# Patient Record
Sex: FEMALE | Race: ASIAN | NOT HISPANIC OR LATINO | Employment: UNEMPLOYED | URBAN - NONMETROPOLITAN AREA
[De-identification: names, ages, dates, MRNs, and addresses within clinical notes are randomized per-mention and may not be internally consistent; named-entity substitution may affect disease eponyms.]

---

## 2020-01-17 ENCOUNTER — HOSPITAL ENCOUNTER (EMERGENCY)
Facility: HOSPITAL | Age: 45
Discharge: HOME | End: 2020-01-17
Attending: EMERGENCY MEDICINE
Payer: COMMERCIAL

## 2020-01-17 VITALS
SYSTOLIC BLOOD PRESSURE: 204 MMHG | TEMPERATURE: 97.7 F | OXYGEN SATURATION: 97 % | RESPIRATION RATE: 22 BRPM | HEART RATE: 107 BPM | DIASTOLIC BLOOD PRESSURE: 93 MMHG

## 2020-01-17 DIAGNOSIS — J06.9 URI (UPPER RESPIRATORY INFECTION): Primary | ICD-10-CM

## 2020-01-17 PROCEDURE — 99283 EMERGENCY DEPT VISIT LOW MDM: CPT

## 2020-01-17 PROCEDURE — 99284 EMERGENCY DEPT VISIT MOD MDM: CPT | Performed by: PHYSICIAN ASSISTANT

## 2020-01-17 RX ORDER — PREDNISONE 20 MG/1
TABLET ORAL
Qty: 18 TABLET | Refills: 0 | Status: SHIPPED | OUTPATIENT
Start: 2020-01-17 | End: 2020-04-08 | Stop reason: HOSPADM

## 2020-01-17 RX ORDER — AZITHROMYCIN 250 MG/1
TABLET, FILM COATED ORAL
Qty: 6 TABLET | Refills: 0 | Status: SHIPPED | OUTPATIENT
Start: 2020-01-17 | End: 2020-01-21

## 2020-03-28 ENCOUNTER — HOSPITAL ENCOUNTER (EMERGENCY)
Facility: HOSPITAL | Age: 45
Discharge: HOME | End: 2020-03-28
Attending: EMERGENCY MEDICINE | Admitting: EMERGENCY MEDICINE
Payer: COMMERCIAL

## 2020-03-28 ENCOUNTER — APPOINTMENT (EMERGENCY)
Dept: CT IMAGING | Facility: HOSPITAL | Age: 45
End: 2020-03-28
Payer: COMMERCIAL

## 2020-03-28 VITALS
TEMPERATURE: 97.3 F | DIASTOLIC BLOOD PRESSURE: 80 MMHG | HEART RATE: 80 BPM | SYSTOLIC BLOOD PRESSURE: 152 MMHG | RESPIRATION RATE: 18 BRPM | WEIGHT: 236.33 LBS | OXYGEN SATURATION: 98 %

## 2020-03-28 DIAGNOSIS — M54.9 BACK PAIN: Primary | ICD-10-CM

## 2020-03-28 LAB
ALBUMIN SERPL BCP-MCNC: 2.5 G/DL (ref 3.5–5)
ALP SERPL-CCNC: 99 U/L (ref 46–116)
ALT SERPL W P-5'-P-CCNC: 14 U/L (ref 12–78)
ANION GAP SERPL CALCULATED.3IONS-SCNC: 7 MMOL/L (ref 4–13)
AST SERPL W P-5'-P-CCNC: 9 U/L (ref 5–45)
BACTERIA UR QL AUTO: NORMAL /HPF
BASOPHILS # BLD AUTO: 0.1 THOUSANDS/ΜL (ref 0–0.1)
BASOPHILS NFR BLD AUTO: 1 % (ref 0–1)
BILIRUB SERPL-MCNC: 0.2 MG/DL (ref 0.2–1)
BILIRUB UR QL STRIP: NEGATIVE
BUN SERPL-MCNC: 17 MG/DL (ref 5–25)
CALCIUM SERPL-MCNC: 9.1 MG/DL (ref 8.3–10.1)
CHLORIDE SERPL-SCNC: 103 MMOL/L (ref 100–108)
CLARITY UR: CLEAR
CO2 SERPL-SCNC: 27 MMOL/L (ref 21–32)
COLOR UR: YELLOW
CREAT SERPL-MCNC: 1.11 MG/DL (ref 0.6–1.3)
EOSINOPHIL # BLD AUTO: 0.53 THOUSAND/ΜL (ref 0–0.61)
EOSINOPHIL NFR BLD AUTO: 5 % (ref 0–6)
ERYTHROCYTE [DISTWIDTH] IN BLOOD BY AUTOMATED COUNT: 21.7 % (ref 11.6–15.1)
GFR SERPL CREATININE-BSD FRML MDRD: 60 ML/MIN/1.73SQ M
GLUCOSE SERPL-MCNC: 105 MG/DL (ref 65–140)
GLUCOSE UR STRIP-MCNC: NEGATIVE MG/DL
HCT VFR BLD AUTO: 32.6 % (ref 34.8–46.1)
HGB BLD-MCNC: 9.7 G/DL (ref 11.5–15.4)
HGB UR QL STRIP.AUTO: NEGATIVE
IMM GRANULOCYTES # BLD AUTO: 0.05 THOUSAND/UL (ref 0–0.2)
IMM GRANULOCYTES NFR BLD AUTO: 0 % (ref 0–2)
KETONES UR STRIP-MCNC: NEGATIVE MG/DL
LACTATE SERPL-SCNC: 1.2 MMOL/L (ref 0.5–2)
LEUKOCYTE ESTERASE UR QL STRIP: NEGATIVE
LIPASE SERPL-CCNC: 112 U/L (ref 73–393)
LYMPHOCYTES # BLD AUTO: 2.65 THOUSANDS/ΜL (ref 0.6–4.47)
LYMPHOCYTES NFR BLD AUTO: 23 % (ref 14–44)
MCH RBC QN AUTO: 23.4 PG (ref 26.8–34.3)
MCHC RBC AUTO-ENTMCNC: 29.8 G/DL (ref 31.4–37.4)
MCV RBC AUTO: 79 FL (ref 82–98)
MONOCYTES # BLD AUTO: 1.04 THOUSAND/ΜL (ref 0.17–1.22)
MONOCYTES NFR BLD AUTO: 9 % (ref 4–12)
NEUTROPHILS # BLD AUTO: 7.31 THOUSANDS/ΜL (ref 1.85–7.62)
NEUTS SEG NFR BLD AUTO: 62 % (ref 43–75)
NITRITE UR QL STRIP: NEGATIVE
NON-SQ EPI CELLS URNS QL MICRO: NORMAL /HPF
NRBC BLD AUTO-RTO: 0 /100 WBCS
PH UR STRIP.AUTO: 7 [PH]
PLATELET # BLD AUTO: 411 THOUSANDS/UL (ref 149–390)
PMV BLD AUTO: 9.7 FL (ref 8.9–12.7)
POTASSIUM SERPL-SCNC: 4.6 MMOL/L (ref 3.5–5.3)
PROT SERPL-MCNC: 7.6 G/DL (ref 6.4–8.2)
PROT UR STRIP-MCNC: ABNORMAL MG/DL
RBC # BLD AUTO: 4.15 MILLION/UL (ref 3.81–5.12)
RBC #/AREA URNS AUTO: NORMAL /HPF
SODIUM SERPL-SCNC: 137 MMOL/L (ref 136–145)
SP GR UR STRIP.AUTO: 1.02 (ref 1–1.03)
UROBILINOGEN UR QL STRIP.AUTO: 0.2 E.U./DL
WBC # BLD AUTO: 11.68 THOUSAND/UL (ref 4.31–10.16)
WBC #/AREA URNS AUTO: NORMAL /HPF

## 2020-03-28 PROCEDURE — 96374 THER/PROPH/DIAG INJ IV PUSH: CPT

## 2020-03-28 PROCEDURE — 81001 URINALYSIS AUTO W/SCOPE: CPT | Performed by: PHYSICIAN ASSISTANT

## 2020-03-28 PROCEDURE — 96361 HYDRATE IV INFUSION ADD-ON: CPT

## 2020-03-28 PROCEDURE — 87040 BLOOD CULTURE FOR BACTERIA: CPT | Performed by: PHYSICIAN ASSISTANT

## 2020-03-28 PROCEDURE — 80053 COMPREHEN METABOLIC PANEL: CPT | Performed by: PHYSICIAN ASSISTANT

## 2020-03-28 PROCEDURE — 36415 COLL VENOUS BLD VENIPUNCTURE: CPT | Performed by: PHYSICIAN ASSISTANT

## 2020-03-28 PROCEDURE — 83690 ASSAY OF LIPASE: CPT | Performed by: PHYSICIAN ASSISTANT

## 2020-03-28 PROCEDURE — 83605 ASSAY OF LACTIC ACID: CPT | Performed by: PHYSICIAN ASSISTANT

## 2020-03-28 PROCEDURE — 74177 CT ABD & PELVIS W/CONTRAST: CPT

## 2020-03-28 PROCEDURE — 85025 COMPLETE CBC W/AUTO DIFF WBC: CPT | Performed by: PHYSICIAN ASSISTANT

## 2020-03-28 PROCEDURE — 99284 EMERGENCY DEPT VISIT MOD MDM: CPT

## 2020-03-28 PROCEDURE — 99284 EMERGENCY DEPT VISIT MOD MDM: CPT | Performed by: PHYSICIAN ASSISTANT

## 2020-03-28 RX ORDER — KETOROLAC TROMETHAMINE 30 MG/ML
15 INJECTION, SOLUTION INTRAMUSCULAR; INTRAVENOUS ONCE
Status: COMPLETED | OUTPATIENT
Start: 2020-03-28 | End: 2020-03-28

## 2020-03-28 RX ORDER — NAPROXEN 250 MG/1
250 TABLET ORAL 2 TIMES DAILY WITH MEALS
Qty: 15 TABLET | Refills: 0 | Status: SHIPPED | OUTPATIENT
Start: 2020-03-28 | End: 2020-04-08 | Stop reason: HOSPADM

## 2020-03-28 RX ADMIN — KETOROLAC TROMETHAMINE 15 MG: 30 INJECTION, SOLUTION INTRAMUSCULAR at 19:38

## 2020-03-28 RX ADMIN — IOHEXOL 100 ML: 350 INJECTION, SOLUTION INTRAVENOUS at 19:02

## 2020-03-28 RX ADMIN — SODIUM CHLORIDE 1000 ML: 0.9 INJECTION, SOLUTION INTRAVENOUS at 18:11

## 2020-03-28 NOTE — ED PROVIDER NOTES
History  Chief Complaint   Patient presents with    Flank Pain     pt c/o left flank pain and left groin pain with increasing pain on urination  patient also states she has sweats and chills     Patient presents to the emergency department today for evaluation left low back pain  She states this commenced about 5 days ago  She states it radiates around the left side of her abdomen low  She also states that she has some burning with urination, urinary frequency, voiding small amounts of urine as well  No vaginal bleeding discharge  Denies cough or fever  No trauma  Prior to Admission Medications   Prescriptions Last Dose Informant Patient Reported? Taking?   predniSONE 20 mg tablet   No No   Sig: Take 3 tablets daily for 3 days followed by 2 tablets daily for 3 days followed by 1 tablet daily for 3 days      Facility-Administered Medications: None       Past Medical History:   Diagnosis Date    Asthma     Diabetes mellitus ([address] )     Hypertension        Past Surgical History:   Procedure Laterality Date     SECTION         History reviewed  No pertinent family history  I have reviewed and agree with the history as documented  E-Cigarette/Vaping    E-Cigarette Use Never User      E-Cigarette/Vaping Substances     Social History     Tobacco Use    Smoking status: Never Smoker    Smokeless tobacco: Never Used   Substance Use Topics    Alcohol use: Not Currently    Drug use: Never       Review of Systems   Constitutional: Negative  Negative for chills and fever  HENT: Negative  Negative for sore throat and trouble swallowing  Eyes: Negative  Respiratory: Negative  Negative for cough, shortness of breath and wheezing  Cardiovascular: Negative  Negative for chest pain and leg swelling  Gastrointestinal: Positive for abdominal pain  Negative for blood in stool and vomiting  Endocrine: Negative      Genitourinary: Positive for difficulty urinating, dysuria, flank pain and frequency  Musculoskeletal: Positive for back pain  Negative for neck stiffness  Skin: Negative  Allergic/Immunologic: Negative  Neurological: Negative  Negative for dizziness, seizures, speech difficulty, weakness, light-headedness, numbness and headaches  Hematological: Negative  Psychiatric/Behavioral: Negative  All other systems reviewed and are negative  Physical Exam  Physical Exam   Constitutional: She is oriented to person, place, and time  Vital signs are normal  She appears well-developed and well-nourished  She does not have a sickly appearance  She does not appear ill  No distress  HENT:   Right Ear: External ear normal  No swelling  Tympanic membrane is not bulging  Left Ear: External ear normal  No swelling  Tympanic membrane is not bulging  Nose: Nose normal    Mouth/Throat: Oropharynx is clear and moist  No oropharyngeal exudate  Eyes: Pupils are equal, round, and reactive to light  Conjunctivae, EOM and lids are normal    Neck: Normal range of motion  Neck supple  No JVD present  No tracheal deviation, no edema and normal range of motion present  No thyromegaly present  Cardiovascular: Normal rate, regular rhythm, normal heart sounds, intact distal pulses and normal pulses  Exam reveals no gallop and no friction rub  No murmur heard  Pulmonary/Chest: Effort normal and breath sounds normal  No stridor  No respiratory distress  She has no wheezes  She has no rales  She exhibits no tenderness  Abdominal: Soft  Bowel sounds are normal  She exhibits no distension and no mass  There is tenderness  There is no rebound, no guarding and negative Campos's sign  No hernia  Lower quadrant abdominal 10   Musculoskeletal: Normal range of motion  She exhibits tenderness  She exhibits no edema  Left low back tenderness   Lymphadenopathy:     She has no cervical adenopathy  Neurological: She is alert and oriented to person, place, and time   She has normal strength and normal reflexes  No cranial nerve deficit or sensory deficit  GCS eye subscore is 4  GCS verbal subscore is 5  GCS motor subscore is 6  Skin: Skin is warm and dry  Capillary refill takes less than 2 seconds  No rash noted  She is not diaphoretic  No erythema  No pallor  Psychiatric: She has a normal mood and affect  Her speech is normal and behavior is normal    Vitals reviewed  Vital Signs  ED Triage Vitals [03/28/20 1757]   Temperature Pulse Respirations Blood Pressure SpO2   (!) 97 3 °F (36 3 °C) 88 20 (!) 189/82 99 %      Temp Source Heart Rate Source Patient Position - Orthostatic VS BP Location FiO2 (%)   Temporal Monitor Lying Right arm --      Pain Score       Worst Possible Pain           Vitals:    03/28/20 1845 03/28/20 1915 03/28/20 1930 03/28/20 1938   BP: 168/77   152/80   Pulse: 90 89 87 80   Patient Position - Orthostatic VS: Lying   Lying         Visual Acuity      ED Medications  Medications   sodium chloride 0 9 % bolus 1,000 mL (0 mL Intravenous Stopped 3/28/20 1935)   iohexol (OMNIPAQUE) 350 MG/ML injection (SINGLE-DOSE) 100 mL (100 mL Intravenous Given 3/28/20 1902)   ketorolac (TORADOL) injection 15 mg (15 mg Intravenous Given 3/28/20 1938)       Diagnostic Studies  Results Reviewed     Procedure Component Value Units Date/Time    Lactic acid, plasma x2 [[id number]]  (Normal) Collected:  03/28/20 1808    Lab Status:  Final result Specimen:  Blood from Arm, Left Updated:  03/28/20 1844     LACTIC ACID 1 2 mmol/L     Narrative:       Result may be elevated if tourniquet was used during collection      Comprehensive metabolic panel [[id number]]  (Abnormal) Collected:  03/28/20 1808    Lab Status:  Final result Specimen:  Blood from Arm, Left Updated:  03/28/20 1840     Sodium 137 mmol/L      Potassium 4 6 mmol/L      Chloride 103 mmol/L      CO2 27 mmol/L      ANION GAP 7 mmol/L      BUN 17 mg/dL      Creatinine 1 11 mg/dL      Glucose 105 mg/dL      Calcium 9 1 mg/dL      AST 9 U/L ALT 14 U/L      Alkaline Phosphatase 99 U/L      Total Protein 7 6 g/dL      Albumin 2 5 g/dL      Total Bilirubin 0 20 mg/dL      eGFR 60 ml/min/1 73sq m     Narrative:       [place] guidelines for Chronic Kidney Disease (CKD):     Stage 1 with normal or high GFR (GFR > 90 mL/min/1 73 square meters)    Stage 2 Mild CKD (GFR = 60-89 mL/min/1 73 square meters)    Stage 3A Moderate CKD (GFR = 45-59 mL/min/1 73 square meters)    Stage 3B Moderate CKD (GFR = 30-44 mL/min/1 73 square meters)    Stage 4 Severe CKD (GFR = 15-29 mL/min/1 73 square meters)    Stage 5 End Stage CKD (GFR <15 mL/min/1 73 square meters)  Note: GFR calculation is accurate only with a steady state creatinine    Urine Microscopic [[id number]]  (Normal) Collected:  03/28/20 1802    Lab Status:  Final result Specimen:  Urine, Clean Catch Updated:  03/28/20 1840     RBC, UA None Seen /hpf      WBC, UA None Seen /hpf      Epithelial Cells Occasional /hpf      Bacteria, UA Occasional /hpf     Lipase [[id number]]  (Normal) Collected:  03/28/20 1808    Lab Status:  Final result Specimen:  Blood from Arm, Left Updated:  03/28/20 1836     Lipase 112 u/L     UA w Reflex to Microscopic w Reflex to Culture [[id number]]  (Abnormal) Collected:  03/28/20 1802    Lab Status:  Final result Specimen:  Urine, Clean Catch Updated:  03/28/20 1825     Color, UA Yellow     Clarity, UA Clear     Specific [place], UA 1 020     pH, UA 7 0     Leukocytes, UA Negative     Nitrite, UA Negative     Protein,  (2+) mg/dl      Glucose, UA Negative mg/dl      Ketones, UA Negative mg/dl      Urobilinogen, UA 0 2 E U /dl      Bilirubin, UA Negative     Blood, UA Negative    CBC and differential [[id number]]  (Abnormal) Collected:  03/28/20 1809    Lab Status:  Final result Specimen:  Blood from Arm, Left Updated:  03/28/20 1824     WBC 11 68 Thousand/uL      RBC 4 15 Million/uL      Hemoglobin 9 7 g/dL      Hematocrit 32 6 %      MCV 79 fL      MCH 23 4 pg      MCHC 29 8 g/dL      RDW 21 7 %      MPV 9 7 fL      Platelets 101 Thousands/uL      nRBC 0 /100 WBCs      Neutrophils Relative 62 %      Immat GRANS % 0 %      Lymphocytes Relative 23 %      Monocytes Relative 9 %      Eosinophils Relative 5 %      Basophils Relative 1 %      Neutrophils Absolute 7 31 Thousands/µL      Immature Grans Absolute 0 05 Thousand/uL      Lymphocytes Absolute 2 65 Thousands/µL      Monocytes Absolute 1 04 Thousand/µL      Eosinophils Absolute 0 53 Thousand/µL      Basophils Absolute 0 10 Thousands/µL     Blood culture #2 [[id number]] Collected:  03/28/20 1808    Lab Status: In process Specimen:  Blood from Arm, Left Updated:  03/28/20 1821    Blood culture #1 [[id number]] Collected:  03/28/20 1808    Lab Status: In process Specimen:  Blood from Arm, Right Updated:  03/28/20 1821                 CT abdomen pelvis with contrast   Final Result by Adelaida Palomo MD (03/28 1917)      No acute pathology              Workstation performed: [id number]                    Procedures  Procedures         ED Course  ED Course as of Mar 28 1940   Sat Mar 28, 2020   1807 Blood Pressure(!): 189/82   1807 Temperature(!): 97 3 °F (36 3 °C)   1807 Pulse: 88   1807 Respirations: 20   1807 SpO2: 99 %   1827 Patient does receive IV infusion of iron      1828 Blood, UA: Negative   1828 Ketones, UA: Negative   1828 POCT URINE PROTEIN(!): 100 (2+)   1828 Nitrite, UA: Negative   1828 Leukocytes, UA: Negative   1828 Color, UA: Yellow   1836 Lipase: 112   1858 LACTIC ACID: 1 2   1858 Sodium: 137   1858 Potassium: 4 6   1858 Chloride: 103   1858 BUN: 17   1858 Calcium: 9 1   1858 Lipase: 112   1859 Pt at [address]:     ABDOMEN     LOWER CHEST:  No clinically significant abnormality identified in the visualized lower chest      LIVER/BILIARY TREE:  One or more subcentimeter sharply circumscribed low-density hepatic lesion(s) are noted, too small to accurately characterize, but statistically most likely to represent subcentimeter hepatic cysts  No suspicious solid hepatic   lesion is identified  Hepatic contours are normal   No biliary dilatation      GALLBLADDER:  No calcified gallstones  No pericholecystic inflammatory change      SPLEEN:  Unremarkable      PANCREAS:  Unremarkable      ADRENAL GLANDS:  Unremarkable      KIDNEYS/URETERS:  Unremarkable  No hydronephrosis      STOMACH AND BOWEL:  Unremarkable      APPENDIX:  No findings to suggest appendicitis      ABDOMINOPELVIC CAVITY:  No ascites or free intraperitoneal air  No lymphadenopathy      VESSELS:  Unremarkable for patient's age      PELVIS     REPRODUCTIVE ORGANS:  Unremarkable for patient's age      URINARY BLADDER:  Unremarkable      ABDOMINAL WALL/INGUINAL REGIONS:  There is a small fat-containing umbilical hernia      OSSEOUS STRUCTURES:  No acute fracture or destructive osseous lesion      IMPRESSION:     No acute pathology                                        MDM      Disposition  Final diagnoses:   Back pain     Time reflects when diagnosis was documented in both MDM as applicable and the Disposition within this note     Time User Action Codes Description Comment    3/28/2020  7:35 PM Cuca Marie [M54 9] Back pain       ED Disposition     ED Disposition Condition Date/Time Comment    Discharge Stable Sat Mar 28, 2020  7:35  [facility] Sree discharge to home/self care  Follow-up Information     Follow up With Specialties Details Why Marco Faulkner 8, DO Family Medicine Schedule an appointment as soon as possible for a visit  As needed [address]  [phone]            Patient's Medications   Discharge Prescriptions    NAPROXEN (NAPROSYN) 250 MG TABLET    Take 1 tablet (250 mg total) by mouth 2 (two) times a day with meals       Start Date: 3/28/2020 End Date: --       Order Dose: 250 mg       Quantity: 15 tablet    Refills: 0     No discharge procedures on file      PDMP Review None          ED Provider  Electronically Signed by           Rodrick Boast, PA-C  03/28/20 1940

## 2020-04-03 LAB
BACTERIA BLD CULT: NORMAL
BACTERIA BLD CULT: NORMAL

## 2020-04-06 ENCOUNTER — APPOINTMENT (EMERGENCY)
Dept: CT IMAGING | Facility: HOSPITAL | Age: 45
End: 2020-04-06
Payer: COMMERCIAL

## 2020-04-06 ENCOUNTER — HOSPITAL ENCOUNTER (OUTPATIENT)
Facility: HOSPITAL | Age: 45
Setting detail: OBSERVATION
Discharge: HOME | End: 2020-04-08
Attending: EMERGENCY MEDICINE | Admitting: INTERNAL MEDICINE
Payer: COMMERCIAL

## 2020-04-06 ENCOUNTER — APPOINTMENT (EMERGENCY)
Dept: RADIOLOGY | Facility: HOSPITAL | Age: 45
End: 2020-04-06
Payer: COMMERCIAL

## 2020-04-06 DIAGNOSIS — I77.9 AORTA DISORDER (HCC): ICD-10-CM

## 2020-04-06 DIAGNOSIS — D50.9 IRON DEFICIENCY ANEMIA, UNSPECIFIED IRON DEFICIENCY ANEMIA TYPE: ICD-10-CM

## 2020-04-06 DIAGNOSIS — R31.9 HEMATURIA: ICD-10-CM

## 2020-04-06 DIAGNOSIS — R07.2 RETROSTERNAL CHEST PAIN: Primary | ICD-10-CM

## 2020-04-06 DIAGNOSIS — I27.20 PULMONARY HYPERTENSION (HCC): ICD-10-CM

## 2020-04-06 DIAGNOSIS — R10.9 LEFT FLANK PAIN: ICD-10-CM

## 2020-04-06 PROBLEM — E11.69 DIABETES MELLITUS TYPE 2 IN OBESE (HCC): Status: ACTIVE | Noted: 2020-04-06

## 2020-04-06 PROBLEM — E04.2 MULTIPLE THYROID NODULES: Status: ACTIVE | Noted: 2020-04-06

## 2020-04-06 PROBLEM — D75.839 THROMBOCYTOSIS: Status: ACTIVE | Noted: 2020-04-06

## 2020-04-06 PROBLEM — E66.9 DIABETES MELLITUS TYPE 2 IN OBESE (HCC): Status: ACTIVE | Noted: 2020-04-06

## 2020-04-06 LAB
ALBUMIN SERPL BCP-MCNC: 2.3 G/DL (ref 3.5–5)
ALP SERPL-CCNC: 279 U/L (ref 46–116)
ALT SERPL W P-5'-P-CCNC: 60 U/L (ref 12–78)
ANION GAP SERPL CALCULATED.3IONS-SCNC: 11 MMOL/L (ref 4–13)
AST SERPL W P-5'-P-CCNC: 39 U/L (ref 5–45)
BACTERIA UR QL AUTO: ABNORMAL /HPF
BASOPHILS # BLD AUTO: 0.09 THOUSANDS/ΜL (ref 0–0.1)
BASOPHILS NFR BLD AUTO: 1 % (ref 0–1)
BILIRUB SERPL-MCNC: 0.2 MG/DL (ref 0.2–1)
BILIRUB UR QL STRIP: NEGATIVE
BUN SERPL-MCNC: 14 MG/DL (ref 5–25)
CALCIUM SERPL-MCNC: 9.8 MG/DL (ref 8.3–10.1)
CHLORIDE SERPL-SCNC: 101 MMOL/L (ref 100–108)
CLARITY UR: ABNORMAL
CO2 SERPL-SCNC: 24 MMOL/L (ref 21–32)
COLOR UR: ABNORMAL
CREAT SERPL-MCNC: 0.87 MG/DL (ref 0.6–1.3)
EOSINOPHIL # BLD AUTO: 0.34 THOUSAND/ΜL (ref 0–0.61)
EOSINOPHIL NFR BLD AUTO: 2 % (ref 0–6)
ERYTHROCYTE [DISTWIDTH] IN BLOOD BY AUTOMATED COUNT: 20.9 % (ref 11.6–15.1)
EXT PREG TEST URINE: NEGATIVE
EXT. CONTROL ED NAV: NORMAL
GFR SERPL CREATININE-BSD FRML MDRD: 80 ML/MIN/1.73SQ M
GLUCOSE SERPL-MCNC: 118 MG/DL (ref 65–140)
GLUCOSE SERPL-MCNC: 132 MG/DL (ref 65–140)
GLUCOSE UR STRIP-MCNC: NEGATIVE MG/DL
HCT VFR BLD AUTO: 31.8 % (ref 34.8–46.1)
HGB BLD-MCNC: 9.6 G/DL (ref 11.5–15.4)
HGB UR QL STRIP.AUTO: ABNORMAL
HOLD SPECIMEN: NORMAL
IMM GRANULOCYTES # BLD AUTO: 0.11 THOUSAND/UL (ref 0–0.2)
IMM GRANULOCYTES NFR BLD AUTO: 1 % (ref 0–2)
INR PPP: 1.11 (ref 0.84–1.19)
KETONES UR STRIP-MCNC: NEGATIVE MG/DL
LEUKOCYTE ESTERASE UR QL STRIP: ABNORMAL
LIPASE SERPL-CCNC: 99 U/L (ref 73–393)
LYMPHOCYTES # BLD AUTO: 2.4 THOUSANDS/ΜL (ref 0.6–4.47)
LYMPHOCYTES NFR BLD AUTO: 16 % (ref 14–44)
MAGNESIUM SERPL-MCNC: 2 MG/DL (ref 1.6–2.6)
MCH RBC QN AUTO: 23.1 PG (ref 26.8–34.3)
MCHC RBC AUTO-ENTMCNC: 30.2 G/DL (ref 31.4–37.4)
MCV RBC AUTO: 76 FL (ref 82–98)
MONOCYTES # BLD AUTO: 1.24 THOUSAND/ΜL (ref 0.17–1.22)
MONOCYTES NFR BLD AUTO: 8 % (ref 4–12)
NEUTROPHILS # BLD AUTO: 10.76 THOUSANDS/ΜL (ref 1.85–7.62)
NEUTS SEG NFR BLD AUTO: 72 % (ref 43–75)
NITRITE UR QL STRIP: NEGATIVE
NON-SQ EPI CELLS URNS QL MICRO: ABNORMAL /HPF
NRBC BLD AUTO-RTO: 0 /100 WBCS
NT-PROBNP SERPL-MCNC: 353 PG/ML
PH UR STRIP.AUTO: 5.5 [PH]
PLATELET # BLD AUTO: 612 THOUSANDS/UL (ref 149–390)
PLATELET # BLD AUTO: 644 THOUSANDS/UL (ref 149–390)
PMV BLD AUTO: 9.2 FL (ref 8.9–12.7)
PMV BLD AUTO: 9.7 FL (ref 8.9–12.7)
POTASSIUM SERPL-SCNC: 4.2 MMOL/L (ref 3.5–5.3)
PROT SERPL-MCNC: 8.8 G/DL (ref 6.4–8.2)
PROT UR STRIP-MCNC: >=300 MG/DL
PROTHROMBIN TIME: 14.3 SECONDS (ref 11.6–14.5)
RBC # BLD AUTO: 4.16 MILLION/UL (ref 3.81–5.12)
RBC #/AREA URNS AUTO: ABNORMAL /HPF
SODIUM SERPL-SCNC: 136 MMOL/L (ref 136–145)
SP GR UR STRIP.AUTO: 1.02 (ref 1–1.03)
TROPONIN I SERPL-MCNC: <0.02 NG/ML
URINE COMMENT: ABNORMAL
UROBILINOGEN UR QL STRIP.AUTO: 0.2 E.U./DL
WBC # BLD AUTO: 14.94 THOUSAND/UL (ref 4.31–10.16)
WBC #/AREA URNS AUTO: ABNORMAL /HPF

## 2020-04-06 PROCEDURE — 99285 EMERGENCY DEPT VISIT HI MDM: CPT | Performed by: EMERGENCY MEDICINE

## 2020-04-06 PROCEDURE — 83735 ASSAY OF MAGNESIUM: CPT | Performed by: EMERGENCY MEDICINE

## 2020-04-06 PROCEDURE — 84484 ASSAY OF TROPONIN QUANT: CPT

## 2020-04-06 PROCEDURE — 71045 X-RAY EXAM CHEST 1 VIEW: CPT

## 2020-04-06 PROCEDURE — 84484 ASSAY OF TROPONIN QUANT: CPT | Performed by: EMERGENCY MEDICINE

## 2020-04-06 PROCEDURE — 85610 PROTHROMBIN TIME: CPT | Performed by: EMERGENCY MEDICINE

## 2020-04-06 PROCEDURE — 81025 URINE PREGNANCY TEST: CPT | Performed by: EMERGENCY MEDICINE

## 2020-04-06 PROCEDURE — 84484 ASSAY OF TROPONIN QUANT: CPT | Performed by: NURSE PRACTITIONER

## 2020-04-06 PROCEDURE — 81001 URINALYSIS AUTO W/SCOPE: CPT | Performed by: EMERGENCY MEDICINE

## 2020-04-06 PROCEDURE — 93005 ELECTROCARDIOGRAM TRACING: CPT

## 2020-04-06 PROCEDURE — 96375 TX/PRO/DX INJ NEW DRUG ADDON: CPT

## 2020-04-06 PROCEDURE — 85025 COMPLETE CBC W/AUTO DIFF WBC: CPT

## 2020-04-06 PROCEDURE — 99220 PR INITIAL OBSERVATION CARE/DAY 70 MINUTES: CPT | Performed by: NURSE PRACTITIONER

## 2020-04-06 PROCEDURE — 71275 CT ANGIOGRAPHY CHEST: CPT

## 2020-04-06 PROCEDURE — 74175 CTA ABDOMEN W/CONTRAST: CPT

## 2020-04-06 PROCEDURE — 51798 US URINE CAPACITY MEASURE: CPT

## 2020-04-06 PROCEDURE — 80053 COMPREHEN METABOLIC PANEL: CPT

## 2020-04-06 PROCEDURE — 36415 COLL VENOUS BLD VENIPUNCTURE: CPT

## 2020-04-06 PROCEDURE — 83880 ASSAY OF NATRIURETIC PEPTIDE: CPT | Performed by: EMERGENCY MEDICINE

## 2020-04-06 PROCEDURE — 99285 EMERGENCY DEPT VISIT HI MDM: CPT

## 2020-04-06 PROCEDURE — 82948 REAGENT STRIP/BLOOD GLUCOSE: CPT

## 2020-04-06 PROCEDURE — 96374 THER/PROPH/DIAG INJ IV PUSH: CPT

## 2020-04-06 PROCEDURE — 83690 ASSAY OF LIPASE: CPT | Performed by: EMERGENCY MEDICINE

## 2020-04-06 PROCEDURE — 85049 AUTOMATED PLATELET COUNT: CPT | Performed by: NURSE PRACTITIONER

## 2020-04-06 PROCEDURE — 83036 HEMOGLOBIN GLYCOSYLATED A1C: CPT | Performed by: NURSE PRACTITIONER

## 2020-04-06 RX ORDER — ESCITALOPRAM OXALATE 10 MG/1
10 TABLET ORAL DAILY
Status: DISCONTINUED | OUTPATIENT
Start: 2020-04-07 | End: 2020-04-08 | Stop reason: HOSPADM

## 2020-04-06 RX ORDER — MELATONIN
5000 WEEKLY
COMMUNITY
End: 2020-05-28 | Stop reason: ALTCHOICE

## 2020-04-06 RX ORDER — IRON POLYSACCHARIDE COMPLEX 150 MG
150 CAPSULE ORAL 2 TIMES DAILY
COMMUNITY
End: 2020-04-08 | Stop reason: HOSPADM

## 2020-04-06 RX ORDER — FOLIC ACID 1 MG/1
1 TABLET ORAL DAILY
Status: DISCONTINUED | OUTPATIENT
Start: 2020-04-07 | End: 2020-04-08 | Stop reason: HOSPADM

## 2020-04-06 RX ORDER — MONTELUKAST SODIUM 10 MG/1
10 TABLET ORAL
COMMUNITY

## 2020-04-06 RX ORDER — ONDANSETRON 2 MG/ML
4 INJECTION INTRAMUSCULAR; INTRAVENOUS ONCE
Status: COMPLETED | OUTPATIENT
Start: 2020-04-06 | End: 2020-04-06

## 2020-04-06 RX ORDER — METOPROLOL TARTRATE 5 MG/5ML
2.5 INJECTION INTRAVENOUS EVERY 6 HOURS PRN
Status: DISCONTINUED | OUTPATIENT
Start: 2020-04-06 | End: 2020-04-08 | Stop reason: HOSPADM

## 2020-04-06 RX ORDER — LOSARTAN POTASSIUM 100 MG/1
100 TABLET ORAL DAILY
COMMUNITY

## 2020-04-06 RX ORDER — ALBUTEROL SULFATE 90 UG/1
2 AEROSOL, METERED RESPIRATORY (INHALATION) 2 TIMES DAILY
COMMUNITY

## 2020-04-06 RX ORDER — SIMVASTATIN 10 MG
10 TABLET ORAL
COMMUNITY
End: 2020-05-19 | Stop reason: HOSPADM

## 2020-04-06 RX ORDER — IRON POLYSACCHARIDE COMPLEX 150 MG
150 CAPSULE ORAL 2 TIMES DAILY
Status: DISCONTINUED | OUTPATIENT
Start: 2020-04-07 | End: 2020-04-08 | Stop reason: HOSPADM

## 2020-04-06 RX ORDER — FOLIC ACID 1 MG/1
TABLET ORAL DAILY
COMMUNITY
End: 2020-04-08 | Stop reason: HOSPADM

## 2020-04-06 RX ORDER — LOSARTAN POTASSIUM 25 MG/1
25 TABLET ORAL DAILY
Status: DISCONTINUED | OUTPATIENT
Start: 2020-04-07 | End: 2020-04-08

## 2020-04-06 RX ORDER — HYDROMORPHONE HCL/PF 1 MG/ML
0.5 SYRINGE (ML) INJECTION ONCE
Status: COMPLETED | OUTPATIENT
Start: 2020-04-07 | End: 2020-04-07

## 2020-04-06 RX ORDER — PRAVASTATIN SODIUM 20 MG
20 TABLET ORAL
Status: DISCONTINUED | OUTPATIENT
Start: 2020-04-07 | End: 2020-04-08 | Stop reason: HOSPADM

## 2020-04-06 RX ORDER — ESCITALOPRAM OXALATE 10 MG/1
10 TABLET ORAL DAILY
COMMUNITY

## 2020-04-06 RX ORDER — MORPHINE SULFATE 10 MG/ML
5 INJECTION, SOLUTION INTRAMUSCULAR; INTRAVENOUS ONCE
Status: COMPLETED | OUTPATIENT
Start: 2020-04-06 | End: 2020-04-06

## 2020-04-06 RX ORDER — BACLOFEN 10 MG/1
10 TABLET ORAL ONCE
Status: COMPLETED | OUTPATIENT
Start: 2020-04-06 | End: 2020-04-06

## 2020-04-06 RX ORDER — LABETALOL 20 MG/4 ML (5 MG/ML) INTRAVENOUS SYRINGE
10 ONCE
Status: COMPLETED | OUTPATIENT
Start: 2020-04-06 | End: 2020-04-06

## 2020-04-06 RX ADMIN — MORPHINE SULFATE 5 MG: 10 INJECTION INTRAVENOUS at 16:14

## 2020-04-06 RX ADMIN — BACLOFEN 10 MG: 10 TABLET ORAL at 23:00

## 2020-04-06 RX ADMIN — LABETALOL 20 MG/4 ML (5 MG/ML) INTRAVENOUS SYRINGE 10 MG: at 18:48

## 2020-04-06 RX ADMIN — ONDANSETRON 4 MG: 2 INJECTION INTRAMUSCULAR; INTRAVENOUS at 16:13

## 2020-04-06 RX ADMIN — IOHEXOL 100 ML: 350 INJECTION, SOLUTION INTRAVENOUS at 16:32

## 2020-04-07 ENCOUNTER — APPOINTMENT (OUTPATIENT)
Dept: NON INVASIVE DIAGNOSTICS | Facility: HOSPITAL | Age: 45
End: 2020-04-07
Payer: COMMERCIAL

## 2020-04-07 PROBLEM — I71.00 INTRAMURAL AORTIC HEMATOMA (HCC): Status: ACTIVE | Noted: 2020-04-06

## 2020-04-07 PROBLEM — I27.20 PULMONARY HYPERTENSION (HCC): Status: ACTIVE | Noted: 2020-04-07

## 2020-04-07 LAB
ALBUMIN SERPL BCP-MCNC: 2 G/DL (ref 3.5–5)
ALP SERPL-CCNC: 254 U/L (ref 46–116)
ALT SERPL W P-5'-P-CCNC: 45 U/L (ref 12–78)
ANION GAP SERPL CALCULATED.3IONS-SCNC: 11 MMOL/L (ref 4–13)
AST SERPL W P-5'-P-CCNC: 24 U/L (ref 5–45)
ATRIAL RATE: 76 BPM
ATRIAL RATE: 83 BPM
ATRIAL RATE: 85 BPM
ATRIAL RATE: 85 BPM
ATRIAL RATE: 90 BPM
BILIRUB SERPL-MCNC: 0.2 MG/DL (ref 0.2–1)
BUN SERPL-MCNC: 12 MG/DL (ref 5–25)
CALCIUM SERPL-MCNC: 9.4 MG/DL (ref 8.3–10.1)
CHLORIDE SERPL-SCNC: 100 MMOL/L (ref 100–108)
CHOLEST SERPL-MCNC: 135 MG/DL (ref 50–200)
CO2 SERPL-SCNC: 23 MMOL/L (ref 21–32)
CREAT SERPL-MCNC: 1.01 MG/DL (ref 0.6–1.3)
ERYTHROCYTE [DISTWIDTH] IN BLOOD BY AUTOMATED COUNT: 21.1 % (ref 11.6–15.1)
EST. AVERAGE GLUCOSE BLD GHB EST-MCNC: 134 MG/DL
GFR SERPL CREATININE-BSD FRML MDRD: 67 ML/MIN/1.73SQ M
GLUCOSE SERPL-MCNC: 123 MG/DL (ref 65–140)
GLUCOSE SERPL-MCNC: 151 MG/DL (ref 65–140)
GLUCOSE SERPL-MCNC: 155 MG/DL (ref 65–140)
GLUCOSE SERPL-MCNC: 165 MG/DL (ref 65–140)
GLUCOSE SERPL-MCNC: 166 MG/DL (ref 65–140)
HBA1C MFR BLD: 6.3 %
HCT VFR BLD AUTO: 30 % (ref 34.8–46.1)
HDLC SERPL-MCNC: 34 MG/DL
HGB BLD-MCNC: 9.1 G/DL (ref 11.5–15.4)
LDLC SERPL CALC-MCNC: 78 MG/DL (ref 0–100)
MAGNESIUM SERPL-MCNC: 2 MG/DL (ref 1.6–2.6)
MCH RBC QN AUTO: 23.4 PG (ref 26.8–34.3)
MCHC RBC AUTO-ENTMCNC: 30.3 G/DL (ref 31.4–37.4)
MCV RBC AUTO: 77 FL (ref 82–98)
P AXIS: 51 DEGREES
P AXIS: 56 DEGREES
P AXIS: 60 DEGREES
P AXIS: 63 DEGREES
P AXIS: 64 DEGREES
PHOSPHATE SERPL-MCNC: 5 MG/DL (ref 2.7–4.5)
PLATELET # BLD AUTO: 632 THOUSANDS/UL (ref 149–390)
PMV BLD AUTO: 9.4 FL (ref 8.9–12.7)
POTASSIUM SERPL-SCNC: 4.1 MMOL/L (ref 3.5–5.3)
PR INTERVAL: 164 MS
PR INTERVAL: 170 MS
PR INTERVAL: 172 MS
PR INTERVAL: 172 MS
PR INTERVAL: 176 MS
PROT SERPL-MCNC: 8.2 G/DL (ref 6.4–8.2)
QRS AXIS: 40 DEGREES
QRS AXIS: 44 DEGREES
QRS AXIS: 48 DEGREES
QRS AXIS: 50 DEGREES
QRS AXIS: 50 DEGREES
QRSD INTERVAL: 78 MS
QRSD INTERVAL: 82 MS
QRSD INTERVAL: 82 MS
QRSD INTERVAL: 86 MS
QRSD INTERVAL: 88 MS
QT INTERVAL: 360 MS
QT INTERVAL: 378 MS
QT INTERVAL: 382 MS
QT INTERVAL: 394 MS
QT INTERVAL: 406 MS
QTC INTERVAL: 440 MS
QTC INTERVAL: 449 MS
QTC INTERVAL: 454 MS
QTC INTERVAL: 456 MS
QTC INTERVAL: 462 MS
RBC # BLD AUTO: 3.89 MILLION/UL (ref 3.81–5.12)
SODIUM SERPL-SCNC: 134 MMOL/L (ref 136–145)
T WAVE AXIS: 67 DEGREES
T WAVE AXIS: 70 DEGREES
T WAVE AXIS: 71 DEGREES
T WAVE AXIS: 75 DEGREES
T WAVE AXIS: 76 DEGREES
TRIGL SERPL-MCNC: 114 MG/DL
TROPONIN I SERPL-MCNC: <0.02 NG/ML
VENTRICULAR RATE: 76 BPM
VENTRICULAR RATE: 83 BPM
VENTRICULAR RATE: 85 BPM
VENTRICULAR RATE: 85 BPM
VENTRICULAR RATE: 90 BPM
WBC # BLD AUTO: 12.67 THOUSAND/UL (ref 4.31–10.16)

## 2020-04-07 PROCEDURE — 93306 TTE W/DOPPLER COMPLETE: CPT | Performed by: INTERNAL MEDICINE

## 2020-04-07 PROCEDURE — 82948 REAGENT STRIP/BLOOD GLUCOSE: CPT

## 2020-04-07 PROCEDURE — 99244 OFF/OP CNSLTJ NEW/EST MOD 40: CPT | Performed by: PHYSICIAN ASSISTANT

## 2020-04-07 PROCEDURE — 85027 COMPLETE CBC AUTOMATED: CPT | Performed by: NURSE PRACTITIONER

## 2020-04-07 PROCEDURE — 93010 ELECTROCARDIOGRAM REPORT: CPT | Performed by: INTERNAL MEDICINE

## 2020-04-07 PROCEDURE — 80061 LIPID PANEL: CPT | Performed by: NURSE PRACTITIONER

## 2020-04-07 PROCEDURE — 93306 TTE W/DOPPLER COMPLETE: CPT

## 2020-04-07 PROCEDURE — 83735 ASSAY OF MAGNESIUM: CPT | Performed by: NURSE PRACTITIONER

## 2020-04-07 PROCEDURE — 84100 ASSAY OF PHOSPHORUS: CPT | Performed by: NURSE PRACTITIONER

## 2020-04-07 PROCEDURE — 99225 PR SBSQ OBSERVATION CARE/DAY 25 MINUTES: CPT | Performed by: PHYSICIAN ASSISTANT

## 2020-04-07 PROCEDURE — 90686 IIV4 VACC NO PRSV 0.5 ML IM: CPT | Performed by: INTERNAL MEDICINE

## 2020-04-07 PROCEDURE — 90471 IMMUNIZATION ADMIN: CPT | Performed by: INTERNAL MEDICINE

## 2020-04-07 PROCEDURE — 80053 COMPREHEN METABOLIC PANEL: CPT | Performed by: NURSE PRACTITIONER

## 2020-04-07 RX ORDER — FUROSEMIDE 10 MG/ML
40 INJECTION INTRAMUSCULAR; INTRAVENOUS ONCE
Status: COMPLETED | OUTPATIENT
Start: 2020-04-07 | End: 2020-04-07

## 2020-04-07 RX ORDER — SODIUM CHLORIDE 9 MG/ML
75 INJECTION, SOLUTION INTRAVENOUS CONTINUOUS
Status: DISCONTINUED | OUTPATIENT
Start: 2020-04-07 | End: 2020-04-07

## 2020-04-07 RX ORDER — ACETAMINOPHEN 325 MG/1
650 TABLET ORAL EVERY 6 HOURS PRN
Status: DISCONTINUED | OUTPATIENT
Start: 2020-04-07 | End: 2020-04-08 | Stop reason: HOSPADM

## 2020-04-07 RX ORDER — OXYCODONE HYDROCHLORIDE 10 MG/1
10 TABLET ORAL EVERY 6 HOURS PRN
Status: DISCONTINUED | OUTPATIENT
Start: 2020-04-07 | End: 2020-04-08 | Stop reason: HOSPADM

## 2020-04-07 RX ORDER — OXYCODONE HYDROCHLORIDE 5 MG/1
5 TABLET ORAL EVERY 6 HOURS PRN
Status: DISCONTINUED | OUTPATIENT
Start: 2020-04-07 | End: 2020-04-08 | Stop reason: HOSPADM

## 2020-04-07 RX ORDER — IBUPROFEN 400 MG/1
400 TABLET ORAL 2 TIMES DAILY
Status: COMPLETED | OUTPATIENT
Start: 2020-04-07 | End: 2020-04-07

## 2020-04-07 RX ADMIN — HYDROMORPHONE HYDROCHLORIDE 0.5 MG: 1 INJECTION, SOLUTION INTRAMUSCULAR; INTRAVENOUS; SUBCUTANEOUS at 00:07

## 2020-04-07 RX ADMIN — OXYCODONE HYDROCHLORIDE 5 MG: 5 TABLET ORAL at 04:45

## 2020-04-07 RX ADMIN — FOLIC ACID 1 MG: 1 TABLET ORAL at 09:18

## 2020-04-07 RX ADMIN — IBUPROFEN 400 MG: 400 TABLET ORAL at 17:21

## 2020-04-07 RX ADMIN — ESCITALOPRAM OXALATE 10 MG: 10 TABLET ORAL at 09:18

## 2020-04-07 RX ADMIN — IBUPROFEN 400 MG: 400 TABLET ORAL at 10:24

## 2020-04-07 RX ADMIN — OXYCODONE HYDROCHLORIDE 5 MG: 5 TABLET ORAL at 21:24

## 2020-04-07 RX ADMIN — INFLUENZA VIRUS VACCINE 0.5 ML: 15; 15; 15; 15 SUSPENSION INTRAMUSCULAR at 09:34

## 2020-04-07 RX ADMIN — Medication 150 MG: at 17:21

## 2020-04-07 RX ADMIN — INSULIN LISPRO 1 UNITS: 100 INJECTION, SOLUTION INTRAVENOUS; SUBCUTANEOUS at 11:56

## 2020-04-07 RX ADMIN — Medication 150 MG: at 09:18

## 2020-04-07 RX ADMIN — LOSARTAN POTASSIUM 25 MG: 25 TABLET, FILM COATED ORAL at 09:18

## 2020-04-07 RX ADMIN — INSULIN LISPRO 1 UNITS: 100 INJECTION, SOLUTION INTRAVENOUS; SUBCUTANEOUS at 07:41

## 2020-04-07 RX ADMIN — PRAVASTATIN SODIUM 20 MG: 20 TABLET ORAL at 17:21

## 2020-04-07 RX ADMIN — FUROSEMIDE 40 MG: 10 INJECTION, SOLUTION INTRAMUSCULAR; INTRAVENOUS at 09:19

## 2020-04-07 RX ADMIN — ENOXAPARIN SODIUM 40 MG: 40 INJECTION SUBCUTANEOUS at 09:38

## 2020-04-07 RX ADMIN — OXYCODONE HYDROCHLORIDE 10 MG: 10 TABLET ORAL at 09:29

## 2020-04-08 VITALS
WEIGHT: 224.65 LBS | HEART RATE: 104 BPM | HEIGHT: 63 IN | DIASTOLIC BLOOD PRESSURE: 83 MMHG | OXYGEN SATURATION: 96 % | TEMPERATURE: 99.8 F | SYSTOLIC BLOOD PRESSURE: 134 MMHG | BODY MASS INDEX: 39.8 KG/M2 | RESPIRATION RATE: 19 BRPM

## 2020-04-08 LAB
ALBUMIN SERPL BCP-MCNC: 2 G/DL (ref 3.5–5)
ALP SERPL-CCNC: 285 U/L (ref 46–116)
ALT SERPL W P-5'-P-CCNC: 46 U/L (ref 12–78)
ANION GAP SERPL CALCULATED.3IONS-SCNC: 7 MMOL/L (ref 4–13)
AST SERPL W P-5'-P-CCNC: 21 U/L (ref 5–45)
BASOPHILS # BLD AUTO: 0.09 THOUSANDS/ΜL (ref 0–0.1)
BASOPHILS NFR BLD AUTO: 1 % (ref 0–1)
BILIRUB SERPL-MCNC: 0.3 MG/DL (ref 0.2–1)
BUN SERPL-MCNC: 17 MG/DL (ref 5–25)
CALCIUM SERPL-MCNC: 9.4 MG/DL (ref 8.3–10.1)
CHLORIDE SERPL-SCNC: 97 MMOL/L (ref 100–108)
CO2 SERPL-SCNC: 27 MMOL/L (ref 21–32)
CREAT SERPL-MCNC: 1.02 MG/DL (ref 0.6–1.3)
EOSINOPHIL # BLD AUTO: 0.26 THOUSAND/ΜL (ref 0–0.61)
EOSINOPHIL NFR BLD AUTO: 2 % (ref 0–6)
ERYTHROCYTE [DISTWIDTH] IN BLOOD BY AUTOMATED COUNT: 20.4 % (ref 11.6–15.1)
GFR SERPL CREATININE-BSD FRML MDRD: 66 ML/MIN/1.73SQ M
GLUCOSE SERPL-MCNC: 138 MG/DL (ref 65–140)
GLUCOSE SERPL-MCNC: 141 MG/DL (ref 65–140)
GLUCOSE SERPL-MCNC: 143 MG/DL (ref 65–140)
HCT VFR BLD AUTO: 31.2 % (ref 34.8–46.1)
HGB BLD-MCNC: 9.4 G/DL (ref 11.5–15.4)
IMM GRANULOCYTES # BLD AUTO: 0.16 THOUSAND/UL (ref 0–0.2)
IMM GRANULOCYTES NFR BLD AUTO: 1 % (ref 0–2)
LYMPHOCYTES # BLD AUTO: 2.31 THOUSANDS/ΜL (ref 0.6–4.47)
LYMPHOCYTES NFR BLD AUTO: 16 % (ref 14–44)
MCH RBC QN AUTO: 23 PG (ref 26.8–34.3)
MCHC RBC AUTO-ENTMCNC: 30.1 G/DL (ref 31.4–37.4)
MCV RBC AUTO: 76 FL (ref 82–98)
MONOCYTES # BLD AUTO: 1.2 THOUSAND/ΜL (ref 0.17–1.22)
MONOCYTES NFR BLD AUTO: 8 % (ref 4–12)
NEUTROPHILS # BLD AUTO: 10.58 THOUSANDS/ΜL (ref 1.85–7.62)
NEUTS SEG NFR BLD AUTO: 72 % (ref 43–75)
NRBC BLD AUTO-RTO: 0 /100 WBCS
PLATELET # BLD AUTO: 646 THOUSANDS/UL (ref 149–390)
PMV BLD AUTO: 9.3 FL (ref 8.9–12.7)
POTASSIUM SERPL-SCNC: 4.3 MMOL/L (ref 3.5–5.3)
PROT SERPL-MCNC: 8.4 G/DL (ref 6.4–8.2)
RBC # BLD AUTO: 4.09 MILLION/UL (ref 3.81–5.12)
SODIUM SERPL-SCNC: 131 MMOL/L (ref 136–145)
WBC # BLD AUTO: 14.6 THOUSAND/UL (ref 4.31–10.16)

## 2020-04-08 PROCEDURE — 90471 IMMUNIZATION ADMIN: CPT | Performed by: INTERNAL MEDICINE

## 2020-04-08 PROCEDURE — 99213 OFFICE O/P EST LOW 20 MIN: CPT | Performed by: INTERNAL MEDICINE

## 2020-04-08 PROCEDURE — 99217 PR OBSERVATION CARE DISCHARGE MANAGEMENT: CPT | Performed by: FAMILY MEDICINE

## 2020-04-08 PROCEDURE — 85025 COMPLETE CBC W/AUTO DIFF WBC: CPT | Performed by: PHYSICIAN ASSISTANT

## 2020-04-08 PROCEDURE — 90732 PPSV23 VACC 2 YRS+ SUBQ/IM: CPT | Performed by: INTERNAL MEDICINE

## 2020-04-08 PROCEDURE — 80053 COMPREHEN METABOLIC PANEL: CPT | Performed by: PHYSICIAN ASSISTANT

## 2020-04-08 PROCEDURE — 82948 REAGENT STRIP/BLOOD GLUCOSE: CPT

## 2020-04-08 RX ORDER — IBUPROFEN 400 MG/1
400 TABLET ORAL EVERY 12 HOURS
Qty: 20 TABLET | Refills: 0 | Status: SHIPPED | OUTPATIENT
Start: 2020-04-08 | End: 2020-04-11

## 2020-04-08 RX ORDER — FUROSEMIDE 10 MG/ML
40 INJECTION INTRAMUSCULAR; INTRAVENOUS ONCE
Status: COMPLETED | OUTPATIENT
Start: 2020-04-08 | End: 2020-04-08

## 2020-04-08 RX ORDER — IBUPROFEN 400 MG/1
400 TABLET ORAL 3 TIMES DAILY
Status: DISCONTINUED | OUTPATIENT
Start: 2020-04-08 | End: 2020-04-08 | Stop reason: HOSPADM

## 2020-04-08 RX ORDER — ONDANSETRON 2 MG/ML
4 INJECTION INTRAMUSCULAR; INTRAVENOUS EVERY 6 HOURS PRN
Status: DISCONTINUED | OUTPATIENT
Start: 2020-04-08 | End: 2020-04-08 | Stop reason: HOSPADM

## 2020-04-08 RX ADMIN — METOPROLOL TARTRATE 2.5 MG: 5 INJECTION, SOLUTION INTRAVENOUS at 00:11

## 2020-04-08 RX ADMIN — Medication 150 MG: at 09:06

## 2020-04-08 RX ADMIN — ENOXAPARIN SODIUM 40 MG: 40 INJECTION SUBCUTANEOUS at 09:06

## 2020-04-08 RX ADMIN — ONDANSETRON 4 MG: 2 INJECTION INTRAMUSCULAR; INTRAVENOUS at 00:29

## 2020-04-08 RX ADMIN — FOLIC ACID 1 MG: 1 TABLET ORAL at 09:06

## 2020-04-08 RX ADMIN — IBUPROFEN 400 MG: 400 TABLET ORAL at 12:02

## 2020-04-08 RX ADMIN — LOSARTAN POTASSIUM 25 MG: 25 TABLET, FILM COATED ORAL at 09:06

## 2020-04-08 RX ADMIN — PNEUMOCOCCAL VACCINE POLYVALENT 0.5 ML
25; 25; 25; 25; 25; 25; 25; 25; 25; 25; 25; 25; 25; 25; 25; 25; 25; 25; 25; 25; 25; 25; 25 INJECTION, SOLUTION INTRAMUSCULAR; SUBCUTANEOUS at 13:38

## 2020-04-08 RX ADMIN — OXYCODONE HYDROCHLORIDE 10 MG: 10 TABLET ORAL at 09:07

## 2020-04-08 RX ADMIN — FUROSEMIDE 40 MG: 10 INJECTION, SOLUTION INTRAMUSCULAR; INTRAVENOUS at 12:02

## 2020-04-08 RX ADMIN — ESCITALOPRAM OXALATE 10 MG: 10 TABLET ORAL at 09:06

## 2020-04-11 ENCOUNTER — APPOINTMENT (EMERGENCY)
Dept: CT IMAGING | Facility: HOSPITAL | Age: 45
End: 2020-04-11
Payer: COMMERCIAL

## 2020-04-11 ENCOUNTER — HOSPITAL ENCOUNTER (EMERGENCY)
Facility: HOSPITAL | Age: 45
Discharge: HOME | End: 2020-04-11
Attending: EMERGENCY MEDICINE | Admitting: EMERGENCY MEDICINE
Payer: COMMERCIAL

## 2020-04-11 VITALS
HEART RATE: 81 BPM | WEIGHT: 224.87 LBS | DIASTOLIC BLOOD PRESSURE: 80 MMHG | RESPIRATION RATE: 16 BRPM | TEMPERATURE: 97 F | SYSTOLIC BLOOD PRESSURE: 133 MMHG | OXYGEN SATURATION: 97 % | BODY MASS INDEX: 39.84 KG/M2 | HEIGHT: 63 IN

## 2020-04-11 DIAGNOSIS — R79.89 DECREASED THYROID STIMULATING HORMONE (TSH) LEVEL: ICD-10-CM

## 2020-04-11 DIAGNOSIS — R10.9 RIGHT FLANK PAIN: Primary | ICD-10-CM

## 2020-04-11 LAB
ALBUMIN SERPL BCP-MCNC: 2.2 G/DL (ref 3.5–5)
ALP SERPL-CCNC: 292 U/L (ref 46–116)
ALT SERPL W P-5'-P-CCNC: 31 U/L (ref 12–78)
ANION GAP SERPL CALCULATED.3IONS-SCNC: 11 MMOL/L (ref 4–13)
AST SERPL W P-5'-P-CCNC: 24 U/L (ref 5–45)
BACTERIA UR QL AUTO: ABNORMAL /HPF
BASOPHILS # BLD AUTO: 0.1 THOUSANDS/ΜL (ref 0–0.1)
BASOPHILS NFR BLD AUTO: 1 % (ref 0–1)
BILIRUB DIRECT SERPL-MCNC: 0.06 MG/DL (ref 0–0.2)
BILIRUB SERPL-MCNC: 0.3 MG/DL (ref 0.2–1)
BILIRUB UR QL STRIP: NEGATIVE
BUN SERPL-MCNC: 30 MG/DL (ref 5–25)
CALCIUM SERPL-MCNC: 9.8 MG/DL (ref 8.3–10.1)
CHLORIDE SERPL-SCNC: 97 MMOL/L (ref 100–108)
CLARITY UR: CLEAR
CO2 SERPL-SCNC: 24 MMOL/L (ref 21–32)
COLOR UR: YELLOW
CREAT SERPL-MCNC: 1.15 MG/DL (ref 0.6–1.3)
EOSINOPHIL # BLD AUTO: 0.22 THOUSAND/ΜL (ref 0–0.61)
EOSINOPHIL NFR BLD AUTO: 2 % (ref 0–6)
ERYTHROCYTE [DISTWIDTH] IN BLOOD BY AUTOMATED COUNT: 20.8 % (ref 11.6–15.1)
GFR SERPL CREATININE-BSD FRML MDRD: 57 ML/MIN/1.73SQ M
GLUCOSE SERPL-MCNC: 119 MG/DL (ref 65–140)
GLUCOSE UR STRIP-MCNC: NEGATIVE MG/DL
HCT VFR BLD AUTO: 34 % (ref 34.8–46.1)
HGB BLD-MCNC: 10.1 G/DL (ref 11.5–15.4)
HGB UR QL STRIP.AUTO: ABNORMAL
IMM GRANULOCYTES # BLD AUTO: 0.16 THOUSAND/UL (ref 0–0.2)
IMM GRANULOCYTES NFR BLD AUTO: 1 % (ref 0–2)
INR PPP: 1.06 (ref 0.84–1.19)
KETONES UR STRIP-MCNC: NEGATIVE MG/DL
LEUKOCYTE ESTERASE UR QL STRIP: NEGATIVE
LIPASE SERPL-CCNC: 84 U/L (ref 73–393)
LYMPHOCYTES # BLD AUTO: 2.57 THOUSANDS/ΜL (ref 0.6–4.47)
LYMPHOCYTES NFR BLD AUTO: 18 % (ref 14–44)
MAGNESIUM SERPL-MCNC: 2.8 MG/DL (ref 1.6–2.6)
MCH RBC QN AUTO: 23 PG (ref 26.8–34.3)
MCHC RBC AUTO-ENTMCNC: 29.7 G/DL (ref 31.4–37.4)
MCV RBC AUTO: 77 FL (ref 82–98)
MONOCYTES # BLD AUTO: 1.13 THOUSAND/ΜL (ref 0.17–1.22)
MONOCYTES NFR BLD AUTO: 8 % (ref 4–12)
NEUTROPHILS # BLD AUTO: 9.93 THOUSANDS/ΜL (ref 1.85–7.62)
NEUTS SEG NFR BLD AUTO: 70 % (ref 43–75)
NITRITE UR QL STRIP: NEGATIVE
NON-SQ EPI CELLS URNS QL MICRO: ABNORMAL /HPF
NRBC BLD AUTO-RTO: 0 /100 WBCS
PH UR STRIP.AUTO: 5.5 [PH]
PLATELET # BLD AUTO: 644 THOUSANDS/UL (ref 149–390)
PMV BLD AUTO: 10.2 FL (ref 8.9–12.7)
POTASSIUM SERPL-SCNC: 5 MMOL/L (ref 3.5–5.3)
PROT SERPL-MCNC: 9.4 G/DL (ref 6.4–8.2)
PROT UR STRIP-MCNC: ABNORMAL MG/DL
PROTHROMBIN TIME: 13.8 SECONDS (ref 11.6–14.5)
RBC # BLD AUTO: 4.39 MILLION/UL (ref 3.81–5.12)
RBC #/AREA URNS AUTO: ABNORMAL /HPF
SODIUM SERPL-SCNC: 132 MMOL/L (ref 136–145)
SP GR UR STRIP.AUTO: 1.02 (ref 1–1.03)
T4 FREE SERPL-MCNC: 1.76 NG/DL (ref 0.76–1.46)
TROPONIN I SERPL-MCNC: <0.02 NG/ML
TSH SERPL DL<=0.05 MIU/L-ACNC: 0.15 UIU/ML (ref 0.36–3.74)
UROBILINOGEN UR QL STRIP.AUTO: 0.2 E.U./DL
WBC # BLD AUTO: 14.11 THOUSAND/UL (ref 4.31–10.16)
WBC #/AREA URNS AUTO: ABNORMAL /HPF

## 2020-04-11 PROCEDURE — 36415 COLL VENOUS BLD VENIPUNCTURE: CPT | Performed by: EMERGENCY MEDICINE

## 2020-04-11 PROCEDURE — 85610 PROTHROMBIN TIME: CPT | Performed by: EMERGENCY MEDICINE

## 2020-04-11 PROCEDURE — 74177 CT ABD & PELVIS W/CONTRAST: CPT

## 2020-04-11 PROCEDURE — 84484 ASSAY OF TROPONIN QUANT: CPT | Performed by: EMERGENCY MEDICINE

## 2020-04-11 PROCEDURE — 96374 THER/PROPH/DIAG INJ IV PUSH: CPT

## 2020-04-11 PROCEDURE — 99285 EMERGENCY DEPT VISIT HI MDM: CPT | Performed by: EMERGENCY MEDICINE

## 2020-04-11 PROCEDURE — 84439 ASSAY OF FREE THYROXINE: CPT | Performed by: EMERGENCY MEDICINE

## 2020-04-11 PROCEDURE — 80076 HEPATIC FUNCTION PANEL: CPT | Performed by: EMERGENCY MEDICINE

## 2020-04-11 PROCEDURE — 83735 ASSAY OF MAGNESIUM: CPT | Performed by: EMERGENCY MEDICINE

## 2020-04-11 PROCEDURE — 85025 COMPLETE CBC W/AUTO DIFF WBC: CPT | Performed by: EMERGENCY MEDICINE

## 2020-04-11 PROCEDURE — 83690 ASSAY OF LIPASE: CPT | Performed by: EMERGENCY MEDICINE

## 2020-04-11 PROCEDURE — 99284 EMERGENCY DEPT VISIT MOD MDM: CPT

## 2020-04-11 PROCEDURE — 84443 ASSAY THYROID STIM HORMONE: CPT | Performed by: EMERGENCY MEDICINE

## 2020-04-11 PROCEDURE — 96375 TX/PRO/DX INJ NEW DRUG ADDON: CPT

## 2020-04-11 PROCEDURE — 81001 URINALYSIS AUTO W/SCOPE: CPT | Performed by: EMERGENCY MEDICINE

## 2020-04-11 PROCEDURE — 80048 BASIC METABOLIC PNL TOTAL CA: CPT | Performed by: EMERGENCY MEDICINE

## 2020-04-11 PROCEDURE — 93005 ELECTROCARDIOGRAM TRACING: CPT

## 2020-04-11 RX ORDER — LIDOCAINE 50 MG/G
PATCH TOPICAL
Qty: 30 PATCH | Refills: 0 | Status: SHIPPED | OUTPATIENT
Start: 2020-04-11 | End: 2020-05-09 | Stop reason: ALTCHOICE

## 2020-04-11 RX ORDER — LIDOCAINE 50 MG/G
1 PATCH TOPICAL ONCE
Status: DISCONTINUED | OUTPATIENT
Start: 2020-04-11 | End: 2020-04-11 | Stop reason: HOSPADM

## 2020-04-11 RX ORDER — KETOROLAC TROMETHAMINE 30 MG/ML
10 INJECTION, SOLUTION INTRAMUSCULAR; INTRAVENOUS ONCE
Status: COMPLETED | OUTPATIENT
Start: 2020-04-11 | End: 2020-04-11

## 2020-04-11 RX ORDER — MORPHINE SULFATE 15 MG/1
7.5 TABLET ORAL EVERY 8 HOURS PRN
Qty: 7 TABLET | Refills: 0 | Status: SHIPPED | OUTPATIENT
Start: 2020-04-11 | End: 2020-04-14

## 2020-04-11 RX ORDER — MORPHINE SULFATE 10 MG/ML
5 INJECTION, SOLUTION INTRAMUSCULAR; INTRAVENOUS ONCE
Status: COMPLETED | OUTPATIENT
Start: 2020-04-11 | End: 2020-04-11

## 2020-04-11 RX ADMIN — LIDOCAINE 1 PATCH: 50 PATCH TOPICAL at 16:12

## 2020-04-11 RX ADMIN — IOHEXOL 100 ML: 350 INJECTION, SOLUTION INTRAVENOUS at 15:14

## 2020-04-11 RX ADMIN — MORPHINE SULFATE 5 MG: 10 INJECTION INTRAVENOUS at 14:25

## 2020-04-11 RX ADMIN — KETOROLAC TROMETHAMINE 9.9 MG: 30 INJECTION, SOLUTION INTRAMUSCULAR at 14:25

## 2020-04-12 LAB
ATRIAL RATE: 79 BPM
P AXIS: 49 DEGREES
PR INTERVAL: 180 MS
QRS AXIS: 51 DEGREES
QRSD INTERVAL: 82 MS
QT INTERVAL: 390 MS
QTC INTERVAL: 447 MS
T WAVE AXIS: 61 DEGREES
VENTRICULAR RATE: 79 BPM

## 2020-04-12 PROCEDURE — 93010 ELECTROCARDIOGRAM REPORT: CPT | Performed by: INTERNAL MEDICINE

## 2020-04-14 ENCOUNTER — APPOINTMENT (EMERGENCY)
Dept: CT IMAGING | Facility: HOSPITAL | Age: 45
End: 2020-04-14
Payer: COMMERCIAL

## 2020-04-14 ENCOUNTER — HOSPITAL ENCOUNTER (EMERGENCY)
Facility: HOSPITAL | Age: 45
Discharge: HOME | End: 2020-04-14
Attending: EMERGENCY MEDICINE
Payer: COMMERCIAL

## 2020-04-14 VITALS
HEART RATE: 82 BPM | OXYGEN SATURATION: 98 % | DIASTOLIC BLOOD PRESSURE: 63 MMHG | RESPIRATION RATE: 17 BRPM | BODY MASS INDEX: 40.07 KG/M2 | TEMPERATURE: 97.1 F | SYSTOLIC BLOOD PRESSURE: 126 MMHG | WEIGHT: 226.19 LBS

## 2020-04-14 DIAGNOSIS — D72.829 LEUKOCYTOSIS: ICD-10-CM

## 2020-04-14 DIAGNOSIS — R42 DIZZINESS: ICD-10-CM

## 2020-04-14 DIAGNOSIS — R10.9 LEFT FLANK PAIN: ICD-10-CM

## 2020-04-14 DIAGNOSIS — G43.909 MIGRAINE: Primary | ICD-10-CM

## 2020-04-14 DIAGNOSIS — D75.839 THROMBOCYTOSIS: ICD-10-CM

## 2020-04-14 LAB
ALBUMIN SERPL BCP-MCNC: 2 G/DL (ref 3.5–5)
ALP SERPL-CCNC: 304 U/L (ref 46–116)
ALT SERPL W P-5'-P-CCNC: 25 U/L (ref 12–78)
ANION GAP SERPL CALCULATED.3IONS-SCNC: 12 MMOL/L (ref 4–13)
APTT PPP: 27 SECONDS (ref 23–37)
AST SERPL W P-5'-P-CCNC: 16 U/L (ref 5–45)
ATRIAL RATE: 83 BPM
BACTERIA UR QL AUTO: ABNORMAL /HPF
BASOPHILS # BLD AUTO: 0.11 THOUSANDS/ΜL (ref 0–0.1)
BASOPHILS NFR BLD AUTO: 1 % (ref 0–1)
BILIRUB SERPL-MCNC: 0.4 MG/DL (ref 0.2–1)
BILIRUB UR QL STRIP: NEGATIVE
BUN SERPL-MCNC: 29 MG/DL (ref 5–25)
CALCIUM SERPL-MCNC: 9.4 MG/DL (ref 8.3–10.1)
CHLORIDE SERPL-SCNC: 99 MMOL/L (ref 100–108)
CLARITY UR: ABNORMAL
CO2 SERPL-SCNC: 22 MMOL/L (ref 21–32)
COLOR UR: YELLOW
CREAT SERPL-MCNC: 1.31 MG/DL (ref 0.6–1.3)
EOSINOPHIL # BLD AUTO: 0.12 THOUSAND/ΜL (ref 0–0.61)
EOSINOPHIL NFR BLD AUTO: 1 % (ref 0–6)
ERYTHROCYTE [DISTWIDTH] IN BLOOD BY AUTOMATED COUNT: 20.3 % (ref 11.6–15.1)
GFR SERPL CREATININE-BSD FRML MDRD: 49 ML/MIN/1.73SQ M
GLUCOSE SERPL-MCNC: 183 MG/DL (ref 65–140)
GLUCOSE UR STRIP-MCNC: NEGATIVE MG/DL
HCT VFR BLD AUTO: 30.3 % (ref 34.8–46.1)
HGB BLD-MCNC: 9.2 G/DL (ref 11.5–15.4)
HGB UR QL STRIP.AUTO: ABNORMAL
IMM GRANULOCYTES # BLD AUTO: 0.27 THOUSAND/UL (ref 0–0.2)
IMM GRANULOCYTES NFR BLD AUTO: 1 % (ref 0–2)
INR PPP: 1.26 (ref 0.84–1.19)
KETONES UR STRIP-MCNC: NEGATIVE MG/DL
LEUKOCYTE ESTERASE UR QL STRIP: NEGATIVE
LIPASE SERPL-CCNC: 66 U/L (ref 73–393)
LYMPHOCYTES # BLD AUTO: 1.75 THOUSANDS/ΜL (ref 0.6–4.47)
LYMPHOCYTES NFR BLD AUTO: 8 % (ref 14–44)
MAGNESIUM SERPL-MCNC: 2.2 MG/DL (ref 1.6–2.6)
MCH RBC QN AUTO: 23.1 PG (ref 26.8–34.3)
MCHC RBC AUTO-ENTMCNC: 30.4 G/DL (ref 31.4–37.4)
MCV RBC AUTO: 76 FL (ref 82–98)
MONOCYTES # BLD AUTO: 1.44 THOUSAND/ΜL (ref 0.17–1.22)
MONOCYTES NFR BLD AUTO: 6 % (ref 4–12)
NEUTROPHILS # BLD AUTO: 19.59 THOUSANDS/ΜL (ref 1.85–7.62)
NEUTS SEG NFR BLD AUTO: 83 % (ref 43–75)
NITRITE UR QL STRIP: NEGATIVE
NON-SQ EPI CELLS URNS QL MICRO: ABNORMAL /HPF
NRBC BLD AUTO-RTO: 0 /100 WBCS
P AXIS: 48 DEGREES
PH UR STRIP.AUTO: 5.5 [PH]
PHOSPHATE SERPL-MCNC: 4.5 MG/DL (ref 2.7–4.5)
PLATELET # BLD AUTO: 762 THOUSANDS/UL (ref 149–390)
PMV BLD AUTO: 9.5 FL (ref 8.9–12.7)
POTASSIUM SERPL-SCNC: 5 MMOL/L (ref 3.5–5.3)
PR INTERVAL: 188 MS
PROT SERPL-MCNC: 8.6 G/DL (ref 6.4–8.2)
PROT UR STRIP-MCNC: ABNORMAL MG/DL
PROTHROMBIN TIME: 15.9 SECONDS (ref 11.6–14.5)
QRS AXIS: 31 DEGREES
QRSD INTERVAL: 86 MS
QT INTERVAL: 388 MS
QTC INTERVAL: 455 MS
RBC # BLD AUTO: 3.99 MILLION/UL (ref 3.81–5.12)
RBC #/AREA URNS AUTO: ABNORMAL /HPF
SODIUM SERPL-SCNC: 133 MMOL/L (ref 136–145)
SP GR UR STRIP.AUTO: 1.01 (ref 1–1.03)
T WAVE AXIS: 57 DEGREES
TROPONIN I SERPL-MCNC: <0.02 NG/ML
UROBILINOGEN UR QL STRIP.AUTO: 0.2 E.U./DL
VENTRICULAR RATE: 83 BPM
WBC # BLD AUTO: 23.28 THOUSAND/UL (ref 4.31–10.16)
WBC #/AREA URNS AUTO: ABNORMAL /HPF

## 2020-04-14 PROCEDURE — 84484 ASSAY OF TROPONIN QUANT: CPT | Performed by: EMERGENCY MEDICINE

## 2020-04-14 PROCEDURE — 93010 ELECTROCARDIOGRAM REPORT: CPT | Performed by: INTERNAL MEDICINE

## 2020-04-14 PROCEDURE — 85610 PROTHROMBIN TIME: CPT | Performed by: EMERGENCY MEDICINE

## 2020-04-14 PROCEDURE — 70496 CT ANGIOGRAPHY HEAD: CPT

## 2020-04-14 PROCEDURE — 96375 TX/PRO/DX INJ NEW DRUG ADDON: CPT

## 2020-04-14 PROCEDURE — 99284 EMERGENCY DEPT VISIT MOD MDM: CPT

## 2020-04-14 PROCEDURE — 85025 COMPLETE CBC W/AUTO DIFF WBC: CPT | Performed by: EMERGENCY MEDICINE

## 2020-04-14 PROCEDURE — 97163 PT EVAL HIGH COMPLEX 45 MIN: CPT

## 2020-04-14 PROCEDURE — 96361 HYDRATE IV INFUSION ADD-ON: CPT

## 2020-04-14 PROCEDURE — 81001 URINALYSIS AUTO W/SCOPE: CPT | Performed by: EMERGENCY MEDICINE

## 2020-04-14 PROCEDURE — 96365 THER/PROPH/DIAG IV INF INIT: CPT

## 2020-04-14 PROCEDURE — 84100 ASSAY OF PHOSPHORUS: CPT | Performed by: EMERGENCY MEDICINE

## 2020-04-14 PROCEDURE — 93005 ELECTROCARDIOGRAM TRACING: CPT

## 2020-04-14 PROCEDURE — 99285 EMERGENCY DEPT VISIT HI MDM: CPT | Performed by: EMERGENCY MEDICINE

## 2020-04-14 PROCEDURE — 80053 COMPREHEN METABOLIC PANEL: CPT | Performed by: EMERGENCY MEDICINE

## 2020-04-14 PROCEDURE — 70498 CT ANGIOGRAPHY NECK: CPT

## 2020-04-14 PROCEDURE — 85730 THROMBOPLASTIN TIME PARTIAL: CPT | Performed by: EMERGENCY MEDICINE

## 2020-04-14 PROCEDURE — 83735 ASSAY OF MAGNESIUM: CPT | Performed by: EMERGENCY MEDICINE

## 2020-04-14 PROCEDURE — 83690 ASSAY OF LIPASE: CPT | Performed by: EMERGENCY MEDICINE

## 2020-04-14 PROCEDURE — 36415 COLL VENOUS BLD VENIPUNCTURE: CPT | Performed by: EMERGENCY MEDICINE

## 2020-04-14 RX ORDER — METOCLOPRAMIDE HYDROCHLORIDE 5 MG/ML
10 INJECTION INTRAMUSCULAR; INTRAVENOUS ONCE
Status: COMPLETED | OUTPATIENT
Start: 2020-04-14 | End: 2020-04-14

## 2020-04-14 RX ORDER — ACETAMINOPHEN 325 MG/1
650 TABLET ORAL ONCE
Status: COMPLETED | OUTPATIENT
Start: 2020-04-14 | End: 2020-04-14

## 2020-04-14 RX ORDER — DIPHENHYDRAMINE HYDROCHLORIDE 50 MG/ML
25 INJECTION INTRAMUSCULAR; INTRAVENOUS ONCE
Status: COMPLETED | OUTPATIENT
Start: 2020-04-14 | End: 2020-04-14

## 2020-04-14 RX ORDER — MAGNESIUM SULFATE HEPTAHYDRATE 40 MG/ML
2 INJECTION, SOLUTION INTRAVENOUS ONCE
Status: COMPLETED | OUTPATIENT
Start: 2020-04-14 | End: 2020-04-14

## 2020-04-14 RX ORDER — MECLIZINE HCL 12.5 MG/1
12.5 TABLET ORAL 3 TIMES DAILY PRN
Qty: 30 TABLET | Refills: 0 | Status: SHIPPED | OUTPATIENT
Start: 2020-04-14 | End: 2020-05-09 | Stop reason: ALTCHOICE

## 2020-04-14 RX ORDER — HYDROCODONE BITARTRATE AND ACETAMINOPHEN 5; 325 MG/1; MG/1
0.5 TABLET ORAL EVERY 8 HOURS PRN
Qty: 5 TABLET | Refills: 0 | Status: ON HOLD | OUTPATIENT
Start: 2020-04-14 | End: 2020-04-25 | Stop reason: SDUPTHER

## 2020-04-14 RX ORDER — MECLIZINE HCL 12.5 MG/1
12.5 TABLET ORAL ONCE
Status: COMPLETED | OUTPATIENT
Start: 2020-04-14 | End: 2020-04-14

## 2020-04-14 RX ORDER — MECLIZINE HCL 12.5 MG/1
25 TABLET ORAL ONCE
Status: DISCONTINUED | OUTPATIENT
Start: 2020-04-14 | End: 2020-04-14

## 2020-04-14 RX ADMIN — ACETAMINOPHEN 650 MG: 325 TABLET ORAL at 05:36

## 2020-04-14 RX ADMIN — IOHEXOL 100 ML: 350 INJECTION, SOLUTION INTRAVENOUS at 05:50

## 2020-04-14 RX ADMIN — DIPHENHYDRAMINE HYDROCHLORIDE 25 MG: 50 INJECTION INTRAMUSCULAR; INTRAVENOUS at 04:37

## 2020-04-14 RX ADMIN — METOCLOPRAMIDE HYDROCHLORIDE 10 MG: 5 INJECTION INTRAMUSCULAR; INTRAVENOUS at 04:37

## 2020-04-14 RX ADMIN — SODIUM CHLORIDE 1000 ML: 0.9 INJECTION, SOLUTION INTRAVENOUS at 04:42

## 2020-04-14 RX ADMIN — MECLIZINE 12.5 MG: 12.5 TABLET ORAL at 07:29

## 2020-04-14 RX ADMIN — MAGNESIUM SULFATE HEPTAHYDRATE 2 G: 40 INJECTION, SOLUTION INTRAVENOUS at 06:02

## 2020-04-15 ENCOUNTER — HOSPITAL ENCOUNTER (EMERGENCY)
Facility: HOSPITAL | Age: 45
End: 2020-04-15
Attending: EMERGENCY MEDICINE | Admitting: EMERGENCY MEDICINE
Payer: COMMERCIAL

## 2020-04-15 ENCOUNTER — TELEPHONE (OUTPATIENT)
Dept: SURGICAL ONCOLOGY | Facility: CLINIC | Age: 45
End: 2020-04-15

## 2020-04-15 ENCOUNTER — HOSPITAL ENCOUNTER (INPATIENT)
Facility: HOSPITAL | Age: 45
LOS: 10 days | Discharge: HOME | DRG: 546 | End: 2020-04-25
Attending: INTERNAL MEDICINE | Admitting: INTERNAL MEDICINE
Payer: COMMERCIAL

## 2020-04-15 ENCOUNTER — PATIENT OUTREACH (OUTPATIENT)
Dept: CASE MANAGEMENT | Facility: OTHER | Age: 45
End: 2020-04-15

## 2020-04-15 ENCOUNTER — APPOINTMENT (EMERGENCY)
Dept: CT IMAGING | Facility: HOSPITAL | Age: 45
End: 2020-04-15
Payer: COMMERCIAL

## 2020-04-15 VITALS
HEART RATE: 93 BPM | DIASTOLIC BLOOD PRESSURE: 63 MMHG | OXYGEN SATURATION: 95 % | RESPIRATION RATE: 22 BRPM | TEMPERATURE: 99.5 F | WEIGHT: 227.07 LBS | SYSTOLIC BLOOD PRESSURE: 140 MMHG | HEIGHT: 63 IN | BODY MASS INDEX: 40.23 KG/M2

## 2020-04-15 DIAGNOSIS — I77.6 AORTITIS (HCC): ICD-10-CM

## 2020-04-15 DIAGNOSIS — R79.89 LOW TSH LEVEL: ICD-10-CM

## 2020-04-15 DIAGNOSIS — E83.39 HYPERPHOSPHATEMIA: ICD-10-CM

## 2020-04-15 DIAGNOSIS — D75.839 THROMBOCYTOSIS: ICD-10-CM

## 2020-04-15 DIAGNOSIS — D50.9 IRON DEFICIENCY ANEMIA, UNSPECIFIED IRON DEFICIENCY ANEMIA TYPE: ICD-10-CM

## 2020-04-15 DIAGNOSIS — D72.829 LEUKOCYTOSIS: ICD-10-CM

## 2020-04-15 DIAGNOSIS — R65.10 SIRS (SYSTEMIC INFLAMMATORY RESPONSE SYNDROME) (HCC): ICD-10-CM

## 2020-04-15 DIAGNOSIS — I77.6 VASCULITIS (HCC): Primary | ICD-10-CM

## 2020-04-15 DIAGNOSIS — G89.29 CHRONIC LOW BACK PAIN: ICD-10-CM

## 2020-04-15 DIAGNOSIS — M54.50 CHRONIC LOW BACK PAIN: ICD-10-CM

## 2020-04-15 DIAGNOSIS — D64.9 ANEMIA: ICD-10-CM

## 2020-04-15 DIAGNOSIS — R79.9 ABNORMAL BLOOD CHEMISTRY: ICD-10-CM

## 2020-04-15 DIAGNOSIS — I77.6 AORTITIS (HCC): Primary | ICD-10-CM

## 2020-04-15 DIAGNOSIS — R74.8 ELEVATED ALKALINE PHOSPHATASE LEVEL: ICD-10-CM

## 2020-04-15 DIAGNOSIS — R10.9 LEFT FLANK PAIN: ICD-10-CM

## 2020-04-15 DIAGNOSIS — R74.8 ELEVATED SERUM GGT LEVEL: ICD-10-CM

## 2020-04-15 DIAGNOSIS — R94.6 ABNORMAL THYROID FUNCTION TEST: ICD-10-CM

## 2020-04-15 PROBLEM — E04.2 MULTIPLE THYROID NODULES: Status: ACTIVE | Noted: 2019-06-12

## 2020-04-15 PROBLEM — F32.A ANXIETY AND DEPRESSION: Status: ACTIVE | Noted: 2018-03-05

## 2020-04-15 PROBLEM — J45.30 MILD PERSISTENT ASTHMA: Status: ACTIVE | Noted: 2018-03-05

## 2020-04-15 PROBLEM — E78.5 DYSLIPIDEMIA, GOAL LDL BELOW 100: Status: ACTIVE | Noted: 2018-03-05

## 2020-04-15 PROBLEM — F41.9 ANXIETY AND DEPRESSION: Status: ACTIVE | Noted: 2018-03-05

## 2020-04-15 PROBLEM — I10 HTN, GOAL BELOW 140/90: Status: ACTIVE | Noted: 2018-03-05

## 2020-04-15 LAB
ALBUMIN SERPL BCP-MCNC: 2 G/DL (ref 3.5–5)
ALP SERPL-CCNC: 416 U/L (ref 46–116)
ALT SERPL W P-5'-P-CCNC: 44 U/L (ref 12–78)
ANION GAP SERPL CALCULATED.3IONS-SCNC: 11 MMOL/L (ref 4–13)
AST SERPL W P-5'-P-CCNC: 46 U/L (ref 5–45)
BACTERIA UR QL AUTO: ABNORMAL /HPF
BASOPHILS # BLD AUTO: 0.11 THOUSANDS/ΜL (ref 0–0.1)
BASOPHILS NFR BLD AUTO: 1 % (ref 0–1)
BILIRUB SERPL-MCNC: 0.3 MG/DL (ref 0.2–1)
BILIRUB UR QL STRIP: NEGATIVE
BUN SERPL-MCNC: 25 MG/DL (ref 5–25)
CALCIUM SERPL-MCNC: 9.4 MG/DL (ref 8.3–10.1)
CHLORIDE SERPL-SCNC: 99 MMOL/L (ref 100–108)
CLARITY UR: CLEAR
CO2 SERPL-SCNC: 25 MMOL/L (ref 21–32)
COLOR UR: YELLOW
CREAT SERPL-MCNC: 1.26 MG/DL (ref 0.6–1.3)
EOSINOPHIL # BLD AUTO: 0.1 THOUSAND/ΜL (ref 0–0.61)
EOSINOPHIL NFR BLD AUTO: 1 % (ref 0–6)
ERYTHROCYTE [DISTWIDTH] IN BLOOD BY AUTOMATED COUNT: 20.5 % (ref 11.6–15.1)
ERYTHROCYTE [SEDIMENTATION RATE] IN BLOOD: 126 MM/HOUR (ref 0–20)
GFR SERPL CREATININE-BSD FRML MDRD: 51 ML/MIN/1.73SQ M
GLUCOSE SERPL-MCNC: 103 MG/DL (ref 65–140)
GLUCOSE UR STRIP-MCNC: NEGATIVE MG/DL
HCT VFR BLD AUTO: 29.6 % (ref 34.8–46.1)
HGB BLD-MCNC: 9 G/DL (ref 11.5–15.4)
HGB UR QL STRIP.AUTO: NEGATIVE
IMM GRANULOCYTES # BLD AUTO: 0.12 THOUSAND/UL (ref 0–0.2)
IMM GRANULOCYTES NFR BLD AUTO: 1 % (ref 0–2)
KETONES UR STRIP-MCNC: NEGATIVE MG/DL
LACTATE SERPL-SCNC: 0.9 MMOL/L (ref 0.5–2)
LEUKOCYTE ESTERASE UR QL STRIP: NEGATIVE
LYMPHOCYTES # BLD AUTO: 2.43 THOUSANDS/ΜL (ref 0.6–4.47)
LYMPHOCYTES NFR BLD AUTO: 15 % (ref 14–44)
MAGNESIUM SERPL-MCNC: 2.5 MG/DL (ref 1.6–2.6)
MCH RBC QN AUTO: 23.2 PG (ref 26.8–34.3)
MCHC RBC AUTO-ENTMCNC: 30.4 G/DL (ref 31.4–37.4)
MCV RBC AUTO: 76 FL (ref 82–98)
MONOCYTES # BLD AUTO: 1.36 THOUSAND/ΜL (ref 0.17–1.22)
MONOCYTES NFR BLD AUTO: 8 % (ref 4–12)
NEUTROPHILS # BLD AUTO: 12.52 THOUSANDS/ΜL (ref 1.85–7.62)
NEUTS SEG NFR BLD AUTO: 74 % (ref 43–75)
NITRITE UR QL STRIP: NEGATIVE
NON-SQ EPI CELLS URNS QL MICRO: ABNORMAL /HPF
NRBC BLD AUTO-RTO: 0 /100 WBCS
NT-PROBNP SERPL-MCNC: 674 PG/ML
PH UR STRIP.AUTO: 6 [PH]
PLATELET # BLD AUTO: 753 THOUSANDS/UL (ref 149–390)
PMV BLD AUTO: 9.3 FL (ref 8.9–12.7)
POTASSIUM SERPL-SCNC: 5 MMOL/L (ref 3.5–5.3)
PROT SERPL-MCNC: 8.6 G/DL (ref 6.4–8.2)
PROT UR STRIP-MCNC: ABNORMAL MG/DL
RBC # BLD AUTO: 3.88 MILLION/UL (ref 3.81–5.12)
RBC #/AREA URNS AUTO: ABNORMAL /HPF
S PYO DNA THROAT QL NAA+PROBE: NORMAL
SODIUM SERPL-SCNC: 135 MMOL/L (ref 136–145)
SP GR UR STRIP.AUTO: <=1.005 (ref 1–1.03)
TROPONIN I SERPL-MCNC: <0.02 NG/ML
TSH SERPL DL<=0.05 MIU/L-ACNC: 0.05 UIU/ML (ref 0.36–3.74)
UROBILINOGEN UR QL STRIP.AUTO: 0.2 E.U./DL
WBC # BLD AUTO: 16.64 THOUSAND/UL (ref 4.31–10.16)
WBC #/AREA URNS AUTO: ABNORMAL /HPF

## 2020-04-15 PROCEDURE — 87651 STREP A DNA AMP PROBE: CPT | Performed by: PHYSICIAN ASSISTANT

## 2020-04-15 PROCEDURE — 93005 ELECTROCARDIOGRAM TRACING: CPT

## 2020-04-15 PROCEDURE — 84484 ASSAY OF TROPONIN QUANT: CPT | Performed by: PHYSICIAN ASSISTANT

## 2020-04-15 PROCEDURE — 99223 1ST HOSP IP/OBS HIGH 75: CPT | Performed by: INTERNAL MEDICINE

## 2020-04-15 PROCEDURE — 99285 EMERGENCY DEPT VISIT HI MDM: CPT | Performed by: PHYSICIAN ASSISTANT

## 2020-04-15 PROCEDURE — 84443 ASSAY THYROID STIM HORMONE: CPT | Performed by: PHYSICIAN ASSISTANT

## 2020-04-15 PROCEDURE — 85025 COMPLETE CBC W/AUTO DIFF WBC: CPT | Performed by: PHYSICIAN ASSISTANT

## 2020-04-15 PROCEDURE — 87040 BLOOD CULTURE FOR BACTERIA: CPT | Performed by: PHYSICIAN ASSISTANT

## 2020-04-15 PROCEDURE — 74177 CT ABD & PELVIS W/CONTRAST: CPT

## 2020-04-15 PROCEDURE — 83735 ASSAY OF MAGNESIUM: CPT | Performed by: PHYSICIAN ASSISTANT

## 2020-04-15 PROCEDURE — 85652 RBC SED RATE AUTOMATED: CPT | Performed by: PHYSICIAN ASSISTANT

## 2020-04-15 PROCEDURE — 96374 THER/PROPH/DIAG INJ IV PUSH: CPT

## 2020-04-15 PROCEDURE — 71260 CT THORAX DX C+: CPT

## 2020-04-15 PROCEDURE — 83605 ASSAY OF LACTIC ACID: CPT | Performed by: PHYSICIAN ASSISTANT

## 2020-04-15 PROCEDURE — 80053 COMPREHEN METABOLIC PANEL: CPT | Performed by: PHYSICIAN ASSISTANT

## 2020-04-15 PROCEDURE — 36415 COLL VENOUS BLD VENIPUNCTURE: CPT | Performed by: PHYSICIAN ASSISTANT

## 2020-04-15 PROCEDURE — 81001 URINALYSIS AUTO W/SCOPE: CPT | Performed by: PHYSICIAN ASSISTANT

## 2020-04-15 PROCEDURE — 86592 SYPHILIS TEST NON-TREP QUAL: CPT | Performed by: PHYSICIAN ASSISTANT

## 2020-04-15 PROCEDURE — 96361 HYDRATE IV INFUSION ADD-ON: CPT

## 2020-04-15 PROCEDURE — 99285 EMERGENCY DEPT VISIT HI MDM: CPT

## 2020-04-15 PROCEDURE — 96375 TX/PRO/DX INJ NEW DRUG ADDON: CPT

## 2020-04-15 PROCEDURE — 83880 ASSAY OF NATRIURETIC PEPTIDE: CPT | Performed by: PHYSICIAN ASSISTANT

## 2020-04-15 RX ORDER — ASPIRIN 81 MG/1
81 TABLET ORAL DAILY
COMMUNITY

## 2020-04-15 RX ORDER — ONDANSETRON 2 MG/ML
4 INJECTION INTRAMUSCULAR; INTRAVENOUS ONCE
Status: COMPLETED | OUTPATIENT
Start: 2020-04-15 | End: 2020-04-15

## 2020-04-15 RX ORDER — MORPHINE SULFATE 4 MG/ML
4 INJECTION, SOLUTION INTRAMUSCULAR; INTRAVENOUS ONCE
Status: COMPLETED | OUTPATIENT
Start: 2020-04-15 | End: 2020-04-15

## 2020-04-15 RX ADMIN — IOHEXOL 100 ML: 350 INJECTION, SOLUTION INTRAVENOUS at 17:59

## 2020-04-15 RX ADMIN — MORPHINE SULFATE 4 MG: 4 INJECTION INTRAVENOUS at 21:00

## 2020-04-15 RX ADMIN — SODIUM CHLORIDE 1000 ML: 0.9 INJECTION, SOLUTION INTRAVENOUS at 16:30

## 2020-04-15 RX ADMIN — ONDANSETRON 4 MG: 2 INJECTION INTRAMUSCULAR; INTRAVENOUS at 20:59

## 2020-04-16 ENCOUNTER — APPOINTMENT (INPATIENT)
Dept: RADIOLOGY | Facility: HOSPITAL | Age: 45
DRG: 546 | End: 2020-04-16
Payer: COMMERCIAL

## 2020-04-16 PROBLEM — M54.50 RIGHT-SIDED LOW BACK PAIN WITHOUT SCIATICA: Status: ACTIVE | Noted: 2020-04-16

## 2020-04-16 LAB
ANION GAP SERPL CALCULATED.3IONS-SCNC: 8 MMOL/L (ref 4–13)
ATRIAL RATE: 80 BPM
BASOPHILS # BLD AUTO: 0.1 THOUSANDS/ΜL (ref 0–0.1)
BASOPHILS NFR BLD AUTO: 1 % (ref 0–1)
BUN SERPL-MCNC: 18 MG/DL (ref 5–25)
C3 SERPL-MCNC: 166 MG/DL (ref 90–180)
C4 SERPL-MCNC: 23 MG/DL (ref 10–40)
CALCIUM SERPL-MCNC: 9.1 MG/DL (ref 8.3–10.1)
CHLORIDE SERPL-SCNC: 109 MMOL/L (ref 100–108)
CO2 SERPL-SCNC: 22 MMOL/L (ref 21–32)
CREAT SERPL-MCNC: 0.93 MG/DL (ref 0.6–1.3)
CRP SERPL QL: 272 MG/L
EOSINOPHIL # BLD AUTO: 0.07 THOUSAND/ΜL (ref 0–0.61)
EOSINOPHIL NFR BLD AUTO: 1 % (ref 0–6)
ERYTHROCYTE [DISTWIDTH] IN BLOOD BY AUTOMATED COUNT: 20.9 % (ref 11.6–15.1)
GFR SERPL CREATININE-BSD FRML MDRD: 74 ML/MIN/1.73SQ M
GLUCOSE SERPL-MCNC: 113 MG/DL (ref 65–140)
GLUCOSE SERPL-MCNC: 120 MG/DL (ref 65–140)
GLUCOSE SERPL-MCNC: 131 MG/DL (ref 65–140)
GLUCOSE SERPL-MCNC: 93 MG/DL (ref 65–140)
GLUCOSE SERPL-MCNC: 97 MG/DL (ref 65–140)
HBV CORE AB SER QL: NORMAL
HBV CORE IGM SER QL: NORMAL
HBV SURFACE AG SER QL: NORMAL
HCT VFR BLD AUTO: 28.1 % (ref 34.8–46.1)
HCV AB SER QL: NORMAL
HGB BLD-MCNC: 8.3 G/DL (ref 11.5–15.4)
IMM GRANULOCYTES # BLD AUTO: 0.14 THOUSAND/UL (ref 0–0.2)
IMM GRANULOCYTES NFR BLD AUTO: 1 % (ref 0–2)
LYMPHOCYTES # BLD AUTO: 2.74 THOUSANDS/ΜL (ref 0.6–4.47)
LYMPHOCYTES NFR BLD AUTO: 18 % (ref 14–44)
MCH RBC QN AUTO: 22.7 PG (ref 26.8–34.3)
MCHC RBC AUTO-ENTMCNC: 29.5 G/DL (ref 31.4–37.4)
MCV RBC AUTO: 77 FL (ref 82–98)
MONOCYTES # BLD AUTO: 1.25 THOUSAND/ΜL (ref 0.17–1.22)
MONOCYTES NFR BLD AUTO: 8 % (ref 4–12)
NEUTROPHILS # BLD AUTO: 10.56 THOUSANDS/ΜL (ref 1.85–7.62)
NEUTS SEG NFR BLD AUTO: 71 % (ref 43–75)
NRBC BLD AUTO-RTO: 0 /100 WBCS
P AXIS: 44 DEGREES
PLATELET # BLD AUTO: 682 THOUSANDS/UL (ref 149–390)
PMV BLD AUTO: 9.6 FL (ref 8.9–12.7)
POTASSIUM SERPL-SCNC: 4.7 MMOL/L (ref 3.5–5.3)
PR INTERVAL: 190 MS
QRS AXIS: 34 DEGREES
QRSD INTERVAL: 88 MS
QT INTERVAL: 398 MS
QTC INTERVAL: 459 MS
RBC # BLD AUTO: 3.65 MILLION/UL (ref 3.81–5.12)
RPR SER QL: NORMAL
SODIUM SERPL-SCNC: 139 MMOL/L (ref 136–145)
T WAVE AXIS: 60 DEGREES
VENTRICULAR RATE: 80 BPM
WBC # BLD AUTO: 14.86 THOUSAND/UL (ref 4.31–10.16)

## 2020-04-16 PROCEDURE — A9585 GADOBUTROL INJECTION: HCPCS | Performed by: INTERNAL MEDICINE

## 2020-04-16 PROCEDURE — 86255 FLUORESCENT ANTIBODY SCREEN: CPT | Performed by: INTERNAL MEDICINE

## 2020-04-16 PROCEDURE — 93010 ELECTROCARDIOGRAM REPORT: CPT | Performed by: INTERNAL MEDICINE

## 2020-04-16 PROCEDURE — 86140 C-REACTIVE PROTEIN: CPT | Performed by: INTERNAL MEDICINE

## 2020-04-16 PROCEDURE — 82948 REAGENT STRIP/BLOOD GLUCOSE: CPT

## 2020-04-16 PROCEDURE — 99222 1ST HOSP IP/OBS MODERATE 55: CPT | Performed by: INTERNAL MEDICINE

## 2020-04-16 PROCEDURE — 72158 MRI LUMBAR SPINE W/O & W/DYE: CPT

## 2020-04-16 PROCEDURE — 86705 HEP B CORE ANTIBODY IGM: CPT | Performed by: INTERNAL MEDICINE

## 2020-04-16 PROCEDURE — 86803 HEPATITIS C AB TEST: CPT | Performed by: INTERNAL MEDICINE

## 2020-04-16 PROCEDURE — 85025 COMPLETE CBC W/AUTO DIFF WBC: CPT | Performed by: INTERNAL MEDICINE

## 2020-04-16 PROCEDURE — 86704 HEP B CORE ANTIBODY TOTAL: CPT | Performed by: INTERNAL MEDICINE

## 2020-04-16 PROCEDURE — 80048 BASIC METABOLIC PNL TOTAL CA: CPT | Performed by: INTERNAL MEDICINE

## 2020-04-16 PROCEDURE — 87340 HEPATITIS B SURFACE AG IA: CPT | Performed by: INTERNAL MEDICINE

## 2020-04-16 PROCEDURE — 99254 IP/OBS CNSLTJ NEW/EST MOD 60: CPT | Performed by: INTERNAL MEDICINE

## 2020-04-16 PROCEDURE — 86160 COMPLEMENT ANTIGEN: CPT | Performed by: INTERNAL MEDICINE

## 2020-04-16 PROCEDURE — 87103 BLOOD FUNGUS CULTURE: CPT | Performed by: INTERNAL MEDICINE

## 2020-04-16 PROCEDURE — 82595 ASSAY OF CRYOGLOBULIN: CPT | Performed by: INTERNAL MEDICINE

## 2020-04-16 PROCEDURE — 99232 SBSQ HOSP IP/OBS MODERATE 35: CPT | Performed by: INTERNAL MEDICINE

## 2020-04-16 PROCEDURE — 86480 TB TEST CELL IMMUN MEASURE: CPT | Performed by: INTERNAL MEDICINE

## 2020-04-16 PROCEDURE — 87389 HIV-1 AG W/HIV-1&-2 AB AG IA: CPT | Performed by: INTERNAL MEDICINE

## 2020-04-16 PROCEDURE — 87449 NOS EACH ORGANISM AG IA: CPT | Performed by: INTERNAL MEDICINE

## 2020-04-16 RX ORDER — VANCOMYCIN HYDROCHLORIDE 1 G/200ML
12.5 INJECTION, SOLUTION INTRAVENOUS EVERY 12 HOURS
Status: DISCONTINUED | OUTPATIENT
Start: 2020-04-16 | End: 2020-04-17

## 2020-04-16 RX ORDER — MECLIZINE HCL 12.5 MG/1
12.5 TABLET ORAL 3 TIMES DAILY PRN
Status: DISCONTINUED | OUTPATIENT
Start: 2020-04-16 | End: 2020-04-25 | Stop reason: HOSPADM

## 2020-04-16 RX ORDER — ACETAMINOPHEN 325 MG/1
650 TABLET ORAL EVERY 6 HOURS PRN
Status: DISCONTINUED | OUTPATIENT
Start: 2020-04-16 | End: 2020-04-20

## 2020-04-16 RX ORDER — MONTELUKAST SODIUM 10 MG/1
10 TABLET ORAL
Status: DISCONTINUED | OUTPATIENT
Start: 2020-04-16 | End: 2020-04-25 | Stop reason: HOSPADM

## 2020-04-16 RX ORDER — MORPHINE SULFATE 4 MG/ML
4 INJECTION, SOLUTION INTRAMUSCULAR; INTRAVENOUS ONCE
Status: COMPLETED | OUTPATIENT
Start: 2020-04-16 | End: 2020-04-16

## 2020-04-16 RX ORDER — LIDOCAINE 50 MG/G
1 PATCH TOPICAL DAILY
Status: DISCONTINUED | OUTPATIENT
Start: 2020-04-16 | End: 2020-04-25 | Stop reason: HOSPADM

## 2020-04-16 RX ORDER — METHOCARBAMOL 500 MG/1
500 TABLET, FILM COATED ORAL EVERY 6 HOURS SCHEDULED
Status: DISCONTINUED | OUTPATIENT
Start: 2020-04-16 | End: 2020-04-19

## 2020-04-16 RX ORDER — HEPARIN SODIUM 5000 [USP'U]/ML
5000 INJECTION, SOLUTION INTRAVENOUS; SUBCUTANEOUS EVERY 8 HOURS SCHEDULED
Status: DISCONTINUED | OUTPATIENT
Start: 2020-04-16 | End: 2020-04-21

## 2020-04-16 RX ORDER — ASPIRIN 81 MG/1
81 TABLET ORAL DAILY
Status: DISCONTINUED | OUTPATIENT
Start: 2020-04-16 | End: 2020-04-25 | Stop reason: HOSPADM

## 2020-04-16 RX ORDER — ALBUTEROL SULFATE 90 UG/1
2 AEROSOL, METERED RESPIRATORY (INHALATION) EVERY 4 HOURS PRN
Status: DISCONTINUED | OUTPATIENT
Start: 2020-04-16 | End: 2020-04-25 | Stop reason: HOSPADM

## 2020-04-16 RX ORDER — ACETAMINOPHEN 325 MG/1
650 TABLET ORAL EVERY 6 HOURS PRN
Status: DISCONTINUED | OUTPATIENT
Start: 2020-04-16 | End: 2020-04-16

## 2020-04-16 RX ORDER — OXYCODONE HYDROCHLORIDE 5 MG/1
2.5 TABLET ORAL EVERY 4 HOURS PRN
Status: DISCONTINUED | OUTPATIENT
Start: 2020-04-16 | End: 2020-04-25 | Stop reason: HOSPADM

## 2020-04-16 RX ORDER — MELATONIN
1000 DAILY
Status: DISCONTINUED | OUTPATIENT
Start: 2020-04-16 | End: 2020-04-25 | Stop reason: HOSPADM

## 2020-04-16 RX ORDER — ESCITALOPRAM OXALATE 10 MG/1
10 TABLET ORAL DAILY
Status: DISCONTINUED | OUTPATIENT
Start: 2020-04-16 | End: 2020-04-21

## 2020-04-16 RX ORDER — OXYCODONE HYDROCHLORIDE 5 MG/1
5 TABLET ORAL EVERY 6 HOURS PRN
Status: DISCONTINUED | OUTPATIENT
Start: 2020-04-16 | End: 2020-04-25 | Stop reason: HOSPADM

## 2020-04-16 RX ORDER — LOSARTAN POTASSIUM 25 MG/1
25 TABLET ORAL DAILY
Status: DISCONTINUED | OUTPATIENT
Start: 2020-04-16 | End: 2020-04-25 | Stop reason: HOSPADM

## 2020-04-16 RX ORDER — PRAVASTATIN SODIUM 20 MG
20 TABLET ORAL
Status: DISCONTINUED | OUTPATIENT
Start: 2020-04-16 | End: 2020-04-21

## 2020-04-16 RX ADMIN — MONTELUKAST 10 MG: 10 TABLET, FILM COATED ORAL at 21:47

## 2020-04-16 RX ADMIN — HEPARIN SODIUM 5000 UNITS: 5000 INJECTION INTRAVENOUS; SUBCUTANEOUS at 05:41

## 2020-04-16 RX ADMIN — VANCOMYCIN HYDROCHLORIDE 1500 MG: 1 INJECTION, POWDER, LYOPHILIZED, FOR SOLUTION INTRAVENOUS at 03:15

## 2020-04-16 RX ADMIN — FOLIC ACID-PYRIDOXINE-CYANOCOBALAMIN TAB 2.5-25-2 MG 1 TABLET: 2.5-25-2 TAB at 08:28

## 2020-04-16 RX ADMIN — LIDOCAINE 1 PATCH: 50 PATCH CUTANEOUS at 11:48

## 2020-04-16 RX ADMIN — GADOBUTROL 10 ML: 604.72 INJECTION INTRAVENOUS at 12:45

## 2020-04-16 RX ADMIN — ACETAMINOPHEN 650 MG: 325 TABLET ORAL at 02:04

## 2020-04-16 RX ADMIN — METHOCARBAMOL TABLETS 500 MG: 500 TABLET, COATED ORAL at 23:14

## 2020-04-16 RX ADMIN — CEFEPIME HYDROCHLORIDE 1000 MG: 1 INJECTION, POWDER, FOR SOLUTION INTRAMUSCULAR; INTRAVENOUS at 02:07

## 2020-04-16 RX ADMIN — MONTELUKAST 10 MG: 10 TABLET, FILM COATED ORAL at 02:04

## 2020-04-16 RX ADMIN — PRAVASTATIN SODIUM 20 MG: 20 TABLET ORAL at 17:21

## 2020-04-16 RX ADMIN — MELATONIN 1000 UNITS: at 08:29

## 2020-04-16 RX ADMIN — METHOCARBAMOL TABLETS 500 MG: 500 TABLET, COATED ORAL at 17:21

## 2020-04-16 RX ADMIN — HEPARIN SODIUM 5000 UNITS: 5000 INJECTION INTRAVENOUS; SUBCUTANEOUS at 13:37

## 2020-04-16 RX ADMIN — METHOCARBAMOL TABLETS 500 MG: 500 TABLET, COATED ORAL at 11:48

## 2020-04-16 RX ADMIN — VANCOMYCIN HYDROCHLORIDE 1000 MG: 1 INJECTION, SOLUTION INTRAVENOUS at 13:37

## 2020-04-16 RX ADMIN — LOSARTAN POTASSIUM 25 MG: 25 TABLET, FILM COATED ORAL at 08:29

## 2020-04-16 RX ADMIN — ESCITALOPRAM OXALATE 10 MG: 10 TABLET ORAL at 08:29

## 2020-04-16 RX ADMIN — MORPHINE SULFATE 4 MG: 4 INJECTION INTRAVENOUS at 08:29

## 2020-04-16 RX ADMIN — ASPIRIN 81 MG: 81 TABLET ORAL at 08:28

## 2020-04-16 RX ADMIN — HEPARIN SODIUM 5000 UNITS: 5000 INJECTION INTRAVENOUS; SUBCUTANEOUS at 21:47

## 2020-04-16 RX ADMIN — OXYCODONE HYDROCHLORIDE 5 MG: 5 TABLET ORAL at 11:48

## 2020-04-16 RX ADMIN — ACETAMINOPHEN 650 MG: 325 TABLET ORAL at 17:23

## 2020-04-17 ENCOUNTER — APPOINTMENT (INPATIENT)
Dept: RADIOLOGY | Facility: HOSPITAL | Age: 45
DRG: 546 | End: 2020-04-17
Payer: COMMERCIAL

## 2020-04-17 LAB
ALBUMIN SERPL BCP-MCNC: 1.9 G/DL (ref 3.5–5)
ALP SERPL-CCNC: 421 U/L (ref 46–116)
ALT SERPL W P-5'-P-CCNC: 58 U/L (ref 12–78)
ANION GAP SERPL CALCULATED.3IONS-SCNC: 7 MMOL/L (ref 4–13)
AST SERPL W P-5'-P-CCNC: 32 U/L (ref 5–45)
BASOPHILS # BLD AUTO: 0.1 THOUSANDS/ΜL (ref 0–0.1)
BASOPHILS NFR BLD AUTO: 1 % (ref 0–1)
BILIRUB SERPL-MCNC: 0.33 MG/DL (ref 0.2–1)
BUN SERPL-MCNC: 14 MG/DL (ref 5–25)
CALCIUM SERPL-MCNC: 9.4 MG/DL (ref 8.3–10.1)
CHLORIDE SERPL-SCNC: 105 MMOL/L (ref 100–108)
CO2 SERPL-SCNC: 24 MMOL/L (ref 21–32)
CREAT SERPL-MCNC: 0.91 MG/DL (ref 0.6–1.3)
CRYPTOC AG TITR SER LA: NEGATIVE {TITER}
EOSINOPHIL # BLD AUTO: 0.15 THOUSAND/ΜL (ref 0–0.61)
EOSINOPHIL NFR BLD AUTO: 1 % (ref 0–6)
ERYTHROCYTE [DISTWIDTH] IN BLOOD BY AUTOMATED COUNT: 20.9 % (ref 11.6–15.1)
GAMMA INTERFERON BACKGROUND BLD IA-ACNC: 0.04 IU/ML
GFR SERPL CREATININE-BSD FRML MDRD: 76 ML/MIN/1.73SQ M
GLUCOSE SERPL-MCNC: 106 MG/DL (ref 65–140)
GLUCOSE SERPL-MCNC: 129 MG/DL (ref 65–140)
GLUCOSE SERPL-MCNC: 97 MG/DL (ref 65–140)
GLUCOSE SERPL-MCNC: 97 MG/DL (ref 65–140)
GLUCOSE SERPL-MCNC: 98 MG/DL (ref 65–140)
HCT VFR BLD AUTO: 29.1 % (ref 34.8–46.1)
HGB BLD-MCNC: 8.7 G/DL (ref 11.5–15.4)
HIV 1+2 AB+HIV1 P24 AG SERPL QL IA: NORMAL
IMM GRANULOCYTES # BLD AUTO: 0.15 THOUSAND/UL (ref 0–0.2)
IMM GRANULOCYTES NFR BLD AUTO: 1 % (ref 0–2)
LYMPHOCYTES # BLD AUTO: 2.66 THOUSANDS/ΜL (ref 0.6–4.47)
LYMPHOCYTES NFR BLD AUTO: 20 % (ref 14–44)
M TB IFN-G BLD-IMP: POSITIVE
M TB IFN-G CD4+ BCKGRND COR BLD-ACNC: 0.69 IU/ML
M TB IFN-G CD4+ BCKGRND COR BLD-ACNC: 1.33 IU/ML
MCH RBC QN AUTO: 22.8 PG (ref 26.8–34.3)
MCHC RBC AUTO-ENTMCNC: 29.9 G/DL (ref 31.4–37.4)
MCV RBC AUTO: 76 FL (ref 82–98)
MITOGEN IGNF BCKGRD COR BLD-ACNC: 0.77 IU/ML
MONOCYTES # BLD AUTO: 1.3 THOUSAND/ΜL (ref 0.17–1.22)
MONOCYTES NFR BLD AUTO: 10 % (ref 4–12)
NEUTROPHILS # BLD AUTO: 8.89 THOUSANDS/ΜL (ref 1.85–7.62)
NEUTS SEG NFR BLD AUTO: 67 % (ref 43–75)
NRBC BLD AUTO-RTO: 0 /100 WBCS
PLATELET # BLD AUTO: 666 THOUSANDS/UL (ref 149–390)
PMV BLD AUTO: 9.5 FL (ref 8.9–12.7)
POTASSIUM SERPL-SCNC: 4.5 MMOL/L (ref 3.5–5.3)
PROT SERPL-MCNC: 8.5 G/DL (ref 6.4–8.2)
RBC # BLD AUTO: 3.81 MILLION/UL (ref 3.81–5.12)
SODIUM SERPL-SCNC: 136 MMOL/L (ref 136–145)
WBC # BLD AUTO: 13.25 THOUSAND/UL (ref 4.31–10.16)

## 2020-04-17 PROCEDURE — 80053 COMPREHEN METABOLIC PANEL: CPT | Performed by: INTERNAL MEDICINE

## 2020-04-17 PROCEDURE — 84165 PROTEIN E-PHORESIS SERUM: CPT | Performed by: PATHOLOGY

## 2020-04-17 PROCEDURE — 82948 REAGENT STRIP/BLOOD GLUCOSE: CPT

## 2020-04-17 PROCEDURE — 99232 SBSQ HOSP IP/OBS MODERATE 35: CPT | Performed by: HOSPITALIST

## 2020-04-17 PROCEDURE — 86038 ANTINUCLEAR ANTIBODIES: CPT | Performed by: INTERNAL MEDICINE

## 2020-04-17 PROCEDURE — 99232 SBSQ HOSP IP/OBS MODERATE 35: CPT | Performed by: INTERNAL MEDICINE

## 2020-04-17 PROCEDURE — 85025 COMPLETE CBC W/AUTO DIFF WBC: CPT | Performed by: INTERNAL MEDICINE

## 2020-04-17 PROCEDURE — 84166 PROTEIN E-PHORESIS/URINE/CSF: CPT | Performed by: PATHOLOGY

## 2020-04-17 PROCEDURE — 86334 IMMUNOFIX E-PHORESIS SERUM: CPT | Performed by: HOSPITALIST

## 2020-04-17 PROCEDURE — 86334 IMMUNOFIX E-PHORESIS SERUM: CPT | Performed by: PATHOLOGY

## 2020-04-17 PROCEDURE — 84166 PROTEIN E-PHORESIS/URINE/CSF: CPT | Performed by: HOSPITALIST

## 2020-04-17 PROCEDURE — 84165 PROTEIN E-PHORESIS SERUM: CPT | Performed by: HOSPITALIST

## 2020-04-17 PROCEDURE — 82787 IGG 1 2 3 OR 4 EACH: CPT | Performed by: HOSPITALIST

## 2020-04-17 PROCEDURE — 82784 ASSAY IGA/IGD/IGG/IGM EACH: CPT | Performed by: HOSPITALIST

## 2020-04-17 PROCEDURE — A9585 GADOBUTROL INJECTION: HCPCS | Performed by: HOSPITALIST

## 2020-04-17 PROCEDURE — 72157 MRI CHEST SPINE W/O & W/DYE: CPT

## 2020-04-17 RX ORDER — DOCUSATE SODIUM 100 MG/1
100 CAPSULE, LIQUID FILLED ORAL DAILY
Status: DISCONTINUED | OUTPATIENT
Start: 2020-04-18 | End: 2020-04-25

## 2020-04-17 RX ADMIN — MONTELUKAST 10 MG: 10 TABLET, FILM COATED ORAL at 21:53

## 2020-04-17 RX ADMIN — PRAVASTATIN SODIUM 20 MG: 20 TABLET ORAL at 17:57

## 2020-04-17 RX ADMIN — METHOCARBAMOL TABLETS 500 MG: 500 TABLET, COATED ORAL at 17:57

## 2020-04-17 RX ADMIN — ESCITALOPRAM OXALATE 10 MG: 10 TABLET ORAL at 09:49

## 2020-04-17 RX ADMIN — METHOCARBAMOL TABLETS 500 MG: 500 TABLET, COATED ORAL at 23:50

## 2020-04-17 RX ADMIN — ACETAMINOPHEN 650 MG: 325 TABLET ORAL at 03:53

## 2020-04-17 RX ADMIN — ASPIRIN 81 MG: 81 TABLET ORAL at 09:49

## 2020-04-17 RX ADMIN — OXYCODONE HYDROCHLORIDE 5 MG: 5 TABLET ORAL at 15:45

## 2020-04-17 RX ADMIN — MELATONIN 1000 UNITS: at 09:49

## 2020-04-17 RX ADMIN — GADOBUTROL 10 ML: 604.72 INJECTION INTRAVENOUS at 17:33

## 2020-04-17 RX ADMIN — LOSARTAN POTASSIUM 25 MG: 25 TABLET, FILM COATED ORAL at 09:49

## 2020-04-17 RX ADMIN — HEPARIN SODIUM 5000 UNITS: 5000 INJECTION INTRAVENOUS; SUBCUTANEOUS at 21:52

## 2020-04-17 RX ADMIN — METHOCARBAMOL TABLETS 500 MG: 500 TABLET, COATED ORAL at 11:02

## 2020-04-17 RX ADMIN — HEPARIN SODIUM 5000 UNITS: 5000 INJECTION INTRAVENOUS; SUBCUTANEOUS at 05:21

## 2020-04-17 RX ADMIN — ACETAMINOPHEN 650 MG: 325 TABLET ORAL at 09:49

## 2020-04-17 RX ADMIN — METHOCARBAMOL TABLETS 500 MG: 500 TABLET, COATED ORAL at 05:21

## 2020-04-17 RX ADMIN — OXYCODONE HYDROCHLORIDE 5 MG: 5 TABLET ORAL at 01:55

## 2020-04-17 RX ADMIN — HEPARIN SODIUM 5000 UNITS: 5000 INJECTION INTRAVENOUS; SUBCUTANEOUS at 13:54

## 2020-04-17 RX ADMIN — VANCOMYCIN HYDROCHLORIDE 1000 MG: 1 INJECTION, SOLUTION INTRAVENOUS at 01:59

## 2020-04-17 RX ADMIN — LIDOCAINE 1 PATCH: 50 PATCH CUTANEOUS at 09:49

## 2020-04-17 RX ADMIN — FOLIC ACID-PYRIDOXINE-CYANOCOBALAMIN TAB 2.5-25-2 MG 1 TABLET: 2.5-25-2 TAB at 09:50

## 2020-04-18 PROBLEM — R94.6 ABNORMAL THYROID FUNCTION TEST: Status: ACTIVE | Noted: 2020-04-18

## 2020-04-18 PROBLEM — R76.12 POSITIVE QUANTIFERON-TB GOLD TEST: Status: ACTIVE | Noted: 2020-04-18

## 2020-04-18 PROBLEM — R65.10 SIRS (SYSTEMIC INFLAMMATORY RESPONSE SYNDROME) (HCC): Status: ACTIVE | Noted: 2020-04-18

## 2020-04-18 LAB
ALBUMIN SERPL BCP-MCNC: 2 G/DL (ref 3.5–5)
ALP SERPL-CCNC: 397 U/L (ref 46–116)
ALT SERPL W P-5'-P-CCNC: 48 U/L (ref 12–78)
ANION GAP SERPL CALCULATED.3IONS-SCNC: 8 MMOL/L (ref 4–13)
AST SERPL W P-5'-P-CCNC: 15 U/L (ref 5–45)
BASOPHILS # BLD AUTO: 0.1 THOUSANDS/ΜL (ref 0–0.1)
BASOPHILS NFR BLD AUTO: 1 % (ref 0–1)
BILIRUB SERPL-MCNC: 0.25 MG/DL (ref 0.2–1)
BUN SERPL-MCNC: 14 MG/DL (ref 5–25)
CALCIUM SERPL-MCNC: 9.8 MG/DL (ref 8.3–10.1)
CHLORIDE SERPL-SCNC: 104 MMOL/L (ref 100–108)
CO2 SERPL-SCNC: 23 MMOL/L (ref 21–32)
CREAT SERPL-MCNC: 0.81 MG/DL (ref 0.6–1.3)
EOSINOPHIL # BLD AUTO: 0.1 THOUSAND/ΜL (ref 0–0.61)
EOSINOPHIL NFR BLD AUTO: 1 % (ref 0–6)
ERYTHROCYTE [DISTWIDTH] IN BLOOD BY AUTOMATED COUNT: 20.8 % (ref 11.6–15.1)
GFR SERPL CREATININE-BSD FRML MDRD: 87 ML/MIN/1.73SQ M
GLUCOSE SERPL-MCNC: 106 MG/DL (ref 65–140)
GLUCOSE SERPL-MCNC: 115 MG/DL (ref 65–140)
GLUCOSE SERPL-MCNC: 115 MG/DL (ref 65–140)
GLUCOSE SERPL-MCNC: 140 MG/DL (ref 65–140)
GLUCOSE SERPL-MCNC: 91 MG/DL (ref 65–140)
HCT VFR BLD AUTO: 30.2 % (ref 34.8–46.1)
HGB BLD-MCNC: 9 G/DL (ref 11.5–15.4)
HGB RETIC QN AUTO: 25.5 PG (ref 30–38.3)
IMM GRANULOCYTES # BLD AUTO: 0.14 THOUSAND/UL (ref 0–0.2)
IMM GRANULOCYTES NFR BLD AUTO: 1 % (ref 0–2)
IMM RETICS NFR: 16.8 % (ref 0–14)
LDH SERPL-CCNC: 109 U/L (ref 81–234)
LYMPHOCYTES # BLD AUTO: 2.76 THOUSANDS/ΜL (ref 0.6–4.47)
LYMPHOCYTES NFR BLD AUTO: 21 % (ref 14–44)
MCH RBC QN AUTO: 22.5 PG (ref 26.8–34.3)
MCHC RBC AUTO-ENTMCNC: 29.8 G/DL (ref 31.4–37.4)
MCV RBC AUTO: 76 FL (ref 82–98)
MONOCYTES # BLD AUTO: 1.14 THOUSAND/ΜL (ref 0.17–1.22)
MONOCYTES NFR BLD AUTO: 9 % (ref 4–12)
NEUTROPHILS # BLD AUTO: 9.1 THOUSANDS/ΜL (ref 1.85–7.62)
NEUTS SEG NFR BLD AUTO: 67 % (ref 43–75)
NRBC BLD AUTO-RTO: 0 /100 WBCS
PLATELET # BLD AUTO: 741 THOUSANDS/UL (ref 149–390)
PMV BLD AUTO: 9.5 FL (ref 8.9–12.7)
POTASSIUM SERPL-SCNC: 4.6 MMOL/L (ref 3.5–5.3)
PROT SERPL-MCNC: 9 G/DL (ref 6.4–8.2)
RBC # BLD AUTO: 4 MILLION/UL (ref 3.81–5.12)
RETICS # AUTO: ABNORMAL 10*3/UL (ref 14097–95744)
RETICS # CALC: 1.11 % (ref 0.37–1.87)
SODIUM SERPL-SCNC: 135 MMOL/L (ref 136–145)
WBC # BLD AUTO: 13.34 THOUSAND/UL (ref 4.31–10.16)

## 2020-04-18 PROCEDURE — 85046 RETICYTE/HGB CONCENTRATE: CPT | Performed by: HOSPITALIST

## 2020-04-18 PROCEDURE — 82948 REAGENT STRIP/BLOOD GLUCOSE: CPT

## 2020-04-18 PROCEDURE — 83615 LACTATE (LD) (LDH) ENZYME: CPT | Performed by: HOSPITALIST

## 2020-04-18 PROCEDURE — 99232 SBSQ HOSP IP/OBS MODERATE 35: CPT | Performed by: HOSPITALIST

## 2020-04-18 PROCEDURE — 85025 COMPLETE CBC W/AUTO DIFF WBC: CPT | Performed by: HOSPITALIST

## 2020-04-18 PROCEDURE — 93005 ELECTROCARDIOGRAM TRACING: CPT

## 2020-04-18 PROCEDURE — 80053 COMPREHEN METABOLIC PANEL: CPT | Performed by: HOSPITALIST

## 2020-04-18 PROCEDURE — 83010 ASSAY OF HAPTOGLOBIN QUANT: CPT | Performed by: HOSPITALIST

## 2020-04-18 RX ORDER — ONDANSETRON 2 MG/ML
4 INJECTION INTRAMUSCULAR; INTRAVENOUS EVERY 4 HOURS PRN
Status: DISCONTINUED | OUTPATIENT
Start: 2020-04-18 | End: 2020-04-25 | Stop reason: HOSPADM

## 2020-04-18 RX ADMIN — DOCUSATE SODIUM 100 MG: 100 CAPSULE, LIQUID FILLED ORAL at 08:08

## 2020-04-18 RX ADMIN — FOLIC ACID-PYRIDOXINE-CYANOCOBALAMIN TAB 2.5-25-2 MG 1 TABLET: 2.5-25-2 TAB at 08:08

## 2020-04-18 RX ADMIN — METHOCARBAMOL TABLETS 500 MG: 500 TABLET, COATED ORAL at 11:51

## 2020-04-18 RX ADMIN — ESCITALOPRAM OXALATE 10 MG: 10 TABLET ORAL at 08:08

## 2020-04-18 RX ADMIN — METHOCARBAMOL TABLETS 500 MG: 500 TABLET, COATED ORAL at 05:51

## 2020-04-18 RX ADMIN — LIDOCAINE 1 PATCH: 50 PATCH CUTANEOUS at 08:08

## 2020-04-18 RX ADMIN — HEPARIN SODIUM 5000 UNITS: 5000 INJECTION INTRAVENOUS; SUBCUTANEOUS at 05:51

## 2020-04-18 RX ADMIN — PRAVASTATIN SODIUM 20 MG: 20 TABLET ORAL at 16:52

## 2020-04-18 RX ADMIN — METHOCARBAMOL TABLETS 500 MG: 500 TABLET, COATED ORAL at 16:53

## 2020-04-18 RX ADMIN — LOSARTAN POTASSIUM 25 MG: 25 TABLET, FILM COATED ORAL at 08:08

## 2020-04-18 RX ADMIN — METHOCARBAMOL TABLETS 500 MG: 500 TABLET, COATED ORAL at 22:42

## 2020-04-18 RX ADMIN — MONTELUKAST 10 MG: 10 TABLET, FILM COATED ORAL at 22:42

## 2020-04-18 RX ADMIN — HEPARIN SODIUM 5000 UNITS: 5000 INJECTION INTRAVENOUS; SUBCUTANEOUS at 13:08

## 2020-04-18 RX ADMIN — MELATONIN 1000 UNITS: at 08:08

## 2020-04-18 RX ADMIN — ACETAMINOPHEN 650 MG: 325 TABLET ORAL at 16:53

## 2020-04-18 RX ADMIN — HEPARIN SODIUM 5000 UNITS: 5000 INJECTION INTRAVENOUS; SUBCUTANEOUS at 22:42

## 2020-04-18 RX ADMIN — ASPIRIN 81 MG: 81 TABLET ORAL at 08:08

## 2020-04-19 LAB
ATRIAL RATE: 85 BPM
BASOPHILS # BLD AUTO: 0.11 THOUSANDS/ΜL (ref 0–0.1)
BASOPHILS NFR BLD AUTO: 1 % (ref 0–1)
EOSINOPHIL # BLD AUTO: 0.12 THOUSAND/ΜL (ref 0–0.61)
EOSINOPHIL NFR BLD AUTO: 1 % (ref 0–6)
ERYTHROCYTE [DISTWIDTH] IN BLOOD BY AUTOMATED COUNT: 21 % (ref 11.6–15.1)
GLUCOSE SERPL-MCNC: 109 MG/DL (ref 65–140)
GLUCOSE SERPL-MCNC: 115 MG/DL (ref 65–140)
GLUCOSE SERPL-MCNC: 132 MG/DL (ref 65–140)
GLUCOSE SERPL-MCNC: 135 MG/DL (ref 65–140)
GLUCOSE SERPL-MCNC: 91 MG/DL (ref 65–140)
HAPTOGLOB SERPL-MCNC: 797 MG/DL (ref 42–296)
HCT VFR BLD AUTO: 32.9 % (ref 34.8–46.1)
HGB BLD-MCNC: 9.9 G/DL (ref 11.5–15.4)
IMM GRANULOCYTES # BLD AUTO: 0.15 THOUSAND/UL (ref 0–0.2)
IMM GRANULOCYTES NFR BLD AUTO: 1 % (ref 0–2)
LYMPHOCYTES # BLD AUTO: 2.79 THOUSANDS/ΜL (ref 0.6–4.47)
LYMPHOCYTES NFR BLD AUTO: 20 % (ref 14–44)
MCH RBC QN AUTO: 22.8 PG (ref 26.8–34.3)
MCHC RBC AUTO-ENTMCNC: 30.1 G/DL (ref 31.4–37.4)
MCV RBC AUTO: 76 FL (ref 82–98)
MONOCYTES # BLD AUTO: 1.1 THOUSAND/ΜL (ref 0.17–1.22)
MONOCYTES NFR BLD AUTO: 8 % (ref 4–12)
NEUTROPHILS # BLD AUTO: 9.58 THOUSANDS/ΜL (ref 1.85–7.62)
NEUTS SEG NFR BLD AUTO: 69 % (ref 43–75)
NRBC BLD AUTO-RTO: 0 /100 WBCS
P AXIS: 52 DEGREES
PLATELET # BLD AUTO: 788 THOUSANDS/UL (ref 149–390)
PMV BLD AUTO: 9.3 FL (ref 8.9–12.7)
PR INTERVAL: 184 MS
PROCALCITONIN SERPL-MCNC: 0.25 NG/ML
QRS AXIS: 52 DEGREES
QRSD INTERVAL: 84 MS
QT INTERVAL: 366 MS
QTC INTERVAL: 435 MS
RBC # BLD AUTO: 4.35 MILLION/UL (ref 3.81–5.12)
T WAVE AXIS: 53 DEGREES
VENTRICULAR RATE: 85 BPM
WBC # BLD AUTO: 13.85 THOUSAND/UL (ref 4.31–10.16)

## 2020-04-19 PROCEDURE — 85025 COMPLETE CBC W/AUTO DIFF WBC: CPT | Performed by: HOSPITALIST

## 2020-04-19 PROCEDURE — 99232 SBSQ HOSP IP/OBS MODERATE 35: CPT | Performed by: HOSPITALIST

## 2020-04-19 PROCEDURE — 84145 PROCALCITONIN (PCT): CPT | Performed by: HOSPITALIST

## 2020-04-19 PROCEDURE — 82948 REAGENT STRIP/BLOOD GLUCOSE: CPT

## 2020-04-19 PROCEDURE — 93010 ELECTROCARDIOGRAM REPORT: CPT | Performed by: INTERNAL MEDICINE

## 2020-04-19 PROCEDURE — 99254 IP/OBS CNSLTJ NEW/EST MOD 60: CPT | Performed by: INTERNAL MEDICINE

## 2020-04-19 RX ORDER — CYCLOBENZAPRINE HCL 10 MG
10 TABLET ORAL 3 TIMES DAILY
Status: DISCONTINUED | OUTPATIENT
Start: 2020-04-19 | End: 2020-04-25 | Stop reason: HOSPADM

## 2020-04-19 RX ADMIN — FOLIC ACID-PYRIDOXINE-CYANOCOBALAMIN TAB 2.5-25-2 MG 1 TABLET: 2.5-25-2 TAB at 08:43

## 2020-04-19 RX ADMIN — HEPARIN SODIUM 5000 UNITS: 5000 INJECTION INTRAVENOUS; SUBCUTANEOUS at 06:43

## 2020-04-19 RX ADMIN — MELATONIN 1000 UNITS: at 08:43

## 2020-04-19 RX ADMIN — OXYCODONE HYDROCHLORIDE 5 MG: 5 TABLET ORAL at 18:21

## 2020-04-19 RX ADMIN — LOSARTAN POTASSIUM 25 MG: 25 TABLET, FILM COATED ORAL at 08:43

## 2020-04-19 RX ADMIN — HEPARIN SODIUM 5000 UNITS: 5000 INJECTION INTRAVENOUS; SUBCUTANEOUS at 22:23

## 2020-04-19 RX ADMIN — METHOCARBAMOL TABLETS 500 MG: 500 TABLET, COATED ORAL at 06:43

## 2020-04-19 RX ADMIN — DOCUSATE SODIUM 100 MG: 100 CAPSULE, LIQUID FILLED ORAL at 08:43

## 2020-04-19 RX ADMIN — ASPIRIN 81 MG: 81 TABLET ORAL at 08:43

## 2020-04-19 RX ADMIN — HEPARIN SODIUM 5000 UNITS: 5000 INJECTION INTRAVENOUS; SUBCUTANEOUS at 13:54

## 2020-04-19 RX ADMIN — LIDOCAINE 1 PATCH: 50 PATCH CUTANEOUS at 08:43

## 2020-04-19 RX ADMIN — ACETAMINOPHEN 650 MG: 325 TABLET ORAL at 12:03

## 2020-04-19 RX ADMIN — CYCLOBENZAPRINE HYDROCHLORIDE 10 MG: 10 TABLET, FILM COATED ORAL at 12:03

## 2020-04-19 RX ADMIN — MONTELUKAST 10 MG: 10 TABLET, FILM COATED ORAL at 22:23

## 2020-04-19 RX ADMIN — OXYCODONE HYDROCHLORIDE 5 MG: 5 TABLET ORAL at 00:18

## 2020-04-19 RX ADMIN — PRAVASTATIN SODIUM 20 MG: 20 TABLET ORAL at 16:03

## 2020-04-19 RX ADMIN — CYCLOBENZAPRINE HYDROCHLORIDE 10 MG: 10 TABLET, FILM COATED ORAL at 22:23

## 2020-04-19 RX ADMIN — ACETAMINOPHEN 650 MG: 325 TABLET ORAL at 22:35

## 2020-04-19 RX ADMIN — CYCLOBENZAPRINE HYDROCHLORIDE 10 MG: 10 TABLET, FILM COATED ORAL at 16:03

## 2020-04-19 RX ADMIN — ESCITALOPRAM OXALATE 10 MG: 10 TABLET ORAL at 08:43

## 2020-04-20 LAB
25(OH)D3 SERPL-MCNC: 56.9 NG/ML (ref 30–100)
BASOPHILS # BLD AUTO: 0.12 THOUSANDS/ΜL (ref 0–0.1)
BASOPHILS NFR BLD AUTO: 1 % (ref 0–1)
CRYOGLOB SER QL 1D COLD INC: NORMAL
EOSINOPHIL # BLD AUTO: 0.22 THOUSAND/ΜL (ref 0–0.61)
EOSINOPHIL NFR BLD AUTO: 2 % (ref 0–6)
ERYTHROCYTE [DISTWIDTH] IN BLOOD BY AUTOMATED COUNT: 21.1 % (ref 11.6–15.1)
GGT SERPL-CCNC: 293 U/L (ref 5–85)
GLUCOSE SERPL-MCNC: 103 MG/DL (ref 65–140)
GLUCOSE SERPL-MCNC: 117 MG/DL (ref 65–140)
GLUCOSE SERPL-MCNC: 119 MG/DL (ref 65–140)
GLUCOSE SERPL-MCNC: 119 MG/DL (ref 65–140)
HCT VFR BLD AUTO: 31.7 % (ref 34.8–46.1)
HGB BLD-MCNC: 9.4 G/DL (ref 11.5–15.4)
IGG SERPL-MCNC: 1540 MG/DL (ref 586–1602)
IGG1 SER-MCNC: 1115 MG/DL (ref 248–810)
IGG2 SER-MCNC: 179 MG/DL (ref 130–555)
IGG3 SER-MCNC: 53 MG/DL (ref 15–102)
IGG4 SER-MCNC: 43 MG/DL (ref 2–96)
IMM GRANULOCYTES # BLD AUTO: 0.17 THOUSAND/UL (ref 0–0.2)
IMM GRANULOCYTES NFR BLD AUTO: 1 % (ref 0–2)
LYMPHOCYTES # BLD AUTO: 2.86 THOUSANDS/ΜL (ref 0.6–4.47)
LYMPHOCYTES NFR BLD AUTO: 24 % (ref 14–44)
MAGNESIUM SERPL-MCNC: 2.6 MG/DL (ref 1.6–2.6)
MCH RBC QN AUTO: 22.8 PG (ref 26.8–34.3)
MCHC RBC AUTO-ENTMCNC: 29.7 G/DL (ref 31.4–37.4)
MCV RBC AUTO: 77 FL (ref 82–98)
MONOCYTES # BLD AUTO: 1.08 THOUSAND/ΜL (ref 0.17–1.22)
MONOCYTES NFR BLD AUTO: 9 % (ref 4–12)
NEUTROPHILS # BLD AUTO: 7.44 THOUSANDS/ΜL (ref 1.85–7.62)
NEUTS SEG NFR BLD AUTO: 63 % (ref 43–75)
NRBC BLD AUTO-RTO: 0 /100 WBCS
PHOSPHATE SERPL-MCNC: 5.8 MG/DL (ref 2.7–4.5)
PLATELET # BLD AUTO: 766 THOUSANDS/UL (ref 149–390)
PMV BLD AUTO: 9.7 FL (ref 8.9–12.7)
PROCALCITONIN SERPL-MCNC: 0.24 NG/ML
PTH-INTACT SERPL-MCNC: 45.4 PG/ML (ref 18.4–80.1)
RBC # BLD AUTO: 4.13 MILLION/UL (ref 3.81–5.12)
RYE IGE QN: NEGATIVE
WBC # BLD AUTO: 11.89 THOUSAND/UL (ref 4.31–10.16)

## 2020-04-20 PROCEDURE — 83970 ASSAY OF PARATHORMONE: CPT | Performed by: INTERNAL MEDICINE

## 2020-04-20 PROCEDURE — 82306 VITAMIN D 25 HYDROXY: CPT | Performed by: INTERNAL MEDICINE

## 2020-04-20 PROCEDURE — 99232 SBSQ HOSP IP/OBS MODERATE 35: CPT | Performed by: HOSPITALIST

## 2020-04-20 PROCEDURE — 82977 ASSAY OF GGT: CPT | Performed by: INTERNAL MEDICINE

## 2020-04-20 PROCEDURE — 82948 REAGENT STRIP/BLOOD GLUCOSE: CPT

## 2020-04-20 PROCEDURE — 99254 IP/OBS CNSLTJ NEW/EST MOD 60: CPT | Performed by: INTERNAL MEDICINE

## 2020-04-20 PROCEDURE — 83735 ASSAY OF MAGNESIUM: CPT | Performed by: INTERNAL MEDICINE

## 2020-04-20 PROCEDURE — 84100 ASSAY OF PHOSPHORUS: CPT | Performed by: INTERNAL MEDICINE

## 2020-04-20 PROCEDURE — 85025 COMPLETE CBC W/AUTO DIFF WBC: CPT | Performed by: HOSPITALIST

## 2020-04-20 PROCEDURE — 84145 PROCALCITONIN (PCT): CPT | Performed by: HOSPITALIST

## 2020-04-20 PROCEDURE — 84445 ASSAY OF TSI GLOBULIN: CPT | Performed by: INTERNAL MEDICINE

## 2020-04-20 PROCEDURE — ND001 PR NO DOCUMENTATION: Performed by: SURGERY

## 2020-04-20 PROCEDURE — 99233 SBSQ HOSP IP/OBS HIGH 50: CPT | Performed by: INTERNAL MEDICINE

## 2020-04-20 RX ORDER — ACETAMINOPHEN 325 MG/1
650 TABLET ORAL EVERY 8 HOURS SCHEDULED
Status: DISCONTINUED | OUTPATIENT
Start: 2020-04-20 | End: 2020-04-25

## 2020-04-20 RX ORDER — FERROUS SULFATE 325(65) MG
325 TABLET ORAL
Status: DISCONTINUED | OUTPATIENT
Start: 2020-04-20 | End: 2020-04-25 | Stop reason: HOSPADM

## 2020-04-20 RX ADMIN — OXYCODONE HYDROCHLORIDE 2.5 MG: 5 TABLET ORAL at 14:52

## 2020-04-20 RX ADMIN — MONTELUKAST 10 MG: 10 TABLET, FILM COATED ORAL at 21:25

## 2020-04-20 RX ADMIN — HEPARIN SODIUM 5000 UNITS: 5000 INJECTION INTRAVENOUS; SUBCUTANEOUS at 06:16

## 2020-04-20 RX ADMIN — CYCLOBENZAPRINE HYDROCHLORIDE 10 MG: 10 TABLET, FILM COATED ORAL at 21:26

## 2020-04-20 RX ADMIN — LIDOCAINE 1 PATCH: 50 PATCH CUTANEOUS at 09:19

## 2020-04-20 RX ADMIN — FERROUS SULFATE TAB 325 MG (65 MG ELEMENTAL FE) 325 MG: 325 (65 FE) TAB at 09:19

## 2020-04-20 RX ADMIN — ASPIRIN 81 MG: 81 TABLET ORAL at 09:19

## 2020-04-20 RX ADMIN — DOCUSATE SODIUM 100 MG: 100 CAPSULE, LIQUID FILLED ORAL at 09:18

## 2020-04-20 RX ADMIN — HEPARIN SODIUM 5000 UNITS: 5000 INJECTION INTRAVENOUS; SUBCUTANEOUS at 14:41

## 2020-04-20 RX ADMIN — OXYCODONE HYDROCHLORIDE 5 MG: 5 TABLET ORAL at 09:18

## 2020-04-20 RX ADMIN — ESCITALOPRAM OXALATE 10 MG: 10 TABLET ORAL at 09:19

## 2020-04-20 RX ADMIN — CYCLOBENZAPRINE HYDROCHLORIDE 10 MG: 10 TABLET, FILM COATED ORAL at 09:19

## 2020-04-20 RX ADMIN — LOSARTAN POTASSIUM 25 MG: 25 TABLET, FILM COATED ORAL at 09:20

## 2020-04-20 RX ADMIN — ACETAMINOPHEN 650 MG: 325 TABLET ORAL at 14:41

## 2020-04-20 RX ADMIN — ACETAMINOPHEN 650 MG: 325 TABLET ORAL at 21:25

## 2020-04-20 RX ADMIN — MELATONIN 1000 UNITS: at 09:19

## 2020-04-20 RX ADMIN — OXYCODONE HYDROCHLORIDE 5 MG: 5 TABLET ORAL at 03:05

## 2020-04-20 RX ADMIN — FOLIC ACID-PYRIDOXINE-CYANOCOBALAMIN TAB 2.5-25-2 MG 1 TABLET: 2.5-25-2 TAB at 09:23

## 2020-04-20 RX ADMIN — CYCLOBENZAPRINE HYDROCHLORIDE 10 MG: 10 TABLET, FILM COATED ORAL at 16:59

## 2020-04-20 RX ADMIN — HEPARIN SODIUM 5000 UNITS: 5000 INJECTION INTRAVENOUS; SUBCUTANEOUS at 21:24

## 2020-04-20 RX ADMIN — PRAVASTATIN SODIUM 20 MG: 20 TABLET ORAL at 17:00

## 2020-04-21 LAB
ALBUMIN SERPL BCP-MCNC: 2 G/DL (ref 3.5–5)
ALBUMIN SERPL ELPH-MCNC: 2.35 G/DL (ref 3.5–5)
ALBUMIN SERPL ELPH-MCNC: 31.7 % (ref 52–65)
ALBUMIN UR ELPH-MCNC: 46.3 %
ALP SERPL-CCNC: 331 U/L (ref 46–116)
ALPHA1 GLOB MFR UR ELPH: 13.2 %
ALPHA1 GLOB SERPL ELPH-MCNC: 0.74 G/DL (ref 0.1–0.4)
ALPHA1 GLOB SERPL ELPH-MCNC: 10 % (ref 2.5–5)
ALPHA2 GLOB MFR UR ELPH: 10.5 %
ALPHA2 GLOB SERPL ELPH-MCNC: 1.69 G/DL (ref 0.4–1.2)
ALPHA2 GLOB SERPL ELPH-MCNC: 22.8 % (ref 7–13)
ALT SERPL W P-5'-P-CCNC: 44 U/L (ref 12–78)
ANION GAP SERPL CALCULATED.3IONS-SCNC: 6 MMOL/L (ref 4–13)
AST SERPL W P-5'-P-CCNC: 18 U/L (ref 5–45)
B-GLOBULIN MFR UR ELPH: 7.9 %
BASOPHILS # BLD AUTO: 0.13 THOUSANDS/ΜL (ref 0–0.1)
BASOPHILS NFR BLD AUTO: 1 % (ref 0–1)
BETA GLOB ABNORMAL SERPL ELPH-MCNC: 0.52 G/DL (ref 0.4–0.8)
BETA1 GLOB SERPL ELPH-MCNC: 7 % (ref 5–13)
BETA2 GLOB SERPL ELPH-MCNC: 7.9 % (ref 2–8)
BETA2+GAMMA GLOB SERPL ELPH-MCNC: 0.58 G/DL (ref 0.2–0.5)
BILIRUB SERPL-MCNC: 0.27 MG/DL (ref 0.2–1)
BUN SERPL-MCNC: 25 MG/DL (ref 5–25)
C-ANCA TITR SER IF: NORMAL TITER
CALCIUM SERPL-MCNC: 9.7 MG/DL (ref 8.3–10.1)
CHLORIDE SERPL-SCNC: 102 MMOL/L (ref 100–108)
CO2 SERPL-SCNC: 25 MMOL/L (ref 21–32)
CREAT SERPL-MCNC: 0.97 MG/DL (ref 0.6–1.3)
CRP SERPL QL: 187 MG/L
EOSINOPHIL # BLD AUTO: 0.16 THOUSAND/ΜL (ref 0–0.61)
EOSINOPHIL NFR BLD AUTO: 1 % (ref 0–6)
ERYTHROCYTE [DISTWIDTH] IN BLOOD BY AUTOMATED COUNT: 21.3 % (ref 11.6–15.1)
ERYTHROCYTE [SEDIMENTATION RATE] IN BLOOD: >130 MM/HOUR (ref 0–20)
GAMMA GLOB ABNORMAL SERPL ELPH-MCNC: 1.52 G/DL (ref 0.5–1.6)
GAMMA GLOB MFR UR ELPH: 22.1 %
GAMMA GLOB SERPL ELPH-MCNC: 20.6 % (ref 12–22)
GFR SERPL CREATININE-BSD FRML MDRD: 70 ML/MIN/1.73SQ M
GLUCOSE SERPL-MCNC: 102 MG/DL (ref 65–140)
GLUCOSE SERPL-MCNC: 107 MG/DL (ref 65–140)
GLUCOSE SERPL-MCNC: 130 MG/DL (ref 65–140)
GLUCOSE SERPL-MCNC: 135 MG/DL (ref 65–140)
GLUCOSE SERPL-MCNC: 156 MG/DL (ref 65–140)
GLUCOSE SERPL-MCNC: 167 MG/DL (ref 65–140)
HCT VFR BLD AUTO: 31.8 % (ref 34.8–46.1)
HGB BLD-MCNC: 9.1 G/DL (ref 11.5–15.4)
IGG/ALB SER: 0.46 {RATIO} (ref 1.1–1.8)
IMM GRANULOCYTES # BLD AUTO: 0.18 THOUSAND/UL (ref 0–0.2)
IMM GRANULOCYTES NFR BLD AUTO: 2 % (ref 0–2)
LYMPHOCYTES # BLD AUTO: 2.61 THOUSANDS/ΜL (ref 0.6–4.47)
LYMPHOCYTES NFR BLD AUTO: 23 % (ref 14–44)
MCH RBC QN AUTO: 22.1 PG (ref 26.8–34.3)
MCHC RBC AUTO-ENTMCNC: 28.6 G/DL (ref 31.4–37.4)
MCV RBC AUTO: 77 FL (ref 82–98)
MONOCYTES # BLD AUTO: 0.89 THOUSAND/ΜL (ref 0.17–1.22)
MONOCYTES NFR BLD AUTO: 8 % (ref 4–12)
MYELOPEROXIDASE AB SER IA-ACNC: <9 U/ML (ref 0–9)
NEUTROPHILS # BLD AUTO: 7.35 THOUSANDS/ΜL (ref 1.85–7.62)
NEUTS SEG NFR BLD AUTO: 65 % (ref 43–75)
NRBC BLD AUTO-RTO: 0 /100 WBCS
P-ANCA ATYPICAL TITR SER IF: NORMAL TITER
P-ANCA TITR SER IF: NORMAL TITER
PLATELET # BLD AUTO: 688 THOUSANDS/UL (ref 149–390)
PMV BLD AUTO: 9.4 FL (ref 8.9–12.7)
POTASSIUM SERPL-SCNC: 4.9 MMOL/L (ref 3.5–5.3)
PROT PATTERN SERPL ELPH-IMP: ABNORMAL
PROT PATTERN UR ELPH-IMP: ABNORMAL
PROT SERPL-MCNC: 7.4 G/DL (ref 6.4–8.2)
PROT SERPL-MCNC: 8.7 G/DL (ref 6.4–8.2)
PROT UR-MCNC: 169 MG/DL
PROTEINASE3 AB SER IA-ACNC: <3.5 U/ML (ref 0–3.5)
RBC # BLD AUTO: 4.11 MILLION/UL (ref 3.81–5.12)
SODIUM SERPL-SCNC: 133 MMOL/L (ref 136–145)
TSI SER-ACNC: <0.1 IU/L (ref 0–0.55)
WBC # BLD AUTO: 11.32 THOUSAND/UL (ref 4.31–10.16)

## 2020-04-21 PROCEDURE — 99232 SBSQ HOSP IP/OBS MODERATE 35: CPT | Performed by: INTERNAL MEDICINE

## 2020-04-21 PROCEDURE — 99254 IP/OBS CNSLTJ NEW/EST MOD 60: CPT | Performed by: SURGERY

## 2020-04-21 PROCEDURE — 86140 C-REACTIVE PROTEIN: CPT | Performed by: SURGERY

## 2020-04-21 PROCEDURE — 80053 COMPREHEN METABOLIC PANEL: CPT | Performed by: INTERNAL MEDICINE

## 2020-04-21 PROCEDURE — 85025 COMPLETE CBC W/AUTO DIFF WBC: CPT | Performed by: INTERNAL MEDICINE

## 2020-04-21 PROCEDURE — 82948 REAGENT STRIP/BLOOD GLUCOSE: CPT

## 2020-04-21 PROCEDURE — 85652 RBC SED RATE AUTOMATED: CPT | Performed by: SURGERY

## 2020-04-21 RX ORDER — PRAVASTATIN SODIUM 40 MG
40 TABLET ORAL
Status: DISCONTINUED | OUTPATIENT
Start: 2020-04-22 | End: 2020-04-25 | Stop reason: HOSPADM

## 2020-04-21 RX ORDER — POLYETHYLENE GLYCOL 3350 17 G/17G
17 POWDER, FOR SOLUTION ORAL DAILY
Status: DISCONTINUED | OUTPATIENT
Start: 2020-04-21 | End: 2020-04-25

## 2020-04-21 RX ORDER — ESCITALOPRAM OXALATE 20 MG/1
20 TABLET ORAL DAILY
Status: DISCONTINUED | OUTPATIENT
Start: 2020-04-22 | End: 2020-04-25 | Stop reason: HOSPADM

## 2020-04-21 RX ADMIN — LIDOCAINE 1 PATCH: 50 PATCH CUTANEOUS at 08:43

## 2020-04-21 RX ADMIN — PRAVASTATIN SODIUM 20 MG: 20 TABLET ORAL at 16:53

## 2020-04-21 RX ADMIN — CYCLOBENZAPRINE HYDROCHLORIDE 10 MG: 10 TABLET, FILM COATED ORAL at 16:53

## 2020-04-21 RX ADMIN — HEPARIN SODIUM 5000 UNITS: 5000 INJECTION INTRAVENOUS; SUBCUTANEOUS at 13:26

## 2020-04-21 RX ADMIN — CYCLOBENZAPRINE HYDROCHLORIDE 10 MG: 10 TABLET, FILM COATED ORAL at 08:43

## 2020-04-21 RX ADMIN — FOLIC ACID-PYRIDOXINE-CYANOCOBALAMIN TAB 2.5-25-2 MG 1 TABLET: 2.5-25-2 TAB at 08:45

## 2020-04-21 RX ADMIN — ASPIRIN 81 MG: 81 TABLET ORAL at 08:42

## 2020-04-21 RX ADMIN — ESCITALOPRAM OXALATE 10 MG: 10 TABLET ORAL at 08:42

## 2020-04-21 RX ADMIN — MECLIZINE 12.5 MG: 12.5 TABLET ORAL at 22:04

## 2020-04-21 RX ADMIN — ACETAMINOPHEN 650 MG: 325 TABLET ORAL at 21:41

## 2020-04-21 RX ADMIN — CYCLOBENZAPRINE HYDROCHLORIDE 10 MG: 10 TABLET, FILM COATED ORAL at 21:42

## 2020-04-21 RX ADMIN — MELATONIN 1000 UNITS: at 08:43

## 2020-04-21 RX ADMIN — RIFAMPIN 600 MG: 300 CAPSULE ORAL at 08:43

## 2020-04-21 RX ADMIN — MONTELUKAST 10 MG: 10 TABLET, FILM COATED ORAL at 21:42

## 2020-04-21 RX ADMIN — OXYCODONE HYDROCHLORIDE 5 MG: 5 TABLET ORAL at 03:04

## 2020-04-21 RX ADMIN — FERROUS SULFATE TAB 325 MG (65 MG ELEMENTAL FE) 325 MG: 325 (65 FE) TAB at 08:43

## 2020-04-21 RX ADMIN — DOCUSATE SODIUM 100 MG: 100 CAPSULE, LIQUID FILLED ORAL at 08:43

## 2020-04-21 RX ADMIN — POLYETHYLENE GLYCOL 3350 17 G: 17 POWDER, FOR SOLUTION ORAL at 12:23

## 2020-04-21 RX ADMIN — HEPARIN SODIUM 5000 UNITS: 5000 INJECTION INTRAVENOUS; SUBCUTANEOUS at 05:58

## 2020-04-21 RX ADMIN — OXYCODONE HYDROCHLORIDE 5 MG: 5 TABLET ORAL at 12:40

## 2020-04-21 RX ADMIN — ACETAMINOPHEN 650 MG: 325 TABLET ORAL at 05:59

## 2020-04-21 RX ADMIN — ACETAMINOPHEN 650 MG: 325 TABLET ORAL at 13:25

## 2020-04-21 RX ADMIN — LOSARTAN POTASSIUM 25 MG: 25 TABLET, FILM COATED ORAL at 08:42

## 2020-04-22 DIAGNOSIS — I77.6 AORTITIS (HCC): Primary | ICD-10-CM

## 2020-04-22 LAB
ALBUMIN SERPL BCP-MCNC: 2 G/DL (ref 3.5–5)
ALP SERPL-CCNC: 366 U/L (ref 46–116)
ALT SERPL W P-5'-P-CCNC: 42 U/L (ref 12–78)
ANION GAP SERPL CALCULATED.3IONS-SCNC: 6 MMOL/L (ref 4–13)
ANION GAP SERPL CALCULATED.3IONS-SCNC: 7 MMOL/L (ref 4–13)
AST SERPL W P-5'-P-CCNC: 24 U/L (ref 5–45)
BASOPHILS # BLD AUTO: 0.1 THOUSANDS/ΜL (ref 0–0.1)
BASOPHILS NFR BLD AUTO: 1 % (ref 0–1)
BILIRUB SERPL-MCNC: 0.49 MG/DL (ref 0.2–1)
BUN SERPL-MCNC: 23 MG/DL (ref 5–25)
BUN SERPL-MCNC: 23 MG/DL (ref 5–25)
CALCIUM SERPL-MCNC: 9.8 MG/DL (ref 8.3–10.1)
CALCIUM SERPL-MCNC: 9.9 MG/DL (ref 8.3–10.1)
CHLORIDE SERPL-SCNC: 103 MMOL/L (ref 100–108)
CHLORIDE SERPL-SCNC: 103 MMOL/L (ref 100–108)
CO2 SERPL-SCNC: 22 MMOL/L (ref 21–32)
CO2 SERPL-SCNC: 25 MMOL/L (ref 21–32)
CREAT SERPL-MCNC: 0.93 MG/DL (ref 0.6–1.3)
CREAT SERPL-MCNC: 0.97 MG/DL (ref 0.6–1.3)
EOSINOPHIL # BLD AUTO: 0.17 THOUSAND/ΜL (ref 0–0.61)
EOSINOPHIL NFR BLD AUTO: 2 % (ref 0–6)
ERYTHROCYTE [DISTWIDTH] IN BLOOD BY AUTOMATED COUNT: 20.9 % (ref 11.6–15.1)
GFR SERPL CREATININE-BSD FRML MDRD: 70 ML/MIN/1.73SQ M
GFR SERPL CREATININE-BSD FRML MDRD: 74 ML/MIN/1.73SQ M
GLUCOSE SERPL-MCNC: 113 MG/DL (ref 65–140)
GLUCOSE SERPL-MCNC: 114 MG/DL (ref 65–140)
GLUCOSE SERPL-MCNC: 114 MG/DL (ref 65–140)
GLUCOSE SERPL-MCNC: 122 MG/DL (ref 65–140)
GLUCOSE SERPL-MCNC: 132 MG/DL (ref 65–140)
HCT VFR BLD AUTO: 30.5 % (ref 34.8–46.1)
HGB BLD-MCNC: 9.1 G/DL (ref 11.5–15.4)
IMM GRANULOCYTES # BLD AUTO: 0.16 THOUSAND/UL (ref 0–0.2)
IMM GRANULOCYTES NFR BLD AUTO: 1 % (ref 0–2)
INTERPRETATION UR IFE-IMP: NORMAL
LYMPHOCYTES # BLD AUTO: 2.16 THOUSANDS/ΜL (ref 0.6–4.47)
LYMPHOCYTES NFR BLD AUTO: 19 % (ref 14–44)
MAGNESIUM SERPL-MCNC: 2.5 MG/DL (ref 1.6–2.6)
MAGNESIUM SERPL-MCNC: 2.6 MG/DL (ref 1.6–2.6)
MCH RBC QN AUTO: 22.8 PG (ref 26.8–34.3)
MCHC RBC AUTO-ENTMCNC: 29.8 G/DL (ref 31.4–37.4)
MCV RBC AUTO: 76 FL (ref 82–98)
MONOCYTES # BLD AUTO: 0.94 THOUSAND/ΜL (ref 0.17–1.22)
MONOCYTES NFR BLD AUTO: 8 % (ref 4–12)
NEUTROPHILS # BLD AUTO: 7.98 THOUSANDS/ΜL (ref 1.85–7.62)
NEUTS SEG NFR BLD AUTO: 69 % (ref 43–75)
NRBC BLD AUTO-RTO: 0 /100 WBCS
PHOSPHATE SERPL-MCNC: 5.1 MG/DL (ref 2.7–4.5)
PHOSPHATE SERPL-MCNC: 5.2 MG/DL (ref 2.7–4.5)
PLATELET # BLD AUTO: 640 THOUSANDS/UL (ref 149–390)
PMV BLD AUTO: 9.5 FL (ref 8.9–12.7)
POTASSIUM SERPL-SCNC: 5.1 MMOL/L (ref 3.5–5.3)
POTASSIUM SERPL-SCNC: 5.8 MMOL/L (ref 3.5–5.3)
PROT SERPL-MCNC: 8.8 G/DL (ref 6.4–8.2)
RBC # BLD AUTO: 4 MILLION/UL (ref 3.81–5.12)
SODIUM SERPL-SCNC: 132 MMOL/L (ref 136–145)
SODIUM SERPL-SCNC: 134 MMOL/L (ref 136–145)
WBC # BLD AUTO: 11.51 THOUSAND/UL (ref 4.31–10.16)

## 2020-04-22 PROCEDURE — 85025 COMPLETE CBC W/AUTO DIFF WBC: CPT | Performed by: INTERNAL MEDICINE

## 2020-04-22 PROCEDURE — 80053 COMPREHEN METABOLIC PANEL: CPT | Performed by: INTERNAL MEDICINE

## 2020-04-22 PROCEDURE — 99232 SBSQ HOSP IP/OBS MODERATE 35: CPT | Performed by: INTERNAL MEDICINE

## 2020-04-22 PROCEDURE — 83735 ASSAY OF MAGNESIUM: CPT | Performed by: INTERNAL MEDICINE

## 2020-04-22 PROCEDURE — 82948 REAGENT STRIP/BLOOD GLUCOSE: CPT

## 2020-04-22 PROCEDURE — 80048 BASIC METABOLIC PNL TOTAL CA: CPT | Performed by: INTERNAL MEDICINE

## 2020-04-22 PROCEDURE — 99255 IP/OBS CONSLTJ NEW/EST HI 80: CPT | Performed by: THORACIC SURGERY (CARDIOTHORACIC VASCULAR SURGERY)

## 2020-04-22 PROCEDURE — 84100 ASSAY OF PHOSPHORUS: CPT | Performed by: INTERNAL MEDICINE

## 2020-04-22 RX ORDER — METHYLPREDNISOLONE SODIUM SUCCINATE 125 MG/2ML
80 INJECTION, POWDER, LYOPHILIZED, FOR SOLUTION INTRAMUSCULAR; INTRAVENOUS EVERY 8 HOURS SCHEDULED
Status: COMPLETED | OUTPATIENT
Start: 2020-04-22 | End: 2020-04-25

## 2020-04-22 RX ADMIN — LOSARTAN POTASSIUM 25 MG: 25 TABLET, FILM COATED ORAL at 09:21

## 2020-04-22 RX ADMIN — POLYETHYLENE GLYCOL 3350 17 G: 17 POWDER, FOR SOLUTION ORAL at 09:22

## 2020-04-22 RX ADMIN — OXYCODONE HYDROCHLORIDE 5 MG: 5 TABLET ORAL at 05:18

## 2020-04-22 RX ADMIN — MECLIZINE 12.5 MG: 12.5 TABLET ORAL at 09:48

## 2020-04-22 RX ADMIN — ESCITALOPRAM OXALATE 20 MG: 20 TABLET ORAL at 09:21

## 2020-04-22 RX ADMIN — RIFAMPIN 600 MG: 300 CAPSULE ORAL at 09:49

## 2020-04-22 RX ADMIN — CYCLOBENZAPRINE HYDROCHLORIDE 10 MG: 10 TABLET, FILM COATED ORAL at 09:21

## 2020-04-22 RX ADMIN — CYCLOBENZAPRINE HYDROCHLORIDE 10 MG: 10 TABLET, FILM COATED ORAL at 22:10

## 2020-04-22 RX ADMIN — LIDOCAINE 1 PATCH: 50 PATCH CUTANEOUS at 13:39

## 2020-04-22 RX ADMIN — DOCUSATE SODIUM 100 MG: 100 CAPSULE, LIQUID FILLED ORAL at 09:21

## 2020-04-22 RX ADMIN — CYCLOBENZAPRINE HYDROCHLORIDE 10 MG: 10 TABLET, FILM COATED ORAL at 17:14

## 2020-04-22 RX ADMIN — ENOXAPARIN SODIUM 40 MG: 40 INJECTION SUBCUTANEOUS at 09:21

## 2020-04-22 RX ADMIN — MELATONIN 1000 UNITS: at 09:21

## 2020-04-22 RX ADMIN — ACETAMINOPHEN 650 MG: 325 TABLET ORAL at 22:11

## 2020-04-22 RX ADMIN — PRAVASTATIN SODIUM 40 MG: 40 TABLET ORAL at 17:14

## 2020-04-22 RX ADMIN — FOLIC ACID-PYRIDOXINE-CYANOCOBALAMIN TAB 2.5-25-2 MG 1 TABLET: 2.5-25-2 TAB at 09:57

## 2020-04-22 RX ADMIN — FERROUS SULFATE TAB 325 MG (65 MG ELEMENTAL FE) 325 MG: 325 (65 FE) TAB at 09:21

## 2020-04-22 RX ADMIN — OXYCODONE HYDROCHLORIDE 5 MG: 5 TABLET ORAL at 13:40

## 2020-04-22 RX ADMIN — ACETAMINOPHEN 650 MG: 325 TABLET ORAL at 05:17

## 2020-04-22 RX ADMIN — ACETAMINOPHEN 650 MG: 325 TABLET ORAL at 13:40

## 2020-04-22 RX ADMIN — MONTELUKAST 10 MG: 10 TABLET, FILM COATED ORAL at 22:20

## 2020-04-22 RX ADMIN — ASPIRIN 81 MG: 81 TABLET ORAL at 09:21

## 2020-04-22 RX ADMIN — METHYLPREDNISOLONE SODIUM SUCCINATE 80 MG: 125 INJECTION, POWDER, FOR SOLUTION INTRAMUSCULAR; INTRAVENOUS at 19:37

## 2020-04-23 ENCOUNTER — APPOINTMENT (INPATIENT)
Dept: RADIOLOGY | Facility: HOSPITAL | Age: 45
DRG: 546 | End: 2020-04-23
Payer: COMMERCIAL

## 2020-04-23 DIAGNOSIS — R76.12 POSITIVE QUANTIFERON-TB GOLD TEST: Primary | ICD-10-CM

## 2020-04-23 LAB
ALBUMIN SERPL BCP-MCNC: 2.2 G/DL (ref 3.5–5)
ALP SERPL-CCNC: 316 U/L (ref 46–116)
ALT SERPL W P-5'-P-CCNC: 33 U/L (ref 12–78)
ANION GAP SERPL CALCULATED.3IONS-SCNC: 6 MMOL/L (ref 4–13)
AST SERPL W P-5'-P-CCNC: 10 U/L (ref 5–45)
BASOPHILS # BLD AUTO: 0.08 THOUSANDS/ΜL (ref 0–0.1)
BASOPHILS NFR BLD AUTO: 1 % (ref 0–1)
BILIRUB SERPL-MCNC: 0.29 MG/DL (ref 0.2–1)
BUN SERPL-MCNC: 23 MG/DL (ref 5–25)
CALCIUM SERPL-MCNC: 10.1 MG/DL (ref 8.3–10.1)
CHLORIDE SERPL-SCNC: 101 MMOL/L (ref 100–108)
CO2 SERPL-SCNC: 25 MMOL/L (ref 21–32)
CREAT SERPL-MCNC: 0.98 MG/DL (ref 0.6–1.3)
EOSINOPHIL # BLD AUTO: 0.02 THOUSAND/ΜL (ref 0–0.61)
EOSINOPHIL NFR BLD AUTO: 0 % (ref 0–6)
ERYTHROCYTE [DISTWIDTH] IN BLOOD BY AUTOMATED COUNT: 21.1 % (ref 11.6–15.1)
GFR SERPL CREATININE-BSD FRML MDRD: 69 ML/MIN/1.73SQ M
GLUCOSE SERPL-MCNC: 106 MG/DL (ref 65–140)
GLUCOSE SERPL-MCNC: 136 MG/DL (ref 65–140)
GLUCOSE SERPL-MCNC: 142 MG/DL (ref 65–140)
GLUCOSE SERPL-MCNC: 175 MG/DL (ref 65–140)
GLUCOSE SERPL-MCNC: 91 MG/DL (ref 65–140)
HCT VFR BLD AUTO: 30.2 % (ref 34.8–46.1)
HGB BLD-MCNC: 9 G/DL (ref 11.5–15.4)
IMM GRANULOCYTES # BLD AUTO: 0.14 THOUSAND/UL (ref 0–0.2)
IMM GRANULOCYTES NFR BLD AUTO: 1 % (ref 0–2)
LYMPHOCYTES # BLD AUTO: 2.53 THOUSANDS/ΜL (ref 0.6–4.47)
LYMPHOCYTES NFR BLD AUTO: 19 % (ref 14–44)
MAGNESIUM SERPL-MCNC: 2.5 MG/DL (ref 1.6–2.6)
MCH RBC QN AUTO: 22.7 PG (ref 26.8–34.3)
MCHC RBC AUTO-ENTMCNC: 29.8 G/DL (ref 31.4–37.4)
MCV RBC AUTO: 76 FL (ref 82–98)
MONOCYTES # BLD AUTO: 0.59 THOUSAND/ΜL (ref 0.17–1.22)
MONOCYTES NFR BLD AUTO: 4 % (ref 4–12)
NEUTROPHILS # BLD AUTO: 9.98 THOUSANDS/ΜL (ref 1.85–7.62)
NEUTS SEG NFR BLD AUTO: 75 % (ref 43–75)
NRBC BLD AUTO-RTO: 0 /100 WBCS
PHOSPHATE SERPL-MCNC: 5.6 MG/DL (ref 2.7–4.5)
PLATELET # BLD AUTO: 692 THOUSANDS/UL (ref 149–390)
PMV BLD AUTO: 9.4 FL (ref 8.9–12.7)
POTASSIUM SERPL-SCNC: 5 MMOL/L (ref 3.5–5.3)
PROT SERPL-MCNC: 9.2 G/DL (ref 6.4–8.2)
RBC # BLD AUTO: 3.97 MILLION/UL (ref 3.81–5.12)
SODIUM SERPL-SCNC: 132 MMOL/L (ref 136–145)
WBC # BLD AUTO: 13.34 THOUSAND/UL (ref 4.31–10.16)

## 2020-04-23 PROCEDURE — 99252 IP/OBS CONSLTJ NEW/EST SF 35: CPT | Performed by: INTERNAL MEDICINE

## 2020-04-23 PROCEDURE — 86038 ANTINUCLEAR ANTIBODIES: CPT | Performed by: INTERNAL MEDICINE

## 2020-04-23 PROCEDURE — 82948 REAGENT STRIP/BLOOD GLUCOSE: CPT

## 2020-04-23 PROCEDURE — 99232 SBSQ HOSP IP/OBS MODERATE 35: CPT | Performed by: INTERNAL MEDICINE

## 2020-04-23 PROCEDURE — 80053 COMPREHEN METABOLIC PANEL: CPT | Performed by: INTERNAL MEDICINE

## 2020-04-23 PROCEDURE — 86235 NUCLEAR ANTIGEN ANTIBODY: CPT | Performed by: INTERNAL MEDICINE

## 2020-04-23 PROCEDURE — 85025 COMPLETE CBC W/AUTO DIFF WBC: CPT | Performed by: INTERNAL MEDICINE

## 2020-04-23 PROCEDURE — 86256 FLUORESCENT ANTIBODY TITER: CPT | Performed by: INTERNAL MEDICINE

## 2020-04-23 PROCEDURE — 83735 ASSAY OF MAGNESIUM: CPT | Performed by: INTERNAL MEDICINE

## 2020-04-23 PROCEDURE — 84100 ASSAY OF PHOSPHORUS: CPT | Performed by: INTERNAL MEDICINE

## 2020-04-23 PROCEDURE — 76705 ECHO EXAM OF ABDOMEN: CPT

## 2020-04-23 RX ADMIN — CYCLOBENZAPRINE HYDROCHLORIDE 10 MG: 10 TABLET, FILM COATED ORAL at 21:23

## 2020-04-23 RX ADMIN — POLYETHYLENE GLYCOL 3350 17 G: 17 POWDER, FOR SOLUTION ORAL at 10:00

## 2020-04-23 RX ADMIN — LIDOCAINE 1 PATCH: 50 PATCH CUTANEOUS at 10:00

## 2020-04-23 RX ADMIN — CYCLOBENZAPRINE HYDROCHLORIDE 10 MG: 10 TABLET, FILM COATED ORAL at 10:11

## 2020-04-23 RX ADMIN — ACETAMINOPHEN 650 MG: 325 TABLET ORAL at 05:43

## 2020-04-23 RX ADMIN — ASPIRIN 81 MG: 81 TABLET ORAL at 10:00

## 2020-04-23 RX ADMIN — FOLIC ACID-PYRIDOXINE-CYANOCOBALAMIN TAB 2.5-25-2 MG 1 TABLET: 2.5-25-2 TAB at 10:11

## 2020-04-23 RX ADMIN — ACETAMINOPHEN 650 MG: 325 TABLET ORAL at 15:29

## 2020-04-23 RX ADMIN — MELATONIN 1000 UNITS: at 10:00

## 2020-04-23 RX ADMIN — ENOXAPARIN SODIUM 40 MG: 40 INJECTION SUBCUTANEOUS at 10:00

## 2020-04-23 RX ADMIN — INSULIN LISPRO 1 UNITS: 100 INJECTION, SOLUTION INTRAVENOUS; SUBCUTANEOUS at 11:51

## 2020-04-23 RX ADMIN — OXYCODONE HYDROCHLORIDE 5 MG: 5 TABLET ORAL at 05:43

## 2020-04-23 RX ADMIN — MONTELUKAST 10 MG: 10 TABLET, FILM COATED ORAL at 21:23

## 2020-04-23 RX ADMIN — METHYLPREDNISOLONE SODIUM SUCCINATE 80 MG: 125 INJECTION, POWDER, FOR SOLUTION INTRAMUSCULAR; INTRAVENOUS at 21:23

## 2020-04-23 RX ADMIN — METHYLPREDNISOLONE SODIUM SUCCINATE 80 MG: 125 INJECTION, POWDER, FOR SOLUTION INTRAMUSCULAR; INTRAVENOUS at 15:30

## 2020-04-23 RX ADMIN — LOSARTAN POTASSIUM 25 MG: 25 TABLET, FILM COATED ORAL at 10:00

## 2020-04-23 RX ADMIN — RIFAMPIN 600 MG: 300 CAPSULE ORAL at 10:11

## 2020-04-23 RX ADMIN — CYCLOBENZAPRINE HYDROCHLORIDE 10 MG: 10 TABLET, FILM COATED ORAL at 16:45

## 2020-04-23 RX ADMIN — PRAVASTATIN SODIUM 40 MG: 40 TABLET ORAL at 16:45

## 2020-04-23 RX ADMIN — METHYLPREDNISOLONE SODIUM SUCCINATE 80 MG: 125 INJECTION, POWDER, FOR SOLUTION INTRAMUSCULAR; INTRAVENOUS at 05:43

## 2020-04-23 RX ADMIN — ESCITALOPRAM OXALATE 20 MG: 20 TABLET ORAL at 10:00

## 2020-04-23 RX ADMIN — FERROUS SULFATE TAB 325 MG (65 MG ELEMENTAL FE) 325 MG: 325 (65 FE) TAB at 09:59

## 2020-04-23 RX ADMIN — ACETAMINOPHEN 650 MG: 325 TABLET ORAL at 21:23

## 2020-04-23 RX ADMIN — DOCUSATE SODIUM 100 MG: 100 CAPSULE, LIQUID FILLED ORAL at 10:00

## 2020-04-23 RX ADMIN — OXYCODONE HYDROCHLORIDE 5 MG: 5 TABLET ORAL at 15:30

## 2020-04-24 ENCOUNTER — APPOINTMENT (INPATIENT)
Dept: RADIOLOGY | Facility: HOSPITAL | Age: 45
DRG: 546 | End: 2020-04-24
Payer: COMMERCIAL

## 2020-04-24 PROBLEM — E83.39 HYPERPHOSPHATEMIA: Status: ACTIVE | Noted: 2020-04-24

## 2020-04-24 LAB
ACTIN IGG SERPL-ACNC: 16 UNITS (ref 0–19)
ALBUMIN SERPL BCP-MCNC: 2.5 G/DL (ref 3.5–5)
ALP SERPL-CCNC: 333 U/L (ref 46–116)
ALT SERPL W P-5'-P-CCNC: 35 U/L (ref 12–78)
ANION GAP SERPL CALCULATED.3IONS-SCNC: 7 MMOL/L (ref 4–13)
AST SERPL W P-5'-P-CCNC: 12 U/L (ref 5–45)
BASOPHILS # BLD AUTO: 0.1 THOUSANDS/ΜL (ref 0–0.1)
BASOPHILS NFR BLD AUTO: 1 % (ref 0–1)
BILIRUB SERPL-MCNC: 0.19 MG/DL (ref 0.2–1)
BUN SERPL-MCNC: 30 MG/DL (ref 5–25)
CALCIUM SERPL-MCNC: 10.4 MG/DL (ref 8.3–10.1)
CHLORIDE SERPL-SCNC: 101 MMOL/L (ref 100–108)
CO2 SERPL-SCNC: 25 MMOL/L (ref 21–32)
CREAT SERPL-MCNC: 1.21 MG/DL (ref 0.6–1.3)
EOSINOPHIL # BLD AUTO: 0.03 THOUSAND/ΜL (ref 0–0.61)
EOSINOPHIL NFR BLD AUTO: 0 % (ref 0–6)
ERYTHROCYTE [DISTWIDTH] IN BLOOD BY AUTOMATED COUNT: 21.4 % (ref 11.6–15.1)
GFR SERPL CREATININE-BSD FRML MDRD: 54 ML/MIN/1.73SQ M
GLUCOSE SERPL-MCNC: 110 MG/DL (ref 65–140)
GLUCOSE SERPL-MCNC: 116 MG/DL (ref 65–140)
GLUCOSE SERPL-MCNC: 93 MG/DL (ref 65–140)
GLUCOSE SERPL-MCNC: 96 MG/DL (ref 65–140)
HCT VFR BLD AUTO: 32.8 % (ref 34.8–46.1)
HGB BLD-MCNC: 9.6 G/DL (ref 11.5–15.4)
IMM GRANULOCYTES # BLD AUTO: 0.13 THOUSAND/UL (ref 0–0.2)
IMM GRANULOCYTES NFR BLD AUTO: 1 % (ref 0–2)
LYMPHOCYTES # BLD AUTO: 4.25 THOUSANDS/ΜL (ref 0.6–4.47)
LYMPHOCYTES NFR BLD AUTO: 33 % (ref 14–44)
MAGNESIUM SERPL-MCNC: 2.8 MG/DL (ref 1.6–2.6)
MCH RBC QN AUTO: 23 PG (ref 26.8–34.3)
MCHC RBC AUTO-ENTMCNC: 29.3 G/DL (ref 31.4–37.4)
MCV RBC AUTO: 79 FL (ref 82–98)
MITOCHONDRIA M2 IGG SER-ACNC: <20 UNITS (ref 0–20)
MONOCYTES # BLD AUTO: 0.7 THOUSAND/ΜL (ref 0.17–1.22)
MONOCYTES NFR BLD AUTO: 6 % (ref 4–12)
NEUTROPHILS # BLD AUTO: 7.58 THOUSANDS/ΜL (ref 1.85–7.62)
NEUTS SEG NFR BLD AUTO: 59 % (ref 43–75)
NRBC BLD AUTO-RTO: 0 /100 WBCS
PHOSPHATE SERPL-MCNC: 6.7 MG/DL (ref 2.7–4.5)
PLATELET # BLD AUTO: 779 THOUSANDS/UL (ref 149–390)
PMV BLD AUTO: 9.7 FL (ref 8.9–12.7)
POTASSIUM SERPL-SCNC: 4.7 MMOL/L (ref 3.5–5.3)
PROT SERPL-MCNC: 10.1 G/DL (ref 6.4–8.2)
RBC # BLD AUTO: 4.18 MILLION/UL (ref 3.81–5.12)
RYE IGE QN: NEGATIVE
SODIUM SERPL-SCNC: 133 MMOL/L (ref 136–145)
WBC # BLD AUTO: 12.79 THOUSAND/UL (ref 4.31–10.16)

## 2020-04-24 PROCEDURE — 99232 SBSQ HOSP IP/OBS MODERATE 35: CPT | Performed by: INTERNAL MEDICINE

## 2020-04-24 PROCEDURE — 78306 BONE IMAGING WHOLE BODY: CPT

## 2020-04-24 PROCEDURE — 84100 ASSAY OF PHOSPHORUS: CPT | Performed by: INTERNAL MEDICINE

## 2020-04-24 PROCEDURE — 82948 REAGENT STRIP/BLOOD GLUCOSE: CPT

## 2020-04-24 PROCEDURE — 80053 COMPREHEN METABOLIC PANEL: CPT | Performed by: INTERNAL MEDICINE

## 2020-04-24 PROCEDURE — 83735 ASSAY OF MAGNESIUM: CPT | Performed by: INTERNAL MEDICINE

## 2020-04-24 PROCEDURE — A9503 TC99M MEDRONATE: HCPCS

## 2020-04-24 PROCEDURE — 85025 COMPLETE CBC W/AUTO DIFF WBC: CPT | Performed by: INTERNAL MEDICINE

## 2020-04-24 RX ORDER — SODIUM CHLORIDE 9 MG/ML
125 INJECTION, SOLUTION INTRAVENOUS CONTINUOUS
Status: DISPENSED | OUTPATIENT
Start: 2020-04-24 | End: 2020-04-25

## 2020-04-24 RX ADMIN — DOCUSATE SODIUM 100 MG: 100 CAPSULE, LIQUID FILLED ORAL at 10:06

## 2020-04-24 RX ADMIN — FOLIC ACID-PYRIDOXINE-CYANOCOBALAMIN TAB 2.5-25-2 MG 1 TABLET: 2.5-25-2 TAB at 10:08

## 2020-04-24 RX ADMIN — ACETAMINOPHEN 650 MG: 325 TABLET ORAL at 06:17

## 2020-04-24 RX ADMIN — ACETAMINOPHEN 650 MG: 325 TABLET ORAL at 14:02

## 2020-04-24 RX ADMIN — POLYETHYLENE GLYCOL 3350 17 G: 17 POWDER, FOR SOLUTION ORAL at 10:07

## 2020-04-24 RX ADMIN — SODIUM CHLORIDE 125 ML/HR: 0.9 INJECTION, SOLUTION INTRAVENOUS at 15:01

## 2020-04-24 RX ADMIN — RIFAMPIN 600 MG: 300 CAPSULE ORAL at 10:08

## 2020-04-24 RX ADMIN — MELATONIN 1000 UNITS: at 10:06

## 2020-04-24 RX ADMIN — PRAVASTATIN SODIUM 40 MG: 40 TABLET ORAL at 17:16

## 2020-04-24 RX ADMIN — ACETAMINOPHEN 650 MG: 325 TABLET ORAL at 21:00

## 2020-04-24 RX ADMIN — METHYLPREDNISOLONE SODIUM SUCCINATE 80 MG: 125 INJECTION, POWDER, FOR SOLUTION INTRAMUSCULAR; INTRAVENOUS at 21:03

## 2020-04-24 RX ADMIN — LIDOCAINE 1 PATCH: 50 PATCH CUTANEOUS at 12:00

## 2020-04-24 RX ADMIN — CYCLOBENZAPRINE HYDROCHLORIDE 10 MG: 10 TABLET, FILM COATED ORAL at 17:16

## 2020-04-24 RX ADMIN — FERROUS SULFATE TAB 325 MG (65 MG ELEMENTAL FE) 325 MG: 325 (65 FE) TAB at 10:06

## 2020-04-24 RX ADMIN — CYCLOBENZAPRINE HYDROCHLORIDE 10 MG: 10 TABLET, FILM COATED ORAL at 10:07

## 2020-04-24 RX ADMIN — MONTELUKAST 10 MG: 10 TABLET, FILM COATED ORAL at 21:01

## 2020-04-24 RX ADMIN — METHYLPREDNISOLONE SODIUM SUCCINATE 80 MG: 125 INJECTION, POWDER, FOR SOLUTION INTRAMUSCULAR; INTRAVENOUS at 14:02

## 2020-04-24 RX ADMIN — ENOXAPARIN SODIUM 40 MG: 40 INJECTION SUBCUTANEOUS at 10:07

## 2020-04-24 RX ADMIN — ASPIRIN 81 MG: 81 TABLET ORAL at 10:06

## 2020-04-24 RX ADMIN — METHYLPREDNISOLONE SODIUM SUCCINATE 80 MG: 125 INJECTION, POWDER, FOR SOLUTION INTRAMUSCULAR; INTRAVENOUS at 06:18

## 2020-04-24 RX ADMIN — ESCITALOPRAM OXALATE 20 MG: 20 TABLET ORAL at 10:07

## 2020-04-25 VITALS
RESPIRATION RATE: 18 BRPM | HEART RATE: 86 BPM | WEIGHT: 225.53 LBS | HEIGHT: 63 IN | DIASTOLIC BLOOD PRESSURE: 76 MMHG | SYSTOLIC BLOOD PRESSURE: 137 MMHG | BODY MASS INDEX: 39.96 KG/M2 | TEMPERATURE: 98.2 F | OXYGEN SATURATION: 99 %

## 2020-04-25 LAB
ALBUMIN SERPL BCP-MCNC: 2.1 G/DL (ref 3.5–5)
ALP SERPL-CCNC: 240 U/L (ref 46–116)
ALT SERPL W P-5'-P-CCNC: 27 U/L (ref 12–78)
ANION GAP SERPL CALCULATED.3IONS-SCNC: 5 MMOL/L (ref 4–13)
AST SERPL W P-5'-P-CCNC: 9 U/L (ref 5–45)
BASOPHILS # BLD AUTO: 0.09 THOUSANDS/ΜL (ref 0–0.1)
BASOPHILS NFR BLD AUTO: 1 % (ref 0–1)
BILIRUB SERPL-MCNC: 0.14 MG/DL (ref 0.2–1)
BUN SERPL-MCNC: 27 MG/DL (ref 5–25)
CALCIUM SERPL-MCNC: 9.2 MG/DL (ref 8.3–10.1)
CHLORIDE SERPL-SCNC: 108 MMOL/L (ref 100–108)
CO2 SERPL-SCNC: 25 MMOL/L (ref 21–32)
CREAT SERPL-MCNC: 0.84 MG/DL (ref 0.6–1.3)
EOSINOPHIL # BLD AUTO: 0.05 THOUSAND/ΜL (ref 0–0.61)
EOSINOPHIL NFR BLD AUTO: 1 % (ref 0–6)
ERYTHROCYTE [DISTWIDTH] IN BLOOD BY AUTOMATED COUNT: 21.4 % (ref 11.6–15.1)
GFR SERPL CREATININE-BSD FRML MDRD: 84 ML/MIN/1.73SQ M
GLUCOSE SERPL-MCNC: 114 MG/DL (ref 65–140)
GLUCOSE SERPL-MCNC: 91 MG/DL (ref 65–140)
GLUCOSE SERPL-MCNC: 93 MG/DL (ref 65–140)
HCT VFR BLD AUTO: 29.2 % (ref 34.8–46.1)
HGB BLD-MCNC: 8.5 G/DL (ref 11.5–15.4)
IMM GRANULOCYTES # BLD AUTO: 0.06 THOUSAND/UL (ref 0–0.2)
IMM GRANULOCYTES NFR BLD AUTO: 1 % (ref 0–2)
LYMPHOCYTES # BLD AUTO: 3.61 THOUSANDS/ΜL (ref 0.6–4.47)
LYMPHOCYTES NFR BLD AUTO: 35 % (ref 14–44)
MAGNESIUM SERPL-MCNC: 2.4 MG/DL (ref 1.6–2.6)
MCH RBC QN AUTO: 22.7 PG (ref 26.8–34.3)
MCHC RBC AUTO-ENTMCNC: 29.1 G/DL (ref 31.4–37.4)
MCV RBC AUTO: 78 FL (ref 82–98)
MONOCYTES # BLD AUTO: 0.74 THOUSAND/ΜL (ref 0.17–1.22)
MONOCYTES NFR BLD AUTO: 7 % (ref 4–12)
NEUTROPHILS # BLD AUTO: 5.67 THOUSANDS/ΜL (ref 1.85–7.62)
NEUTS SEG NFR BLD AUTO: 55 % (ref 43–75)
NRBC BLD AUTO-RTO: 0 /100 WBCS
PHOSPHATE SERPL-MCNC: 4.7 MG/DL (ref 2.7–4.5)
PLATELET # BLD AUTO: 633 THOUSANDS/UL (ref 149–390)
PMV BLD AUTO: 9.6 FL (ref 8.9–12.7)
POTASSIUM SERPL-SCNC: 4.5 MMOL/L (ref 3.5–5.3)
PROT SERPL-MCNC: 8.2 G/DL (ref 6.4–8.2)
RBC # BLD AUTO: 3.75 MILLION/UL (ref 3.81–5.12)
SODIUM SERPL-SCNC: 138 MMOL/L (ref 136–145)
WBC # BLD AUTO: 10.22 THOUSAND/UL (ref 4.31–10.16)

## 2020-04-25 PROCEDURE — 80053 COMPREHEN METABOLIC PANEL: CPT | Performed by: INTERNAL MEDICINE

## 2020-04-25 PROCEDURE — 85025 COMPLETE CBC W/AUTO DIFF WBC: CPT | Performed by: INTERNAL MEDICINE

## 2020-04-25 PROCEDURE — 97166 OT EVAL MOD COMPLEX 45 MIN: CPT

## 2020-04-25 PROCEDURE — 84100 ASSAY OF PHOSPHORUS: CPT | Performed by: INTERNAL MEDICINE

## 2020-04-25 PROCEDURE — 82948 REAGENT STRIP/BLOOD GLUCOSE: CPT

## 2020-04-25 PROCEDURE — 97163 PT EVAL HIGH COMPLEX 45 MIN: CPT

## 2020-04-25 PROCEDURE — 99239 HOSP IP/OBS DSCHRG MGMT >30: CPT | Performed by: INTERNAL MEDICINE

## 2020-04-25 PROCEDURE — 83735 ASSAY OF MAGNESIUM: CPT | Performed by: INTERNAL MEDICINE

## 2020-04-25 RX ORDER — FERROUS SULFATE 325(65) MG
325 TABLET ORAL
Qty: 30 TABLET | Refills: 0 | Status: SHIPPED | OUTPATIENT
Start: 2020-04-26 | End: 2020-05-09 | Stop reason: ALTCHOICE

## 2020-04-25 RX ORDER — SODIUM CHLORIDE 9 MG/ML
125 INJECTION, SOLUTION INTRAVENOUS CONTINUOUS
Status: DISCONTINUED | OUTPATIENT
Start: 2020-04-25 | End: 2020-04-25

## 2020-04-25 RX ORDER — LIDOCAINE 50 MG/G
1 PATCH TOPICAL DAILY
Qty: 15 PATCH | Refills: 0 | Status: SHIPPED | OUTPATIENT
Start: 2020-04-25 | End: 2020-05-09 | Stop reason: ALTCHOICE

## 2020-04-25 RX ORDER — PREDNISONE 20 MG/1
80 TABLET ORAL DAILY
Qty: 120 TABLET | Refills: 0 | Status: SHIPPED | OUTPATIENT
Start: 2020-04-25 | End: 2020-10-06 | Stop reason: ALTCHOICE

## 2020-04-25 RX ORDER — HYDROCODONE BITARTRATE AND ACETAMINOPHEN 5; 325 MG/1; MG/1
0.5 TABLET ORAL EVERY 8 HOURS PRN
Qty: 15 TABLET | Refills: 0 | Status: SHIPPED | OUTPATIENT
Start: 2020-04-25 | End: 2020-05-05

## 2020-04-25 RX ADMIN — ENOXAPARIN SODIUM 40 MG: 40 INJECTION SUBCUTANEOUS at 08:07

## 2020-04-25 RX ADMIN — METHYLPREDNISOLONE SODIUM SUCCINATE 80 MG: 125 INJECTION, POWDER, FOR SOLUTION INTRAMUSCULAR; INTRAVENOUS at 06:17

## 2020-04-25 RX ADMIN — ASPIRIN 81 MG: 81 TABLET ORAL at 08:07

## 2020-04-25 RX ADMIN — OXYCODONE HYDROCHLORIDE 2.5 MG: 5 TABLET ORAL at 06:16

## 2020-04-25 RX ADMIN — DOCUSATE SODIUM 100 MG: 100 CAPSULE, LIQUID FILLED ORAL at 08:07

## 2020-04-25 RX ADMIN — FOLIC ACID-PYRIDOXINE-CYANOCOBALAMIN TAB 2.5-25-2 MG 1 TABLET: 2.5-25-2 TAB at 08:08

## 2020-04-25 RX ADMIN — CYCLOBENZAPRINE HYDROCHLORIDE 10 MG: 10 TABLET, FILM COATED ORAL at 01:50

## 2020-04-25 RX ADMIN — METHYLPREDNISOLONE SODIUM SUCCINATE 80 MG: 125 INJECTION, POWDER, FOR SOLUTION INTRAMUSCULAR; INTRAVENOUS at 13:51

## 2020-04-25 RX ADMIN — LOSARTAN POTASSIUM 25 MG: 25 TABLET, FILM COATED ORAL at 08:07

## 2020-04-25 RX ADMIN — ESCITALOPRAM OXALATE 20 MG: 20 TABLET ORAL at 08:07

## 2020-04-25 RX ADMIN — CYCLOBENZAPRINE HYDROCHLORIDE 10 MG: 10 TABLET, FILM COATED ORAL at 15:32

## 2020-04-25 RX ADMIN — FERROUS SULFATE TAB 325 MG (65 MG ELEMENTAL FE) 325 MG: 325 (65 FE) TAB at 08:07

## 2020-04-25 RX ADMIN — CYCLOBENZAPRINE HYDROCHLORIDE 10 MG: 10 TABLET, FILM COATED ORAL at 08:07

## 2020-04-25 RX ADMIN — SODIUM CHLORIDE 125 ML/HR: 0.9 INJECTION, SOLUTION INTRAVENOUS at 11:28

## 2020-04-25 RX ADMIN — PRAVASTATIN SODIUM 40 MG: 40 TABLET ORAL at 15:32

## 2020-04-25 RX ADMIN — LIDOCAINE 1 PATCH: 50 PATCH CUTANEOUS at 08:07

## 2020-04-25 RX ADMIN — RIFAMPIN 600 MG: 300 CAPSULE ORAL at 08:08

## 2020-04-25 RX ADMIN — MELATONIN 1000 UNITS: at 08:07

## 2020-04-25 RX ADMIN — ACETAMINOPHEN 650 MG: 325 TABLET ORAL at 13:51

## 2020-04-25 RX ADMIN — ACETAMINOPHEN 650 MG: 325 TABLET ORAL at 06:16

## 2020-04-27 ENCOUNTER — TELEPHONE (OUTPATIENT)
Dept: GASTROENTEROLOGY | Facility: CLINIC | Age: 45
End: 2020-04-27

## 2020-04-27 ENCOUNTER — TELEPHONE (OUTPATIENT)
Dept: INFECTIOUS DISEASES | Facility: CLINIC | Age: 45
End: 2020-04-27

## 2020-05-09 ENCOUNTER — HOSPITAL ENCOUNTER (EMERGENCY)
Facility: HOSPITAL | Age: 45
Discharge: HOME | End: 2020-05-09
Attending: EMERGENCY MEDICINE | Admitting: EMERGENCY MEDICINE
Payer: COMMERCIAL

## 2020-05-09 ENCOUNTER — APPOINTMENT (EMERGENCY)
Dept: CT IMAGING | Facility: HOSPITAL | Age: 45
End: 2020-05-09
Payer: COMMERCIAL

## 2020-05-09 ENCOUNTER — APPOINTMENT (EMERGENCY)
Dept: RADIOLOGY | Facility: HOSPITAL | Age: 45
End: 2020-05-09
Payer: COMMERCIAL

## 2020-05-09 VITALS
OXYGEN SATURATION: 99 % | TEMPERATURE: 97.8 F | SYSTOLIC BLOOD PRESSURE: 189 MMHG | WEIGHT: 216.05 LBS | BODY MASS INDEX: 38.28 KG/M2 | HEIGHT: 63 IN | RESPIRATION RATE: 20 BRPM | HEART RATE: 76 BPM | DIASTOLIC BLOOD PRESSURE: 84 MMHG

## 2020-05-09 DIAGNOSIS — M94.0 ACUTE COSTOCHONDRITIS: ICD-10-CM

## 2020-05-09 DIAGNOSIS — R07.89 LEFT-SIDED CHEST WALL PAIN: Primary | ICD-10-CM

## 2020-05-09 DIAGNOSIS — R79.89 ELEVATED D-DIMER: ICD-10-CM

## 2020-05-09 LAB
ANION GAP SERPL CALCULATED.3IONS-SCNC: 13 MMOL/L (ref 4–13)
BASOPHILS # BLD AUTO: 0.06 THOUSANDS/ΜL (ref 0–0.1)
BASOPHILS NFR BLD AUTO: 0 % (ref 0–1)
BUN SERPL-MCNC: 26 MG/DL (ref 5–25)
CALCIUM SERPL-MCNC: 9.1 MG/DL (ref 8.3–10.1)
CHLORIDE SERPL-SCNC: 101 MMOL/L (ref 100–108)
CO2 SERPL-SCNC: 21 MMOL/L (ref 21–32)
CREAT SERPL-MCNC: 1.17 MG/DL (ref 0.6–1.3)
D DIMER PPP FEU-MCNC: 0.63 UG/ML FEU
EOSINOPHIL # BLD AUTO: 0.06 THOUSAND/ΜL (ref 0–0.61)
EOSINOPHIL NFR BLD AUTO: 0 % (ref 0–6)
ERYTHROCYTE [DISTWIDTH] IN BLOOD BY AUTOMATED COUNT: 21.3 % (ref 11.6–15.1)
GFR SERPL CREATININE-BSD FRML MDRD: 56 ML/MIN/1.73SQ M
GLUCOSE SERPL-MCNC: 147 MG/DL (ref 65–140)
HCT VFR BLD AUTO: 27.7 % (ref 34.8–46.1)
HGB BLD-MCNC: 8.2 G/DL (ref 11.5–15.4)
IMM GRANULOCYTES # BLD AUTO: 0.15 THOUSAND/UL (ref 0–0.2)
IMM GRANULOCYTES NFR BLD AUTO: 1 % (ref 0–2)
LYMPHOCYTES # BLD AUTO: 2.93 THOUSANDS/ΜL (ref 0.6–4.47)
LYMPHOCYTES NFR BLD AUTO: 21 % (ref 14–44)
MAGNESIUM SERPL-MCNC: 1.7 MG/DL (ref 1.6–2.6)
MCH RBC QN AUTO: 23.1 PG (ref 26.8–34.3)
MCHC RBC AUTO-ENTMCNC: 29.6 G/DL (ref 31.4–37.4)
MCV RBC AUTO: 78 FL (ref 82–98)
MONOCYTES # BLD AUTO: 1.07 THOUSAND/ΜL (ref 0.17–1.22)
MONOCYTES NFR BLD AUTO: 8 % (ref 4–12)
NEUTROPHILS # BLD AUTO: 9.62 THOUSANDS/ΜL (ref 1.85–7.62)
NEUTS SEG NFR BLD AUTO: 70 % (ref 43–75)
NRBC BLD AUTO-RTO: 0 /100 WBCS
PLATELET # BLD AUTO: 446 THOUSANDS/UL (ref 149–390)
PMV BLD AUTO: 9.4 FL (ref 8.9–12.7)
POTASSIUM SERPL-SCNC: 4 MMOL/L (ref 3.5–5.3)
RBC # BLD AUTO: 3.55 MILLION/UL (ref 3.81–5.12)
SODIUM SERPL-SCNC: 135 MMOL/L (ref 136–145)
TROPONIN I SERPL-MCNC: <0.02 NG/ML
TROPONIN I SERPL-MCNC: <0.02 NG/ML
WBC # BLD AUTO: 13.89 THOUSAND/UL (ref 4.31–10.16)

## 2020-05-09 PROCEDURE — 83735 ASSAY OF MAGNESIUM: CPT | Performed by: EMERGENCY MEDICINE

## 2020-05-09 PROCEDURE — 85025 COMPLETE CBC W/AUTO DIFF WBC: CPT | Performed by: EMERGENCY MEDICINE

## 2020-05-09 PROCEDURE — 84484 ASSAY OF TROPONIN QUANT: CPT | Performed by: EMERGENCY MEDICINE

## 2020-05-09 PROCEDURE — 71275 CT ANGIOGRAPHY CHEST: CPT

## 2020-05-09 PROCEDURE — 85379 FIBRIN DEGRADATION QUANT: CPT | Performed by: EMERGENCY MEDICINE

## 2020-05-09 PROCEDURE — 80048 BASIC METABOLIC PNL TOTAL CA: CPT | Performed by: EMERGENCY MEDICINE

## 2020-05-09 PROCEDURE — 36415 COLL VENOUS BLD VENIPUNCTURE: CPT | Performed by: EMERGENCY MEDICINE

## 2020-05-09 PROCEDURE — 99285 EMERGENCY DEPT VISIT HI MDM: CPT | Performed by: EMERGENCY MEDICINE

## 2020-05-09 PROCEDURE — 71046 X-RAY EXAM CHEST 2 VIEWS: CPT

## 2020-05-09 PROCEDURE — 93005 ELECTROCARDIOGRAM TRACING: CPT

## 2020-05-09 PROCEDURE — 99285 EMERGENCY DEPT VISIT HI MDM: CPT

## 2020-05-09 RX ORDER — NITROGLYCERIN 0.4 MG/1
0.4 TABLET SUBLINGUAL ONCE
Status: DISCONTINUED | OUTPATIENT
Start: 2020-05-09 | End: 2020-05-09

## 2020-05-09 RX ORDER — NITROGLYCERIN 0.4 MG/1
0.4 TABLET SUBLINGUAL
Status: DISCONTINUED | OUTPATIENT
Start: 2020-05-09 | End: 2020-05-09 | Stop reason: HOSPADM

## 2020-05-09 RX ORDER — HYDROCODONE BITARTRATE AND ACETAMINOPHEN 5; 325 MG/1; MG/1
1 TABLET ORAL EVERY 8 HOURS PRN
COMMUNITY
End: 2020-07-22 | Stop reason: ALTCHOICE

## 2020-05-09 RX ORDER — ACETAMINOPHEN 325 MG/1
975 TABLET ORAL ONCE
Status: COMPLETED | OUTPATIENT
Start: 2020-05-09 | End: 2020-05-09

## 2020-05-09 RX ORDER — SODIUM CHLORIDE 9 MG/ML
3 INJECTION INTRAVENOUS
Status: DISCONTINUED | OUTPATIENT
Start: 2020-05-09 | End: 2020-05-09 | Stop reason: HOSPADM

## 2020-05-09 RX ORDER — LIDOCAINE 50 MG/G
1 PATCH TOPICAL ONCE
Status: DISCONTINUED | OUTPATIENT
Start: 2020-05-09 | End: 2020-05-09 | Stop reason: HOSPADM

## 2020-05-09 RX ADMIN — ACETAMINOPHEN 975 MG: 325 TABLET ORAL at 13:42

## 2020-05-09 RX ADMIN — NITROGLYCERIN 0.4 MG: 0.4 TABLET SUBLINGUAL at 10:31

## 2020-05-09 RX ADMIN — NITROGLYCERIN 0.4 MG: 0.4 TABLET SUBLINGUAL at 08:55

## 2020-05-09 RX ADMIN — IOHEXOL 85 ML: 350 INJECTION, SOLUTION INTRAVENOUS at 09:56

## 2020-05-09 RX ADMIN — LIDOCAINE 1 PATCH: 50 PATCH TOPICAL at 13:42

## 2020-05-10 LAB
ATRIAL RATE: 78 BPM
ATRIAL RATE: 85 BPM
P AXIS: 23 DEGREES
P AXIS: 50 DEGREES
PR INTERVAL: 174 MS
PR INTERVAL: 176 MS
QRS AXIS: 30 DEGREES
QRS AXIS: 36 DEGREES
QRSD INTERVAL: 84 MS
QRSD INTERVAL: 90 MS
QT INTERVAL: 366 MS
QT INTERVAL: 396 MS
QTC INTERVAL: 435 MS
QTC INTERVAL: 451 MS
T WAVE AXIS: 72 DEGREES
T WAVE AXIS: 75 DEGREES
VENTRICULAR RATE: 78 BPM
VENTRICULAR RATE: 85 BPM

## 2020-05-10 PROCEDURE — 93010 ELECTROCARDIOGRAM REPORT: CPT | Performed by: INTERNAL MEDICINE

## 2020-05-11 ENCOUNTER — DOCUMENTATION (OUTPATIENT)
Dept: INFECTIOUS DISEASES | Facility: CLINIC | Age: 45
End: 2020-05-11

## 2020-05-11 ENCOUNTER — APPOINTMENT (OUTPATIENT)
Dept: LAB | Facility: HOSPITAL | Age: 45
End: 2020-05-11
Attending: INTERNAL MEDICINE
Payer: COMMERCIAL

## 2020-05-11 DIAGNOSIS — Z22.7 LATENT TUBERCULOSIS: Primary | ICD-10-CM

## 2020-05-11 LAB
ALBUMIN SERPL BCP-MCNC: 2.1 G/DL (ref 3.5–5)
ALP SERPL-CCNC: 314 U/L (ref 46–116)
ALT SERPL W P-5'-P-CCNC: 129 U/L (ref 12–78)
AST SERPL W P-5'-P-CCNC: 32 U/L (ref 5–45)
BILIRUB DIRECT SERPL-MCNC: 0.37 MG/DL (ref 0–0.2)
BILIRUB SERPL-MCNC: 0.6 MG/DL (ref 0.2–1)
PROT SERPL-MCNC: 7.9 G/DL (ref 6.4–8.2)

## 2020-05-11 PROCEDURE — 80076 HEPATIC FUNCTION PANEL: CPT

## 2020-05-11 PROCEDURE — 36415 COLL VENOUS BLD VENIPUNCTURE: CPT

## 2020-05-12 ENCOUNTER — TELEPHONE (OUTPATIENT)
Dept: INFECTIOUS DISEASES | Facility: CLINIC | Age: 45
End: 2020-05-12

## 2020-05-13 ENCOUNTER — APPOINTMENT (EMERGENCY)
Dept: CT IMAGING | Facility: HOSPITAL | Age: 45
End: 2020-05-13
Payer: COMMERCIAL

## 2020-05-13 ENCOUNTER — HOSPITAL ENCOUNTER (EMERGENCY)
Facility: HOSPITAL | Age: 45
End: 2020-05-13
Attending: EMERGENCY MEDICINE | Admitting: EMERGENCY MEDICINE
Payer: COMMERCIAL

## 2020-05-13 ENCOUNTER — APPOINTMENT (EMERGENCY)
Dept: RADIOLOGY | Facility: HOSPITAL | Age: 45
End: 2020-05-13
Payer: COMMERCIAL

## 2020-05-13 ENCOUNTER — HOSPITAL ENCOUNTER (INPATIENT)
Facility: HOSPITAL | Age: 45
LOS: 6 days | Discharge: HOME | DRG: 897 | End: 2020-05-19
Attending: INTERNAL MEDICINE | Admitting: INTERNAL MEDICINE
Payer: COMMERCIAL

## 2020-05-13 VITALS
HEART RATE: 98 BPM | TEMPERATURE: 100.4 F | OXYGEN SATURATION: 98 % | WEIGHT: 210.98 LBS | RESPIRATION RATE: 24 BRPM | SYSTOLIC BLOOD PRESSURE: 126 MMHG | BODY MASS INDEX: 37.37 KG/M2 | DIASTOLIC BLOOD PRESSURE: 58 MMHG

## 2020-05-13 DIAGNOSIS — E05.90 HYPERTHYROIDISM: ICD-10-CM

## 2020-05-13 DIAGNOSIS — R06.00 DYSPNEA: ICD-10-CM

## 2020-05-13 DIAGNOSIS — I77.6 AORTITIS (HCC): ICD-10-CM

## 2020-05-13 DIAGNOSIS — E11.69 DIABETES MELLITUS TYPE 2 IN OBESE (HCC): ICD-10-CM

## 2020-05-13 DIAGNOSIS — R59.0 MEDIASTINAL LYMPHADENOPATHY: ICD-10-CM

## 2020-05-13 DIAGNOSIS — K29.70 GASTRITIS: ICD-10-CM

## 2020-05-13 DIAGNOSIS — R65.10 SIRS (SYSTEMIC INFLAMMATORY RESPONSE SYNDROME) (HCC): Primary | ICD-10-CM

## 2020-05-13 DIAGNOSIS — E66.9 DIABETES MELLITUS TYPE 2 IN OBESE (HCC): ICD-10-CM

## 2020-05-13 DIAGNOSIS — Z22.7 TB LUNG, LATENT: ICD-10-CM

## 2020-05-13 DIAGNOSIS — A41.9 SEPSIS (HCC): Primary | ICD-10-CM

## 2020-05-13 PROBLEM — R53.83 FATIGUE: Status: ACTIVE | Noted: 2020-05-13

## 2020-05-13 LAB
ALBUMIN SERPL BCP-MCNC: 2 G/DL (ref 3.5–5)
ALP SERPL-CCNC: 320 U/L (ref 46–116)
ALT SERPL W P-5'-P-CCNC: 77 U/L (ref 12–78)
ANION GAP SERPL CALCULATED.3IONS-SCNC: 10 MMOL/L (ref 4–13)
APTT PPP: 27 SECONDS (ref 23–37)
AST SERPL W P-5'-P-CCNC: 49 U/L (ref 5–45)
BACTERIA UR QL AUTO: ABNORMAL /HPF
BASOPHILS # BLD AUTO: 0.04 THOUSANDS/ΜL (ref 0–0.1)
BASOPHILS NFR BLD AUTO: 0 % (ref 0–1)
BILIRUB SERPL-MCNC: 0.4 MG/DL (ref 0.2–1)
BILIRUB UR QL STRIP: NEGATIVE
BUN SERPL-MCNC: 21 MG/DL (ref 5–25)
CALCIUM SERPL-MCNC: 9.2 MG/DL (ref 8.3–10.1)
CHLORIDE SERPL-SCNC: 99 MMOL/L (ref 100–108)
CLARITY UR: CLEAR
CO2 SERPL-SCNC: 24 MMOL/L (ref 21–32)
COLOR UR: YELLOW
CREAT SERPL-MCNC: 0.85 MG/DL (ref 0.6–1.3)
D DIMER PPP FEU-MCNC: 0.66 UG/ML FEU
EOSINOPHIL # BLD AUTO: 0.15 THOUSAND/ΜL (ref 0–0.61)
EOSINOPHIL NFR BLD AUTO: 1 % (ref 0–6)
ERYTHROCYTE [DISTWIDTH] IN BLOOD BY AUTOMATED COUNT: 21.7 % (ref 11.6–15.1)
GFR SERPL CREATININE-BSD FRML MDRD: 82 ML/MIN/1.73SQ M
GLUCOSE SERPL-MCNC: 159 MG/DL (ref 65–140)
GLUCOSE UR STRIP-MCNC: NEGATIVE MG/DL
HCT VFR BLD AUTO: 31.8 % (ref 34.8–46.1)
HGB BLD-MCNC: 9.4 G/DL (ref 11.5–15.4)
HGB UR QL STRIP.AUTO: NEGATIVE
IMM GRANULOCYTES # BLD AUTO: 0.26 THOUSAND/UL (ref 0–0.2)
IMM GRANULOCYTES NFR BLD AUTO: 2 % (ref 0–2)
INR PPP: 1.13 (ref 0.84–1.19)
KETONES UR STRIP-MCNC: NEGATIVE MG/DL
LACTATE SERPL-SCNC: 0.6 MMOL/L (ref 0.5–2)
LACTATE SERPL-SCNC: 2.3 MMOL/L (ref 0.5–2)
LEUKOCYTE ESTERASE UR QL STRIP: NEGATIVE
LYMPHOCYTES # BLD AUTO: 2.4 THOUSANDS/ΜL (ref 0.6–4.47)
LYMPHOCYTES NFR BLD AUTO: 17 % (ref 14–44)
MAGNESIUM SERPL-MCNC: 1.9 MG/DL (ref 1.6–2.6)
MCH RBC QN AUTO: 23.3 PG (ref 26.8–34.3)
MCHC RBC AUTO-ENTMCNC: 29.6 G/DL (ref 31.4–37.4)
MCV RBC AUTO: 79 FL (ref 82–98)
MONOCYTES # BLD AUTO: 1.02 THOUSAND/ΜL (ref 0.17–1.22)
MONOCYTES NFR BLD AUTO: 7 % (ref 4–12)
NEUTROPHILS # BLD AUTO: 10.5 THOUSANDS/ΜL (ref 1.85–7.62)
NEUTS SEG NFR BLD AUTO: 73 % (ref 43–75)
NITRITE UR QL STRIP: NEGATIVE
NON-SQ EPI CELLS URNS QL MICRO: ABNORMAL /HPF
NRBC BLD AUTO-RTO: 0 /100 WBCS
NT-PROBNP SERPL-MCNC: 227 PG/ML
PH UR STRIP.AUTO: 5.5 [PH]
PLATELET # BLD AUTO: 523 THOUSANDS/UL (ref 149–390)
PMV BLD AUTO: 9.9 FL (ref 8.9–12.7)
POTASSIUM SERPL-SCNC: 5.2 MMOL/L (ref 3.5–5.3)
POTASSIUM SERPL-SCNC: 5.6 MMOL/L (ref 3.5–5.3)
PROT SERPL-MCNC: 8.4 G/DL (ref 6.4–8.2)
PROT UR STRIP-MCNC: ABNORMAL MG/DL
PROTHROMBIN TIME: 14.5 SECONDS (ref 11.6–14.5)
RBC # BLD AUTO: 4.04 MILLION/UL (ref 3.81–5.12)
RBC #/AREA URNS AUTO: ABNORMAL /HPF
SARS-COV-2 RNA RESP QL NAA+PROBE: NEGATIVE
SODIUM SERPL-SCNC: 133 MMOL/L (ref 136–145)
SP GR UR STRIP.AUTO: 1.01 (ref 1–1.03)
TROPONIN I SERPL-MCNC: <0.02 NG/ML
UROBILINOGEN UR QL STRIP.AUTO: 0.2 E.U./DL
WBC # BLD AUTO: 14.37 THOUSAND/UL (ref 4.31–10.16)
WBC #/AREA URNS AUTO: ABNORMAL /HPF

## 2020-05-13 PROCEDURE — 85730 THROMBOPLASTIN TIME PARTIAL: CPT | Performed by: PHYSICIAN ASSISTANT

## 2020-05-13 PROCEDURE — 96366 THER/PROPH/DIAG IV INF ADDON: CPT

## 2020-05-13 PROCEDURE — 71275 CT ANGIOGRAPHY CHEST: CPT

## 2020-05-13 PROCEDURE — 84132 ASSAY OF SERUM POTASSIUM: CPT | Performed by: PHYSICIAN ASSISTANT

## 2020-05-13 PROCEDURE — 85610 PROTHROMBIN TIME: CPT | Performed by: PHYSICIAN ASSISTANT

## 2020-05-13 PROCEDURE — 80053 COMPREHEN METABOLIC PANEL: CPT | Performed by: PHYSICIAN ASSISTANT

## 2020-05-13 PROCEDURE — 83880 ASSAY OF NATRIURETIC PEPTIDE: CPT | Performed by: PHYSICIAN ASSISTANT

## 2020-05-13 PROCEDURE — 81001 URINALYSIS AUTO W/SCOPE: CPT | Performed by: PHYSICIAN ASSISTANT

## 2020-05-13 PROCEDURE — 93005 ELECTROCARDIOGRAM TRACING: CPT

## 2020-05-13 PROCEDURE — 87040 BLOOD CULTURE FOR BACTERIA: CPT | Performed by: PHYSICIAN ASSISTANT

## 2020-05-13 PROCEDURE — 85025 COMPLETE CBC W/AUTO DIFF WBC: CPT | Performed by: PHYSICIAN ASSISTANT

## 2020-05-13 PROCEDURE — 83735 ASSAY OF MAGNESIUM: CPT | Performed by: PHYSICIAN ASSISTANT

## 2020-05-13 PROCEDURE — 96367 TX/PROPH/DG ADDL SEQ IV INF: CPT

## 2020-05-13 PROCEDURE — 99285 EMERGENCY DEPT VISIT HI MDM: CPT

## 2020-05-13 PROCEDURE — 96361 HYDRATE IV INFUSION ADD-ON: CPT

## 2020-05-13 PROCEDURE — 85379 FIBRIN DEGRADATION QUANT: CPT | Performed by: PHYSICIAN ASSISTANT

## 2020-05-13 PROCEDURE — 87635 SARS-COV-2 COVID-19 AMP PRB: CPT | Performed by: PHYSICIAN ASSISTANT

## 2020-05-13 PROCEDURE — 71045 X-RAY EXAM CHEST 1 VIEW: CPT

## 2020-05-13 PROCEDURE — 84145 PROCALCITONIN (PCT): CPT | Performed by: PHYSICIAN ASSISTANT

## 2020-05-13 PROCEDURE — 84484 ASSAY OF TROPONIN QUANT: CPT | Performed by: PHYSICIAN ASSISTANT

## 2020-05-13 PROCEDURE — 36415 COLL VENOUS BLD VENIPUNCTURE: CPT | Performed by: PHYSICIAN ASSISTANT

## 2020-05-13 PROCEDURE — 99285 EMERGENCY DEPT VISIT HI MDM: CPT | Performed by: PHYSICIAN ASSISTANT

## 2020-05-13 PROCEDURE — 74177 CT ABD & PELVIS W/CONTRAST: CPT

## 2020-05-13 PROCEDURE — 96365 THER/PROPH/DIAG IV INF INIT: CPT

## 2020-05-13 PROCEDURE — 83605 ASSAY OF LACTIC ACID: CPT | Performed by: PHYSICIAN ASSISTANT

## 2020-05-13 RX ORDER — ALBUTEROL SULFATE 90 UG/1
2 AEROSOL, METERED RESPIRATORY (INHALATION) 2 TIMES DAILY
Status: DISCONTINUED | OUTPATIENT
Start: 2020-05-14 | End: 2020-05-19 | Stop reason: HOSPADM

## 2020-05-13 RX ORDER — ESCITALOPRAM OXALATE 10 MG/1
10 TABLET ORAL DAILY
Status: DISCONTINUED | OUTPATIENT
Start: 2020-05-14 | End: 2020-05-19 | Stop reason: HOSPADM

## 2020-05-13 RX ORDER — MONTELUKAST SODIUM 10 MG/1
10 TABLET ORAL
Status: DISCONTINUED | OUTPATIENT
Start: 2020-05-14 | End: 2020-05-19 | Stop reason: HOSPADM

## 2020-05-13 RX ORDER — LOSARTAN POTASSIUM 50 MG/1
100 TABLET ORAL DAILY
Status: DISCONTINUED | OUTPATIENT
Start: 2020-05-14 | End: 2020-05-19 | Stop reason: HOSPADM

## 2020-05-13 RX ORDER — ACETAMINOPHEN 325 MG/1
650 TABLET ORAL ONCE
Status: COMPLETED | OUTPATIENT
Start: 2020-05-13 | End: 2020-05-13

## 2020-05-13 RX ORDER — CEFEPIME HYDROCHLORIDE 2 G/50ML
2000 INJECTION, SOLUTION INTRAVENOUS ONCE
Status: COMPLETED | OUTPATIENT
Start: 2020-05-13 | End: 2020-05-13

## 2020-05-13 RX ORDER — ASPIRIN 81 MG/1
81 TABLET ORAL DAILY
Status: DISCONTINUED | OUTPATIENT
Start: 2020-05-14 | End: 2020-05-19 | Stop reason: HOSPADM

## 2020-05-13 RX ORDER — PREDNISONE 20 MG/1
80 TABLET ORAL DAILY
Status: DISCONTINUED | OUTPATIENT
Start: 2020-05-14 | End: 2020-05-19 | Stop reason: HOSPADM

## 2020-05-13 RX ADMIN — SODIUM CHLORIDE 1000 ML: 0.9 INJECTION, SOLUTION INTRAVENOUS at 18:14

## 2020-05-13 RX ADMIN — CEFEPIME HYDROCHLORIDE 2000 MG: 2 INJECTION, SOLUTION INTRAVENOUS at 18:14

## 2020-05-13 RX ADMIN — IOHEXOL 100 ML: 350 INJECTION, SOLUTION INTRAVENOUS at 18:01

## 2020-05-13 RX ADMIN — VANCOMYCIN HYDROCHLORIDE 1500 MG: 750 INJECTION, POWDER, LYOPHILIZED, FOR SOLUTION INTRAVENOUS at 19:06

## 2020-05-13 RX ADMIN — SODIUM CHLORIDE 500 ML: 0.9 INJECTION, SOLUTION INTRAVENOUS at 15:49

## 2020-05-13 RX ADMIN — ACETAMINOPHEN 650 MG: 325 TABLET, FILM COATED ORAL at 17:44

## 2020-05-14 PROBLEM — M54.50 LOW BACK PAIN: Status: ACTIVE | Noted: 2020-05-14

## 2020-05-14 PROBLEM — R74.8 ELEVATED LIVER ENZYMES: Status: ACTIVE | Noted: 2020-05-14

## 2020-05-14 LAB
ALBUMIN SERPL BCP-MCNC: 2 G/DL (ref 3.5–5)
ALP SERPL-CCNC: 290 U/L (ref 46–116)
ALT SERPL W P-5'-P-CCNC: 55 U/L (ref 12–78)
ANION GAP SERPL CALCULATED.3IONS-SCNC: 6 MMOL/L (ref 4–13)
AST SERPL W P-5'-P-CCNC: 14 U/L (ref 5–45)
ATRIAL RATE: 104 BPM
BASOPHILS # BLD AUTO: 0.05 THOUSANDS/ΜL (ref 0–0.1)
BASOPHILS NFR BLD AUTO: 0 % (ref 0–1)
BILIRUB SERPL-MCNC: 0.35 MG/DL (ref 0.2–1)
BUN SERPL-MCNC: 13 MG/DL (ref 5–25)
CALCIUM SERPL-MCNC: 9.6 MG/DL (ref 8.3–10.1)
CHLORIDE SERPL-SCNC: 107 MMOL/L (ref 100–108)
CK SERPL-CCNC: 8 U/L (ref 26–192)
CO2 SERPL-SCNC: 23 MMOL/L (ref 21–32)
CREAT SERPL-MCNC: 0.72 MG/DL (ref 0.6–1.3)
EOSINOPHIL # BLD AUTO: 0.11 THOUSAND/ΜL (ref 0–0.61)
EOSINOPHIL NFR BLD AUTO: 1 % (ref 0–6)
ERYTHROCYTE [DISTWIDTH] IN BLOOD BY AUTOMATED COUNT: 21.5 % (ref 11.6–15.1)
GFR SERPL CREATININE-BSD FRML MDRD: 101 ML/MIN/1.73SQ M
GLUCOSE SERPL-MCNC: 123 MG/DL (ref 65–140)
GLUCOSE SERPL-MCNC: 138 MG/DL (ref 65–140)
GLUCOSE SERPL-MCNC: 140 MG/DL (ref 65–140)
GLUCOSE SERPL-MCNC: 141 MG/DL (ref 65–140)
GLUCOSE SERPL-MCNC: 183 MG/DL (ref 65–140)
HCT VFR BLD AUTO: 26 % (ref 34.8–46.1)
HGB BLD-MCNC: 7.7 G/DL (ref 11.5–15.4)
IMM GRANULOCYTES # BLD AUTO: 0.21 THOUSAND/UL (ref 0–0.2)
IMM GRANULOCYTES NFR BLD AUTO: 2 % (ref 0–2)
LYMPHOCYTES # BLD AUTO: 2.44 THOUSANDS/ΜL (ref 0.6–4.47)
LYMPHOCYTES NFR BLD AUTO: 20 % (ref 14–44)
MAGNESIUM SERPL-MCNC: 1.9 MG/DL (ref 1.6–2.6)
MCH RBC QN AUTO: 22.8 PG (ref 26.8–34.3)
MCHC RBC AUTO-ENTMCNC: 29.6 G/DL (ref 31.4–37.4)
MCV RBC AUTO: 77 FL (ref 82–98)
MONOCYTES # BLD AUTO: 1.22 THOUSAND/ΜL (ref 0.17–1.22)
MONOCYTES NFR BLD AUTO: 10 % (ref 4–12)
NEUTROPHILS # BLD AUTO: 8.44 THOUSANDS/ΜL (ref 1.85–7.62)
NEUTS SEG NFR BLD AUTO: 67 % (ref 43–75)
NRBC BLD AUTO-RTO: 0 /100 WBCS
P AXIS: 54 DEGREES
PLATELET # BLD AUTO: 434 THOUSANDS/UL (ref 149–390)
PLATELET # BLD AUTO: 445 THOUSANDS/UL (ref 149–390)
PMV BLD AUTO: 9.6 FL (ref 8.9–12.7)
PMV BLD AUTO: 9.7 FL (ref 8.9–12.7)
POTASSIUM SERPL-SCNC: 4.2 MMOL/L (ref 3.5–5.3)
PR INTERVAL: 150 MS
PROCALCITONIN SERPL-MCNC: 0.17 NG/ML
PROCALCITONIN SERPL-MCNC: 0.22 NG/ML
PROT SERPL-MCNC: 7.5 G/DL (ref 6.4–8.2)
QRS AXIS: 33 DEGREES
QRSD INTERVAL: 70 MS
QT INTERVAL: 322 MS
QTC INTERVAL: 423 MS
RBC # BLD AUTO: 3.37 MILLION/UL (ref 3.81–5.12)
SODIUM SERPL-SCNC: 136 MMOL/L (ref 136–145)
T WAVE AXIS: 71 DEGREES
VENTRICULAR RATE: 104 BPM
WBC # BLD AUTO: 12.47 THOUSAND/UL (ref 4.31–10.16)

## 2020-05-14 PROCEDURE — 82948 REAGENT STRIP/BLOOD GLUCOSE: CPT

## 2020-05-14 PROCEDURE — 84145 PROCALCITONIN (PCT): CPT | Performed by: INTERNAL MEDICINE

## 2020-05-14 PROCEDURE — 99223 1ST HOSP IP/OBS HIGH 75: CPT | Performed by: INTERNAL MEDICINE

## 2020-05-14 PROCEDURE — 93010 ELECTROCARDIOGRAM REPORT: CPT | Performed by: INTERNAL MEDICINE

## 2020-05-14 PROCEDURE — 85025 COMPLETE CBC W/AUTO DIFF WBC: CPT | Performed by: INTERNAL MEDICINE

## 2020-05-14 PROCEDURE — 85049 AUTOMATED PLATELET COUNT: CPT | Performed by: INTERNAL MEDICINE

## 2020-05-14 PROCEDURE — 80053 COMPREHEN METABOLIC PANEL: CPT | Performed by: INTERNAL MEDICINE

## 2020-05-14 PROCEDURE — 99253 IP/OBS CNSLTJ NEW/EST LOW 45: CPT | Performed by: INTERNAL MEDICINE

## 2020-05-14 PROCEDURE — 83735 ASSAY OF MAGNESIUM: CPT | Performed by: INTERNAL MEDICINE

## 2020-05-14 PROCEDURE — 82550 ASSAY OF CK (CPK): CPT | Performed by: INTERNAL MEDICINE

## 2020-05-14 RX ORDER — CALCIUM CARBONATE 200(500)MG
500 TABLET,CHEWABLE ORAL 3 TIMES DAILY PRN
Status: DISCONTINUED | OUTPATIENT
Start: 2020-05-14 | End: 2020-05-19 | Stop reason: HOSPADM

## 2020-05-14 RX ORDER — PANTOPRAZOLE SODIUM 40 MG/1
40 TABLET, DELAYED RELEASE ORAL
Status: DISCONTINUED | OUTPATIENT
Start: 2020-05-14 | End: 2020-05-19 | Stop reason: HOSPADM

## 2020-05-14 RX ADMIN — ALBUTEROL SULFATE 2 PUFF: 90 AEROSOL, METERED RESPIRATORY (INHALATION) at 09:27

## 2020-05-14 RX ADMIN — CEFEPIME HYDROCHLORIDE 2000 MG: 2 INJECTION, POWDER, FOR SOLUTION INTRAVENOUS at 05:30

## 2020-05-14 RX ADMIN — ESCITALOPRAM OXALATE 10 MG: 10 TABLET ORAL at 09:28

## 2020-05-14 RX ADMIN — ALBUTEROL SULFATE 2 PUFF: 90 AEROSOL, METERED RESPIRATORY (INHALATION) at 18:00

## 2020-05-14 RX ADMIN — ENOXAPARIN SODIUM 40 MG: 40 INJECTION SUBCUTANEOUS at 09:28

## 2020-05-14 RX ADMIN — PREDNISONE 80 MG: 20 TABLET ORAL at 09:28

## 2020-05-14 RX ADMIN — PANTOPRAZOLE SODIUM 40 MG: 40 TABLET, DELAYED RELEASE ORAL at 18:00

## 2020-05-14 RX ADMIN — LOSARTAN POTASSIUM 100 MG: 50 TABLET, FILM COATED ORAL at 09:28

## 2020-05-14 RX ADMIN — MONTELUKAST SODIUM 10 MG: 10 TABLET, FILM COATED ORAL at 21:35

## 2020-05-14 RX ADMIN — MONTELUKAST SODIUM 10 MG: 10 TABLET, FILM COATED ORAL at 00:09

## 2020-05-14 RX ADMIN — INSULIN LISPRO 1 UNITS: 100 INJECTION, SOLUTION INTRAVENOUS; SUBCUTANEOUS at 17:59

## 2020-05-14 RX ADMIN — ASPIRIN 81 MG: 81 TABLET, COATED ORAL at 09:28

## 2020-05-14 RX ADMIN — VANCOMYCIN HYDROCHLORIDE 1500 MG: 10 INJECTION, POWDER, LYOPHILIZED, FOR SOLUTION INTRAVENOUS at 06:19

## 2020-05-15 PROBLEM — E83.52 HYPERCALCEMIA: Status: ACTIVE | Noted: 2020-05-15

## 2020-05-15 PROBLEM — E05.90 HYPERTHYROIDISM: Status: ACTIVE | Noted: 2020-05-15

## 2020-05-15 LAB
ANION GAP SERPL CALCULATED.3IONS-SCNC: 9 MMOL/L (ref 4–13)
BASOPHILS # BLD AUTO: 0.05 THOUSANDS/ΜL (ref 0–0.1)
BASOPHILS NFR BLD AUTO: 0 % (ref 0–1)
BUN SERPL-MCNC: 17 MG/DL (ref 5–25)
CALCIUM SERPL-MCNC: 9.5 MG/DL (ref 8.3–10.1)
CHLORIDE SERPL-SCNC: 105 MMOL/L (ref 100–108)
CK SERPL-CCNC: 9 U/L (ref 26–192)
CO2 SERPL-SCNC: 23 MMOL/L (ref 21–32)
CREAT SERPL-MCNC: 0.8 MG/DL (ref 0.6–1.3)
EOSINOPHIL # BLD AUTO: 0.09 THOUSAND/ΜL (ref 0–0.61)
EOSINOPHIL NFR BLD AUTO: 1 % (ref 0–6)
ERYTHROCYTE [DISTWIDTH] IN BLOOD BY AUTOMATED COUNT: 21.4 % (ref 11.6–15.1)
GFR SERPL CREATININE-BSD FRML MDRD: 89 ML/MIN/1.73SQ M
GLUCOSE SERPL-MCNC: 112 MG/DL (ref 65–140)
GLUCOSE SERPL-MCNC: 116 MG/DL (ref 65–140)
GLUCOSE SERPL-MCNC: 152 MG/DL (ref 65–140)
GLUCOSE SERPL-MCNC: 160 MG/DL (ref 65–140)
GLUCOSE SERPL-MCNC: 175 MG/DL (ref 65–140)
HAV IGM SER QL: NORMAL
HBV CORE IGM SER QL: NORMAL
HBV SURFACE AG SER QL: NORMAL
HCT VFR BLD AUTO: 27.2 % (ref 34.8–46.1)
HCV AB SER QL: NORMAL
HGB BLD-MCNC: 8.1 G/DL (ref 11.5–15.4)
IMM GRANULOCYTES # BLD AUTO: 0.15 THOUSAND/UL (ref 0–0.2)
IMM GRANULOCYTES NFR BLD AUTO: 1 % (ref 0–2)
LYMPHOCYTES # BLD AUTO: 2.97 THOUSANDS/ΜL (ref 0.6–4.47)
LYMPHOCYTES NFR BLD AUTO: 25 % (ref 14–44)
MCH RBC QN AUTO: 23 PG (ref 26.8–34.3)
MCHC RBC AUTO-ENTMCNC: 29.8 G/DL (ref 31.4–37.4)
MCV RBC AUTO: 77 FL (ref 82–98)
MONOCYTES # BLD AUTO: 1.14 THOUSAND/ΜL (ref 0.17–1.22)
MONOCYTES NFR BLD AUTO: 10 % (ref 4–12)
NEUTROPHILS # BLD AUTO: 7.45 THOUSANDS/ΜL (ref 1.85–7.62)
NEUTS SEG NFR BLD AUTO: 63 % (ref 43–75)
NRBC BLD AUTO-RTO: 0 /100 WBCS
PLATELET # BLD AUTO: 479 THOUSANDS/UL (ref 149–390)
PMV BLD AUTO: 9.7 FL (ref 8.9–12.7)
POTASSIUM SERPL-SCNC: 4 MMOL/L (ref 3.5–5.3)
RBC # BLD AUTO: 3.52 MILLION/UL (ref 3.81–5.12)
SODIUM SERPL-SCNC: 137 MMOL/L (ref 136–145)
T3FREE SERPL-MCNC: 3.86 PG/ML (ref 2.3–4.2)
T4 FREE SERPL-MCNC: 2.88 NG/DL (ref 0.76–1.46)
TSH SERPL DL<=0.05 MIU/L-ACNC: <0.007 UIU/ML (ref 0.36–3.74)
WBC # BLD AUTO: 11.85 THOUSAND/UL (ref 4.31–10.16)

## 2020-05-15 PROCEDURE — 84481 FREE ASSAY (FT-3): CPT | Performed by: INTERNAL MEDICINE

## 2020-05-15 PROCEDURE — 84443 ASSAY THYROID STIM HORMONE: CPT | Performed by: INTERNAL MEDICINE

## 2020-05-15 PROCEDURE — 85025 COMPLETE CBC W/AUTO DIFF WBC: CPT | Performed by: INTERNAL MEDICINE

## 2020-05-15 PROCEDURE — 99232 SBSQ HOSP IP/OBS MODERATE 35: CPT | Performed by: INTERNAL MEDICINE

## 2020-05-15 PROCEDURE — 99255 IP/OBS CONSLTJ NEW/EST HI 80: CPT | Performed by: INTERNAL MEDICINE

## 2020-05-15 PROCEDURE — 80048 BASIC METABOLIC PNL TOTAL CA: CPT | Performed by: INTERNAL MEDICINE

## 2020-05-15 PROCEDURE — 82948 REAGENT STRIP/BLOOD GLUCOSE: CPT

## 2020-05-15 PROCEDURE — 80074 ACUTE HEPATITIS PANEL: CPT | Performed by: INTERNAL MEDICINE

## 2020-05-15 PROCEDURE — 84439 ASSAY OF FREE THYROXINE: CPT | Performed by: INTERNAL MEDICINE

## 2020-05-15 PROCEDURE — 82550 ASSAY OF CK (CPK): CPT | Performed by: INTERNAL MEDICINE

## 2020-05-15 RX ORDER — METHIMAZOLE 10 MG/1
10 TABLET ORAL DAILY
Status: DISCONTINUED | OUTPATIENT
Start: 2020-05-16 | End: 2020-05-19 | Stop reason: HOSPADM

## 2020-05-15 RX ORDER — ACETAMINOPHEN 325 MG/1
975 TABLET ORAL EVERY 8 HOURS PRN
Status: DISCONTINUED | OUTPATIENT
Start: 2020-05-15 | End: 2020-05-15

## 2020-05-15 RX ORDER — MAGNESIUM HYDROXIDE/ALUMINUM HYDROXICE/SIMETHICONE 120; 1200; 1200 MG/30ML; MG/30ML; MG/30ML
30 SUSPENSION ORAL ONCE
Status: DISCONTINUED | OUTPATIENT
Start: 2020-05-15 | End: 2020-05-15

## 2020-05-15 RX ORDER — ACETAMINOPHEN 325 MG/1
975 TABLET ORAL EVERY 8 HOURS PRN
Status: COMPLETED | OUTPATIENT
Start: 2020-05-15 | End: 2020-05-17

## 2020-05-15 RX ORDER — LIDOCAINE HYDROCHLORIDE 20 MG/ML
10 SOLUTION OROPHARYNGEAL ONCE
Status: DISCONTINUED | OUTPATIENT
Start: 2020-05-15 | End: 2020-05-15

## 2020-05-15 RX ADMIN — PANTOPRAZOLE SODIUM 40 MG: 40 TABLET, DELAYED RELEASE ORAL at 09:09

## 2020-05-15 RX ADMIN — LOSARTAN POTASSIUM 100 MG: 50 TABLET, FILM COATED ORAL at 09:10

## 2020-05-15 RX ADMIN — ESCITALOPRAM OXALATE 10 MG: 10 TABLET ORAL at 09:09

## 2020-05-15 RX ADMIN — CALCIUM CARBONATE (ANTACID) CHEW TAB 500 MG 500 MG: 500 CHEW TAB at 10:37

## 2020-05-15 RX ADMIN — MONTELUKAST SODIUM 10 MG: 10 TABLET, FILM COATED ORAL at 21:15

## 2020-05-15 RX ADMIN — INSULIN LISPRO 1 UNITS: 100 INJECTION, SOLUTION INTRAVENOUS; SUBCUTANEOUS at 12:48

## 2020-05-15 RX ADMIN — ALBUTEROL SULFATE 2 PUFF: 90 AEROSOL, METERED RESPIRATORY (INHALATION) at 18:43

## 2020-05-15 RX ADMIN — ALBUTEROL SULFATE 2 PUFF: 90 AEROSOL, METERED RESPIRATORY (INHALATION) at 09:06

## 2020-05-15 RX ADMIN — PREDNISONE 80 MG: 20 TABLET ORAL at 09:08

## 2020-05-15 RX ADMIN — INSULIN LISPRO 1 UNITS: 100 INJECTION, SOLUTION INTRAVENOUS; SUBCUTANEOUS at 18:42

## 2020-05-15 RX ADMIN — ENOXAPARIN SODIUM 40 MG: 40 INJECTION SUBCUTANEOUS at 09:11

## 2020-05-15 RX ADMIN — ACETAMINOPHEN 975 MG: 325 TABLET ORAL at 01:53

## 2020-05-15 RX ADMIN — ASPIRIN 81 MG: 81 TABLET, COATED ORAL at 09:09

## 2020-05-16 LAB
ALBUMIN SERPL BCP-MCNC: 2.1 G/DL (ref 3.5–5)
ALBUMIN SERPL BCP-MCNC: 2.2 G/DL (ref 3.5–5)
ALP SERPL-CCNC: 361 U/L (ref 46–116)
ALP SERPL-CCNC: 375 U/L (ref 46–116)
ALT SERPL W P-5'-P-CCNC: 64 U/L (ref 12–78)
ALT SERPL W P-5'-P-CCNC: 65 U/L (ref 12–78)
ANION GAP SERPL CALCULATED.3IONS-SCNC: 10 MMOL/L (ref 4–13)
ANION GAP SERPL CALCULATED.3IONS-SCNC: 7 MMOL/L (ref 4–13)
AST SERPL W P-5'-P-CCNC: 25 U/L (ref 5–45)
AST SERPL W P-5'-P-CCNC: 26 U/L (ref 5–45)
BASOPHILS # BLD AUTO: 0.05 THOUSANDS/ΜL (ref 0–0.1)
BASOPHILS NFR BLD AUTO: 0 % (ref 0–1)
BILIRUB SERPL-MCNC: 0.25 MG/DL (ref 0.2–1)
BILIRUB SERPL-MCNC: 0.32 MG/DL (ref 0.2–1)
BUN SERPL-MCNC: 20 MG/DL (ref 5–25)
BUN SERPL-MCNC: 21 MG/DL (ref 5–25)
CALCIUM SERPL-MCNC: 10.2 MG/DL (ref 8.3–10.1)
CALCIUM SERPL-MCNC: 9.8 MG/DL (ref 8.3–10.1)
CHLORIDE SERPL-SCNC: 104 MMOL/L (ref 100–108)
CHLORIDE SERPL-SCNC: 105 MMOL/L (ref 100–108)
CO2 SERPL-SCNC: 22 MMOL/L (ref 21–32)
CO2 SERPL-SCNC: 24 MMOL/L (ref 21–32)
CREAT SERPL-MCNC: 0.92 MG/DL (ref 0.6–1.3)
CREAT SERPL-MCNC: 0.94 MG/DL (ref 0.6–1.3)
EOSINOPHIL # BLD AUTO: 0.05 THOUSAND/ΜL (ref 0–0.61)
EOSINOPHIL NFR BLD AUTO: 0 % (ref 0–6)
ERYTHROCYTE [DISTWIDTH] IN BLOOD BY AUTOMATED COUNT: 21.3 % (ref 11.6–15.1)
GFR SERPL CREATININE-BSD FRML MDRD: 73 ML/MIN/1.73SQ M
GFR SERPL CREATININE-BSD FRML MDRD: 75 ML/MIN/1.73SQ M
GLUCOSE SERPL-MCNC: 116 MG/DL (ref 65–140)
GLUCOSE SERPL-MCNC: 116 MG/DL (ref 65–140)
GLUCOSE SERPL-MCNC: 128 MG/DL (ref 65–140)
GLUCOSE SERPL-MCNC: 148 MG/DL (ref 65–140)
GLUCOSE SERPL-MCNC: 161 MG/DL (ref 65–140)
GLUCOSE SERPL-MCNC: 208 MG/DL (ref 65–140)
HCT VFR BLD AUTO: 29.7 % (ref 34.8–46.1)
HGB BLD-MCNC: 8.8 G/DL (ref 11.5–15.4)
IMM GRANULOCYTES # BLD AUTO: 0.16 THOUSAND/UL (ref 0–0.2)
IMM GRANULOCYTES NFR BLD AUTO: 1 % (ref 0–2)
LYMPHOCYTES # BLD AUTO: 3.03 THOUSANDS/ΜL (ref 0.6–4.47)
LYMPHOCYTES NFR BLD AUTO: 22 % (ref 14–44)
MCH RBC QN AUTO: 23.2 PG (ref 26.8–34.3)
MCHC RBC AUTO-ENTMCNC: 29.6 G/DL (ref 31.4–37.4)
MCV RBC AUTO: 78 FL (ref 82–98)
MONOCYTES # BLD AUTO: 1.23 THOUSAND/ΜL (ref 0.17–1.22)
MONOCYTES NFR BLD AUTO: 9 % (ref 4–12)
NEUTROPHILS # BLD AUTO: 9.49 THOUSANDS/ΜL (ref 1.85–7.62)
NEUTS SEG NFR BLD AUTO: 68 % (ref 43–75)
NRBC BLD AUTO-RTO: 0 /100 WBCS
PHOSPHATE SERPL-MCNC: 4.4 MG/DL (ref 2.7–4.5)
PLATELET # BLD AUTO: 571 THOUSANDS/UL (ref 149–390)
PMV BLD AUTO: 9.7 FL (ref 8.9–12.7)
POTASSIUM SERPL-SCNC: 3.9 MMOL/L (ref 3.5–5.3)
POTASSIUM SERPL-SCNC: 3.9 MMOL/L (ref 3.5–5.3)
PROT SERPL-MCNC: 8.7 G/DL (ref 6.4–8.2)
PROT SERPL-MCNC: 8.8 G/DL (ref 6.4–8.2)
PTH-INTACT SERPL-MCNC: 37 PG/ML (ref 18.4–80.1)
RBC # BLD AUTO: 3.79 MILLION/UL (ref 3.81–5.12)
SODIUM SERPL-SCNC: 135 MMOL/L (ref 136–145)
SODIUM SERPL-SCNC: 137 MMOL/L (ref 136–145)
WBC # BLD AUTO: 14.01 THOUSAND/UL (ref 4.31–10.16)

## 2020-05-16 PROCEDURE — 85025 COMPLETE CBC W/AUTO DIFF WBC: CPT | Performed by: INTERNAL MEDICINE

## 2020-05-16 PROCEDURE — 80053 COMPREHEN METABOLIC PANEL: CPT | Performed by: INTERNAL MEDICINE

## 2020-05-16 PROCEDURE — 82164 ANGIOTENSIN I ENZYME TEST: CPT | Performed by: INTERNAL MEDICINE

## 2020-05-16 PROCEDURE — 84100 ASSAY OF PHOSPHORUS: CPT | Performed by: INTERNAL MEDICINE

## 2020-05-16 PROCEDURE — 83970 ASSAY OF PARATHORMONE: CPT | Performed by: INTERNAL MEDICINE

## 2020-05-16 PROCEDURE — 82948 REAGENT STRIP/BLOOD GLUCOSE: CPT

## 2020-05-16 PROCEDURE — 82652 VIT D 1 25-DIHYDROXY: CPT | Performed by: INTERNAL MEDICINE

## 2020-05-16 PROCEDURE — 82397 CHEMILUMINESCENT ASSAY: CPT | Performed by: INTERNAL MEDICINE

## 2020-05-16 RX ADMIN — ALBUTEROL SULFATE 2 PUFF: 90 AEROSOL, METERED RESPIRATORY (INHALATION) at 18:31

## 2020-05-16 RX ADMIN — CALCIUM CARBONATE (ANTACID) CHEW TAB 500 MG 500 MG: 500 CHEW TAB at 11:54

## 2020-05-16 RX ADMIN — INSULIN LISPRO 2 UNITS: 100 INJECTION, SOLUTION INTRAVENOUS; SUBCUTANEOUS at 12:02

## 2020-05-16 RX ADMIN — PANTOPRAZOLE SODIUM 40 MG: 40 TABLET, DELAYED RELEASE ORAL at 05:27

## 2020-05-16 RX ADMIN — LOSARTAN POTASSIUM 100 MG: 50 TABLET, FILM COATED ORAL at 08:31

## 2020-05-16 RX ADMIN — INSULIN LISPRO 5 UNITS: 100 INJECTION, SOLUTION INTRAVENOUS; SUBCUTANEOUS at 12:03

## 2020-05-16 RX ADMIN — INSULIN LISPRO 5 UNITS: 100 INJECTION, SOLUTION INTRAVENOUS; SUBCUTANEOUS at 18:31

## 2020-05-16 RX ADMIN — INSULIN LISPRO 1 UNITS: 100 INJECTION, SOLUTION INTRAVENOUS; SUBCUTANEOUS at 18:30

## 2020-05-16 RX ADMIN — ASPIRIN 81 MG: 81 TABLET, COATED ORAL at 08:36

## 2020-05-16 RX ADMIN — METHIMAZOLE 10 MG: 10 TABLET ORAL at 08:33

## 2020-05-16 RX ADMIN — ALBUTEROL SULFATE 2 PUFF: 90 AEROSOL, METERED RESPIRATORY (INHALATION) at 08:32

## 2020-05-16 RX ADMIN — CALCIUM CARBONATE (ANTACID) CHEW TAB 500 MG 500 MG: 500 CHEW TAB at 03:47

## 2020-05-16 RX ADMIN — ENOXAPARIN SODIUM 40 MG: 40 INJECTION SUBCUTANEOUS at 08:36

## 2020-05-16 RX ADMIN — ACETAMINOPHEN 975 MG: 325 TABLET ORAL at 11:54

## 2020-05-16 RX ADMIN — PREDNISONE 80 MG: 20 TABLET ORAL at 08:35

## 2020-05-16 RX ADMIN — ESCITALOPRAM OXALATE 10 MG: 10 TABLET ORAL at 08:35

## 2020-05-16 RX ADMIN — MONTELUKAST SODIUM 10 MG: 10 TABLET, FILM COATED ORAL at 21:28

## 2020-05-17 LAB
ALBUMIN SERPL BCP-MCNC: 2.1 G/DL (ref 3.5–5)
ALP SERPL-CCNC: 324 U/L (ref 46–116)
ALT SERPL W P-5'-P-CCNC: 56 U/L (ref 12–78)
ANION GAP SERPL CALCULATED.3IONS-SCNC: 7 MMOL/L (ref 4–13)
AST SERPL W P-5'-P-CCNC: 14 U/L (ref 5–45)
BASOPHILS # BLD AUTO: 0.05 THOUSANDS/ΜL (ref 0–0.1)
BASOPHILS NFR BLD AUTO: 0 % (ref 0–1)
BILIRUB SERPL-MCNC: 0.15 MG/DL (ref 0.2–1)
BUN SERPL-MCNC: 33 MG/DL (ref 5–25)
CALCIUM SERPL-MCNC: 9.7 MG/DL (ref 8.3–10.1)
CHLORIDE SERPL-SCNC: 107 MMOL/L (ref 100–108)
CO2 SERPL-SCNC: 25 MMOL/L (ref 21–32)
CREAT SERPL-MCNC: 0.87 MG/DL (ref 0.6–1.3)
EOSINOPHIL # BLD AUTO: 0.01 THOUSAND/ΜL (ref 0–0.61)
EOSINOPHIL NFR BLD AUTO: 0 % (ref 0–6)
ERYTHROCYTE [DISTWIDTH] IN BLOOD BY AUTOMATED COUNT: 21.3 % (ref 11.6–15.1)
GFR SERPL CREATININE-BSD FRML MDRD: 80 ML/MIN/1.73SQ M
GLUCOSE SERPL-MCNC: 122 MG/DL (ref 65–140)
GLUCOSE SERPL-MCNC: 200 MG/DL (ref 65–140)
GLUCOSE SERPL-MCNC: 209 MG/DL (ref 65–140)
GLUCOSE SERPL-MCNC: 220 MG/DL (ref 65–140)
GLUCOSE SERPL-MCNC: 85 MG/DL (ref 65–140)
GLUCOSE SERPL-MCNC: 99 MG/DL (ref 65–140)
HCT VFR BLD AUTO: 27.8 % (ref 34.8–46.1)
HGB BLD-MCNC: 8.3 G/DL (ref 11.5–15.4)
IMM GRANULOCYTES # BLD AUTO: 0.16 THOUSAND/UL (ref 0–0.2)
IMM GRANULOCYTES NFR BLD AUTO: 1 % (ref 0–2)
LYMPHOCYTES # BLD AUTO: 3.34 THOUSANDS/ΜL (ref 0.6–4.47)
LYMPHOCYTES NFR BLD AUTO: 24 % (ref 14–44)
MCH RBC QN AUTO: 23.4 PG (ref 26.8–34.3)
MCHC RBC AUTO-ENTMCNC: 29.9 G/DL (ref 31.4–37.4)
MCV RBC AUTO: 79 FL (ref 82–98)
MONOCYTES # BLD AUTO: 1.21 THOUSAND/ΜL (ref 0.17–1.22)
MONOCYTES NFR BLD AUTO: 9 % (ref 4–12)
NEUTROPHILS # BLD AUTO: 9.04 THOUSANDS/ΜL (ref 1.85–7.62)
NEUTS SEG NFR BLD AUTO: 66 % (ref 43–75)
NRBC BLD AUTO-RTO: 0 /100 WBCS
PLATELET # BLD AUTO: 595 THOUSANDS/UL (ref 149–390)
PMV BLD AUTO: 10.1 FL (ref 8.9–12.7)
POTASSIUM SERPL-SCNC: 4.3 MMOL/L (ref 3.5–5.3)
PROT SERPL-MCNC: 8.1 G/DL (ref 6.4–8.2)
RBC # BLD AUTO: 3.54 MILLION/UL (ref 3.81–5.12)
SODIUM SERPL-SCNC: 139 MMOL/L (ref 136–145)
WBC # BLD AUTO: 13.81 THOUSAND/UL (ref 4.31–10.16)

## 2020-05-17 PROCEDURE — 82948 REAGENT STRIP/BLOOD GLUCOSE: CPT

## 2020-05-17 PROCEDURE — 99232 SBSQ HOSP IP/OBS MODERATE 35: CPT | Performed by: INTERNAL MEDICINE

## 2020-05-17 PROCEDURE — 85025 COMPLETE CBC W/AUTO DIFF WBC: CPT | Performed by: INTERNAL MEDICINE

## 2020-05-17 PROCEDURE — 80053 COMPREHEN METABOLIC PANEL: CPT | Performed by: INTERNAL MEDICINE

## 2020-05-17 RX ADMIN — PREDNISONE 80 MG: 20 TABLET ORAL at 08:40

## 2020-05-17 RX ADMIN — INSULIN LISPRO 2 UNITS: 100 INJECTION, SOLUTION INTRAVENOUS; SUBCUTANEOUS at 17:40

## 2020-05-17 RX ADMIN — ESCITALOPRAM OXALATE 10 MG: 10 TABLET ORAL at 08:40

## 2020-05-17 RX ADMIN — ALBUTEROL SULFATE 2 PUFF: 90 AEROSOL, METERED RESPIRATORY (INHALATION) at 17:39

## 2020-05-17 RX ADMIN — METHIMAZOLE 10 MG: 10 TABLET ORAL at 08:43

## 2020-05-17 RX ADMIN — ASPIRIN 81 MG: 81 TABLET, COATED ORAL at 08:38

## 2020-05-17 RX ADMIN — INSULIN LISPRO 5 UNITS: 100 INJECTION, SOLUTION INTRAVENOUS; SUBCUTANEOUS at 17:39

## 2020-05-17 RX ADMIN — ACETAMINOPHEN 975 MG: 325 TABLET ORAL at 08:38

## 2020-05-17 RX ADMIN — LOSARTAN POTASSIUM 100 MG: 50 TABLET, FILM COATED ORAL at 08:43

## 2020-05-17 RX ADMIN — INSULIN LISPRO 5 UNITS: 100 INJECTION, SOLUTION INTRAVENOUS; SUBCUTANEOUS at 12:09

## 2020-05-17 RX ADMIN — MONTELUKAST SODIUM 10 MG: 10 TABLET, FILM COATED ORAL at 21:44

## 2020-05-17 RX ADMIN — ALBUTEROL SULFATE 2 PUFF: 90 AEROSOL, METERED RESPIRATORY (INHALATION) at 08:44

## 2020-05-17 RX ADMIN — ENOXAPARIN SODIUM 40 MG: 40 INJECTION SUBCUTANEOUS at 08:42

## 2020-05-17 RX ADMIN — PANTOPRAZOLE SODIUM 40 MG: 40 TABLET, DELAYED RELEASE ORAL at 05:57

## 2020-05-18 LAB
1,25(OH)2D3 SERPL-MCNC: 43.8 PG/ML (ref 19.9–79.3)
ACE SERPL-CCNC: 26 U/L (ref 14–82)
ALBUMIN SERPL BCP-MCNC: 2.2 G/DL (ref 3.5–5)
ALP SERPL-CCNC: 322 U/L (ref 46–116)
ALT SERPL W P-5'-P-CCNC: 55 U/L (ref 12–78)
ANION GAP SERPL CALCULATED.3IONS-SCNC: 6 MMOL/L (ref 4–13)
AST SERPL W P-5'-P-CCNC: 17 U/L (ref 5–45)
ATRIAL RATE: 108 BPM
BACTERIA BLD CULT: NORMAL
BACTERIA BLD CULT: NORMAL
BASOPHILS # BLD AUTO: 0.05 THOUSANDS/ΜL (ref 0–0.1)
BASOPHILS NFR BLD AUTO: 0 % (ref 0–1)
BILIRUB SERPL-MCNC: 0.25 MG/DL (ref 0.2–1)
BUN SERPL-MCNC: 31 MG/DL (ref 5–25)
CALCIUM SERPL-MCNC: 10.2 MG/DL (ref 8.3–10.1)
CHLORIDE SERPL-SCNC: 107 MMOL/L (ref 100–108)
CO2 SERPL-SCNC: 26 MMOL/L (ref 21–32)
CREAT SERPL-MCNC: 0.89 MG/DL (ref 0.6–1.3)
EOSINOPHIL # BLD AUTO: 0.03 THOUSAND/ΜL (ref 0–0.61)
EOSINOPHIL NFR BLD AUTO: 0 % (ref 0–6)
ERYTHROCYTE [DISTWIDTH] IN BLOOD BY AUTOMATED COUNT: 21.4 % (ref 11.6–15.1)
GFR SERPL CREATININE-BSD FRML MDRD: 78 ML/MIN/1.73SQ M
GLUCOSE SERPL-MCNC: 180 MG/DL (ref 65–140)
GLUCOSE SERPL-MCNC: 218 MG/DL (ref 65–140)
GLUCOSE SERPL-MCNC: 253 MG/DL (ref 65–140)
GLUCOSE SERPL-MCNC: 88 MG/DL (ref 65–140)
GLUCOSE SERPL-MCNC: 94 MG/DL (ref 65–140)
HCT VFR BLD AUTO: 28.3 % (ref 34.8–46.1)
HGB BLD-MCNC: 8.3 G/DL (ref 11.5–15.4)
IMM GRANULOCYTES # BLD AUTO: 0.2 THOUSAND/UL (ref 0–0.2)
IMM GRANULOCYTES NFR BLD AUTO: 1 % (ref 0–2)
LYMPHOCYTES # BLD AUTO: 3.84 THOUSANDS/ΜL (ref 0.6–4.47)
LYMPHOCYTES NFR BLD AUTO: 26 % (ref 14–44)
MCH RBC QN AUTO: 22.9 PG (ref 26.8–34.3)
MCHC RBC AUTO-ENTMCNC: 29.3 G/DL (ref 31.4–37.4)
MCV RBC AUTO: 78 FL (ref 82–98)
MONOCYTES # BLD AUTO: 1.39 THOUSAND/ΜL (ref 0.17–1.22)
MONOCYTES NFR BLD AUTO: 10 % (ref 4–12)
NEUTROPHILS # BLD AUTO: 9.03 THOUSANDS/ΜL (ref 1.85–7.62)
NEUTS SEG NFR BLD AUTO: 63 % (ref 43–75)
NRBC BLD AUTO-RTO: 0 /100 WBCS
P AXIS: 51 DEGREES
PLATELET # BLD AUTO: 637 THOUSANDS/UL (ref 149–390)
PMV BLD AUTO: 9.7 FL (ref 8.9–12.7)
POTASSIUM SERPL-SCNC: 4.3 MMOL/L (ref 3.5–5.3)
PR INTERVAL: 174 MS
PROT SERPL-MCNC: 8.3 G/DL (ref 6.4–8.2)
QRS AXIS: 34 DEGREES
QRSD INTERVAL: 84 MS
QT INTERVAL: 346 MS
QTC INTERVAL: 463 MS
RBC # BLD AUTO: 3.63 MILLION/UL (ref 3.81–5.12)
SODIUM SERPL-SCNC: 139 MMOL/L (ref 136–145)
T WAVE AXIS: 86 DEGREES
TROPONIN I SERPL-MCNC: <0.02 NG/ML
VENTRICULAR RATE: 108 BPM
WBC # BLD AUTO: 14.54 THOUSAND/UL (ref 4.31–10.16)

## 2020-05-18 PROCEDURE — 82948 REAGENT STRIP/BLOOD GLUCOSE: CPT

## 2020-05-18 PROCEDURE — 93010 ELECTROCARDIOGRAM REPORT: CPT | Performed by: INTERNAL MEDICINE

## 2020-05-18 PROCEDURE — 99223 1ST HOSP IP/OBS HIGH 75: CPT | Performed by: INTERNAL MEDICINE

## 2020-05-18 PROCEDURE — 99232 SBSQ HOSP IP/OBS MODERATE 35: CPT | Performed by: INTERNAL MEDICINE

## 2020-05-18 PROCEDURE — 93005 ELECTROCARDIOGRAM TRACING: CPT

## 2020-05-18 PROCEDURE — 84484 ASSAY OF TROPONIN QUANT: CPT | Performed by: INTERNAL MEDICINE

## 2020-05-18 PROCEDURE — 80053 COMPREHEN METABOLIC PANEL: CPT | Performed by: INTERNAL MEDICINE

## 2020-05-18 PROCEDURE — 85025 COMPLETE CBC W/AUTO DIFF WBC: CPT | Performed by: INTERNAL MEDICINE

## 2020-05-18 PROCEDURE — 99239 HOSP IP/OBS DSCHRG MGMT >30: CPT | Performed by: INTERNAL MEDICINE

## 2020-05-18 PROCEDURE — 87385 HISTOPLASMA CAPSUL AG IA: CPT | Performed by: INTERNAL MEDICINE

## 2020-05-18 RX ORDER — PANTOPRAZOLE SODIUM 40 MG/1
40 TABLET, DELAYED RELEASE ORAL
Qty: 30 TABLET | Refills: 0 | Status: SHIPPED | OUTPATIENT
Start: 2020-05-19

## 2020-05-18 RX ORDER — METHIMAZOLE 10 MG/1
10 TABLET ORAL DAILY
Qty: 30 TABLET | Refills: 0 | Status: SHIPPED | OUTPATIENT
Start: 2020-05-19 | End: 2020-06-17

## 2020-05-18 RX ORDER — NITROGLYCERIN 0.4 MG/1
0.4 TABLET SUBLINGUAL
Status: DISCONTINUED | OUTPATIENT
Start: 2020-05-18 | End: 2020-05-19 | Stop reason: HOSPADM

## 2020-05-18 RX ORDER — ACETAMINOPHEN 325 MG/1
650 TABLET ORAL EVERY 6 HOURS PRN
Status: DISCONTINUED | OUTPATIENT
Start: 2020-05-18 | End: 2020-05-19 | Stop reason: HOSPADM

## 2020-05-18 RX ADMIN — ACETAMINOPHEN 650 MG: 325 TABLET ORAL at 08:45

## 2020-05-18 RX ADMIN — ESCITALOPRAM OXALATE 10 MG: 10 TABLET ORAL at 08:39

## 2020-05-18 RX ADMIN — PREDNISONE 80 MG: 20 TABLET ORAL at 08:39

## 2020-05-18 RX ADMIN — INSULIN LISPRO 5 UNITS: 100 INJECTION, SOLUTION INTRAVENOUS; SUBCUTANEOUS at 12:00

## 2020-05-18 RX ADMIN — INSULIN LISPRO 5 UNITS: 100 INJECTION, SOLUTION INTRAVENOUS; SUBCUTANEOUS at 08:40

## 2020-05-18 RX ADMIN — INSULIN LISPRO 2 UNITS: 100 INJECTION, SOLUTION INTRAVENOUS; SUBCUTANEOUS at 17:12

## 2020-05-18 RX ADMIN — ASPIRIN 81 MG: 81 TABLET, COATED ORAL at 08:39

## 2020-05-18 RX ADMIN — MONTELUKAST SODIUM 10 MG: 10 TABLET, FILM COATED ORAL at 22:06

## 2020-05-18 RX ADMIN — NITROGLYCERIN 0.4 MG: 0.4 TABLET SUBLINGUAL at 08:46

## 2020-05-18 RX ADMIN — LOSARTAN POTASSIUM 100 MG: 50 TABLET, FILM COATED ORAL at 08:40

## 2020-05-18 RX ADMIN — INSULIN LISPRO 1 UNITS: 100 INJECTION, SOLUTION INTRAVENOUS; SUBCUTANEOUS at 22:15

## 2020-05-18 RX ADMIN — ALBUTEROL SULFATE 2 PUFF: 90 AEROSOL, METERED RESPIRATORY (INHALATION) at 17:14

## 2020-05-18 RX ADMIN — METHIMAZOLE 10 MG: 10 TABLET ORAL at 08:40

## 2020-05-18 RX ADMIN — ENOXAPARIN SODIUM 40 MG: 40 INJECTION SUBCUTANEOUS at 08:39

## 2020-05-18 RX ADMIN — INSULIN LISPRO 5 UNITS: 100 INJECTION, SOLUTION INTRAVENOUS; SUBCUTANEOUS at 17:13

## 2020-05-18 RX ADMIN — INSULIN LISPRO 3 UNITS: 100 INJECTION, SOLUTION INTRAVENOUS; SUBCUTANEOUS at 11:59

## 2020-05-18 RX ADMIN — ALBUTEROL SULFATE 2 PUFF: 90 AEROSOL, METERED RESPIRATORY (INHALATION) at 08:41

## 2020-05-18 RX ADMIN — PANTOPRAZOLE SODIUM 40 MG: 40 TABLET, DELAYED RELEASE ORAL at 05:55

## 2020-05-19 VITALS
OXYGEN SATURATION: 97 % | TEMPERATURE: 98.5 F | WEIGHT: 210.54 LBS | HEART RATE: 79 BPM | DIASTOLIC BLOOD PRESSURE: 67 MMHG | BODY MASS INDEX: 37.3 KG/M2 | RESPIRATION RATE: 16 BRPM | HEIGHT: 63 IN | SYSTOLIC BLOOD PRESSURE: 138 MMHG

## 2020-05-19 DIAGNOSIS — R76.12 POSITIVE QUANTIFERON-TB GOLD TEST: Primary | ICD-10-CM

## 2020-05-19 DIAGNOSIS — E05.90 HYPERTHYROIDISM: Primary | ICD-10-CM

## 2020-05-19 LAB
ALBUMIN SERPL BCP-MCNC: 2.1 G/DL (ref 3.5–5)
ALP SERPL-CCNC: 298 U/L (ref 46–116)
ALT SERPL W P-5'-P-CCNC: 57 U/L (ref 12–78)
ANION GAP SERPL CALCULATED.3IONS-SCNC: 8 MMOL/L (ref 4–13)
AST SERPL W P-5'-P-CCNC: 10 U/L (ref 5–45)
BASOPHILS # BLD AUTO: 0.03 THOUSANDS/ΜL (ref 0–0.1)
BASOPHILS NFR BLD AUTO: 0 % (ref 0–1)
BILIRUB SERPL-MCNC: 0.27 MG/DL (ref 0.2–1)
BUN SERPL-MCNC: 31 MG/DL (ref 5–25)
CALCIUM SERPL-MCNC: 10.1 MG/DL (ref 8.3–10.1)
CHLORIDE SERPL-SCNC: 108 MMOL/L (ref 100–108)
CO2 SERPL-SCNC: 23 MMOL/L (ref 21–32)
CREAT SERPL-MCNC: 0.85 MG/DL (ref 0.6–1.3)
EOSINOPHIL # BLD AUTO: 0.02 THOUSAND/ΜL (ref 0–0.61)
EOSINOPHIL NFR BLD AUTO: 0 % (ref 0–6)
ERYTHROCYTE [DISTWIDTH] IN BLOOD BY AUTOMATED COUNT: 21.6 % (ref 11.6–15.1)
GFR SERPL CREATININE-BSD FRML MDRD: 82 ML/MIN/1.73SQ M
GLUCOSE SERPL-MCNC: 106 MG/DL (ref 65–140)
GLUCOSE SERPL-MCNC: 106 MG/DL (ref 65–140)
GLUCOSE SERPL-MCNC: 98 MG/DL (ref 65–140)
HCT VFR BLD AUTO: 29 % (ref 34.8–46.1)
HGB BLD-MCNC: 8.5 G/DL (ref 11.5–15.4)
IMM GRANULOCYTES # BLD AUTO: 0.23 THOUSAND/UL (ref 0–0.2)
IMM GRANULOCYTES NFR BLD AUTO: 2 % (ref 0–2)
LYMPHOCYTES # BLD AUTO: 3.97 THOUSANDS/ΜL (ref 0.6–4.47)
LYMPHOCYTES NFR BLD AUTO: 25 % (ref 14–44)
MCH RBC QN AUTO: 22.8 PG (ref 26.8–34.3)
MCHC RBC AUTO-ENTMCNC: 29.3 G/DL (ref 31.4–37.4)
MCV RBC AUTO: 78 FL (ref 82–98)
MONOCYTES # BLD AUTO: 1.28 THOUSAND/ΜL (ref 0.17–1.22)
MONOCYTES NFR BLD AUTO: 8 % (ref 4–12)
NEUTROPHILS # BLD AUTO: 10.16 THOUSANDS/ΜL (ref 1.85–7.62)
NEUTS SEG NFR BLD AUTO: 65 % (ref 43–75)
NRBC BLD AUTO-RTO: 0 /100 WBCS
PLATELET # BLD AUTO: 639 THOUSANDS/UL (ref 149–390)
PMV BLD AUTO: 9.7 FL (ref 8.9–12.7)
POTASSIUM SERPL-SCNC: 4.1 MMOL/L (ref 3.5–5.3)
PROT SERPL-MCNC: 8.1 G/DL (ref 6.4–8.2)
RBC # BLD AUTO: 3.73 MILLION/UL (ref 3.81–5.12)
SODIUM SERPL-SCNC: 139 MMOL/L (ref 136–145)
WBC # BLD AUTO: 15.69 THOUSAND/UL (ref 4.31–10.16)

## 2020-05-19 PROCEDURE — 99232 SBSQ HOSP IP/OBS MODERATE 35: CPT | Performed by: INTERNAL MEDICINE

## 2020-05-19 PROCEDURE — 99447 NTRPROF PH1/NTRNET/EHR 11-20: CPT | Performed by: PHYSICIAN ASSISTANT

## 2020-05-19 PROCEDURE — 85025 COMPLETE CBC W/AUTO DIFF WBC: CPT | Performed by: INTERNAL MEDICINE

## 2020-05-19 PROCEDURE — 82948 REAGENT STRIP/BLOOD GLUCOSE: CPT

## 2020-05-19 PROCEDURE — 80053 COMPREHEN METABOLIC PANEL: CPT | Performed by: INTERNAL MEDICINE

## 2020-05-19 RX ADMIN — ENOXAPARIN SODIUM 40 MG: 40 INJECTION SUBCUTANEOUS at 08:51

## 2020-05-19 RX ADMIN — ASPIRIN 81 MG: 81 TABLET, COATED ORAL at 08:51

## 2020-05-19 RX ADMIN — ESCITALOPRAM OXALATE 10 MG: 10 TABLET ORAL at 08:51

## 2020-05-19 RX ADMIN — ALBUTEROL SULFATE 2 PUFF: 90 AEROSOL, METERED RESPIRATORY (INHALATION) at 08:50

## 2020-05-19 RX ADMIN — ACETAMINOPHEN 650 MG: 325 TABLET ORAL at 08:51

## 2020-05-19 RX ADMIN — PREDNISONE 80 MG: 20 TABLET ORAL at 08:51

## 2020-05-19 RX ADMIN — INSULIN LISPRO 5 UNITS: 100 INJECTION, SOLUTION INTRAVENOUS; SUBCUTANEOUS at 08:51

## 2020-05-19 RX ADMIN — INSULIN LISPRO 5 UNITS: 100 INJECTION, SOLUTION INTRAVENOUS; SUBCUTANEOUS at 12:05

## 2020-05-19 RX ADMIN — METHIMAZOLE 10 MG: 10 TABLET ORAL at 08:51

## 2020-05-19 RX ADMIN — PANTOPRAZOLE SODIUM 40 MG: 40 TABLET, DELAYED RELEASE ORAL at 05:11

## 2020-05-19 RX ADMIN — LOSARTAN POTASSIUM 100 MG: 50 TABLET, FILM COATED ORAL at 08:51

## 2020-05-20 LAB
H CAPSUL AG UR IA-ACNC: <0.5
STONE ANALYSIS-IMP: NORMAL

## 2020-05-22 LAB — PTH RELATED PROT SERPL-SCNC: <2 PMOL/L

## 2020-05-26 ENCOUNTER — PREP FOR PROCEDURE (OUTPATIENT)
Dept: PULMONOLOGY | Facility: CLINIC | Age: 45
End: 2020-05-26

## 2020-05-26 DIAGNOSIS — Z20.828 EXPOSURE TO SARS-ASSOCIATED CORONAVIRUS: Primary | ICD-10-CM

## 2020-05-26 DIAGNOSIS — R93.89 ABNORMAL CHEST CT: Primary | ICD-10-CM

## 2020-05-28 ENCOUNTER — ANESTHESIA EVENT (OUTPATIENT)
Dept: PERIOP | Facility: HOSPITAL | Age: 45
End: 2020-05-28

## 2020-05-28 RX ORDER — ACETAMINOPHEN 325 MG/1
650 TABLET ORAL EVERY 6 HOURS PRN
COMMUNITY

## 2020-05-29 ENCOUNTER — ANESTHESIA (OUTPATIENT)
Dept: PERIOP | Facility: HOSPITAL | Age: 45
End: 2020-05-29

## 2020-05-29 ENCOUNTER — TELEPHONE (OUTPATIENT)
Dept: OBGYN CLINIC | Facility: HOSPITAL | Age: 45
End: 2020-05-29

## 2020-05-29 ENCOUNTER — HOSPITAL ENCOUNTER (OUTPATIENT)
Dept: PERIOP | Facility: HOSPITAL | Age: 45
Setting detail: OUTPATIENT SURGERY
Discharge: HOME | End: 2020-05-29
Attending: INTERNAL MEDICINE | Admitting: INTERNAL MEDICINE
Payer: COMMERCIAL

## 2020-05-29 VITALS
SYSTOLIC BLOOD PRESSURE: 187 MMHG | HEART RATE: 109 BPM | RESPIRATION RATE: 16 BRPM | OXYGEN SATURATION: 97 % | DIASTOLIC BLOOD PRESSURE: 87 MMHG | TEMPERATURE: 98.6 F

## 2020-05-29 DIAGNOSIS — R93.89 ABNORMAL CHEST CT: ICD-10-CM

## 2020-05-29 LAB
EXT PREGNANCY TEST URINE: NEGATIVE
EXT. CONTROL: NORMAL
GLUCOSE SERPL-MCNC: 165 MG/DL (ref 65–140)
GLUCOSE SERPL-MCNC: 185 MG/DL (ref 65–140)
SARS-COV-2 RNA RESP QL NAA+PROBE: NEGATIVE

## 2020-05-29 PROCEDURE — 87116 MYCOBACTERIA CULTURE: CPT | Performed by: INTERNAL MEDICINE

## 2020-05-29 PROCEDURE — U0003 INFECTIOUS AGENT DETECTION BY NUCLEIC ACID (DNA OR RNA); SEVERE ACUTE RESPIRATORY SYNDROME CORONAVIRUS 2 (SARS-COV-2) (CORONAVIRUS DISEASE [COVID-19]), AMPLIFIED PROBE TECHNIQUE, MAKING USE OF HIGH THROUGHPUT TECHNOLOGIES AS DESCRIBED BY CMS-2020-01-R: HCPCS | Performed by: INTERNAL MEDICINE

## 2020-05-29 PROCEDURE — 87206 SMEAR FLUORESCENT/ACID STAI: CPT | Performed by: INTERNAL MEDICINE

## 2020-05-29 PROCEDURE — 88305 TISSUE EXAM BY PATHOLOGIST: CPT | Performed by: PATHOLOGY

## 2020-05-29 PROCEDURE — 81025 URINE PREGNANCY TEST: CPT | Performed by: INTERNAL MEDICINE

## 2020-05-29 PROCEDURE — 88173 CYTOPATH EVAL FNA REPORT: CPT | Performed by: PATHOLOGY

## 2020-05-29 PROCEDURE — 87107 FUNGI IDENTIFICATION MOLD: CPT | Performed by: INTERNAL MEDICINE

## 2020-05-29 PROCEDURE — 88172 CYTP DX EVAL FNA 1ST EA SITE: CPT | Performed by: PATHOLOGY

## 2020-05-29 PROCEDURE — 31629 BRONCHOSCOPY/NEEDLE BX EACH: CPT | Performed by: INTERNAL MEDICINE

## 2020-05-29 PROCEDURE — 31652 BRONCH EBUS SAMPLNG 1/2 NODE: CPT | Performed by: INTERNAL MEDICINE

## 2020-05-29 PROCEDURE — 82948 REAGENT STRIP/BLOOD GLUCOSE: CPT

## 2020-05-29 PROCEDURE — 31624 DX BRONCHOSCOPE/LAVAGE: CPT | Performed by: INTERNAL MEDICINE

## 2020-05-29 PROCEDURE — 87102 FUNGUS ISOLATION CULTURE: CPT | Performed by: INTERNAL MEDICINE

## 2020-05-29 PROCEDURE — 87070 CULTURE OTHR SPECIMN AEROBIC: CPT | Performed by: INTERNAL MEDICINE

## 2020-05-29 RX ORDER — FENTANYL CITRATE 50 UG/ML
INJECTION, SOLUTION INTRAMUSCULAR; INTRAVENOUS AS NEEDED
Status: DISCONTINUED | OUTPATIENT
Start: 2020-05-29 | End: 2020-05-29 | Stop reason: SURG

## 2020-05-29 RX ORDER — ONDANSETRON 2 MG/ML
4 INJECTION INTRAMUSCULAR; INTRAVENOUS ONCE AS NEEDED
Status: DISCONTINUED | OUTPATIENT
Start: 2020-05-29 | End: 2020-06-02 | Stop reason: HOSPADM

## 2020-05-29 RX ORDER — PREDNISONE 20 MG/1
80 TABLET ORAL DAILY
Qty: 56 TABLET | Refills: 0 | Status: SHIPPED | OUTPATIENT
Start: 2020-05-29 | End: 2020-07-22 | Stop reason: SDUPTHER

## 2020-05-29 RX ORDER — DEXAMETHASONE SODIUM PHOSPHATE 10 MG/ML
INJECTION, SOLUTION INTRAMUSCULAR; INTRAVENOUS AS NEEDED
Status: DISCONTINUED | OUTPATIENT
Start: 2020-05-29 | End: 2020-05-29 | Stop reason: SURG

## 2020-05-29 RX ORDER — GUAIFENESIN/DEXTROMETHORPHAN 100-10MG/5
10 SYRUP ORAL EVERY 4 HOURS PRN
Status: DISCONTINUED | OUTPATIENT
Start: 2020-05-29 | End: 2020-06-02 | Stop reason: HOSPADM

## 2020-05-29 RX ORDER — LIDOCAINE HYDROCHLORIDE 10 MG/ML
0.5 INJECTION, SOLUTION EPIDURAL; INFILTRATION; INTRACAUDAL; PERINEURAL ONCE
Status: COMPLETED | OUTPATIENT
Start: 2020-05-29 | End: 2020-05-29

## 2020-05-29 RX ORDER — LABETALOL 20 MG/4 ML (5 MG/ML) INTRAVENOUS SYRINGE
AS NEEDED
Status: DISCONTINUED | OUTPATIENT
Start: 2020-05-29 | End: 2020-05-29 | Stop reason: SURG

## 2020-05-29 RX ORDER — DIPHENHYDRAMINE HYDROCHLORIDE 50 MG/ML
12.5 INJECTION INTRAMUSCULAR; INTRAVENOUS ONCE AS NEEDED
Status: DISCONTINUED | OUTPATIENT
Start: 2020-05-29 | End: 2020-06-02 | Stop reason: HOSPADM

## 2020-05-29 RX ORDER — PROPOFOL 10 MG/ML
INJECTION, EMULSION INTRAVENOUS CONTINUOUS PRN
Status: DISCONTINUED | OUTPATIENT
Start: 2020-05-29 | End: 2020-05-29 | Stop reason: SURG

## 2020-05-29 RX ORDER — MIDAZOLAM HYDROCHLORIDE 2 MG/2ML
INJECTION, SOLUTION INTRAMUSCULAR; INTRAVENOUS AS NEEDED
Status: DISCONTINUED | OUTPATIENT
Start: 2020-05-29 | End: 2020-05-29 | Stop reason: SURG

## 2020-05-29 RX ORDER — SODIUM CHLORIDE, SODIUM LACTATE, POTASSIUM CHLORIDE, CALCIUM CHLORIDE 600; 310; 30; 20 MG/100ML; MG/100ML; MG/100ML; MG/100ML
125 INJECTION, SOLUTION INTRAVENOUS CONTINUOUS
Status: DISCONTINUED | OUTPATIENT
Start: 2020-05-29 | End: 2020-06-02 | Stop reason: HOSPADM

## 2020-05-29 RX ORDER — LIDOCAINE HYDROCHLORIDE 20 MG/ML
INJECTION, SOLUTION EPIDURAL; INFILTRATION; INTRACAUDAL; PERINEURAL CODE/TRAUMA/SEDATION MEDICATION
Status: COMPLETED | OUTPATIENT
Start: 2020-05-29 | End: 2020-05-29

## 2020-05-29 RX ORDER — FENTANYL CITRATE/PF 50 MCG/ML
25 SYRINGE (ML) INJECTION
Status: COMPLETED | OUTPATIENT
Start: 2020-05-29 | End: 2020-05-29

## 2020-05-29 RX ORDER — LIDOCAINE HYDROCHLORIDE 10 MG/ML
INJECTION, SOLUTION EPIDURAL; INFILTRATION; INTRACAUDAL; PERINEURAL AS NEEDED
Status: DISCONTINUED | OUTPATIENT
Start: 2020-05-29 | End: 2020-05-29 | Stop reason: SURG

## 2020-05-29 RX ORDER — EPHEDRINE SULFATE 50 MG/ML
INJECTION INTRAVENOUS AS NEEDED
Status: DISCONTINUED | OUTPATIENT
Start: 2020-05-29 | End: 2020-05-29 | Stop reason: SURG

## 2020-05-29 RX ORDER — ALBUTEROL SULFATE 2.5 MG/3ML
2.5 SOLUTION RESPIRATORY (INHALATION) ONCE
Status: COMPLETED | OUTPATIENT
Start: 2020-05-29 | End: 2020-05-29

## 2020-05-29 RX ORDER — PROPOFOL 10 MG/ML
INJECTION, EMULSION INTRAVENOUS AS NEEDED
Status: DISCONTINUED | OUTPATIENT
Start: 2020-05-29 | End: 2020-05-29 | Stop reason: SURG

## 2020-05-29 RX ADMIN — SODIUM CHLORIDE, SODIUM LACTATE, POTASSIUM CHLORIDE, AND CALCIUM CHLORIDE: .6; .31; .03; .02 INJECTION, SOLUTION INTRAVENOUS at 12:00

## 2020-05-29 RX ADMIN — FENTANYL CITRATE 25 MCG: 50 INJECTION INTRAMUSCULAR; INTRAVENOUS at 13:10

## 2020-05-29 RX ADMIN — ALBUTEROL SULFATE 2.5 MG: 2.5 SOLUTION RESPIRATORY (INHALATION) at 13:40

## 2020-05-29 RX ADMIN — FENTANYL CITRATE 25 MCG: 50 INJECTION INTRAMUSCULAR; INTRAVENOUS at 13:15

## 2020-05-29 RX ADMIN — FENTANYL CITRATE 100 MCG: 50 INJECTION, SOLUTION INTRAMUSCULAR; INTRAVENOUS at 11:02

## 2020-05-29 RX ADMIN — FENTANYL CITRATE 25 MCG: 50 INJECTION INTRAMUSCULAR; INTRAVENOUS at 13:34

## 2020-05-29 RX ADMIN — DEXAMETHASONE SODIUM PHOSPHATE 10 MG: 10 INJECTION, SOLUTION INTRAMUSCULAR; INTRAVENOUS at 11:30

## 2020-05-29 RX ADMIN — PROPOFOL 100 MCG/KG/MIN: 10 INJECTION, EMULSION INTRAVENOUS at 11:05

## 2020-05-29 RX ADMIN — FENTANYL CITRATE 25 MCG: 50 INJECTION INTRAMUSCULAR; INTRAVENOUS at 13:26

## 2020-05-29 RX ADMIN — SODIUM CHLORIDE, SODIUM LACTATE, POTASSIUM CHLORIDE, AND CALCIUM CHLORIDE 125 ML/HR: .6; .31; .03; .02 INJECTION, SOLUTION INTRAVENOUS at 09:18

## 2020-05-29 RX ADMIN — MIDAZOLAM 2 MG: 1 INJECTION INTRAMUSCULAR; INTRAVENOUS at 10:54

## 2020-05-29 RX ADMIN — LABETALOL 20 MG/4 ML (5 MG/ML) INTRAVENOUS SYRINGE 5 MG: at 11:44

## 2020-05-29 RX ADMIN — LIDOCAINE HYDROCHLORIDE 0.5 ML: 10 INJECTION, SOLUTION EPIDURAL; INFILTRATION; INTRACAUDAL; PERINEURAL at 09:18

## 2020-05-29 RX ADMIN — LIDOCAINE HYDROCHLORIDE 50 MG: 10 INJECTION, SOLUTION EPIDURAL; INFILTRATION; INTRACAUDAL; PERINEURAL at 11:02

## 2020-05-29 RX ADMIN — LIDOCAINE HYDROCHLORIDE 8 ML: 20 INJECTION, SOLUTION EPIDURAL; INFILTRATION; INTRACAUDAL; PERINEURAL at 11:16

## 2020-05-29 RX ADMIN — GUAIFENESIN AND DEXTROMETHORPHAN 10 ML: 100; 10 SYRUP ORAL at 14:14

## 2020-05-29 RX ADMIN — REMIFENTANIL HYDROCHLORIDE 0.2 MCG/KG/MIN: 1 INJECTION, POWDER, LYOPHILIZED, FOR SOLUTION INTRAVENOUS at 11:05

## 2020-05-29 RX ADMIN — EPHEDRINE SULFATE 10 MG: 50 INJECTION, SOLUTION INTRAVENOUS at 11:32

## 2020-05-29 RX ADMIN — PROPOFOL 200 MG: 10 INJECTION, EMULSION INTRAVENOUS at 11:02

## 2020-05-31 LAB
BACTERIA BRONCH AEROBE CULT: NORMAL
GRAM STN SPEC: NORMAL
GRAM STN SPEC: NORMAL

## 2020-06-02 LAB — FUNGAL BLOOD CULTURE: NORMAL

## 2020-06-10 ENCOUNTER — TELEPHONE (OUTPATIENT)
Dept: ENDOCRINOLOGY | Facility: CLINIC | Age: 45
End: 2020-06-10

## 2020-06-11 ENCOUNTER — TELEPHONE (OUTPATIENT)
Dept: INFECTIOUS DISEASES | Facility: CLINIC | Age: 45
End: 2020-06-11

## 2020-06-11 ENCOUNTER — OFFICE VISIT (OUTPATIENT)
Dept: ENDOCRINOLOGY | Facility: CLINIC | Age: 45
End: 2020-06-11
Payer: COMMERCIAL

## 2020-06-11 VITALS
SYSTOLIC BLOOD PRESSURE: 170 MMHG | DIASTOLIC BLOOD PRESSURE: 62 MMHG | WEIGHT: 226 LBS | HEIGHT: 63 IN | BODY MASS INDEX: 40.04 KG/M2 | TEMPERATURE: 98.4 F | HEART RATE: 88 BPM

## 2020-06-11 DIAGNOSIS — I42.2 HYPERTROPHIC NONOBSTRUCTIVE CARDIOMYOPATHY (HCC): ICD-10-CM

## 2020-06-11 DIAGNOSIS — E05.90 HYPERTHYROIDISM: Primary | ICD-10-CM

## 2020-06-11 DIAGNOSIS — D50.9 IRON DEFICIENCY ANEMIA, UNSPECIFIED IRON DEFICIENCY ANEMIA TYPE: ICD-10-CM

## 2020-06-11 DIAGNOSIS — E83.52 HYPERCALCEMIA: ICD-10-CM

## 2020-06-11 DIAGNOSIS — E66.9 DIABETES MELLITUS TYPE 2 IN OBESE (HCC): ICD-10-CM

## 2020-06-11 DIAGNOSIS — E11.69 DIABETES MELLITUS TYPE 2 IN OBESE (HCC): ICD-10-CM

## 2020-06-11 DIAGNOSIS — E04.2 MULTIPLE THYROID NODULES: ICD-10-CM

## 2020-06-11 PROCEDURE — 99214 OFFICE O/P EST MOD 30 MIN: CPT | Performed by: INTERNAL MEDICINE

## 2020-06-12 ENCOUNTER — TELEPHONE (OUTPATIENT)
Dept: HEMATOLOGY ONCOLOGY | Facility: CLINIC | Age: 45
End: 2020-06-12

## 2020-06-15 ENCOUNTER — HOSPITAL ENCOUNTER (OUTPATIENT)
Dept: RADIOLOGY | Facility: HOSPITAL | Age: 45
Discharge: HOME | End: 2020-06-15
Attending: INTERNAL MEDICINE
Payer: COMMERCIAL

## 2020-06-15 ENCOUNTER — TRANSCRIBE ORDERS (OUTPATIENT)
Dept: ADMINISTRATIVE | Facility: HOSPITAL | Age: 45
End: 2020-06-15

## 2020-06-15 ENCOUNTER — APPOINTMENT (OUTPATIENT)
Dept: LAB | Facility: HOSPITAL | Age: 45
End: 2020-06-15
Payer: COMMERCIAL

## 2020-06-15 DIAGNOSIS — R76.12 POSITIVE QUANTIFERON-TB GOLD TEST: ICD-10-CM

## 2020-06-15 DIAGNOSIS — M31.4 TAKAYASU'S DISEASE (HCC): ICD-10-CM

## 2020-06-15 DIAGNOSIS — E83.52 HYPERCALCEMIA: ICD-10-CM

## 2020-06-15 DIAGNOSIS — E05.90 HYPERTHYROIDISM: ICD-10-CM

## 2020-06-15 DIAGNOSIS — M31.4 TAKAYASU'S DISEASE (HCC): Primary | ICD-10-CM

## 2020-06-15 LAB
ALBUMIN SERPL BCP-MCNC: 2.2 G/DL (ref 3.5–5)
ALP SERPL-CCNC: 104 U/L (ref 46–116)
ALT SERPL W P-5'-P-CCNC: 23 U/L (ref 12–78)
ANION GAP SERPL CALCULATED.3IONS-SCNC: 8 MMOL/L (ref 4–13)
AST SERPL W P-5'-P-CCNC: 11 U/L (ref 5–45)
BASOPHILS # BLD AUTO: 0.02 THOUSANDS/ΜL (ref 0–0.1)
BASOPHILS NFR BLD AUTO: 0 % (ref 0–1)
BILIRUB SERPL-MCNC: 0.3 MG/DL (ref 0.2–1)
BUN SERPL-MCNC: 17 MG/DL (ref 5–25)
CALCIUM SERPL-MCNC: 8.6 MG/DL (ref 8.3–10.1)
CHLORIDE SERPL-SCNC: 101 MMOL/L (ref 100–108)
CO2 SERPL-SCNC: 27 MMOL/L (ref 21–32)
CREAT SERPL-MCNC: 0.73 MG/DL (ref 0.6–1.3)
CRP SERPL HS-MCNC: 81.4 MG/L
EOSINOPHIL # BLD AUTO: 0.01 THOUSAND/ΜL (ref 0–0.61)
EOSINOPHIL NFR BLD AUTO: 0 % (ref 0–6)
ERYTHROCYTE [DISTWIDTH] IN BLOOD BY AUTOMATED COUNT: 22.5 % (ref 11.6–15.1)
ERYTHROCYTE [SEDIMENTATION RATE] IN BLOOD: >130 MM/HOUR (ref 0–20)
GFR SERPL CREATININE-BSD FRML MDRD: 99 ML/MIN/1.73SQ M
GLUCOSE SERPL-MCNC: 197 MG/DL (ref 65–140)
HCT VFR BLD AUTO: 30.8 % (ref 34.8–46.1)
HGB BLD-MCNC: 9 G/DL (ref 11.5–15.4)
IMM GRANULOCYTES # BLD AUTO: 0.17 THOUSAND/UL (ref 0–0.2)
IMM GRANULOCYTES NFR BLD AUTO: 1 % (ref 0–2)
LYMPHOCYTES # BLD AUTO: 0.8 THOUSANDS/ΜL (ref 0.6–4.47)
LYMPHOCYTES NFR BLD AUTO: 6 % (ref 14–44)
MCH RBC QN AUTO: 23.3 PG (ref 26.8–34.3)
MCHC RBC AUTO-ENTMCNC: 29.2 G/DL (ref 31.4–37.4)
MCV RBC AUTO: 80 FL (ref 82–98)
MONOCYTES # BLD AUTO: 0.32 THOUSAND/ΜL (ref 0.17–1.22)
MONOCYTES NFR BLD AUTO: 2 % (ref 4–12)
NEUTROPHILS # BLD AUTO: 12.34 THOUSANDS/ΜL (ref 1.85–7.62)
NEUTS SEG NFR BLD AUTO: 91 % (ref 43–75)
NRBC BLD AUTO-RTO: 0 /100 WBCS
PLATELET # BLD AUTO: 407 THOUSANDS/UL (ref 149–390)
PMV BLD AUTO: 9.8 FL (ref 8.9–12.7)
POTASSIUM SERPL-SCNC: 4.5 MMOL/L (ref 3.5–5.3)
PROT SERPL-MCNC: 6.9 G/DL (ref 6.4–8.2)
RBC # BLD AUTO: 3.86 MILLION/UL (ref 3.81–5.12)
SODIUM SERPL-SCNC: 136 MMOL/L (ref 136–145)
T4 FREE SERPL-MCNC: 2.45 NG/DL (ref 0.76–1.46)
TSH SERPL DL<=0.05 MIU/L-ACNC: <0.007 UIU/ML (ref 0.36–3.74)
WBC # BLD AUTO: 13.66 THOUSAND/UL (ref 4.31–10.16)

## 2020-06-15 PROCEDURE — 85025 COMPLETE CBC W/AUTO DIFF WBC: CPT

## 2020-06-15 PROCEDURE — 84443 ASSAY THYROID STIM HORMONE: CPT

## 2020-06-15 PROCEDURE — 85652 RBC SED RATE AUTOMATED: CPT

## 2020-06-15 PROCEDURE — 84439 ASSAY OF FREE THYROXINE: CPT

## 2020-06-15 PROCEDURE — 36415 COLL VENOUS BLD VENIPUNCTURE: CPT

## 2020-06-15 PROCEDURE — 71046 X-RAY EXAM CHEST 2 VIEWS: CPT

## 2020-06-15 PROCEDURE — 80053 COMPREHEN METABOLIC PANEL: CPT

## 2020-06-15 PROCEDURE — 86141 C-REACTIVE PROTEIN HS: CPT

## 2020-06-16 LAB — FUNGUS SPEC CULT: ABNORMAL

## 2020-06-17 ENCOUNTER — TELEPHONE (OUTPATIENT)
Dept: OTHER | Facility: HOSPITAL | Age: 45
End: 2020-06-17

## 2020-06-17 ENCOUNTER — TELEMEDICINE (OUTPATIENT)
Dept: GASTROENTEROLOGY | Facility: CLINIC | Age: 45
End: 2020-06-17
Payer: COMMERCIAL

## 2020-06-17 ENCOUNTER — TELEPHONE (OUTPATIENT)
Dept: INFECTIOUS DISEASES | Facility: CLINIC | Age: 45
End: 2020-06-17

## 2020-06-17 VITALS — WEIGHT: 226 LBS | HEIGHT: 63 IN | BODY MASS INDEX: 40.04 KG/M2

## 2020-06-17 DIAGNOSIS — R74.8 ELEVATED ALKALINE PHOSPHATASE LEVEL: Primary | ICD-10-CM

## 2020-06-17 DIAGNOSIS — Z11.59 NEED FOR HEPATITIS C SCREENING TEST: ICD-10-CM

## 2020-06-17 DIAGNOSIS — E05.90 HYPERTHYROIDISM: ICD-10-CM

## 2020-06-17 PROCEDURE — 99214 OFFICE O/P EST MOD 30 MIN: CPT | Performed by: INTERNAL MEDICINE

## 2020-06-17 RX ORDER — METHIMAZOLE 10 MG/1
20 TABLET ORAL DAILY
Qty: 30 TABLET | Refills: 0 | Status: SHIPPED | OUTPATIENT
Start: 2020-06-17 | End: 2020-07-06

## 2020-06-26 ENCOUNTER — PREP FOR PROCEDURE (OUTPATIENT)
Dept: PULMONOLOGY | Facility: CLINIC | Age: 45
End: 2020-06-26

## 2020-06-26 ENCOUNTER — TELEMEDICINE (OUTPATIENT)
Dept: INFECTIOUS DISEASES | Facility: CLINIC | Age: 45
End: 2020-06-26
Payer: COMMERCIAL

## 2020-06-26 ENCOUNTER — TELEPHONE (OUTPATIENT)
Dept: HEMATOLOGY ONCOLOGY | Facility: CLINIC | Age: 45
End: 2020-06-26

## 2020-06-26 DIAGNOSIS — R65.10 SIRS (SYSTEMIC INFLAMMATORY RESPONSE SYNDROME) (HCC): Primary | ICD-10-CM

## 2020-06-26 DIAGNOSIS — R93.89 ABNORMAL CHEST X-RAY: Primary | ICD-10-CM

## 2020-06-26 DIAGNOSIS — E05.90 HYPERTHYROIDISM: ICD-10-CM

## 2020-06-26 DIAGNOSIS — M79.10 PAIN IN THE MUSCLES: ICD-10-CM

## 2020-06-26 DIAGNOSIS — R74.8 ELEVATED ALKALINE PHOSPHATASE LEVEL: ICD-10-CM

## 2020-06-26 DIAGNOSIS — R76.12 POSITIVE QUANTIFERON-TB GOLD TEST: ICD-10-CM

## 2020-06-26 DIAGNOSIS — E83.52 HYPERCALCEMIA: ICD-10-CM

## 2020-06-26 DIAGNOSIS — Z22.7 LATENT TUBERCULOSIS: ICD-10-CM

## 2020-06-26 DIAGNOSIS — I77.6 AORTITIS (HCC): ICD-10-CM

## 2020-06-26 PROCEDURE — 99215 OFFICE O/P EST HI 40 MIN: CPT | Performed by: INTERNAL MEDICINE

## 2020-06-29 ENCOUNTER — TELEPHONE (OUTPATIENT)
Dept: PULMONOLOGY | Facility: CLINIC | Age: 45
End: 2020-06-29

## 2020-06-29 LAB — FUNGUS SPEC CULT: NORMAL

## 2020-06-30 ENCOUNTER — TELEPHONE (OUTPATIENT)
Dept: PULMONOLOGY | Facility: CLINIC | Age: 45
End: 2020-06-30

## 2020-06-30 ENCOUNTER — TELEPHONE (OUTPATIENT)
Dept: INFECTIOUS DISEASES | Facility: CLINIC | Age: 45
End: 2020-06-30

## 2020-07-06 DIAGNOSIS — E05.90 HYPERTHYROIDISM: ICD-10-CM

## 2020-07-06 RX ORDER — METHIMAZOLE 10 MG/1
20 TABLET ORAL DAILY
Qty: 30 TABLET | Refills: 0 | Status: SHIPPED | OUTPATIENT
Start: 2020-07-06 | End: 2020-07-20 | Stop reason: SDUPTHER

## 2020-07-09 ENCOUNTER — TELEPHONE (OUTPATIENT)
Dept: RADIOLOGY | Facility: HOSPITAL | Age: 45
End: 2020-07-09

## 2020-07-09 RX ORDER — SODIUM CHLORIDE 9 MG/ML
75 INJECTION, SOLUTION INTRAVENOUS CONTINUOUS
Status: CANCELLED | OUTPATIENT
Start: 2020-07-09

## 2020-07-11 ENCOUNTER — APPOINTMENT (OUTPATIENT)
Dept: LAB | Facility: HOSPITAL | Age: 45
End: 2020-07-11
Payer: COMMERCIAL

## 2020-07-11 DIAGNOSIS — E05.90 HYPERTHYROIDISM: ICD-10-CM

## 2020-07-11 DIAGNOSIS — R93.89 ABNORMAL CHEST X-RAY: ICD-10-CM

## 2020-07-11 LAB
APTT PPP: 21 SECONDS (ref 23–37)
INR PPP: 0.99 (ref 0.84–1.19)
PROTHROMBIN TIME: 13.1 SECONDS (ref 11.6–14.5)
T4 FREE SERPL-MCNC: 2 NG/DL (ref 0.76–1.46)
TSH SERPL DL<=0.05 MIU/L-ACNC: <0.007 UIU/ML (ref 0.36–3.74)

## 2020-07-11 PROCEDURE — 84443 ASSAY THYROID STIM HORMONE: CPT

## 2020-07-11 PROCEDURE — 85730 THROMBOPLASTIN TIME PARTIAL: CPT

## 2020-07-11 PROCEDURE — 84439 ASSAY OF FREE THYROXINE: CPT

## 2020-07-11 PROCEDURE — 36415 COLL VENOUS BLD VENIPUNCTURE: CPT

## 2020-07-11 PROCEDURE — 85610 PROTHROMBIN TIME: CPT

## 2020-07-14 ENCOUNTER — TELEPHONE (OUTPATIENT)
Dept: INPATIENT UNIT | Facility: HOSPITAL | Age: 45
End: 2020-07-14

## 2020-07-14 DIAGNOSIS — R93.89 ABNORMAL CHEST X-RAY: ICD-10-CM

## 2020-07-14 LAB
MYCOBACTERIUM SPEC CULT: NORMAL
MYCOBACTERIUM SPEC CULT: NORMAL
RHODAMINE-AURAMINE STN SPEC: NORMAL
RHODAMINE-AURAMINE STN SPEC: NORMAL

## 2020-07-14 PROCEDURE — U0003 INFECTIOUS AGENT DETECTION BY NUCLEIC ACID (DNA OR RNA); SEVERE ACUTE RESPIRATORY SYNDROME CORONAVIRUS 2 (SARS-COV-2) (CORONAVIRUS DISEASE [COVID-19]), AMPLIFIED PROBE TECHNIQUE, MAKING USE OF HIGH THROUGHPUT TECHNOLOGIES AS DESCRIBED BY CMS-2020-01-R: HCPCS

## 2020-07-15 ENCOUNTER — PREP FOR PROCEDURE (OUTPATIENT)
Dept: PULMONOLOGY | Facility: CLINIC | Age: 45
End: 2020-07-15

## 2020-07-15 ENCOUNTER — TELEPHONE (OUTPATIENT)
Dept: OTHER | Facility: HOSPITAL | Age: 45
End: 2020-07-15

## 2020-07-15 ENCOUNTER — HOSPITAL ENCOUNTER (OUTPATIENT)
Dept: RADIOLOGY | Facility: HOSPITAL | Age: 45
Discharge: HOME | End: 2020-07-15
Attending: INTERNAL MEDICINE | Admitting: INTERNAL MEDICINE
Payer: COMMERCIAL

## 2020-07-15 VITALS
BODY MASS INDEX: 39.16 KG/M2 | RESPIRATION RATE: 18 BRPM | HEART RATE: 97 BPM | OXYGEN SATURATION: 98 % | HEIGHT: 63 IN | WEIGHT: 221 LBS | SYSTOLIC BLOOD PRESSURE: 135 MMHG | DIASTOLIC BLOOD PRESSURE: 65 MMHG | TEMPERATURE: 98.2 F

## 2020-07-15 DIAGNOSIS — R76.12 POSITIVE QUANTIFERON-TB GOLD TEST: ICD-10-CM

## 2020-07-15 DIAGNOSIS — R74.8 ELEVATED ALKALINE PHOSPHATASE LEVEL: ICD-10-CM

## 2020-07-15 DIAGNOSIS — Z22.7 LATENT TUBERCULOSIS: ICD-10-CM

## 2020-07-15 DIAGNOSIS — R93.89 ABNORMAL CT OF THE CHEST: Primary | ICD-10-CM

## 2020-07-15 DIAGNOSIS — R65.10 SIRS (SYSTEMIC INFLAMMATORY RESPONSE SYNDROME) (HCC): ICD-10-CM

## 2020-07-15 PROCEDURE — 76942 ECHO GUIDE FOR BIOPSY: CPT | Performed by: RADIOLOGY

## 2020-07-15 PROCEDURE — 99152 MOD SED SAME PHYS/QHP 5/>YRS: CPT | Performed by: RADIOLOGY

## 2020-07-15 PROCEDURE — 87206 SMEAR FLUORESCENT/ACID STAI: CPT | Performed by: INTERNAL MEDICINE

## 2020-07-15 PROCEDURE — 87116 MYCOBACTERIA CULTURE: CPT | Performed by: INTERNAL MEDICINE

## 2020-07-15 PROCEDURE — NC001 PR NO CHARGE: Performed by: RADIOLOGY

## 2020-07-15 PROCEDURE — 88313 SPECIAL STAINS GROUP 2: CPT | Performed by: PATHOLOGY

## 2020-07-15 PROCEDURE — 47000 NEEDLE BIOPSY OF LIVER PERQ: CPT | Performed by: RADIOLOGY

## 2020-07-15 PROCEDURE — 47000 NEEDLE BIOPSY OF LIVER PERQ: CPT

## 2020-07-15 PROCEDURE — 88307 TISSUE EXAM BY PATHOLOGIST: CPT | Performed by: PATHOLOGY

## 2020-07-15 PROCEDURE — 87556 M.TUBERCULO DNA AMP PROBE: CPT

## 2020-07-15 RX ORDER — MIDAZOLAM HYDROCHLORIDE 2 MG/2ML
INJECTION, SOLUTION INTRAMUSCULAR; INTRAVENOUS CODE/TRAUMA/SEDATION MEDICATION
Status: COMPLETED | OUTPATIENT
Start: 2020-07-15 | End: 2020-07-15

## 2020-07-15 RX ORDER — FENTANYL CITRATE 50 UG/ML
INJECTION, SOLUTION INTRAMUSCULAR; INTRAVENOUS CODE/TRAUMA/SEDATION MEDICATION
Status: COMPLETED | OUTPATIENT
Start: 2020-07-15 | End: 2020-07-15

## 2020-07-15 RX ORDER — SODIUM CHLORIDE 9 MG/ML
75 INJECTION, SOLUTION INTRAVENOUS CONTINUOUS
Status: DISCONTINUED | OUTPATIENT
Start: 2020-07-15 | End: 2020-07-15 | Stop reason: HOSPADM

## 2020-07-15 RX ADMIN — FENTANYL CITRATE 50 MCG: 50 INJECTION, SOLUTION INTRAMUSCULAR; INTRAVENOUS at 09:30

## 2020-07-15 RX ADMIN — MIDAZOLAM 1 MG: 1 INJECTION INTRAMUSCULAR; INTRAVENOUS at 09:30

## 2020-07-15 RX ADMIN — SODIUM CHLORIDE 75 ML/HR: 0.9 INJECTION, SOLUTION INTRAVENOUS at 09:03

## 2020-07-15 RX ADMIN — FENTANYL CITRATE 50 MCG: 50 INJECTION, SOLUTION INTRAMUSCULAR; INTRAVENOUS at 09:33

## 2020-07-15 RX ADMIN — MIDAZOLAM 1 MG: 1 INJECTION INTRAMUSCULAR; INTRAVENOUS at 09:33

## 2020-07-15 NOTE — BRIEF OP NOTE (RAD/CATH)
IR IMAGE GUIDED BIOPSY/ASPIRATION LIVER Procedure Note    PATIENT NAME: Carlos Balbuena  : 1975  MRN: [id number]    Pre-op Diagnosis:   1  SIRS (systemic inflammatory response syndrome) (HCC)    2  Elevated alkaline phosphatase level    3  Latent tuberculosis    4  Positive QuantiFERON-TB Gold test      Post-op Diagnosis:   1  SIRS (systemic inflammatory response syndrome) (HCC)    2  Elevated alkaline phosphatase level    3  Latent tuberculosis    4  Positive QuantiFERON-TB Gold test        Surgeon:   Fabiola Marques MD    Estimated Blood Loss: 2 mL    Findings: Successful right liver biopsy      Specimens: 6 core needle samples (4 into formalin and 2 into sterile cup)    Complications:  None    Anesthesia: Conscious sedation and Local    Fabiola Marques MD     Date: 7/15/2020  Time: 9:53 AM

## 2020-07-15 NOTE — DISCHARGE INSTRUCTIONS
Percutaneous Liver Biopsy   WHAT YOU NEED TO KNOW:   A PLB is a procedure to remove a sample of tissue from your liver  The sample can be sent to a lab and tested for liver disease, cancer, or infection  After the procedure you may have pain and bruising at the biopsy site  You may also have pain in your right shoulder  These symptoms should get better in 48 to 72 hours  DISCHARGE INSTRUCTIONS:     [address] and Lolita Cherrie patients,  Contact Interventional Radiology at [phone] PATIENTS: Contact Interventional Radiology at [phone]   [facility] PATIENTS: Contact Interventional Radiology at [phone] if:    · Fever greater than 101 or chills  · You have severe pain in your abdomen  · Your abdomen is larger than usual and feels hard  · Your neck is more swollen and you have trouble swallowing  · You feel weak or dizzy  · Your heart is beating faster than usual    · Your pain does not get better after you take pain medicine  · Your wound is red, swollen, or draining pus  · You have nausea or are vomiting  · Your skin is itchy, swollen, or you have a rash  · You have questions or concerns about your condition or care  Medicines:   · Acetaminophen decreases pain and fever  It is available without a doctor's order  Acetaminophen can cause liver damage if not taken correctly  · Take your home medicine as directed  · Resume your normal diet  Small sips of flat soda will help with mild nausea  Self-care:   · Rest as directed  Do not play sports, exercise, or lift anything heavier than 5 pounds for up to 1 week  · Apply firm, steady pressure if bleeding occurs  A small amount of bleeding from your wound is possible  Apply pressure with a clean gauze or towel for 5 to 10 minutes  Call 911 if bleeding becomes heavy or does not stop  · Ask your healthcare provider when to take your blood thinner or antiplatelet medicine   You may need to wait 24 to 72 hours to take your medicine  This will prevent bleeding  Follow up with your healthcare provider as directed: Write down your questions so you remember to ask them during your visits

## 2020-07-15 NOTE — H&P
Interventional Radiology  History and Physical 7/15/2020     History of Present Illness:  53year old female with latent tuberculosis is referred for liver biopsy to rule out extrapulmonary TB  Past Medical History:   Diagnosis Date    Asthma     Diabetes mellitus ([address] )     Hypertension     TB lung, latent         Past Surgical History:   Procedure Laterality Date     SECTION          Social History     Tobacco Use   Smoking Status Never Smoker   Smokeless Tobacco Never Used        Social History     Substance and Sexual Activity   Alcohol Use Never    Alcohol/week: 0 0 standard drinks    Frequency: Never    Drinks per session: Patient refused    Binge frequency: Never        Social History     Substance and Sexual Activity   Drug Use Never        Allergies   Allergen Reactions    Other Allergic Rhinitis     pollen       Current Facility-Administered Medications   Medication Dose Route Frequency Provider Last Rate Last Dose    sodium chloride 0 9 % infusion  75 mL/hr Intravenous Continuous Sydnee Posey MD              Objective:    Vitals:    07/15/20 0854   BP: (!) 181/84   BP Location: Right arm   Pulse: 98   Resp: 18   Temp: 98 °F (36 7 °C)   TempSrc: Oral   SpO2: 98%   Weight: 100 kg (221 lb)   Height: 5' 3" (1 6 m)        Physical Exam    Const: NAD  Abd: Soft    Lab Results   Component Value Date    WBC 13 66 (H) 06/15/2020    HGB 9 0 (L) 06/15/2020    HCT 30 8 (L) 06/15/2020    MCV 80 (L) 06/15/2020     (H) 06/15/2020     Lab Results   Component Value Date    INR 0 99 2020    INR 1 13 2020    INR 1 26 (H) 2020    PROTIME 13 1 2020    PROTIME 14 5 2020    PROTIME 15 9 (H) 2020     Lab Results   Component Value Date    PTT 21 (L) 2020     I have personally reviewed pertinent imaging and laboratory results       Assessment/Plan:  - Liver biopsy      This procedure has been fully reviewed with the patient and written informed consent has been obtained

## 2020-07-16 ENCOUNTER — HOSPITAL ENCOUNTER (OUTPATIENT)
Dept: CT IMAGING | Facility: HOSPITAL | Age: 45
Discharge: HOME | End: 2020-07-16
Payer: COMMERCIAL

## 2020-07-16 DIAGNOSIS — R74.8 ELEVATED ALKALINE PHOSPHATASE LEVEL: ICD-10-CM

## 2020-07-16 DIAGNOSIS — Z22.7 LTBI (LATENT TUBERCULOSIS INFECTION): ICD-10-CM

## 2020-07-16 DIAGNOSIS — I77.6 AORTITIS (HCC): ICD-10-CM

## 2020-07-16 DIAGNOSIS — I77.6 AORTITIS (HCC): Primary | ICD-10-CM

## 2020-07-16 LAB
INPATIENT: NORMAL
SARS-COV-2 RNA SPEC QL NAA+PROBE: NOT DETECTED

## 2020-07-16 PROCEDURE — 71275 CT ANGIOGRAPHY CHEST: CPT

## 2020-07-16 RX ADMIN — IOHEXOL 100 ML: 350 INJECTION, SOLUTION INTRAVENOUS at 09:03

## 2020-07-20 ENCOUNTER — OFFICE VISIT (OUTPATIENT)
Dept: CARDIAC SURGERY | Facility: CLINIC | Age: 45
End: 2020-07-20
Payer: COMMERCIAL

## 2020-07-20 ENCOUNTER — TELEPHONE (OUTPATIENT)
Dept: INFECTIOUS DISEASES | Facility: CLINIC | Age: 45
End: 2020-07-20

## 2020-07-20 ENCOUNTER — TELEPHONE (OUTPATIENT)
Dept: OTHER | Facility: HOSPITAL | Age: 45
End: 2020-07-20

## 2020-07-20 VITALS
SYSTOLIC BLOOD PRESSURE: 136 MMHG | TEMPERATURE: 98.9 F | HEIGHT: 63 IN | DIASTOLIC BLOOD PRESSURE: 78 MMHG | WEIGHT: 215 LBS | RESPIRATION RATE: 18 BRPM | BODY MASS INDEX: 38.09 KG/M2 | HEART RATE: 95 BPM

## 2020-07-20 DIAGNOSIS — I77.6 AORTITIS (HCC): Primary | ICD-10-CM

## 2020-07-20 DIAGNOSIS — E05.90 HYPERTHYROIDISM: ICD-10-CM

## 2020-07-20 PROCEDURE — 99215 OFFICE O/P EST HI 40 MIN: CPT | Performed by: PHYSICIAN ASSISTANT

## 2020-07-20 RX ORDER — METHIMAZOLE 10 MG/1
20 TABLET ORAL DAILY
Qty: 60 TABLET | Refills: 3 | Status: SHIPPED | OUTPATIENT
Start: 2020-07-20 | End: 2020-07-21 | Stop reason: SDUPTHER

## 2020-07-20 RX ORDER — SULFAMETHOXAZOLE AND TRIMETHOPRIM 800; 160 MG/1; MG/1
TABLET ORAL
COMMUNITY
Start: 2020-07-13 | End: 2020-10-06 | Stop reason: ALTCHOICE

## 2020-07-20 RX ORDER — ERGOCALCIFEROL 1.25 MG/1
50000 CAPSULE ORAL WEEKLY
COMMUNITY
Start: 2020-06-16 | End: 2021-03-01 | Stop reason: ALTCHOICE

## 2020-07-20 NOTE — TELEPHONE ENCOUNTER
Spoke to patients son and let him know that we will give her a call to get her scheduled with Dr Ly until after her Bronchoscopy and after we get finalized results  He understood

## 2020-07-20 NOTE — PROGRESS NOTES
Consultation - Cardiothoracic Surgery   [facility] 53 y o  female MRN: [id number]    Reason for Consult / Principal Problem: Aortitis    History of Present Illness: Shad Balbuena is a 53y o  year old female who presents today for ongoing surveillance of previously identified aortitis  She has PMH of large vessel vasculitis, hyperthyroidism, DM2, HTN, asthma, chronic anemia, who was initially admitted to [facility] in April with complaints of chills/fever, generalized weakness, SOB, N/V, dizziness, lightheadedness, and migraines  Patient had been to the ER several times in the preceding few weeks for similar symptoms following an iron infusion for chronic anemia  Upon presentation, CT chest demonstrated thickening of the aortic arch with narrowing of the proximal aspect of the left subclavian artery, likely representing aortitis  Extensive work up by ID found only latent TB  TB gold found to be positive  Patient admits to abnormal PPD 3 years ago  Patient was started on Rifampin, but had to discontinue it prematurely due to intolerance  After discharge, patient has followed up with Rhuematology, ID, GI, cardiology, pulmonology,and Endocrinology  Vasculitis is unclear if its autoimmune or infectious in etiology  Since we last saw her, she has undergone further work up, including negative bronchial lymph node biopsy, negative liver biopsy, and negative TB testing  She is taking prednisone and methotrexate  Her ESR and CRP remain elevated  They are considering starting a biologic but patient is still being worked up for TB with ID  Upon presentation today, patient states her chest pain has significantly improved since April, although she does have intermittent discomfort, cold feeling in the left chest, with associated numbness/tingling in the left extremity  Patient states there seems to be no pattern to the symptoms as they will come on at any time of the day   She states her lower extremity edema has resolved  Past Medical History:  Past Medical History:   Diagnosis Date    Asthma     Chronic anemia     Diabetes mellitus ([address] )     Hypertension     Hyperthyroidism     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[address] )     TB lung, latent          Past Surgical History:   Past Surgical History:   Procedure Laterality Date     SECTION      IR IMAGE GUIDED BIOPSY/ASPIRATION LIVER  7/15/2020         Family History:  History reviewed  No pertinent family history  Social History:    Social History     Substance and Sexual Activity   Alcohol Use Never    Alcohol/week: 0 0 standard drinks    Frequency: Never    Drinks per session: Patient refused    Binge frequency: Never     Social History     Substance and Sexual Activity   Drug Use Never     Social History     Tobacco Use   Smoking Status Never Smoker   Smokeless Tobacco Never Used       Home Medications:   Prior to Admission medications    Medication Sig Start Date End Date Taking?  Authorizing Provider   acetaminophen (TYLENOL) 325 mg tablet Take 650 mg by mouth every 6 (six) hours as needed for mild pain    Historical Provider, MD   albuterol (PROVENTIL HFA,VENTOLIN HFA) 90 mcg/act inhaler Inhale 2 puffs 2 (two) times a day    Historical Provider, MD   aspirin (ECOTRIN LOW STRENGTH) 81 mg EC tablet Take 81 mg by mouth daily    Historical Provider, MD   diclofenac sodium (VOLTAREN) 50 mg EC tablet Take 1 tablet (50 mg total) by mouth 2 (two) times a day  Patient not taking: Reported on 2020   Ching Alford DO   escitalopram (LEXAPRO) 10 mg tablet Take 10 mg by mouth daily    Historical Provider, MD   Fe Heme Polypeptide-Folic Acid 06-2 MG TABS Take 1 tablet by mouth daily  Patient not taking: Reported on 2020   Elyn Goodpasture, MD   HYDROcodone-acetaminophen (NORCO) 5-325 mg per tablet Take 1 tablet by mouth every 8 (eight) hours as needed for pain    Historical Provider, MD   losartan (COZAAR) 100 MG tablet Take 100 mg by mouth daily     Historical Provider, MD   metFORMIN (GLUCOPHAGE) 500 mg tablet Take 1,000 mg by mouth 2 (two) times a day with meals    Historical Provider, MD   methimazole (TAPAZOLE) 10 mg tablet Take 2 tablets (20 mg total) by mouth daily 7/6/20   Prabhakar Yeh MD   montelukast (SINGULAIR) 10 mg tablet Take 10 mg by mouth daily at bedtime    Historical Provider, MD   ondansetron (ZOFRAN-ODT) 4 mg disintegrating tablet Take 4 mg by mouth every 6 (six) hours as needed  5/1/20   Historical Provider, MD   pantoprazole (PROTONIX) 40 mg tablet Take 1 tablet (40 mg total) by mouth daily in the early morning  Patient not taking: Reported on 6/17/2020 5/19/20   Hossein Ortiz MD   predniSONE 20 mg tablet Take 4 tablets (80 mg total) by mouth daily 4/25/20   Karina Sandoval MD   predniSONE 20 mg tablet Take 4 tablets (80 mg total) by mouth daily  Patient not taking: Reported on 6/11/2020 5/29/20   Hossein Ortiz MD       Allergies: Allergies   Allergen Reactions    Other Allergic Rhinitis     pollen       Review of Systems:  Review of Systems   Constitutional: Positive for fatigue  Negative for activity change, appetite change, chills and fever  HENT: Positive for facial swelling  Negative for nosebleeds and trouble swallowing  Eyes: Negative for pain and visual disturbance  Respiratory: Positive for shortness of breath  Negative for cough, chest tightness and wheezing  Cardiovascular: Positive for chest pain  Negative for palpitations and leg swelling  Gastrointestinal: Negative for abdominal pain, nausea and vomiting  Genitourinary: Negative for dysuria, flank pain and hematuria  Musculoskeletal: Negative for arthralgias, gait problem and joint swelling  Skin: Negative for color change and pallor  Neurological: Negative for tremors, seizures and syncope  Numbness/tingling in left upper extremity   Hematological: Negative for adenopathy  Does not bruise/bleed easily     Psychiatric/Behavioral: Negative for agitation, behavioral problems and confusion  Vital Signs:     Vitals:    07/20/20 1420 07/20/20 1427   BP: 130/58 136/78   BP Location: Right arm Left arm   Patient Position: Sitting Sitting   Cuff Size: Large Large   Pulse: 95    Resp: 18    Temp: 98 9 °F (37 2 °C)    TempSrc: Tympanic    Weight: 97 5 kg (215 lb)    Height: 5' 3" (1 6 m)        Physical Exam:  Physical Exam   Constitutional: She is oriented to person, place, and time  She appears well-developed and well-nourished  No distress  HENT:   Head: Normocephalic and atraumatic  Right Ear: External ear normal    Left Ear: External ear normal    Nose: Nose normal    Eyes: Right eye exhibits no discharge  Left eye exhibits no discharge  Neck: No JVD present  No tracheal deviation present  Cardiovascular: Normal rate and regular rhythm  Exam reveals no friction rub  No murmur heard  Pulmonary/Chest: Effort normal and breath sounds normal  She has no wheezes  She has no rales  Abdominal: Soft  Bowel sounds are normal  She exhibits no distension  There is no tenderness  Musculoskeletal: She exhibits no edema or deformity  Neurological: She is alert and oriented to person, place, and time  Skin: Skin is warm and dry  No rash noted  She is not diaphoretic  No erythema  Psychiatric: She has a normal mood and affect  Her behavior is normal    Vitals reviewed  Lab Results:   Lab Results   Component Value Date    SODIUM 136 06/15/2020    K 4 5 06/15/2020     06/15/2020    CO2 27 06/15/2020    BUN 17 06/15/2020    CREATININE 0 73 06/15/2020    GLUC 197 (H) 06/15/2020    CALCIUM 8 6 06/15/2020     Lab Results   Component Value Date    WBC 13 66 (H) 06/15/2020    HGB 9 0 (L) 06/15/2020    HCT 30 8 (L) 06/15/2020    MCV 80 (L) 06/15/2020     (H) 06/15/2020       Imaging Studies:     CTA Chest: 7/16/2020  IMPRESSION:  Minimally decreased volume of aortic thickening, consistent with aortitis    Redemonstrated is mild mediastinal and hilar lymph node enlargement  Mild nodular pleural thickening of the left major fissure is more noticeable on today's study  Taken   together, findings once again raises suspicion for either a vasculitis, sarcoid, tuberculosis, or other inflammatory process  Given the known history of uveitis, hypercalcemia, and elevated inflammatory markers, sarcoid is favored  CTA chest: 4/14/2020  IMPRESSION:  No acute intracranial abnormality  No focal stenosis or saccular aneurysm within the [place]  No hemodynamically significant stenosis within either common or internal carotid artery  Less than 50% stenosis by NASCET criteria  Dominant right vertebral artery  Reidentified irregularity and luminal narrowing of the visualized aortic arch with wall thickening suspicious for intramural hematoma  No evidence of aortic dissection  Please refer to the report of a CTA of chest dated April 6, 2020 for additional details  There is stable moderate to severe stenosis at the origin of the left subclavian artery  Multinodular goiter  A nonemergent thyroid ultrasound is recommended for further evaluation  Echocardiogram: 4/7/2020  SUMMARY     LEFT VENTRICLE:  Size was normal   Systolic function was normal  Ejection fraction was estimated in the range of 60 % to 65 %  There were no regional wall motion abnormalities  Wall thickness was mildly increased  There was mild concentric hypertrophy      LEFT ATRIUM:  The atrium was mildly dilated      MITRAL VALVE:  There was trace regurgitation      AORTIC VALVE:  The valve was trileaflet  Leaflets exhibited sclerosis  There was mild regurgitation      TRICUSPID VALVE:  There was mild regurgitation  Estimated peak PA pressure was 35 mmHg      I have personally reviewed pertinent films in PACS    Assessment:  Patient Active Problem List    Diagnosis Date Noted    Hypercalcemia 05/15/2020    Hyperthyroidism 05/15/2020    Abnormal LFTs 05/14/2020  Low back pain 05/14/2020    Fatigue 05/13/2020    Hyperphosphatemia 04/24/2020    Latent tuberculosis 04/18/2020    Abnormal thyroid function test 04/18/2020    SIRS (systemic inflammatory response syndrome) 04/18/2020    Acute on chronic right-sided low back pain without sciatica 04/16/2020    Aortitis  04/15/2020    Pulmonary hypertension ([facility] [address] ) 04/07/2020    Intramural aortic hematoma ([facility] [address] ) 04/06/2020    Retrosternal chest pain 04/06/2020    Left flank pain 04/06/2020    Diabetes mellitus type 2 04/06/2020    Iron deficiency anemia 04/06/2020    Thrombocytosis  01/28/2020    Multiple thyroid nodules 06/12/2019    Depression 03/05/2018    Dyslipidemia, goal LDL below 100 03/05/2018    Mild persistent asthma without complication 88/61/5707    Essential hypertension 03/05/2018     Aortitis    Plan:     CTA chest performed prior to the visit today was reviewed  There was minimally decreased volume of the aortic thickening  These findings were confirmed and shared with the patient today  Patient will continue work up and treatment for large vessel vasculitis and its etiology  Follow-up monitoring is the treatment plan  Arrangements have been made for future surveillance to be completed with CTA chest in 6 Months  [facility] Sree was comfortable with our recommendations, and their questions were answered to their satisfaction  Thank you for allowing us to participate in the care of this patient       Routine referral to gastroenterology for colonoscopy screening was not indicated, as the patient is less than 48years old    SIGNATURE: Julia France PA-C  DATE: July 20, 2020  TIME: 3:44 PM

## 2020-07-20 NOTE — TELEPHONE ENCOUNTER
Contacted by patient's outpatient rheumatologist   Reviewed recent imaging and there has not been significant improvement despite being on steroids additionally patient's inflammatory markers are largely unchanged  We reviewed patient's previous pathology and there has been no direct evidence for TB  Most recent CTA without any new findings in the lungs patient is scheduled for bronchoscopy this week  At this time we discussed my plan for review of her case with the Department of Health and most likely plan for treatment with a 4 drug regimen given her atypical clinical picture without clear explanation  My plan is to follow her in the office in mid August after discussion of her case and lab work at that point with the [employer]  Per the patient's rheumatologist her picture does not seem consistent with vasculitis and she has had minimal to no improvement on steroids  Plan is tentatively to taper the patient off steroids and then eventually off of methotrexate and then to monitor clinically thereafter  He plans to follow with her in 2 weeks  Will forward phone visit to office staff so they are aware of follow-up needs and to follow up on results

## 2020-07-21 ENCOUNTER — TELEPHONE (OUTPATIENT)
Dept: INFECTIOUS DISEASES | Facility: CLINIC | Age: 45
End: 2020-07-21

## 2020-07-21 ENCOUNTER — TELEPHONE (OUTPATIENT)
Dept: OTHER | Facility: HOSPITAL | Age: 45
End: 2020-07-21

## 2020-07-21 ENCOUNTER — OFFICE VISIT (OUTPATIENT)
Dept: CARDIOLOGY CLINIC | Facility: HOSPITAL | Age: 45
End: 2020-07-21
Payer: COMMERCIAL

## 2020-07-21 VITALS
WEIGHT: 211 LBS | HEART RATE: 96 BPM | BODY MASS INDEX: 37.39 KG/M2 | DIASTOLIC BLOOD PRESSURE: 82 MMHG | HEIGHT: 63 IN | SYSTOLIC BLOOD PRESSURE: 148 MMHG

## 2020-07-21 DIAGNOSIS — I10 ESSENTIAL HYPERTENSION: ICD-10-CM

## 2020-07-21 DIAGNOSIS — E05.90 HYPERTHYROIDISM: ICD-10-CM

## 2020-07-21 DIAGNOSIS — I25.9 CHEST PAIN DUE TO MYOCARDIAL ISCHEMIA, UNSPECIFIED ISCHEMIC CHEST PAIN TYPE: Primary | ICD-10-CM

## 2020-07-21 DIAGNOSIS — E78.5 DYSLIPIDEMIA, GOAL LDL BELOW 100: ICD-10-CM

## 2020-07-21 DIAGNOSIS — I77.6 AORTITIS (HCC): ICD-10-CM

## 2020-07-21 DIAGNOSIS — R65.10 SIRS (SYSTEMIC INFLAMMATORY RESPONSE SYNDROME) (HCC): ICD-10-CM

## 2020-07-21 PROCEDURE — 99214 OFFICE O/P EST MOD 30 MIN: CPT | Performed by: INTERNAL MEDICINE

## 2020-07-21 RX ORDER — METOPROLOL SUCCINATE 25 MG/1
25 TABLET, EXTENDED RELEASE ORAL DAILY
Qty: 90 TABLET | Refills: 3 | Status: SHIPPED | OUTPATIENT
Start: 2020-07-21 | End: 2020-07-30 | Stop reason: SDUPTHER

## 2020-07-21 RX ORDER — METHIMAZOLE 10 MG/1
20 TABLET ORAL DAILY
Qty: 120 TABLET | Refills: 0 | Status: SHIPPED | OUTPATIENT
Start: 2020-07-21 | End: 2021-01-21

## 2020-07-21 NOTE — TELEPHONE ENCOUNTER
Contacted patient regarding setting up an appointment with Dr Rafael Lewis for the week of 8/10  We offered this to patient and his son would like sooner as he does go to school and would like to accompany his mother  Unfortunately, due to bronchoscopy scheduled for tomorrow we would not be guaranteed to have the results by then  Pt's son advised that we would take the appointment but if there is a conflict with his schooling, he would call and need to reschedule or work something else out  I tentatively scheduled the patient for 8/10 at 8:30 AM      Per Dr Rafael Lewis, if pt does start school that week - it is ok to offer the Friday prior 8/7 at 8:30  Dr Rafael Lewis aware  Office staff aware

## 2020-07-21 NOTE — TELEPHONE ENCOUNTER
Called and reviewed labs with patient  Most recent fT4 was 2 00  TSH still undetectable  Recent Ca 9 5mg/dL with Alb 3 9g/L  Hb 11 4g/dL    She has a recent Hx of Aortitis, Hyperthyroidism and hypercalcemia with concern for Atypical TB  Following with ID and Rheumatology  Her steroid dose is reduced to Prednisone 40mg qdaily with a plan to taper off and stop methotrexate  She feels better with her breathing and able to talk in sentences now  Had Liver Bx as listed below  Waiting bronchoscopy tomorrow  Being considered for 4 drug regimen for latent TB per ID  Final Diagnosis   A  Liver (core needle biopsy):  - Minimal steatosis (less than 5%)  - No significant fibrosis      Comment: The patient's clinically reported history of latent tuberculosis is noted and at the request of the clinician block A1 is sent for molecular testing for tuberculosis    No granulomas are identified in the current biopsy material     Electronically signed by Lynn Guevara MD on 7/16/2020 at [phone]     Plan:  - continue Methimazole 20 mg daily as she did not respond to methimazole 10mg in past   - repeat fT4 in 2 weeks  - note improve hypercalcemia and anemia

## 2020-07-22 ENCOUNTER — HOSPITAL ENCOUNTER (OUTPATIENT)
Dept: GASTROENTEROLOGY | Facility: HOSPITAL | Age: 45
Setting detail: OUTPATIENT SURGERY
Discharge: HOME | End: 2020-07-22
Attending: INTERNAL MEDICINE
Payer: COMMERCIAL

## 2020-07-22 ENCOUNTER — ANESTHESIA EVENT (OUTPATIENT)
Dept: GASTROENTEROLOGY | Facility: HOSPITAL | Age: 45
End: 2020-07-22

## 2020-07-22 ENCOUNTER — ANESTHESIA (OUTPATIENT)
Dept: GASTROENTEROLOGY | Facility: HOSPITAL | Age: 45
End: 2020-07-22

## 2020-07-22 VITALS
DIASTOLIC BLOOD PRESSURE: 73 MMHG | OXYGEN SATURATION: 94 % | RESPIRATION RATE: 18 BRPM | HEIGHT: 63 IN | HEART RATE: 108 BPM | SYSTOLIC BLOOD PRESSURE: 158 MMHG | BODY MASS INDEX: 37.21 KG/M2 | WEIGHT: 210 LBS | TEMPERATURE: 98.4 F

## 2020-07-22 DIAGNOSIS — R74.8 ELEVATED ALKALINE PHOSPHATASE LEVEL: ICD-10-CM

## 2020-07-22 DIAGNOSIS — Z22.7 LTBI (LATENT TUBERCULOSIS INFECTION): ICD-10-CM

## 2020-07-22 DIAGNOSIS — R93.89 ABNORMAL CHEST CT: ICD-10-CM

## 2020-07-22 DIAGNOSIS — R93.89 ABNORMAL CT OF THE CHEST: ICD-10-CM

## 2020-07-22 PROCEDURE — 87116 MYCOBACTERIA CULTURE: CPT | Performed by: INTERNAL MEDICINE

## 2020-07-22 PROCEDURE — 87252 VIRUS INOCULATION TISSUE: CPT | Performed by: INTERNAL MEDICINE

## 2020-07-22 PROCEDURE — 88112 CYTOPATH CELL ENHANCE TECH: CPT | Performed by: PATHOLOGY

## 2020-07-22 PROCEDURE — 87206 SMEAR FLUORESCENT/ACID STAI: CPT | Performed by: INTERNAL MEDICINE

## 2020-07-22 PROCEDURE — 87102 FUNGUS ISOLATION CULTURE: CPT | Performed by: INTERNAL MEDICINE

## 2020-07-22 PROCEDURE — 88184 FLOWCYTOMETRY/ TC 1 MARKER: CPT | Performed by: INTERNAL MEDICINE

## 2020-07-22 PROCEDURE — 87106 FUNGI IDENTIFICATION YEAST: CPT | Performed by: INTERNAL MEDICINE

## 2020-07-22 PROCEDURE — 87147 CULTURE TYPE IMMUNOLOGIC: CPT | Performed by: INTERNAL MEDICINE

## 2020-07-22 PROCEDURE — 87070 CULTURE OTHR SPECIMN AEROBIC: CPT | Performed by: INTERNAL MEDICINE

## 2020-07-22 PROCEDURE — 88305 TISSUE EXAM BY PATHOLOGIST: CPT | Performed by: PATHOLOGY

## 2020-07-22 PROCEDURE — 89051 BODY FLUID CELL COUNT: CPT | Performed by: PATHOLOGY

## 2020-07-22 PROCEDURE — 93000 ELECTROCARDIOGRAM COMPLETE: CPT | Performed by: INTERNAL MEDICINE

## 2020-07-22 PROCEDURE — 88185 FLOWCYTOMETRY/TC ADD-ON: CPT | Performed by: INTERNAL MEDICINE

## 2020-07-22 PROCEDURE — 31623 DX BRONCHOSCOPE/BRUSH: CPT | Performed by: INTERNAL MEDICINE

## 2020-07-22 PROCEDURE — U0003 INFECTIOUS AGENT DETECTION BY NUCLEIC ACID (DNA OR RNA); SEVERE ACUTE RESPIRATORY SYNDROME CORONAVIRUS 2 (SARS-COV-2) (CORONAVIRUS DISEASE [COVID-19]), AMPLIFIED PROBE TECHNIQUE, MAKING USE OF HIGH THROUGHPUT TECHNOLOGIES AS DESCRIBED BY CMS-2020-01-R: HCPCS | Performed by: INTERNAL MEDICINE

## 2020-07-22 PROCEDURE — 31624 DX BRONCHOSCOPE/LAVAGE: CPT | Performed by: INTERNAL MEDICINE

## 2020-07-22 RX ORDER — MIDAZOLAM HYDROCHLORIDE 2 MG/2ML
INJECTION, SOLUTION INTRAMUSCULAR; INTRAVENOUS AS NEEDED
Status: DISCONTINUED | OUTPATIENT
Start: 2020-07-22 | End: 2020-07-22 | Stop reason: SURG

## 2020-07-22 RX ORDER — ONDANSETRON 2 MG/ML
INJECTION INTRAMUSCULAR; INTRAVENOUS AS NEEDED
Status: DISCONTINUED | OUTPATIENT
Start: 2020-07-22 | End: 2020-07-22 | Stop reason: SURG

## 2020-07-22 RX ORDER — PROPOFOL 10 MG/ML
INJECTION, EMULSION INTRAVENOUS CONTINUOUS PRN
Status: DISCONTINUED | OUTPATIENT
Start: 2020-07-22 | End: 2020-07-22 | Stop reason: SURG

## 2020-07-22 RX ORDER — LIDOCAINE HYDROCHLORIDE 10 MG/ML
INJECTION, SOLUTION EPIDURAL; INFILTRATION; INTRACAUDAL; PERINEURAL AS NEEDED
Status: DISCONTINUED | OUTPATIENT
Start: 2020-07-22 | End: 2020-07-22 | Stop reason: SURG

## 2020-07-22 RX ORDER — SODIUM CHLORIDE 9 MG/ML
INJECTION, SOLUTION INTRAVENOUS CONTINUOUS PRN
Status: DISCONTINUED | OUTPATIENT
Start: 2020-07-22 | End: 2020-07-22 | Stop reason: SURG

## 2020-07-22 RX ORDER — PROPOFOL 10 MG/ML
INJECTION, EMULSION INTRAVENOUS AS NEEDED
Status: DISCONTINUED | OUTPATIENT
Start: 2020-07-22 | End: 2020-07-22 | Stop reason: SURG

## 2020-07-22 RX ADMIN — PROPOFOL 30 MG: 10 INJECTION, EMULSION INTRAVENOUS at 10:24

## 2020-07-22 RX ADMIN — PROPOFOL 150 MCG/KG/MIN: 10 INJECTION, EMULSION INTRAVENOUS at 10:19

## 2020-07-22 RX ADMIN — PROPOFOL 50 MG: 10 INJECTION, EMULSION INTRAVENOUS at 10:17

## 2020-07-22 RX ADMIN — LIDOCAINE HYDROCHLORIDE 50 MG: 10 INJECTION, SOLUTION EPIDURAL; INFILTRATION; INTRACAUDAL; PERINEURAL at 10:17

## 2020-07-22 RX ADMIN — MIDAZOLAM 1 MG: 1 INJECTION INTRAMUSCULAR; INTRAVENOUS at 10:11

## 2020-07-22 RX ADMIN — SODIUM CHLORIDE: 0.9 INJECTION, SOLUTION INTRAVENOUS at 09:49

## 2020-07-22 RX ADMIN — ONDANSETRON 4 MG: 2 INJECTION INTRAMUSCULAR; INTRAVENOUS at 10:15

## 2020-07-22 NOTE — ANESTHESIA POSTPROCEDURE EVALUATION
Post-Op Assessment Note    CV Status:  Stable  Pain Score: 0    Pain management: adequate     Mental Status:  Alert   Hydration Status:  Stable   Airway Patency:  Patent and adequate   Post Op Vitals Reviewed: Yes      Staff: Anesthesiologist           BP   154/62   Temp      Pulse 102   Resp 18   SpO2 98%

## 2020-07-22 NOTE — ANESTHESIA PREPROCEDURE EVALUATION
Review of Systems/Medical History  Patient summary reviewed  Chart reviewed  No history of anesthetic complications     Cardiovascular  Negative cardio ROS Hyperlipidemia, Hypertension ,   Comment: Beta blocker, Pulmonary hypertension Pulmonary  Negative pulmonary ROS Asthma ,   Comment: Latent TB     GI/Hepatic  Negative GI/hepatic ROS          Negative  ROS        Endo/Other  Negative endo/other ROS Diabetes type 2 , History of thyroid disease , hyperthyroidism and hypothyroidism,   Comment: chornic steroids    GYN  Negative gynecology ROS          Hematology  Negative hematology ROS      Musculoskeletal  Negative musculoskeletal ROS        Neurology  Negative neurology ROS      Psychology   Negative psychology ROS          EBUS 5/29/20: GETA, MV with OPA, grade 2a with MAC 3    EKG 5/18/20: ST @ 108bpm, nonspecific twave changes, poor R wave progression    TTE 4/7/20: LVEF 60-65%, no rWMA, mild concnetric LVH, RV fn nl, LA mildly dilated, trace MR, mild AI, trace TR, PASP 35mmHg    CTA chest 7/16/20: Minimally decreased volume of aortic thickening, consistent with aortitis  Redemonstrated is mild mediastinal and hilar lymph node enlargement  Mild nodular pleural thickening of the left major fissure is more noticeable on today's study  Taken together, findings once again raises suspicion for either a vasculitis, sarcoid, tuberculosis, or other inflammatory process  Given the known history of uveitis, hypercalcemia, and elevated inflammatory markers, sarcoid is favored      Recent Results (from the past 672 hour(s))   T4, free    Collection Time: 07/11/20 12:01 PM   Result Value Ref Range    Free T4 2 00 (H) 0 76 - 1 46 ng/dL   TSH, 3rd generation    Collection Time: 07/11/20 12:01 PM   Result Value Ref Range    TSH 3RD GENERATON <0 007 (L) 0 358 - 3 740 uIU/mL   Protime-INR    Collection Time: 07/11/20 12:01 PM   Result Value Ref Range    Protime 13 1 11 6 - 14 5 seconds    INR 0 99 0 84 - 1 19   APTT Collection Time: 07/11/20 12:01 PM   Result Value Ref Range    PTT 21 (L) 23 - 37 seconds   Novel Coronavirus (COVID-19), PCR [facility]    Collection Time: 07/14/20  2:05 PM   Result Value Ref Range    SARS-CoV-2  Not Detected Not Detected   INPATIENT (COVID-19 HIGH PRIORITY)    Collection Time: 07/14/20  2:05 PM   Result Value Ref Range    Inpatient Comment    Tissue Exam    Collection Time: 07/15/20  9:43 AM   Result Value Ref Range    Case Report       Surgical Pathology Report                         Case: [id number]                                   Authorizing Provider:  Laura Crawford MD          Collected:           07/15/2020 0943              Ordering Location:     [address] [place] [address]      Received:            07/15/2020 [facility] and                                                                                  Delivery                                                                     Pathologist:           Maranda Miguel MD                                                               Specimen:    Liver                                                                                      Final Diagnosis       A  Liver (core needle biopsy):  - Minimal steatosis (less than 5%)  - No significant fibrosis     Comment: The patient's clinically reported history of latent tuberculosis is noted and at the request of the clinician block A1 is sent for molecular testing for tuberculosis  No granulomas are identified in the current biopsy material        Microscopic Description       Histologic section show six (6) liver cores with adequate portal tracts which display minimal to mild lymphocytic inflammation confined to the tracts  Bile ducts are identified in the majority of portal tracts and show no destructive lesions or granulomatous inflammation  Lobular inflammation is inconspicuous  Steatosis is minimal comprising less than 5% of the core volume    Ballooned hepatocytes are not appreciated  Granulomas are not present  The reticulin stain shows hepatic plates of 1-2 cells in thickness  The iron stain shows miniscule iron in Kupffer cells  The trichrome stain shows no significant fibrosis  Additional Information       All reported additional testing was performed with appropriately reactive controls  These tests were developed and their performance characteristics determined by [address] [place] [address] Specialty Laboratory or appropriate performing facility, though some tests may be performed on tissues which have not been validated for performance characteristics (such as staining performed on alcohol exposed cell blocks and decalcified tissues)  Results should be interpreted with caution and in the context of the patients clinical condition  These tests may not be cleared or approved by the [place]  Food and Drug Administration, though the FDA has determined that such clearance or approval is not necessary  These tests are used for clinical purposes and they should not be regarded as investigational or for research  This laboratory has been approved by CLIA 88, designated as a high-complexity laboratory and is qualified to perform these tests  Sung Rivera Description       A  The specimen is received in formalin, labeled with the patient's name and medical record number, and is designated "liver 4 cores  The specimen consists of 5 tan-red soft tissue cores each measuring 0 1 cm in diameter and ranging from 0 7-3 0 cm in length  Also submitted are multiple tan-red hemorrhagic and soft tissue fragments measuring in loose aggregate 0 4 x 0 2 x 0 1 cm  Entirely submitted in 6 cassettes with cassettes 1-5 between sponges with 1 core in each cassette and the multiple fragments in cassettes 6 in an embedding bag      Note: The estimated total formalin fixation time based upon information provided by the submitting clinician and the standard processing schedule is under 72 hours      Wendy      Clinical Information       6/15/20 - ALT, AST, AP within normal limits; hep A, B, C neg 5/15/20   AFB Culture with Stain    Collection Time: 07/15/20  9:43 AM   Result Value Ref Range    AFB Culture No growth to date     AFB Stain No acid fast bacilli seen      Physical Exam    Airway    Mallampati score: III  TM Distance: <3 FB  Neck ROM: full     Dental   No notable dental hx     Cardiovascular  Comment: Negative ROS, Rhythm: regular, Rate: normal, No murmur,     Pulmonary  Breath sounds clear to auscultation,     Other Findings        Anesthesia Plan  ASA Score- 3     Anesthesia Type- IV sedation with anesthesia with ASA Monitors  Additional Monitors:   Airway Plan:         Plan Factors-    Induction-     Postoperative Plan-     Informed Consent- Anesthetic plan and risks discussed with patient and son  I personally reviewed this patient with the CRNA  Discussed and agreed on the Anesthesia Plan with the CRNA  Karuna Stanley

## 2020-07-22 NOTE — PROGRESS NOTES
Cardiology Follow Up    [facility]  1975  [id number]  [address] [facility] [place]  [address] [place]  [place] [address]  [phone]    1  Chest pain due to myocardial ischemia, unspecified ischemic chest pain type  POCT ECG    NM myocardial perfusion spect (rx stress and/or rest)    metoprolol succinate (TOPROL-XL) 25 mg 24 hr tablet   2  Essential hypertension     3  Aortitis      4  SIRS (systemic inflammatory response syndrome)     5  Dyslipidemia, goal LDL below 100           Discussion/Summary: With her CP and aortitis, concern is that she had inflammatory CAD  I will schedule her for a Lexiscan myoview stress test and start her on Toprol XL 25 mg daily with elevated BP and HR  Further recommendations with be based on the test results  RTO 4 months  Interval History: She is seen for further evaluation of L sided CP which has improved since she has been treated for SIRS  ECHO 4/7/2020 - EF 60-65%, PASP 35 mmHg    CT chest - c/w aortitis  She is more active  She denies SOB             Patient Active Problem List   Diagnosis    Intramural aortic hematoma (HCC)    Retrosternal chest pain    Left flank pain    Thrombocytosis     Multiple thyroid nodules    Diabetes mellitus type 2    Iron deficiency anemia    Pulmonary hypertension (HCC)    Depression    Dyslipidemia, goal LDL below 100    Mild persistent asthma without complication    Essential hypertension    Aortitis     Acute on chronic right-sided low back pain without sciatica    Latent tuberculosis    Abnormal thyroid function test    SIRS (systemic inflammatory response syndrome)    Hyperphosphatemia    Fatigue    Abnormal LFTs    Low back pain    Hypercalcemia    Hyperthyroidism     Past Medical History:   Diagnosis Date    Asthma     Chronic anemia     Diabetes mellitus ([address] )     Hypertension     Hyperthyroidism     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[facility] [address] )     TB lung, latent      Social History     Socioeconomic History    Marital status: /Civil Union     Spouse name: Not on file    Number of children: Not on file    Years of education: Not on file    Highest education level: Not on file   Occupational History    Occupation: not working   Social Needs    Financial resource strain: Not on file    Food insecurity:     Worry: Not on file     Inability: Not on file   [place] needs:     Medical: Not on file     Non-medical: Not on file   Tobacco Use    Smoking status: Never Smoker    Smokeless tobacco: Never Used   Substance and Sexual Activity    Alcohol use: Never     Alcohol/week: 0 0 standard drinks     Frequency: Never     Drinks per session: Patient refused     Binge frequency: Never    Drug use: Never    Sexual activity: Yes     Partners: Male   Lifestyle    Physical activity:     Days per week: Not on file     Minutes per session: Not on file    Stress: Not on file   Relationships    Social connections:     Talks on phone: Not on file     Gets together: Not on file     Attends Je[employer]'s witness service: Not on file     Active member of club or organization: Not on file     Attends meetings of clubs or organizations: Not on file     Relationship status: Not on file    Intimate partner violence:     Fear of current or ex partner: Not on file     Emotionally abused: Not on file     Physically abused: Not on file     Forced sexual activity: Not on file   Other Topics Concern    Not on file   Social History Narrative    Not on file      History reviewed  No pertinent family history    Past Surgical History:   Procedure Laterality Date     SECTION      IR IMAGE GUIDED BIOPSY/ASPIRATION LIVER  7/15/2020       Current Outpatient Medications:     acetaminophen (TYLENOL) 325 mg tablet, Take 650 mg by mouth every 6 (six) hours as needed for mild pain, Disp: , Rfl:     albuterol (PROVENTIL HFA,VENTOLIN HFA) 90 mcg/act inhaler, Inhale 2 puffs 2 (two) times a day, Disp: , Rfl:     aspirin (ECOTRIN LOW STRENGTH) 81 mg EC tablet, Take 81 mg by mouth daily, Disp: , Rfl:     diclofenac sodium (VOLTAREN) 50 mg EC tablet, Take 1 tablet (50 mg total) by mouth 2 (two) times a day, Disp: 30 tablet, Rfl: 0    ergocalciferol (VITAMIN D2) 50,000 units, 50,000 Units once a week , Disp: , Rfl:     escitalopram (LEXAPRO) 10 mg tablet, Take 10 mg by mouth daily, Disp: , Rfl:     Fe Heme Polypeptide-Folic Acid 56-8 MG TABS, Take 1 tablet by mouth daily, Disp: , Rfl: 0    losartan (COZAAR) 100 MG tablet, Take 100 mg by mouth daily , Disp: , Rfl:     metFORMIN (GLUCOPHAGE) 500 mg tablet, Take 1,000 mg by mouth 2 (two) times a day with meals, Disp: , Rfl:     montelukast (SINGULAIR) 10 mg tablet, Take 10 mg by mouth daily at bedtime, Disp: , Rfl:     ondansetron (ZOFRAN-ODT) 4 mg disintegrating tablet, Take 4 mg by mouth every 6 (six) hours as needed , Disp: , Rfl:     pantoprazole (PROTONIX) 40 mg tablet, Take 1 tablet (40 mg total) by mouth daily in the early morning, Disp: 30 tablet, Rfl: 0    predniSONE 20 mg tablet, Take 4 tablets (80 mg total) by mouth daily (Patient taking differently: Take 40 mg by mouth daily ), Disp: 120 tablet, Rfl: 0    sulfamethoxazole-trimethoprim (BACTRIM DS) 800-160 mg per tablet, , Disp: , Rfl:     methimazole (TAPAZOLE) 10 mg tablet, Take 2 tablets (20 mg total) by mouth daily, Disp: 120 tablet, Rfl: 0    metoprolol succinate (TOPROL-XL) 25 mg 24 hr tablet, Take 1 tablet (25 mg total) by mouth daily, Disp: 90 tablet, Rfl: 3  No current facility-administered medications for this visit       Facility-Administered Medications Ordered in Other Visits:     lidocaine (PF) (XYLOCAINE-MPF) 1 % injection, , , PRN, Jaymie Talamantes, CRNA, 50 mg at 07/22/20 1017    midazolam (VERSED) injection, , , PRN, Jaymie Talamantes, CRNA, 1 mg at 07/22/20 1011    ondansetron (ZOFRAN) injection, , , PRN, Jaymie Talamantes, TANYA, 4 mg at 07/22/20 1015    propofol (DIPRIVAN) 1000 mg in 100 mL infusion (premix), , Intravenous, Continuous PRN, Jennifer Laneyy, CRNA, Stopped at 07/22/20 1031    propofol (DIPRIVAN) 200 MG/20ML bolus injection, , Intravenous, PRN, Jennifer Jury, CRNA, 30 mg at 07/22/20 1024    sodium chloride 0 9 % infusion, , , Continuous PRN, Jennifer Jury, CRNA  Allergies   Allergen Reactions    Other Allergic Rhinitis     pollen     Vitals:    07/21/20 1457   BP: 148/82   BP Location: Left arm   Patient Position: Sitting   Cuff Size: Standard   Pulse: 96   Weight: 95 7 kg (211 lb)   Height: 5' 3" (1 6 m)     Weight (last 2 days)     Date/Time   Weight    07/21/20 1457   95 7 (211)             Blood pressure 148/82, pulse 96, height 5' 3" (1 6 m), weight 95 7 kg (211 lb), last menstrual period 04/04/2020 , Body mass index is 37 38 kg/m²  Labs:  Admission on 07/15/2020, Discharged on 07/15/2020   Component Date Value    Case Report 07/15/2020                      Value:Surgical Pathology Report                         Case: [id number]                                   Authorizing Provider:  Odilon Meza MD          Collected:           07/15/2020 [facility]              Ordering Location:     [address] [place] [address]      Received:            07/15/2020 [facility] and                                                                                  Delivery                                                                     Pathologist:           Edmund Shannon MD                                                               Specimen:    Liver                                                                                      Final Diagnosis 07/15/2020                      Value: This result contains rich text formatting which cannot be displayed here   Microscopic Description 07/15/2020                      Value: This result contains rich text formatting which cannot be displayed here      Additional Information 07/15/2020                      Value: This result contains rich text formatting which cannot be displayed here  Yamileth Maurice Gross Description 07/15/2020                      Value: This result contains rich text formatting which cannot be displayed here      Clinical Information 07/15/2020                      Value:6/15/20 - ALT, AST, AP within normal limits; hep A, B, C neg 5/15/20    AFB Culture 07/15/2020 No growth to date     AFB Stain 07/15/2020 No acid fast bacilli seen    Orders Only on 07/14/2020   Component Date Value    SARS-CoV-2  07/14/2020 Not Detected     Inpatient 07/14/2020 Comment    Appointment on 07/11/2020   Component Date Value    Free T4 07/11/2020 2 00*    TSH 3RD GENERATON 07/11/2020 <0 007*    Protime 07/11/2020 13 1     INR 07/11/2020 0 99     PTT 07/11/2020 21*   Transcribe Orders on 06/15/2020   Component Date Value    Sed Rate 06/15/2020 >130*   Appointment on 06/15/2020   Component Date Value    Free T4 06/15/2020 2 45*    TSH 3RD GENERATON 06/15/2020 <0 007*    WBC 06/15/2020 13 66*    RBC 06/15/2020 3 86     Hemoglobin 06/15/2020 9 0*    Hematocrit 06/15/2020 30 8*    MCV 06/15/2020 80*    MCH 06/15/2020 23 3*    MCHC 06/15/2020 29 2*    RDW 06/15/2020 22 5*    MPV 06/15/2020 9 8     Platelets 05/57/1018 407*    nRBC 06/15/2020 0     Neutrophils Relative 06/15/2020 91*    Immat GRANS % 06/15/2020 1     Lymphocytes Relative 06/15/2020 6*    Monocytes Relative 06/15/2020 2*    Eosinophils Relative 06/15/2020 0     Basophils Relative 06/15/2020 0     Neutrophils Absolute 06/15/2020 12 34*    Immature Grans Absolute 06/15/2020 0 17     Lymphocytes Absolute 06/15/2020 0 80     Monocytes Absolute 06/15/2020 0 32     Eosinophils Absolute 06/15/2020 0 01     Basophils Absolute 06/15/2020 0 02     Sodium 06/15/2020 136     Potassium 06/15/2020 4 5     Chloride 06/15/2020 101     CO2 06/15/2020 27     ANION GAP 06/15/2020 8     BUN 06/15/2020 17     Creatinine 06/15/2020 0 73     Glucose 06/15/2020 197*    Calcium 06/15/2020 8 6     AST 06/15/2020 11     ALT 06/15/2020 23     Alkaline Phosphatase 06/15/2020 104     Total Protein 06/15/2020 6 9     Albumin 06/15/2020 2 2*    Total Bilirubin 06/15/2020 0 30     eGFR 06/15/2020 99     CRP, High Sensitivity 06/15/2020 81 40    Hospital Outpatient Visit on 05/29/2020   Component Date Value    SARS-CoV-2 05/29/2020 Negative     POC Glucose 05/29/2020 165*    EXT Preg Test, Ur 05/29/2020 Negative     Control 05/29/2020 Valid     Case Report 05/29/2020                      Value:Non-gynecologic Cytology                          Case: [id number]                                  Authorizing Provider:  Jaylene Landers MD         Collected:           05/29/2020 1141              Ordering Location:     [address] [place] [address]      Received:            05/29/2020 [address] [place] [address] Operating Room                                                      Pathologist:           Dajuan Roche MD                                                   Specimens:   A) - Lymph Node, level 7L                                                                           B) - Lymph Node, level 7L                                                                           C) - Lymph Node, level 7L, cell block                                                      Final Diagnosis 05/29/2020                      Value: This result contains rich text formatting which cannot be displayed here   Intraoperative Consultat* 05/29/2020                      Value: This result contains rich text formatting which cannot be displayed here  [facility] Gross Description 05/29/2020                      Value: This result contains rich text formatting which cannot be displayed here   Additional Information 05/29/2020                      Value: This result contains rich text formatting which cannot be displayed here   Fungus (Mycology) Culture 05/29/2020 No Fungus Isolated at 4 Weeks     AFB Culture 05/29/2020 No AFB Isolated at 6 Weeks     AFB Stain 05/29/2020 No acid fast bacilli seen     Fungus (Mycology) Culture 05/29/2020 1 colony Penicillium species*    Bronchial Culture 05/29/2020 Few Colonies of      Gram Stain Result 05/29/2020 2+ Polys     Gram Stain Result 05/29/2020 No bacteria seen     AFB Culture 05/29/2020 No AFB Isolated at 6 Weeks     AFB Stain 05/29/2020 No acid fast bacilli seen     POC Glucose 05/29/2020 185*   No results displayed because visit has over 200 results        Admission on 05/13/2020, Discharged on 05/13/2020   Component Date Value    WBC 05/13/2020 14 37*    RBC 05/13/2020 4 04     Hemoglobin 05/13/2020 9 4*    Hematocrit 05/13/2020 31 8*    MCV 05/13/2020 79*    MCH 05/13/2020 23 3*    MCHC 05/13/2020 29 6*    RDW 05/13/2020 21 7*    MPV 05/13/2020 9 9     Platelets 46/32/0254 523*    nRBC 05/13/2020 0     Neutrophils Relative 05/13/2020 73     Immat GRANS % 05/13/2020 2     Lymphocytes Relative 05/13/2020 17     Monocytes Relative 05/13/2020 7     Eosinophils Relative 05/13/2020 1     Basophils Relative 05/13/2020 0     Neutrophils Absolute 05/13/2020 10 50*    Immature Grans Absolute 05/13/2020 0 26*    Lymphocytes Absolute 05/13/2020 2 40     Monocytes Absolute 05/13/2020 1 02     Eosinophils Absolute 05/13/2020 0 15     Basophils Absolute 05/13/2020 0 04     Sodium 05/13/2020 133*    Potassium 05/13/2020 5 6*    Chloride 05/13/2020 99*    CO2 05/13/2020 24     ANION GAP 05/13/2020 10     BUN 05/13/2020 21     Creatinine 05/13/2020 0 85     Glucose 05/13/2020 159*    Calcium 05/13/2020 9 2     AST 05/13/2020 49*    ALT 05/13/2020 77     Alkaline Phosphatase 05/13/2020 320*    Total Protein 05/13/2020 8 4*    Albumin 05/13/2020 2 0*    Total Bilirubin 05/13/2020 0 40     eGFR 05/13/2020 82     Procalcitonin 05/13/2020 0 17  NT-proBNP 05/13/2020 227*    Magnesium 05/13/2020 1 9     Blood Culture 05/13/2020 No Growth After 5 Days   Blood Culture 05/13/2020 No Growth After 5 Days       Troponin I 05/13/2020 <0 02     Color, UA 05/13/2020 Yellow     Clarity, UA 05/13/2020 Clear     Specific Gravity, UA 05/13/2020 1 015     pH, UA 05/13/2020 5 5     Leukocytes, UA 05/13/2020 Negative     Nitrite, UA 05/13/2020 Negative     Protein, UA 05/13/2020 30 (1+)*    Glucose, UA 05/13/2020 Negative     Ketones, UA 05/13/2020 Negative     Urobilinogen, UA 05/13/2020 0 2     Bilirubin, UA 05/13/2020 Negative     Blood, UA 05/13/2020 Negative     Protime 05/13/2020 14 5     INR 05/13/2020 1 13     PTT 05/13/2020 27     LACTIC ACID 05/13/2020 2 3*    SARS-CoV-2 05/13/2020 Negative     D-Dimer, Quant 05/13/2020 0 66*    Potassium 05/13/2020 5 2     LACTIC ACID 05/13/2020 0 6     RBC, UA 05/13/2020 None Seen     WBC, UA 05/13/2020 0-1*    Epithelial Cells 05/13/2020 Occasional     Bacteria, UA 05/13/2020 Occasional     Ventricular Rate 05/13/2020 104     Atrial Rate 05/13/2020 104     AL Interval 05/13/2020 150     QRSD Interval 05/13/2020 70     QT Interval 05/13/2020 322     QTC Interval 05/13/2020 423     P Axis 05/13/2020 54     QRS Axis 05/13/2020 33     T Wave [place] 05/13/2020 71    Orders Only on 05/11/2020   Component Date Value    Total Bilirubin 05/11/2020 0 60     Bilirubin, Direct 05/11/2020 0 37*    Alkaline Phosphatase 05/11/2020 314*    AST 05/11/2020 32     ALT 05/11/2020 129*    Total Protein 05/11/2020 7 9     Albumin 05/11/2020 2 1*   Admission on 05/09/2020, Discharged on 05/09/2020   Component Date Value    WBC 05/09/2020 13 89*    RBC 05/09/2020 3 55*    Hemoglobin 05/09/2020 8 2*    Hematocrit 05/09/2020 27 7*    MCV 05/09/2020 78*    MCH 05/09/2020 23 1*    MCHC 05/09/2020 29 6*    RDW 05/09/2020 21 3*    MPV 05/09/2020 9 4     Platelets 59/98/1685 446*    nRBC 05/09/2020 0  Neutrophils Relative 05/09/2020 70     Immat GRANS % 05/09/2020 1     Lymphocytes Relative 05/09/2020 21     Monocytes Relative 05/09/2020 8     Eosinophils Relative 05/09/2020 0     Basophils Relative 05/09/2020 0     Neutrophils Absolute 05/09/2020 9 62*    Immature Grans Absolute 05/09/2020 0 15     Lymphocytes Absolute 05/09/2020 2 93     Monocytes Absolute 05/09/2020 1 07     Eosinophils Absolute 05/09/2020 0 06     Basophils Absolute 05/09/2020 0 06     Sodium 05/09/2020 135*    Potassium 05/09/2020 4 0     Chloride 05/09/2020 101     CO2 05/09/2020 21     ANION GAP 05/09/2020 13     BUN 05/09/2020 26*    Creatinine 05/09/2020 1 17     Glucose 05/09/2020 147*    Calcium 05/09/2020 9 1     eGFR 05/09/2020 56     Troponin I 05/09/2020 <0 02     Magnesium 05/09/2020 1 7     D-Dimer, Quant 05/09/2020 0 63*    Troponin I 05/09/2020 <0 02     Ventricular Rate 05/09/2020 78     Atrial Rate 05/09/2020 78     SD Interval 05/09/2020 176     QRSD Interval 05/09/2020 90     QT Interval 05/09/2020 396     QTC Interval 05/09/2020 451     P Axis 05/09/2020 23     QRS Axis 05/09/2020 30     T Wave [place] 05/09/2020 72     Ventricular Rate 05/09/2020 85     Atrial Rate 05/09/2020 85     SD Interval 05/09/2020 174     QRSD Interval 05/09/2020 84     QT Interval 05/09/2020 366     QTC Interval 05/09/2020 435     P Axis 05/09/2020 50     QRS Axis 05/09/2020 36     T Wave Axis 05/09/2020 75    There may be more visits with results that are not included  Imaging: Cta Chest Wo W Contrast    Result Date: 7/16/2020  Narrative: CT ANGIOGRAM OF THE CHEST, WITH AND WITHOUT IV CONTRAST INDICATION:  History of aortitis  Elevated inflammatory markers of unclear etiology  History of uveitis  Recent liver biopsy  COMPARISON: CT, dated 5/9/2020  CT, dated 5/13/2020  TECHNIQUE:  CT angiogram examination of the chest was performed according to standard protocol   Contrast as well as noncontrast images were obtained  This examination, like all CT scans performed in the [facility], was performed utilizing techniques to minimize radiation dose exposure, including the use of iterative reconstruction and automated exposure control  3D reconstructions were performed an independent workstation, and are supplied for review  Rad dose 1681 29 mGy-cm IV Contrast:  100 mL of iohexol (OMNIPAQUE)  FINDINGS: VASCULAR STRUCTURES:  THORACIC VASCULAR STRUCTURES: HEART: The study is not coronary gated, limiting evaluation  Within these confines, the heart is normal in size  There is no pericardial effusion  There are no coronary arterial calcifications  PULMONARY ARTERIES: The main pulmonary artery is normal in size  Nontargeted evaluation shows no central pulmonary embolism  ASCENDING AORTA: Non coronary-gated evaluation shows the ascending thoracic aorta to be normal in size and without calcifications  Redemonstrated is circumferential wall thickening, mildly decreased from the prior study  AORTIC ARCH: The three-vessel arch is normal  DESCENDING THORACIC AORTA: The descending thoracic aorta is normal in caliber, with no atherosclerotic calcifications  OTHER FINDINGS: LUNGS:  There are no pulmonary nodules  No endobronchial or tracheal lesion is present  Cathlean Eduardo PLEURA: Mild nodular pleural thickening is present in the left major fissure (series 4, image 27)  MEDIASTINUM AND АЛЕКСАНДР:  Redemonstrated is mild mediastinal and hilar lymph node enlargement, nonspecific, presumed inflammatory  CHEST WALL AND LOWER NECK:  Redemonstrated is an enlarged thyroid gland, with a dominant nodule left lobe of the thyroid measuring 2 9 x 1 9 cm  VISUALIZED STRUCTURES IN THE UPPER ABDOMEN:  Early enhancement of the portal vein within the right hepatic lobe, 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  This is unlikely to be of any clinical significance       Impression: Minimally decreased volume of aortic thickening, consistent with aortitis  Redemonstrated is mild mediastinal and hilar lymph node enlargement  Mild nodular pleural thickening of the left major fissure is more noticeable on today's study  Taken together, findings once again raises suspicion for either a vasculitis, sarcoid, tuberculosis, or other inflammatory process  Given the known history of uveitis, hypercalcemia, and elevated inflammatory markers, sarcoid is favored  Workstation performed: [id number]     Ir Image Guided Biopsy/aspiration Liver    Result Date: 7/15/2020  Narrative: EXAMINATION: Liver biopsy INDICATION: History of latent tuberculosis  Liver biopsy to rule out extrapulmonary tuberculosis  CONTRAST: N/A FLUOROSCOPY TIME:   N/A IMAGES:  3 ANESTHESIA: Moderate sedation and local lidocaine PROCEDURE:  The patient was identified verbally and by wristband  Timeout was performed  Informed consent was obtained  Following obtaining informed consent, the patient was prepped and draped in the usual sterile fashion  All elements of maximal sterile barrier technique, cap and mask and sterile gloves and sterile drape and hand hygiene and 2% chlorhexidine for cutaneous antisepsis  Sterile ultrasound technique with sterile gel and sterile probe covers was also utilized  Under ultrasound guidance, a 17-gauge coaxial introducer needle was advanced into the right liver parenchyma  An 18-gauge BioPince needle was used to obtain 6 core needle samples, 4 of which were placed into formalin and 2 placed into sterile cup  D-Stat hemostatic agent was instilled through the introducer needle during its removal   Bandage was applied  The patient tolerated the procedure well without complication  The patient left the IR department in stable condition  Findings: Successful liver biopsy with 6 core needle samples obtained  Impression: Impression: Successful ultrasound-guided percutaneous liver biopsy  Workstation performed: [id number]       Review of Systems:  Review of Systems   Constitutional: Negative for diaphoresis, fatigue, fever and unexpected weight change  HENT: Negative  Respiratory: Negative for cough, shortness of breath and wheezing  Cardiovascular: Positive for chest pain  Negative for palpitations and leg swelling  Gastrointestinal: Negative for abdominal pain, diarrhea and nausea  Musculoskeletal: Negative for gait problem and myalgias  Skin: Negative for rash  Neurological: Negative for dizziness and numbness  Psychiatric/Behavioral: Negative  Physical Exam:  Physical Exam   Constitutional: She is oriented to person, place, and time  She appears well-developed and well-nourished  HENT:   Head: Normocephalic and atraumatic  Eyes: Pupils are equal, round, and reactive to light  Neck: Normal range of motion  Neck supple  No JVD present  Cardiovascular: Regular rhythm, S1 normal, S2 normal and normal pulses  Pulses:       Carotid pulses are 2+ on the right side, and 2+ on the left side  Pulmonary/Chest: Effort normal and breath sounds normal  She has no wheezes  She has no rales  Abdominal: Soft  Bowel sounds are normal  There is no tenderness  Musculoskeletal: Normal range of motion  She exhibits no edema or tenderness  Neurological: She is alert and oriented to person, place, and time  She has normal reflexes  No cranial nerve deficit  Skin: Skin is warm  Psychiatric: She has a normal mood and affect

## 2020-07-23 DIAGNOSIS — Z22.7 LTBI (LATENT TUBERCULOSIS INFECTION): ICD-10-CM

## 2020-07-23 DIAGNOSIS — R93.89 ABNORMAL CHEST CT: Primary | ICD-10-CM

## 2020-07-23 DIAGNOSIS — R74.8 ELEVATED ALKALINE PHOSPHATASE LEVEL: ICD-10-CM

## 2020-07-23 LAB — SARS-COV-2 RNA SPEC QL NAA+PROBE: NOT DETECTED

## 2020-07-24 LAB
BACTERIA BRONCH AEROBE CULT: NORMAL
GRAM STN SPEC: NORMAL
GRAM STN SPEC: NORMAL
SCAN RESULT: NORMAL

## 2020-07-25 LAB
BACTERIA BRONCH AEROBE CULT: ABNORMAL
BACTERIA BRONCH AEROBE CULT: ABNORMAL
GRAM STN SPEC: ABNORMAL

## 2020-07-27 LAB — MYCOBACTERIUM TUBERCULOSIS COMPLEX PCR: NORMAL

## 2020-07-30 ENCOUNTER — HOSPITAL ENCOUNTER (OUTPATIENT)
Dept: NUCLEAR MEDICINE | Facility: HOSPITAL | Age: 45
Discharge: HOME | End: 2020-07-30
Attending: INTERNAL MEDICINE
Payer: COMMERCIAL

## 2020-07-30 ENCOUNTER — TELEPHONE (OUTPATIENT)
Dept: INFECTIOUS DISEASES | Facility: CLINIC | Age: 45
End: 2020-07-30

## 2020-07-30 DIAGNOSIS — I25.9 CHEST PAIN DUE TO MYOCARDIAL ISCHEMIA, UNSPECIFIED ISCHEMIC CHEST PAIN TYPE: ICD-10-CM

## 2020-07-30 PROCEDURE — A9502 TC99M TETROFOSMIN: HCPCS

## 2020-07-30 PROCEDURE — 93017 CV STRESS TEST TRACING ONLY: CPT

## 2020-07-30 PROCEDURE — 78452 HT MUSCLE IMAGE SPECT MULT: CPT

## 2020-07-30 PROCEDURE — 78452 HT MUSCLE IMAGE SPECT MULT: CPT | Performed by: INTERNAL MEDICINE

## 2020-07-30 PROCEDURE — 93016 CV STRESS TEST SUPVJ ONLY: CPT | Performed by: INTERNAL MEDICINE

## 2020-07-30 PROCEDURE — 93018 CV STRESS TEST I&R ONLY: CPT | Performed by: INTERNAL MEDICINE

## 2020-07-30 RX ORDER — METOPROLOL SUCCINATE 25 MG/1
25 TABLET, EXTENDED RELEASE ORAL DAILY
Qty: 90 TABLET | Refills: 0 | Status: SHIPPED | OUTPATIENT
Start: 2020-07-30 | End: 2020-08-04 | Stop reason: DRUGHIGH

## 2020-07-30 RX ADMIN — REGADENOSON 0.4 MG: 0.08 INJECTION, SOLUTION INTRAVENOUS at 09:15

## 2020-07-30 NOTE — TELEPHONE ENCOUNTER
Contacted pt to offer her an appt tomorrow at 1 or next week wed, thurs, or Friday at 6  Per pt, they do not wish to come before the scheduled appt 8/10  Pt thanks me for my call

## 2020-07-31 LAB — VIRUS SPEC CULT: ABNORMAL

## 2020-08-03 ENCOUNTER — DOCUMENTATION (OUTPATIENT)
Dept: CARDIOLOGY CLINIC | Facility: HOSPITAL | Age: 45
End: 2020-08-03

## 2020-08-03 NOTE — PROGRESS NOTES
Per Dr Phuong Vora for pt to increase Toprol XL to 50 mgm daily based on result of recent nuclear stress test with elevated heart rate  Advised her to call office if any questions

## 2020-08-04 ENCOUNTER — OFFICE VISIT (OUTPATIENT)
Dept: HEMATOLOGY ONCOLOGY | Facility: CLINIC | Age: 45
End: 2020-08-04
Payer: COMMERCIAL

## 2020-08-04 VITALS
HEIGHT: 63 IN | SYSTOLIC BLOOD PRESSURE: 138 MMHG | BODY MASS INDEX: 38.09 KG/M2 | OXYGEN SATURATION: 97 % | HEART RATE: 86 BPM | TEMPERATURE: 97.4 F | WEIGHT: 215 LBS | DIASTOLIC BLOOD PRESSURE: 76 MMHG | RESPIRATION RATE: 16 BRPM

## 2020-08-04 DIAGNOSIS — R53.83 OTHER FATIGUE: ICD-10-CM

## 2020-08-04 DIAGNOSIS — D50.9 IRON DEFICIENCY ANEMIA, UNSPECIFIED IRON DEFICIENCY ANEMIA TYPE: ICD-10-CM

## 2020-08-04 DIAGNOSIS — D75.839 THROMBOCYTOSIS: Primary | ICD-10-CM

## 2020-08-04 DIAGNOSIS — I77.6 AORTITIS (HCC): ICD-10-CM

## 2020-08-04 DIAGNOSIS — R00.0 TACHYCARDIA: Primary | ICD-10-CM

## 2020-08-04 PROCEDURE — 99245 OFF/OP CONSLTJ NEW/EST HI 55: CPT | Performed by: INTERNAL MEDICINE

## 2020-08-04 RX ORDER — SIMVASTATIN 10 MG
10 TABLET ORAL
COMMUNITY

## 2020-08-04 RX ORDER — FOLIC ACID 1 MG/1
TABLET ORAL DAILY
COMMUNITY
End: 2021-05-14

## 2020-08-04 NOTE — PROGRESS NOTES
[facility]  1975  HEM ONC [id number]  [address] [facility] [address]  [phone]    Chief Complaint   Patient presents with    Follow-up         Oncology History    No history exists  History of Present Illness:  Patient had been seen in the [place] [address] for anemia in the past   At that time she had persistently depressed iron saturation as well as thrombocytosis  Iron deficiency was attributed to chronic menstrual blood loss  She was treated with oral iron in the past, this was continued  Hemoglobinopathy evaluation was negative  Erythropoietin level 104 3  Direct Meena negative, haptoglobin elevated  She was apparently having parental iron replacement  I see no record of that within our hospital system, however  In May 2020 she had fevers, malaise, fatigue  She was admitted to the hospital with tachypnea and tachycardia  Sepsis workup proved negative  Additionally, she had been diagnosed with aortitis and was treated with prednisone  Review of Systems   Constitutional: Negative for appetite change, diaphoresis, fatigue and fever  HENT: Negative for sinus pain  Eyes: Negative for discharge  Respiratory: Negative for cough and shortness of breath  Cardiovascular: Negative for chest pain  Gastrointestinal: Negative for abdominal pain, constipation and diarrhea  Endocrine: Negative for cold intolerance  Genitourinary: Negative for difficulty urinating and hematuria  Musculoskeletal: Negative for joint swelling  Skin: Negative for rash  Allergic/Immunologic: Negative for environmental allergies  Neurological: Negative for dizziness and headaches  Hematological: Negative for adenopathy  Psychiatric/Behavioral: Negative for agitation         Patient Active Problem List   Diagnosis    Intramural aortic hematoma (HCC)    Retrosternal chest pain    Left flank pain    Thrombocytosis     Multiple thyroid nodules    Diabetes mellitus type 2    Iron deficiency anemia    Pulmonary hypertension (HCC)    Depression    Dyslipidemia, goal LDL below 100    Mild persistent asthma without complication    Essential hypertension    Aortitis     Acute on chronic right-sided low back pain without sciatica    Latent tuberculosis    Abnormal thyroid function test    SIRS (systemic inflammatory response syndrome)    Hyperphosphatemia    Fatigue    Abnormal LFTs    Low back pain    Hypercalcemia    Hyperthyroidism     Past Medical History:   Diagnosis Date    Asthma     Chronic anemia     Diabetes mellitus ([address] )     Hypertension     Hyperthyroidism     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HCC)     TB lung, latent      Past Surgical History:   Procedure Laterality Date     SECTION      IR IMAGE GUIDED BIOPSY/ASPIRATION LIVER  7/15/2020     History reviewed  No pertinent family history    Social History     Socioeconomic History    Marital status: /Civil Union     Spouse name: Not on file    Number of children: Not on file    Years of education: Not on file    Highest education level: Not on file   Occupational History    Occupation: not working   Social Needs    Financial resource strain: Not on file    Food insecurity     Worry: Not on file     Inability: Not on file   [email or web address] needs     Medical: Not on file     Non-medical: Not on file   Tobacco Use    Smoking status: Never Smoker    Smokeless tobacco: Never Used   Substance and Sexual Activity    Alcohol use: Never     Alcohol/week: 0 0 standard drinks     Frequency: Never     Drinks per session: Patient refused     Binge frequency: Never    Drug use: Never    Sexual activity: Yes     Partners: Male   Lifestyle    Physical activity     Days per week: Not on file     Minutes per session: Not on file    Stress: Not on file   Relationships    Social connections     Talks on phone: Not on file     Gets together: Not on file     Attends Yazdanism service: Not on file     Active member of club or organization: Not on file     Attends meetings of clubs or organizations: Not on file     Relationship status: Not on file    Intimate partner violence     Fear of current or ex partner: Not on file     Emotionally abused: Not on file     Physically abused: Not on file     Forced sexual activity: Not on file   Other Topics Concern    Not on file   Social History Narrative    Not on file       Current Outpatient Medications:     acetaminophen (TYLENOL) 325 mg tablet, Take 650 mg by mouth every 6 (six) hours as needed for mild pain, Disp: , Rfl:     albuterol (PROVENTIL HFA,VENTOLIN HFA) 90 mcg/act inhaler, Inhale 2 puffs 2 (two) times a day, Disp: , Rfl:     aspirin (ECOTRIN LOW STRENGTH) 81 mg EC tablet, Take 81 mg by mouth daily, Disp: , Rfl:     ergocalciferol (VITAMIN D2) 50,000 units, 50,000 Units once a week , Disp: , Rfl:     escitalopram (LEXAPRO) 10 mg tablet, Take 10 mg by mouth daily, Disp: , Rfl:     Fe Heme Polypeptide-Folic Acid 36-6 MG TABS, Take 1 tablet by mouth daily, Disp: , Rfl: 0    folic acid (FOLVITE) 1 mg tablet, Take by mouth daily, Disp: , Rfl:     losartan (COZAAR) 100 MG tablet, Take 100 mg by mouth daily , Disp: , Rfl:     metFORMIN (GLUCOPHAGE) 500 mg tablet, Take 1,000 mg by mouth 2 (two) times a day with meals, Disp: , Rfl:     methimazole (TAPAZOLE) 10 mg tablet, Take 2 tablets (20 mg total) by mouth daily, Disp: 120 tablet, Rfl: 0    methotrexate 2 5 mg tablet, Take by mouth once a week, Disp: , Rfl:     metoprolol succinate (TOPROL-XL) 25 mg 24 hr tablet, Take 1 tablet (25 mg total) by mouth daily (Patient taking differently: Take 25 mg by mouth daily TAKE 2 TABS PO DAILY), Disp: 90 tablet, Rfl: 0    montelukast (SINGULAIR) 10 mg tablet, Take 10 mg by mouth daily at bedtime, Disp: , Rfl:     pantoprazole (PROTONIX) 40 mg tablet, Take 1 tablet (40 mg total) by mouth daily in the early morning, Disp: 30 tablet, Rfl: 0    Polysaccharide Iron Complex (POLY-IRON 150 PO), Take by mouth, Disp: , Rfl:     predniSONE 20 mg tablet, Take 4 tablets (80 mg total) by mouth daily (Patient taking differently: Take 40 mg by mouth daily ), Disp: 120 tablet, Rfl: 0    simvastatin (ZOCOR) 10 mg tablet, Take 10 mg by mouth daily at bedtime, Disp: , Rfl:     sulfamethoxazole-trimethoprim (BACTRIM DS) 800-160 mg per tablet, , Disp: , Rfl:     diclofenac sodium (VOLTAREN) 50 mg EC tablet, Take 1 tablet (50 mg total) by mouth 2 (two) times a day (Patient not taking: Reported on 8/4/2020), Disp: 30 tablet, Rfl: 0    ondansetron (ZOFRAN-ODT) 4 mg disintegrating tablet, Take 4 mg by mouth every 6 (six) hours as needed , Disp: , Rfl:   Allergies   Allergen Reactions    Other Allergic Rhinitis     pollen     Vitals:    08/04/20 0957   BP: 138/76   Pulse: 86   Resp: 16   Temp: (!) 97 4 °F (36 3 °C)   SpO2: 97%       Physical Exam   Constitutional: She is oriented to person, place, and time  She appears well-developed  HENT:   Head: Normocephalic and atraumatic  Eyes: Pupils are equal, round, and reactive to light  Neck: Neck supple  Cardiovascular: Normal rate  No murmur heard  Pulmonary/Chest: No respiratory distress  She has no wheezes  She has no rales  Abdominal: Soft  She exhibits no distension  There is no abdominal tenderness  There is no rebound  Musculoskeletal:         General: No tenderness  Lymphadenopathy:     She has no cervical adenopathy  Neurological: She is alert and oriented to person, place, and time  She displays normal reflexes  Skin: Skin is warm  No rash noted  Psychiatric: Thought content normal          Labs:  CBC, Coags, BMP, Mg, Phos     Imaging  Cta Chest Wo W Contrast    Result Date: 7/16/2020  Narrative: CT ANGIOGRAM OF THE CHEST, WITH AND WITHOUT IV CONTRAST INDICATION:  History of aortitis  Elevated inflammatory markers of unclear etiology  History of uveitis  Recent liver biopsy  COMPARISON: CT, dated 5/9/2020  CT, dated 5/13/2020  TECHNIQUE:  CT angiogram examination of the chest was performed according to standard protocol  Contrast as well as noncontrast images were obtained  This examination, like all CT scans performed in the [facility], was performed utilizing techniques to minimize radiation dose exposure, including the use of iterative reconstruction and automated exposure control  3D reconstructions were performed an independent workstation, and are supplied for review  Rad dose 1681 29 mGy-cm IV Contrast:  100 mL of iohexol (OMNIPAQUE)  FINDINGS: VASCULAR STRUCTURES:  THORACIC VASCULAR STRUCTURES: HEART: The study is not coronary gated, limiting evaluation  Within these confines, the heart is normal in size  There is no pericardial effusion  There are no coronary arterial calcifications  PULMONARY ARTERIES: The main pulmonary artery is normal in size  Nontargeted evaluation shows no central pulmonary embolism  ASCENDING AORTA: Non coronary-gated evaluation shows the ascending thoracic aorta to be normal in size and without calcifications  Redemonstrated is circumferential wall thickening, mildly decreased from the prior study  AORTIC ARCH: The three-vessel arch is normal  DESCENDING THORACIC AORTA: The descending thoracic aorta is normal in caliber, with no atherosclerotic calcifications  OTHER FINDINGS: LUNGS:  There are no pulmonary nodules  No endobronchial or tracheal lesion is present  Tab Amado PLEURA: Mild nodular pleural thickening is present in the left major fissure (series 4, image 27)  MEDIASTINUM AND АЛЕКСАНДР:  Redemonstrated is mild mediastinal and hilar lymph node enlargement, nonspecific, presumed inflammatory  CHEST WALL AND LOWER NECK:  Redemonstrated is an enlarged thyroid gland, with a dominant nodule left lobe of the thyroid measuring 2 9 x 1 9 cm   VISUALIZED STRUCTURES IN THE UPPER ABDOMEN:  Early enhancement of the portal vein within the right hepatic lobe, 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  This is unlikely to be of any clinical significance  Impression: Minimally decreased volume of aortic thickening, consistent with aortitis  Redemonstrated is mild mediastinal and hilar lymph node enlargement  Mild nodular pleural thickening of the left major fissure is more noticeable on today's study  Taken together, findings once again raises suspicion for either a vasculitis, sarcoid, tuberculosis, or other inflammatory process  Given the known history of uveitis, hypercalcemia, and elevated inflammatory markers, sarcoid is favored  Workstation performed: [id number]     Ir Image Guided Biopsy/aspiration Liver    Result Date: 7/15/2020  Narrative: EXAMINATION: Liver biopsy INDICATION: History of latent tuberculosis  Liver biopsy to rule out extrapulmonary tuberculosis  CONTRAST: N/A FLUOROSCOPY TIME:   N/A IMAGES:  3 ANESTHESIA: Moderate sedation and local lidocaine PROCEDURE:  The patient was identified verbally and by wristband  Timeout was performed  Informed consent was obtained  Following obtaining informed consent, the patient was prepped and draped in the usual sterile fashion  All elements of maximal sterile barrier technique, cap and mask and sterile gloves and sterile drape and hand hygiene and 2% chlorhexidine for cutaneous antisepsis  Sterile ultrasound technique with sterile gel and sterile probe covers was also utilized  Under ultrasound guidance, a 17-gauge coaxial introducer needle was advanced into the right liver parenchyma  An 18-gauge BioPince needle was used to obtain 6 core needle samples, 4 of which were placed into formalin and 2 placed into sterile cup  D-Stat hemostatic agent was instilled through the introducer needle during its removal   Bandage was applied  The patient tolerated the procedure well without complication    The patient left the IR department in stable condition  Findings: Successful liver biopsy with 6 core needle samples obtained  Impression: Impression: Successful ultrasound-guided percutaneous liver biopsy  Workstation performed: [id number]     I reviewed the above laboratory and imaging data  Discussion/Summary:  In summary, this is a 64-year-old female history of anemia for many years  She does have some family members to her anemic, particularly her mother  She had a hemoglobinopathy workup which was negative  Erythropoietin level is within the range of appropriate response to her anemia  Certainly anemia of chronic disease could be superimposed on iron deficiency and result in hemoglobins fluctuating in the 8 5-11 0 range  She has been on oral iron for several years but her iron saturation remains depressed  The etiology of her iron deficiency is unclear but has been attributed to menstrual bleeding  Currently she states that she has menses approximately every 28 days although they are somewhat irregular  Duration is 6 days with heavy flow in the 1st 3 days  Fortunately, she is almost entirely asymptomatic  I asked her to continue on oral iron for the moment  Observation is favored  Thrombocytosis is likely reactive to either background inflammatory process, aortitis, or iron deficiency  She did have blood work investigating for the possibility of myeloproliferative disorder  I reviewed the above considerations with the patient and her son  They voiced understanding and agreement  If all is well we will see her back in 2 months for review with repeat blood work just prior to that visit

## 2020-08-05 RX ORDER — METOPROLOL SUCCINATE 50 MG/1
50 TABLET, EXTENDED RELEASE ORAL DAILY
Qty: 90 TABLET | Refills: 4 | Status: SHIPPED | OUTPATIENT
Start: 2020-08-05 | End: 2021-08-16

## 2020-08-06 LAB
FUNGUS SPEC CULT: ABNORMAL
FUNGUS SPEC CULT: ABNORMAL

## 2020-08-07 ENCOUNTER — TELEPHONE (OUTPATIENT)
Dept: INFECTIOUS DISEASES | Facility: CLINIC | Age: 45
End: 2020-08-07

## 2020-08-07 NOTE — TELEPHONE ENCOUNTER

## 2020-08-10 ENCOUNTER — OFFICE VISIT (OUTPATIENT)
Dept: INFECTIOUS DISEASES | Facility: CLINIC | Age: 45
End: 2020-08-10
Payer: COMMERCIAL

## 2020-08-10 VITALS
BODY MASS INDEX: 38.09 KG/M2 | TEMPERATURE: 98.2 F | DIASTOLIC BLOOD PRESSURE: 68 MMHG | HEART RATE: 83 BPM | SYSTOLIC BLOOD PRESSURE: 140 MMHG | WEIGHT: 215 LBS

## 2020-08-10 DIAGNOSIS — R76.12 POSITIVE QUANTIFERON-TB GOLD TEST: ICD-10-CM

## 2020-08-10 DIAGNOSIS — R65.10 SIRS (SYSTEMIC INFLAMMATORY RESPONSE SYNDROME) (HCC): ICD-10-CM

## 2020-08-10 DIAGNOSIS — I77.6 AORTITIS (HCC): Primary | ICD-10-CM

## 2020-08-10 DIAGNOSIS — E83.52 HYPERCALCEMIA: ICD-10-CM

## 2020-08-10 DIAGNOSIS — E05.90 HYPERTHYROIDISM: ICD-10-CM

## 2020-08-10 PROCEDURE — 99214 OFFICE O/P EST MOD 30 MIN: CPT | Performed by: INTERNAL MEDICINE

## 2020-08-10 NOTE — PROGRESS NOTES
Progress Note - Infectious Disease   Tatiana Balbuena 50 y o  female MRN: [id number]  Unit/Bed#:  Encounter: [id number]      Impression/Plan:  1  SIRS syndrome  Initial presentation with multiple vague complaints as below and electrolyte abnormalities along with findings on imaging  Patient's lab abnormalities have improved on thyroid medication and she is no longer having any systemic complaints  Continue to monitor for recurrence of fever  Continue lab monitoring given below  Will continue follow symptomatically and with imaging intermittently  Ongoing close follow-up with PCP/Endocrinology/Rheumatology    2  Diffuse muscle aches/pains  Patient had fluctuating muscle aches, bone pain and generalized fatigue  Ultimately felt to be multifactorial in the setting of iron infusion, problem 3 and rifampin on her last admission  Symptoms have improved and have not returned  Continue thyroid medication as below  Holding off on TB/latent TB medication at this time  Will continue clinical and image monitoring as below    3  Hyperthyroidism, elevated alkaline phosphatase and hypercalcemia  Thyroid testing consistent with hyperthyroidism and with calcium testing unremarkable patient felt to have hypercalcemia due to hyperthyroidism  Consideration was also for extrapulmonary TB  Sputum testing, lymph node testing as well as liver biopsy have all been unremarkable  Lower suspicion at this time for extrapulmonary TB/granulomatous disease  Continue medications for hyperthyroidism  Will plan to follow imaging as below  Ongoing close follow-up by Endocrinology     4  Aortitis  Patient's imaging with these episodes of fever for concerning for aortitis, possible intramural hematoma, and questionable vasculitis  Have reviewed her case multiple times with her rheumatologist and there is no definitive evidence for vasculitis additionally inflammatory markers have not improved with immunosuppression    There is concern that findings on imaging may have been incidental   Agree with removing immunosuppression given lack of diagnosis  Patient is plan for repeat CTA in 6 months, will follow up on imaging  Patient to continue to follow with her rheumatologist  Monitor clinically for any return of symptoms  Continue to follow-up otherwise with CT surgery    5  Latent tuberculosis  During the workup of the above issues patient was found have positive QuantiFERON  Evaluations undertaken for extrapulmonary TB as well as pulmonary disease  Multiple sampling have been negative without any findings of granulomas on pathology  There is no direct evidence of active tuberculosis at this time  Despite being on immunosuppression the patient has not relapse and is overall feeling well  She did however have rifampin intolerance  This case has been discussed in great detail with the Department of Health the overarching concern is lack of an absolute diagnosis of her imaging findings in symptomatology  At this time will monitor for return of symptoms  Will not initiate treatment for latent tuberculosis or active tuberculosis  Will follow up repeat imaging in 6 months  Recommended patient return to our office sooner if symptoms developed  Recommended patient monitor for any return of fevers  Will continue to discuss this case with the Department of Health as new developments arise    The above plan was discussed in great detail with the patient  The above plan was discussed in great detail with patient's rheumatology and the Department of Health prior to her visit  Will Forward office visit to PCP and endocrinologist    RTO in 6 months or sooner if symptoms return  Antibiotics:  None    Subjective:  Patient currently denies having any nausea, vomiting, chest pain or shortness of breath  Since our last visit she has been seen by her rheumatologist and they are rapidly taking her off of immunosuppressive therapy    She has also been seen by CT surgery and had repeat CTA  She also continues to follow with Endocrinology  The patient reports her original symptoms of muscle aches and back pain along with fatigue have all resolved  She denies having any night sweats, weight loss, chronic cough or hemoptysis  She has had multiple samples taken from lymph nodes, multiple pulmonary specimens and liver biopsy which do not show any changes of granulomatous disease and testing for AFB/TB has been unremarkable  Since her last visit I have discussed her case with her rheumatologist and there is no definitive evidence of a vasculitis hence the plan to remove immunosuppression and monitor  I have also discussed her case with the Department of Health in her county and we have been unable to find true active TB  There remains hesitation however to initiate latent TB therapy as her diagnosis is unclear and she has not tolerated rifampin in the past   Recommendation/decision discussed with the Department of Health was monitoring clinically for any new or developing signs or symptoms and to hold off on 4 drug treatment as well as treatment for LTBI     ROS: A complete 12 point ROS is negative other than that noted in the HPI    Followup portions patient history reviewed and updated as: Allergies, current medications, past medical history, past social history, past surgical history, and the problem list    Objective:  Vitals:  Vitals:    08/10/20 0818   BP: 140/68   Pulse: 83   Temp: 98 2 °F (36 8 °C)   Weight: 97 5 kg (215 lb)       Physical Exam:   General Appearance:  Alert, interactive, appearing well, nontoxic, no acute distress  Throat: Deferred as patient is wearing a mask   Lungs:   Clear to auscultation bilaterally; no audible wheezes, rhonchi or rales; respirations unlabored on room air   Heart:  RRR; no murmur, rub or gallop appreciated   Abdomen:   Soft, non-tender, non-distended, positive bowel sounds       Extremities: No clubbing, cyanosis or edema Skin: No new rashes or lesions  No new draining wounds         Labs, Imaging, & Other studies:   All pertinent labs and imaging studies were personally reviewed    Lab Results   Component Value Date    K 4 5 06/15/2020     06/15/2020    CO2 27 06/15/2020    BUN 17 06/15/2020    CREATININE 0 73 06/15/2020    CALCIUM 8 6 06/15/2020    AST 11 06/15/2020    ALT 23 06/15/2020    ALKPHOS 104 06/15/2020    EGFR 99 06/15/2020     Lab Results   Component Value Date    WBC 13 66 (H) 06/15/2020    HGB 9 0 (L) 06/15/2020    HCT 30 8 (L) 06/15/2020    MCV 80 (L) 06/15/2020     (H) 06/15/2020   No results found for: Nguyen Sal

## 2020-08-10 NOTE — PATIENT INSTRUCTIONS
Please follow up with us after your CTA procedure is completed (appoximately 6 months)  We will call to schedule you for an appointment  Please call us if you develop any symptoms of fevers, chills, weight loss, or fatigue

## 2020-08-10 NOTE — LETTER
August 12, 2020     [address] [place] [id number]    Patient: Carlos Balbuena   YOB: 1975   Date of Visit: 8/10/2020       Dear Dr Cornelius Ours: Thank you for referring Carlos Balbuena to me for evaluation  Below are my notes for this consultation  If you have questions, please do not hesitate to call me  I look forward to following your patient along with you  Sincerely,        Winnie Concepcion MD        CC: MD Winnie [address] MD  8/12/2020  9:20 AM  Sign when Signing Visit  Progress Note - Infectious Disease   Carlos Balbuena 50 y o  female MRN: [id number]  Unit/Bed#:  Encounter: [id number]      Impression/Plan:  1  SIRS syndrome  Initial presentation with multiple vague complaints as below and electrolyte abnormalities along with findings on imaging  Patient's lab abnormalities have improved on thyroid medication and she is no longer having any systemic complaints  Continue to monitor for recurrence of fever  Continue lab monitoring given below  Will continue follow symptomatically and with imaging intermittently  Ongoing close follow-up with PCP/Endocrinology/Rheumatology    2  Diffuse muscle aches/pains  Patient had fluctuating muscle aches, bone pain and generalized fatigue  Ultimately felt to be multifactorial in the setting of iron infusion, problem 3 and rifampin on her last admission  Symptoms have improved and have not returned  Continue thyroid medication as below  Holding off on TB/latent TB medication at this time  Will continue clinical and image monitoring as below    3  Hyperthyroidism, elevated alkaline phosphatase and hypercalcemia  Thyroid testing consistent with hyperthyroidism and with calcium testing unremarkable patient felt to have hypercalcemia due to hyperthyroidism  Consideration was also for extrapulmonary TB  Sputum testing, lymph node testing as well as liver biopsy have all been unremarkable    Lower suspicion at this time for extrapulmonary TB/granulomatous disease  Continue medications for hyperthyroidism  Will plan to follow imaging as below  Ongoing close follow-up by Endocrinology     4  Aortitis  Patient's imaging with these episodes of fever for concerning for aortitis, possible intramural hematoma, and questionable vasculitis  Have reviewed her case multiple times with her rheumatologist and there is no definitive evidence for vasculitis additionally inflammatory markers have not improved with immunosuppression  There is concern that findings on imaging may have been incidental   Agree with removing immunosuppression given lack of diagnosis  Patient is plan for repeat CTA in 6 months, will follow up on imaging  Patient to continue to follow with her rheumatologist  Monitor clinically for any return of symptoms  Continue to follow-up otherwise with CT surgery    5  Latent tuberculosis  During the workup of the above issues patient was found have positive QuantiFERON  Evaluations undertaken for extrapulmonary TB as well as pulmonary disease  Multiple sampling have been negative without any findings of granulomas on pathology  There is no direct evidence of active tuberculosis at this time  Despite being on immunosuppression the patient has not relapse and is overall feeling well  She did however have rifampin intolerance  This case has been discussed in great detail with the Department of Health the overarching concern is lack of an absolute diagnosis of her imaging findings in symptomatology    At this time will monitor for return of symptoms  Will not initiate treatment for latent tuberculosis or active tuberculosis  Will follow up repeat imaging in 6 months  Recommended patient return to our office sooner if symptoms developed  Recommended patient monitor for any return of fevers  Will continue to discuss this case with the Department of Health as new developments arise    The above plan was discussed in great detail with the patient  The above plan was discussed in great detail with patient's rheumatology and the Department of Health prior to her visit  Will Forward office visit to PCP and endocrinologist    RTO in 6 months or sooner if symptoms return  Antibiotics:  None    Subjective:  Patient currently denies having any nausea, vomiting, chest pain or shortness of breath  Since our last visit she has been seen by her rheumatologist and they are rapidly taking her off of immunosuppressive therapy  She has also been seen by CT surgery and had repeat CTA  She also continues to follow with Endocrinology  The patient reports her original symptoms of muscle aches and back pain along with fatigue have all resolved  She denies having any night sweats, weight loss, chronic cough or hemoptysis  She has had multiple samples taken from lymph nodes, multiple pulmonary specimens and liver biopsy which do not show any changes of granulomatous disease and testing for AFB/TB has been unremarkable  Since her last visit I have discussed her case with her rheumatologist and there is no definitive evidence of a vasculitis hence the plan to remove immunosuppression and monitor  I have also discussed her case with the Department of Health in her county and we have been unable to find true active TB  There remains hesitation however to initiate latent TB therapy as her diagnosis is unclear and she has not tolerated rifampin in the past   Recommendation/decision discussed with the Department of Health was monitoring clinically for any new or developing signs or symptoms and to hold off on 4 drug treatment as well as treatment for LTBI     ROS: A complete 12 point ROS is negative other than that noted in the HPI    Followup portions patient history reviewed and updated as:   Allergies, current medications, past medical history, past social history, past surgical history, and the problem list    Objective:  Vitals:  Vitals: 08/10/20 0818   BP: 140/68   Pulse: 83   Temp: 98 2 °F (36 8 °C)   Weight: 97 5 kg (215 lb)       Physical Exam:   General Appearance:  Alert, interactive, appearing well, nontoxic, no acute distress  Throat: Deferred as patient is wearing a mask   Lungs:   Clear to auscultation bilaterally; no audible wheezes, rhonchi or rales; respirations unlabored on room air   Heart:  RRR; no murmur, rub or gallop appreciated   Abdomen:   Soft, non-tender, non-distended, positive bowel sounds  Extremities: No clubbing, cyanosis or edema   Skin: No new rashes or lesions  No new draining wounds         Labs, Imaging, & Other studies:   All pertinent labs and imaging studies were personally reviewed    Lab Results   Component Value Date    K 4 5 06/15/2020     06/15/2020    CO2 27 06/15/2020    BUN 17 06/15/2020    CREATININE 0 73 06/15/2020    CALCIUM 8 6 06/15/2020    AST 11 06/15/2020    ALT 23 06/15/2020    ALKPHOS 104 06/15/2020    EGFR 99 06/15/2020     Lab Results   Component Value Date    WBC 13 66 (H) 06/15/2020    HGB 9 0 (L) 06/15/2020    HCT 30 8 (L) 06/15/2020    MCV 80 (L) 06/15/2020     (H) 06/15/2020   No results found for: Cruz Shahid

## 2020-08-11 ENCOUNTER — TELEPHONE (OUTPATIENT)
Dept: INFECTIOUS DISEASES | Facility: CLINIC | Age: 45
End: 2020-08-11

## 2020-08-11 NOTE — TELEPHONE ENCOUNTER
Received call from Norberto Delong from the [address] [place] [address]  She wanted to let Dr Ly know that the patients sputum cultures all came back negative today for tuberculosis  Dr Ly aware

## 2020-08-24 LAB — FUNGUS SPEC CULT: NORMAL

## 2020-09-01 LAB
MYCOBACTERIUM SPEC CULT: NORMAL
RHODAMINE-AURAMINE STN SPEC: NORMAL

## 2020-09-09 LAB
MYCOBACTERIUM SPEC CULT: NORMAL
RHODAMINE-AURAMINE STN SPEC: NORMAL

## 2020-09-17 ENCOUNTER — OFFICE VISIT (OUTPATIENT)
Dept: ENDOCRINOLOGY | Facility: CLINIC | Age: 45
End: 2020-09-17
Payer: COMMERCIAL

## 2020-09-17 VITALS
SYSTOLIC BLOOD PRESSURE: 138 MMHG | HEIGHT: 63 IN | HEART RATE: 72 BPM | DIASTOLIC BLOOD PRESSURE: 60 MMHG | BODY MASS INDEX: 38.55 KG/M2 | TEMPERATURE: 97.8 F | WEIGHT: 217.6 LBS

## 2020-09-17 DIAGNOSIS — E66.9 DIABETES MELLITUS TYPE 2 IN OBESE (HCC): ICD-10-CM

## 2020-09-17 DIAGNOSIS — E05.90 HYPERTHYROIDISM: Primary | ICD-10-CM

## 2020-09-17 DIAGNOSIS — E66.9 DIABETES MELLITUS TYPE 2 IN OBESE (HCC): Primary | ICD-10-CM

## 2020-09-17 DIAGNOSIS — E04.2 MULTIPLE THYROID NODULES: ICD-10-CM

## 2020-09-17 DIAGNOSIS — E11.69 DIABETES MELLITUS TYPE 2 IN OBESE (HCC): ICD-10-CM

## 2020-09-17 DIAGNOSIS — E83.52 HYPERCALCEMIA: ICD-10-CM

## 2020-09-17 DIAGNOSIS — E11.69 DIABETES MELLITUS TYPE 2 IN OBESE (HCC): Primary | ICD-10-CM

## 2020-09-17 DIAGNOSIS — D50.9 IRON DEFICIENCY ANEMIA, UNSPECIFIED IRON DEFICIENCY ANEMIA TYPE: ICD-10-CM

## 2020-09-17 LAB — SL AMB POCT HEMOGLOBIN AIC: 7.1 (ref ?–6.5)

## 2020-09-17 PROCEDURE — 83036 HEMOGLOBIN GLYCOSYLATED A1C: CPT

## 2020-09-17 PROCEDURE — 99214 OFFICE O/P EST MOD 30 MIN: CPT | Performed by: INTERNAL MEDICINE

## 2020-09-17 NOTE — PROGRESS NOTES
Follow-up Patient Progress Note      CC:  Follow-up of hyperthyroidism, type 2 diabetes and hypercalcemia    History of Present Illness:   Mrs Sara Gr presents today for follow-up  Presently she feels much better  She reports that she has completed her steroid regimen approximately 10 days ago  She has gained approximately 10-15 lb over the last few months  Her dyspnea is improved  All her workup for latent TB was negative  She reports some mild constipation over the last 2 weeks  She denies any symptoms of muscle cramps, polyuria, polydipsia, numbness and tingling of her hands or feet, tremulousness or throat pain  She has had no blood test done since July 2020  Background history:  49-year-old [place] female with recently diagnosed Aortitis with mediastinal adenopathy, a positive QuantiFERON gold test, hyperthyroidism, hypercalcemia and known history of hypertension, HARLEY,  obesity, type 2 diabetes and metabolic syndrome  She is presently under follow-up by multiple specialties including Rheumatology, heme Oncology, Infectious Diseases and pulmonology  She is presently on prednisone 80 mg every day, methimazole 10 mg every day, metformin 1 g twice a day, losartan 100 mg daily, iron tablets twice a day, aspirin, Protonix and Singulair  Home blood glucose monitoring: checks every few days   Reports fasting in range of 80-90 and post prandial in 130-140mg/dL  No hypoglycemia    Current meds:  Methimazole 20mg qd  Metformin 1000mg BID    Patient Active Problem List   Diagnosis    Intramural aortic hematoma (HCC)    Retrosternal chest pain    Left flank pain    Thrombocytosis     Multiple thyroid nodules    Diabetes mellitus type 2    Iron deficiency anemia    Pulmonary hypertension (HCC)    Depression    Dyslipidemia, goal LDL below 100    Mild persistent asthma without complication    Essential hypertension    Aortitis     Acute on chronic right-sided low back pain without sciatica    Latent tuberculosis    Abnormal thyroid function test    SIRS (systemic inflammatory response syndrome)    Hyperphosphatemia    Fatigue    Abnormal LFTs    Low back pain    Hypercalcemia    Hyperthyroidism     Past Medical History:   Diagnosis Date    Asthma     Chronic anemia     Diabetes mellitus ([facility] )     Hypertension     Hyperthyroidism     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[facility] )     TB lung, latent       Past Surgical History:   Procedure Laterality Date     SECTION      IR BIOPSY LIVER MASS  7/15/2020      Family History   Problem Relation Age of Onset    Diabetes unspecified Mother      Social History     Tobacco Use    Smoking status: Never Smoker    Smokeless tobacco: Never Used   Substance Use Topics    Alcohol use: Never     Alcohol/week: 0 0 standard drinks     Frequency: Never     Drinks per session: Patient refused     Binge frequency: Never     Allergies   Allergen Reactions    Other Allergic Rhinitis     pollen         Current Outpatient Medications:     acetaminophen (TYLENOL) 325 mg tablet, Take 650 mg by mouth every 6 (six) hours as needed for mild pain, Disp: , Rfl:     albuterol (PROVENTIL HFA,VENTOLIN HFA) 90 mcg/act inhaler, Inhale 2 puffs 2 (two) times a day, Disp: , Rfl:     aspirin (ECOTRIN LOW STRENGTH) 81 mg EC tablet, Take 81 mg by mouth daily, Disp: , Rfl:     diclofenac sodium (VOLTAREN) 50 mg EC tablet, Take 1 tablet (50 mg total) by mouth 2 (two) times a day, Disp: 30 tablet, Rfl: 0    ergocalciferol (VITAMIN D2) 50,000 units, 50,000 Units once a week , Disp: , Rfl:     escitalopram (LEXAPRO) 10 mg tablet, Take 10 mg by mouth daily, Disp: , Rfl:     Fe Heme Polypeptide-Folic Acid 68-5 MG TABS, Take 1 tablet by mouth daily, Disp: , Rfl: 0    losartan (COZAAR) 100 MG tablet, Take 100 mg by mouth daily , Disp: , Rfl:     metFORMIN (GLUCOPHAGE) 500 mg tablet, Take 1,000 mg by mouth 2 (two) times a day with meals, Disp: , Rfl:     methimazole (TAPAZOLE) 10 mg tablet, Take 2 tablets (20 mg total) by mouth daily, Disp: 120 tablet, Rfl: 0    methotrexate 2 5 mg tablet, Take by mouth once a week, Disp: , Rfl:     metoprolol succinate (TOPROL-XL) 50 mg 24 hr tablet, Take 1 tablet (50 mg total) by mouth daily, Disp: 90 tablet, Rfl: 4    montelukast (SINGULAIR) 10 mg tablet, Take 10 mg by mouth daily at bedtime, Disp: , Rfl:     ondansetron (ZOFRAN-ODT) 4 mg disintegrating tablet, Take 4 mg by mouth every 6 (six) hours as needed , Disp: , Rfl:     Polysaccharide Iron Complex (POLY-IRON 150 PO), Take by mouth, Disp: , Rfl:     predniSONE 20 mg tablet, Take 4 tablets (80 mg total) by mouth daily (Patient taking differently: Take 40 mg by mouth daily ), Disp: 120 tablet, Rfl: 0    simvastatin (ZOCOR) 10 mg tablet, Take 10 mg by mouth daily at bedtime, Disp: , Rfl:     folic acid (FOLVITE) 1 mg tablet, Take by mouth daily, Disp: , Rfl:     pantoprazole (PROTONIX) 40 mg tablet, Take 1 tablet (40 mg total) by mouth daily in the early morning (Patient not taking: Reported on 9/17/2020), Disp: 30 tablet, Rfl: 0    sulfamethoxazole-trimethoprim (BACTRIM DS) 800-160 mg per tablet, , Disp: , Rfl:     Review of Systems   Constitutional: Positive for fatigue  HENT: Negative  Eyes: Negative  Respiratory: Negative  Cardiovascular: Negative  Gastrointestinal: Negative  Endocrine: Negative  Musculoskeletal: Negative  Skin: Negative  Allergic/Immunologic: Negative  Neurological: Negative  Hematological: Negative  Psychiatric/Behavioral: Negative  Physical Exam:  Body mass index is 38 55 kg/m²  /60   Pulse 72   Temp 97 8 °F (36 6 °C)   Ht 5' 3" (1 6 m)   Wt 98 7 kg (217 lb 9 6 oz)   LMP 04/04/2020 (Exact Date)   BMI 38 55 kg/m²    Vitals:    09/17/20 1047   Weight: 98 7 kg (217 lb 9 6 oz)        Physical Exam  Constitutional:       Appearance: She is well-developed  HENT:      Head: Normocephalic     Eyes:      Pupils: Pupils are equal, round, and reactive to light  Neck:      Musculoskeletal: Normal range of motion  Thyroid: No thyromegaly  Comments: Increased neck circumference  She has a clear thyromegaly with the visible and palpable goiter  No palpable thyroid nodules  No tenderness in the goiter  Cardiovascular:      Rate and Rhythm: Normal rate  Heart sounds: Normal heart sounds  Pulmonary:      Effort: Pulmonary effort is normal       Breath sounds: Normal breath sounds  Abdominal:      General: Bowel sounds are normal       Palpations: Abdomen is soft  Musculoskeletal:         General: No deformity  Skin:     Capillary Refill: Capillary refill takes less than 2 seconds  Coloration: Skin is not pale  Findings: No rash  Neurological:      Mental Status: She is alert and oriented to person, place, and time  Labs:   Lab Results   Component Value Date    HGBA1C 6 3 (H) 04/06/2020       Lab Results   Component Value Date    [id number] <0 007 (L) 07/11/2020       Lab Results   Component Value Date    CREATININE 0 73 06/15/2020    CREATININE 0 85 05/19/2020    CREATININE 0 89 05/18/2020    BUN 17 06/15/2020    K 4 5 06/15/2020     06/15/2020    CO2 27 06/15/2020     eGFR   Date Value Ref Range Status   06/15/2020 99 ml/min/1 73sq m Final       Lab Results   Component Value Date    ALT 23 06/15/2020    AST 11 06/15/2020     (H) 04/20/2020    ALKPHOS 104 06/15/2020       Lab Results   Component Value Date    CHOLESTEROL 135 04/07/2020     Lab Results   Component Value Date    HDL 34 (L) 04/07/2020     Lab Results   Component Value Date    TRIG 114 04/07/2020     No results found for: NONHDLC      Impression:  1  Hyperthyroidism    2  Diabetes mellitus type 2    3  Multiple thyroid nodules    4  Hypercalcemia    5  Iron deficiency anemia, unspecified iron deficiency anemia type         Plan:    Diagnoses and all orders for this visit:    Hyperthyroidism    She is currently on methimazole 20 mg daily  Presently she does not report any symptoms suggestive of hyperthyroidism  She did not have repeat thyroid functions checked since the dose increase approximately 2 months ago  Today she was counseled on the options for her hyperthyroid state  Advised to get repeat thyroid function tests done over the next few days based on which her her methimazole dosage will be adjusted  If she remains uncontrolled, may consider radioactive iodine ablation or surgery for definitive management of her hyperthyroid state  Advised follow-up in 3 months with repeat thyroid functions prior to the visit  -     T4, free; Future  -     TSH, 3rd generation; Future  -     T4, free; Future  -     TSH, 3rd generation; Future    Diabetes mellitus type 2  She is controlled with recent A1c of 7 1% and this is in spite of being on large doses of steroids to treat for suspected autoimmune mediated aortitis  She monitors her dietary intake and uses her metformin regularly  Today we discussed diabetes education and increased activity which has been limited due to her recurrent hospitalizations and cardiomyopathy  In the future she wishes to follow in the [facility] endocrinology office at [address] [place] [address]   -     Hemoglobin A1C; Future  -     Microalbumin / creatinine urine ratio; Future    Multiple thyroid nodules  She has a multinodular thyroid with the largest nodule located in the left lower lobe measuring 3 1 cm  This was an incidental finding on a CT of the neck done on 4/14/20         - Will check a thyroid ultrasound to monitor for growth  Hypercalcemia  This had improved based on the last lab test done on 7/21/20  Patient does not have any clinical symptoms of hypercalcemia  She has been treated with steroids for concern of possible granulomatous disease  For now will monitor only  -     Comprehensive metabolic panel; Future    Iron deficiency anemia, unspecified iron deficiency anemia type    She follows with a heme oncologist     I have spent 35 minutes with patient today in which greater than 50% of this time was spent in counseling/coordination of care  Discussed with the patient and all questioned fully answered  She will call me if any problems arise  Educated/ Counseled patient on diagnostic test results, prognosis, risk vs benefit of treatment options, importance of treatment compliance, healthy life and lifestyle choices        [address]

## 2020-10-05 ENCOUNTER — TELEPHONE (OUTPATIENT)
Dept: HEMATOLOGY ONCOLOGY | Facility: CLINIC | Age: 45
End: 2020-10-05

## 2020-10-05 ENCOUNTER — APPOINTMENT (OUTPATIENT)
Dept: LAB | Facility: HOSPITAL | Age: 45
End: 2020-10-05
Attending: INTERNAL MEDICINE
Payer: COMMERCIAL

## 2020-10-05 ENCOUNTER — DOCUMENTATION (OUTPATIENT)
Dept: HEMATOLOGY ONCOLOGY | Facility: MEDICAL CENTER | Age: 45
End: 2020-10-05

## 2020-10-05 DIAGNOSIS — D50.9 IRON DEFICIENCY ANEMIA, UNSPECIFIED IRON DEFICIENCY ANEMIA TYPE: ICD-10-CM

## 2020-10-05 LAB
ALBUMIN SERPL BCP-MCNC: 2.5 G/DL (ref 3.5–5)
ALP SERPL-CCNC: 103 U/L (ref 46–116)
ALT SERPL W P-5'-P-CCNC: 12 U/L (ref 12–78)
ANION GAP SERPL CALCULATED.3IONS-SCNC: 11 MMOL/L (ref 4–13)
AST SERPL W P-5'-P-CCNC: 13 U/L (ref 5–45)
BASOPHILS # BLD AUTO: 0.11 THOUSANDS/ΜL (ref 0–0.1)
BASOPHILS NFR BLD AUTO: 1 % (ref 0–1)
BILIRUB SERPL-MCNC: 0.2 MG/DL (ref 0.2–1)
BUN SERPL-MCNC: 15 MG/DL (ref 5–25)
CALCIUM ALBUM COR SERPL-MCNC: 10.5 MG/DL (ref 8.3–10.1)
CALCIUM SERPL-MCNC: 9.3 MG/DL (ref 8.3–10.1)
CHLORIDE SERPL-SCNC: 103 MMOL/L (ref 100–108)
CO2 SERPL-SCNC: 22 MMOL/L (ref 21–32)
CREAT SERPL-MCNC: 1.09 MG/DL (ref 0.6–1.3)
EOSINOPHIL # BLD AUTO: 0.18 THOUSAND/ΜL (ref 0–0.61)
EOSINOPHIL NFR BLD AUTO: 2 % (ref 0–6)
ERYTHROCYTE [DISTWIDTH] IN BLOOD BY AUTOMATED COUNT: 18.1 % (ref 11.6–15.1)
FERRITIN SERPL-MCNC: 133 NG/ML (ref 8–388)
GFR SERPL CREATININE-BSD FRML MDRD: 61 ML/MIN/1.73SQ M
GLUCOSE SERPL-MCNC: 143 MG/DL (ref 65–140)
HCT VFR BLD AUTO: 34.1 % (ref 34.8–46.1)
HGB BLD-MCNC: 10.4 G/DL (ref 11.5–15.4)
IMM GRANULOCYTES # BLD AUTO: 0.06 THOUSAND/UL (ref 0–0.2)
IMM GRANULOCYTES NFR BLD AUTO: 1 % (ref 0–2)
IRON SATN MFR SERPL: 17 %
IRON SERPL-MCNC: 42 UG/DL (ref 50–170)
LYMPHOCYTES # BLD AUTO: 2.49 THOUSANDS/ΜL (ref 0.6–4.47)
LYMPHOCYTES NFR BLD AUTO: 26 % (ref 14–44)
MCH RBC QN AUTO: 24.5 PG (ref 26.8–34.3)
MCHC RBC AUTO-ENTMCNC: 30.5 G/DL (ref 31.4–37.4)
MCV RBC AUTO: 80 FL (ref 82–98)
MONOCYTES # BLD AUTO: 0.61 THOUSAND/ΜL (ref 0.17–1.22)
MONOCYTES NFR BLD AUTO: 6 % (ref 4–12)
NEUTROPHILS # BLD AUTO: 6.15 THOUSANDS/ΜL (ref 1.85–7.62)
NEUTS SEG NFR BLD AUTO: 64 % (ref 43–75)
NRBC BLD AUTO-RTO: 0 /100 WBCS
PLATELET # BLD AUTO: 534 THOUSANDS/UL (ref 149–390)
PMV BLD AUTO: 9.7 FL (ref 8.9–12.7)
POTASSIUM SERPL-SCNC: 4 MMOL/L (ref 3.5–5.3)
PROT SERPL-MCNC: 7.8 G/DL (ref 6.4–8.2)
RBC # BLD AUTO: 4.24 MILLION/UL (ref 3.81–5.12)
SODIUM SERPL-SCNC: 136 MMOL/L (ref 136–145)
TIBC SERPL-MCNC: 245 UG/DL (ref 250–450)
WBC # BLD AUTO: 9.6 THOUSAND/UL (ref 4.31–10.16)

## 2020-10-05 PROCEDURE — 85025 COMPLETE CBC W/AUTO DIFF WBC: CPT

## 2020-10-05 PROCEDURE — 80053 COMPREHEN METABOLIC PANEL: CPT

## 2020-10-05 PROCEDURE — 82728 ASSAY OF FERRITIN: CPT

## 2020-10-05 PROCEDURE — 36415 COLL VENOUS BLD VENIPUNCTURE: CPT

## 2020-10-05 PROCEDURE — 83550 IRON BINDING TEST: CPT

## 2020-10-05 PROCEDURE — 83540 ASSAY OF IRON: CPT

## 2020-10-06 ENCOUNTER — OFFICE VISIT (OUTPATIENT)
Dept: HEMATOLOGY ONCOLOGY | Facility: CLINIC | Age: 45
End: 2020-10-06
Payer: COMMERCIAL

## 2020-10-06 ENCOUNTER — TELEPHONE (OUTPATIENT)
Dept: HEMATOLOGY ONCOLOGY | Facility: CLINIC | Age: 45
End: 2020-10-06

## 2020-10-06 VITALS
WEIGHT: 217 LBS | BODY MASS INDEX: 38.45 KG/M2 | HEIGHT: 63 IN | OXYGEN SATURATION: 96 % | SYSTOLIC BLOOD PRESSURE: 137 MMHG | TEMPERATURE: 98 F | HEART RATE: 85 BPM | DIASTOLIC BLOOD PRESSURE: 80 MMHG

## 2020-10-06 DIAGNOSIS — D50.9 IRON DEFICIENCY ANEMIA, UNSPECIFIED IRON DEFICIENCY ANEMIA TYPE: Primary | ICD-10-CM

## 2020-10-06 DIAGNOSIS — D75.839 THROMBOCYTOSIS: ICD-10-CM

## 2020-10-06 PROCEDURE — 99214 OFFICE O/P EST MOD 30 MIN: CPT | Performed by: NURSE PRACTITIONER

## 2020-10-12 ENCOUNTER — TELEPHONE (OUTPATIENT)
Dept: HEMATOLOGY ONCOLOGY | Facility: CLINIC | Age: 45
End: 2020-10-12

## 2020-11-17 ENCOUNTER — TELEPHONE (OUTPATIENT)
Dept: PULMONOLOGY | Facility: CLINIC | Age: 45
End: 2020-11-17

## 2020-12-21 ENCOUNTER — TELEPHONE (OUTPATIENT)
Dept: INTERVENTIONAL RADIOLOGY/VASCULAR | Facility: HOSPITAL | Age: 45
End: 2020-12-21

## 2020-12-21 ENCOUNTER — OFFICE VISIT (OUTPATIENT)
Dept: CARDIOLOGY CLINIC | Facility: HOSPITAL | Age: 45
End: 2020-12-21
Payer: COMMERCIAL

## 2020-12-21 VITALS
DIASTOLIC BLOOD PRESSURE: 70 MMHG | BODY MASS INDEX: 40.57 KG/M2 | HEIGHT: 63 IN | WEIGHT: 229 LBS | SYSTOLIC BLOOD PRESSURE: 122 MMHG | HEART RATE: 76 BPM | OXYGEN SATURATION: 98 %

## 2020-12-21 DIAGNOSIS — I10 ESSENTIAL HYPERTENSION: Primary | ICD-10-CM

## 2020-12-21 DIAGNOSIS — E78.5 DYSLIPIDEMIA, GOAL LDL BELOW 100: ICD-10-CM

## 2020-12-21 DIAGNOSIS — I77.6 AORTITIS (HCC): ICD-10-CM

## 2020-12-21 PROCEDURE — 99213 OFFICE O/P EST LOW 20 MIN: CPT | Performed by: INTERNAL MEDICINE

## 2021-01-11 ENCOUNTER — LAB (OUTPATIENT)
Dept: LAB | Facility: MEDICAL CENTER | Age: 46
End: 2021-01-11
Payer: COMMERCIAL

## 2021-01-11 DIAGNOSIS — Z22.7 LTBI (LATENT TUBERCULOSIS INFECTION): ICD-10-CM

## 2021-01-11 DIAGNOSIS — E11.69 DIABETES MELLITUS TYPE 2 IN OBESE (HCC): ICD-10-CM

## 2021-01-11 DIAGNOSIS — E66.9 DIABETES MELLITUS TYPE 2 IN OBESE (HCC): ICD-10-CM

## 2021-01-11 DIAGNOSIS — D75.839 THROMBOCYTOSIS: ICD-10-CM

## 2021-01-11 DIAGNOSIS — R74.8 ELEVATED ALKALINE PHOSPHATASE LEVEL: ICD-10-CM

## 2021-01-11 DIAGNOSIS — D50.9 IRON DEFICIENCY ANEMIA, UNSPECIFIED IRON DEFICIENCY ANEMIA TYPE: ICD-10-CM

## 2021-01-11 DIAGNOSIS — E05.90 HYPERTHYROIDISM: Primary | ICD-10-CM

## 2021-01-11 DIAGNOSIS — I77.6 AORTITIS (HCC): ICD-10-CM

## 2021-01-11 LAB
ALBUMIN SERPL BCP-MCNC: 2.9 G/DL (ref 3.5–5)
ALP SERPL-CCNC: 134 U/L (ref 46–116)
ALT SERPL W P-5'-P-CCNC: 17 U/L (ref 12–78)
ANION GAP SERPL CALCULATED.3IONS-SCNC: 7 MMOL/L (ref 4–13)
AST SERPL W P-5'-P-CCNC: 11 U/L (ref 5–45)
BASOPHILS # BLD AUTO: 0.16 THOUSANDS/ΜL (ref 0–0.1)
BASOPHILS NFR BLD AUTO: 2 % (ref 0–1)
BILIRUB SERPL-MCNC: 0.26 MG/DL (ref 0.2–1)
BUN SERPL-MCNC: 18 MG/DL (ref 5–25)
CALCIUM ALBUM COR SERPL-MCNC: 10.1 MG/DL (ref 8.3–10.1)
CALCIUM SERPL-MCNC: 9.2 MG/DL (ref 8.3–10.1)
CHLORIDE SERPL-SCNC: 108 MMOL/L (ref 100–108)
CO2 SERPL-SCNC: 23 MMOL/L (ref 21–32)
CREAT SERPL-MCNC: 0.98 MG/DL (ref 0.6–1.3)
CREAT UR-MCNC: 74.6 MG/DL
EOSINOPHIL # BLD AUTO: 0.26 THOUSAND/ΜL (ref 0–0.61)
EOSINOPHIL NFR BLD AUTO: 3 % (ref 0–6)
ERYTHROCYTE [DISTWIDTH] IN BLOOD BY AUTOMATED COUNT: 17.5 % (ref 11.6–15.1)
FERRITIN SERPL-MCNC: 65 NG/ML (ref 8–388)
GFR SERPL CREATININE-BSD FRML MDRD: 69 ML/MIN/1.73SQ M
GLUCOSE SERPL-MCNC: 127 MG/DL (ref 65–140)
HCT VFR BLD AUTO: 40.7 % (ref 34.8–46.1)
HGB BLD-MCNC: 12.5 G/DL (ref 11.5–15.4)
IMM GRANULOCYTES # BLD AUTO: 0.04 THOUSAND/UL (ref 0–0.2)
IMM GRANULOCYTES NFR BLD AUTO: 0 % (ref 0–2)
IRON SATN MFR SERPL: 11 %
IRON SERPL-MCNC: 38 UG/DL (ref 50–170)
LYMPHOCYTES # BLD AUTO: 2.52 THOUSANDS/ΜL (ref 0.6–4.47)
LYMPHOCYTES NFR BLD AUTO: 28 % (ref 14–44)
MCH RBC QN AUTO: 24.3 PG (ref 26.8–34.3)
MCHC RBC AUTO-ENTMCNC: 30.7 G/DL (ref 31.4–37.4)
MCV RBC AUTO: 79 FL (ref 82–98)
MICROALBUMIN UR-MCNC: 1400 MG/L (ref 0–20)
MICROALBUMIN/CREAT 24H UR: 1877 MG/G CREATININE (ref 0–30)
MONOCYTES # BLD AUTO: 0.54 THOUSAND/ΜL (ref 0.17–1.22)
MONOCYTES NFR BLD AUTO: 6 % (ref 4–12)
NEUTROPHILS # BLD AUTO: 5.43 THOUSANDS/ΜL (ref 1.85–7.62)
NEUTS SEG NFR BLD AUTO: 61 % (ref 43–75)
NRBC BLD AUTO-RTO: 0 /100 WBCS
PLATELET # BLD AUTO: 599 THOUSANDS/UL (ref 149–390)
PMV BLD AUTO: 9.7 FL (ref 8.9–12.7)
POTASSIUM SERPL-SCNC: 4.4 MMOL/L (ref 3.5–5.3)
PROT SERPL-MCNC: 8.2 G/DL (ref 6.4–8.2)
RBC # BLD AUTO: 5.14 MILLION/UL (ref 3.81–5.12)
SODIUM SERPL-SCNC: 138 MMOL/L (ref 136–145)
T4 FREE SERPL-MCNC: 0.62 NG/DL (ref 0.76–1.46)
TIBC SERPL-MCNC: 333 UG/DL (ref 250–450)
TSH SERPL DL<=0.05 MIU/L-ACNC: 1.21 UIU/ML (ref 0.36–3.74)
WBC # BLD AUTO: 8.95 THOUSAND/UL (ref 4.31–10.16)

## 2021-01-11 PROCEDURE — 36415 COLL VENOUS BLD VENIPUNCTURE: CPT

## 2021-01-11 PROCEDURE — 85025 COMPLETE CBC W/AUTO DIFF WBC: CPT

## 2021-01-11 PROCEDURE — 82570 ASSAY OF URINE CREATININE: CPT

## 2021-01-11 PROCEDURE — 84439 ASSAY OF FREE THYROXINE: CPT

## 2021-01-11 PROCEDURE — 80053 COMPREHEN METABOLIC PANEL: CPT

## 2021-01-11 PROCEDURE — 82652 VIT D 1 25-DIHYDROXY: CPT

## 2021-01-11 PROCEDURE — 82043 UR ALBUMIN QUANTITATIVE: CPT

## 2021-01-11 PROCEDURE — 83540 ASSAY OF IRON: CPT

## 2021-01-11 PROCEDURE — 82728 ASSAY OF FERRITIN: CPT

## 2021-01-11 PROCEDURE — 83550 IRON BINDING TEST: CPT

## 2021-01-11 PROCEDURE — 84443 ASSAY THYROID STIM HORMONE: CPT

## 2021-01-12 ENCOUNTER — TELEPHONE (OUTPATIENT)
Dept: ENDOCRINOLOGY | Facility: CLINIC | Age: 46
End: 2021-01-12

## 2021-01-12 ENCOUNTER — OFFICE VISIT (OUTPATIENT)
Dept: HEMATOLOGY ONCOLOGY | Facility: CLINIC | Age: 46
End: 2021-01-12
Payer: COMMERCIAL

## 2021-01-12 VITALS
HEIGHT: 63 IN | RESPIRATION RATE: 18 BRPM | DIASTOLIC BLOOD PRESSURE: 72 MMHG | HEART RATE: 83 BPM | SYSTOLIC BLOOD PRESSURE: 124 MMHG | TEMPERATURE: 99.6 F | OXYGEN SATURATION: 96 % | WEIGHT: 224 LBS | BODY MASS INDEX: 39.69 KG/M2

## 2021-01-12 DIAGNOSIS — D50.9 IRON DEFICIENCY ANEMIA, UNSPECIFIED IRON DEFICIENCY ANEMIA TYPE: Primary | ICD-10-CM

## 2021-01-12 DIAGNOSIS — D75.839 THROMBOCYTOSIS: ICD-10-CM

## 2021-01-12 DIAGNOSIS — I77.6 AORTITIS (HCC): ICD-10-CM

## 2021-01-12 PROCEDURE — 99214 OFFICE O/P EST MOD 30 MIN: CPT | Performed by: INTERNAL MEDICINE

## 2021-01-12 NOTE — TELEPHONE ENCOUNTER
Called patient to review lab results - left message  Note seeing hemonc today  Will review in person on her next visit 1/21/21

## 2021-01-12 NOTE — PROGRESS NOTES
[facility] [place]  [address] [facility] [address]  [phone]    Selina Gallo Sree,1975, [id number]  01/12/21    Discussion:   In summary, this is a 78-year-old female history of anemia and thrombocytosis  CBC today shows normal white count, diff, hemoglobin  MCV 79  Platelets 448  Iron saturation 11%, ferritin 65  She has not been on oral iron supplementation  She had 3 menstrual bleeds over the past 2 months which may have led to mild iron deficiency although it does not seem to have affected her hemoglobin  She has been on folate and B12 supplementation orally since June 2020  I am not sure whether these are helping but they are probably not harming her  The etiology of her thrombocytosis is unclear  She had previously had radiographic demonstration of aortitis  She has also had significant CRP elevations  Specific diagnosis is not evident to me  Background inflammatory process may be the cause of her thrombocytosis  Clinically she is actually doing fairly well  She generally functions without restriction  From a hematologic viewpoint, observation is appropriate  Follow-up in 4 months with repeat lab work just prior to that visit  I discussed the above with the patient  The patient  voiced understanding and agreement   ______________________________________________________________________    Chief Complaint   Patient presents with    Follow-up       HPI:  Oncology History    No history exists  Interval History:  Clinically stable  ECOG-  0 - Asymptomatic    Review of Systems   Constitutional: Negative for appetite change, diaphoresis, fatigue and fever  HENT: Negative for sinus pain  Eyes: Negative for discharge  Respiratory: Negative for cough and shortness of breath  Cardiovascular: Negative for chest pain  Gastrointestinal: Negative for abdominal pain, constipation and diarrhea  Endocrine: Negative for cold intolerance  Genitourinary: Negative for difficulty urinating and hematuria  Musculoskeletal: Negative for joint swelling  Skin: Negative for rash  Allergic/Immunologic: Negative for environmental allergies  Neurological: Negative for dizziness and headaches  Hematological: Negative for adenopathy  Psychiatric/Behavioral: Negative for agitation         Past Medical History:   Diagnosis Date    Asthma     Chronic anemia     Diabetes mellitus ([facility] )     Hypertension     Hyperthyroidism     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[facility] )     TB lung, latent      Patient Active Problem List   Diagnosis    Intramural aortic hematoma (HCC)    Retrosternal chest pain    Left flank pain    Thrombocytosis     Multiple thyroid nodules    Diabetes mellitus type 2    Iron deficiency anemia    Pulmonary hypertension (HCC)    Depression    Dyslipidemia, goal LDL below 100    Mild persistent asthma without complication    Essential hypertension    Aortitis     Acute on chronic right-sided low back pain without sciatica    Latent tuberculosis    Abnormal thyroid function test    SIRS (systemic inflammatory response syndrome)    Hyperphosphatemia    Fatigue    Abnormal LFTs    Low back pain    Hypercalcemia    Hyperthyroidism       Current Outpatient Medications:     acetaminophen (TYLENOL) 325 mg tablet, Take 650 mg by mouth every 6 (six) hours as needed for mild pain, Disp: , Rfl:     albuterol (PROVENTIL HFA,VENTOLIN HFA) 90 mcg/act inhaler, Inhale 2 puffs 2 (two) times a day, Disp: , Rfl:     aspirin (ECOTRIN LOW STRENGTH) 81 mg EC tablet, Take 81 mg by mouth daily, Disp: , Rfl:     ergocalciferol (VITAMIN D2) 50,000 units, 50,000 Units once a week , Disp: , Rfl:     escitalopram (LEXAPRO) 10 mg tablet, Take 10 mg by mouth daily, Disp: , Rfl:     folic acid (FOLVITE) 1 mg tablet, Take by mouth daily, Disp: , Rfl:     losartan (COZAAR) 100 MG tablet, Take 100 mg by mouth daily , Disp: , Rfl:     metFORMIN (GLUCOPHAGE) 500 mg tablet, Take 1,000 mg by mouth 2 (two) times a day with meals, Disp: , Rfl:     methimazole (TAPAZOLE) 10 mg tablet, Take 2 tablets (20 mg total) by mouth daily, Disp: 120 tablet, Rfl: 0    methotrexate 2 5 mg tablet, Take by mouth once a week, Disp: , Rfl:     metoprolol succinate (TOPROL-XL) 50 mg 24 hr tablet, Take 1 tablet (50 mg total) by mouth daily, Disp: 90 tablet, Rfl: 4    montelukast (SINGULAIR) 10 mg tablet, Take 10 mg by mouth daily at bedtime, Disp: , Rfl:     pantoprazole (PROTONIX) 40 mg tablet, Take 1 tablet (40 mg total) by mouth daily in the early morning, Disp: 30 tablet, Rfl: 0    Polysaccharide Iron Complex (POLY-IRON 150 PO), Take by mouth, Disp: , Rfl:     simvastatin (ZOCOR) 10 mg tablet, Take 10 mg by mouth daily at bedtime, Disp: , Rfl:     diclofenac sodium (VOLTAREN) 50 mg EC tablet, Take 1 tablet (50 mg total) by mouth 2 (two) times a day (Patient not taking: Reported on 2020), Disp: 30 tablet, Rfl: 0    Fe Heme Polypeptide-Folic Acid 82-8 MG TABS, Take 1 tablet by mouth daily (Patient not taking: Reported on 2020), Disp: , Rfl: 0  Allergies   Allergen Reactions    Other Allergic Rhinitis     pollen     Past Surgical History:   Procedure Laterality Date     SECTION      IR BIOPSY LIVER MASS  7/15/2020     Social History     Objective:  Vitals:    21 1042   BP: 124/72   BP Location: Left arm   Patient Position: Sitting   Pulse: 83   Resp: 18   Temp: 99 6 °F (37 6 °C)   TempSrc: Tympanic   SpO2: 96%   Weight: 102 kg (224 lb)   Height: 5' 3" (1 6 m)     Physical Exam  Constitutional:       Appearance: She is well-developed  HENT:      Head: Normocephalic and atraumatic  Eyes:      Pupils: Pupils are equal, round, and reactive to light  Neck:      Musculoskeletal: Neck supple  Cardiovascular:      Rate and Rhythm: Normal rate  Heart sounds: No murmur  Pulmonary:      Effort: No respiratory distress        Breath sounds: No wheezing or rales  Abdominal:      General: There is no distension  Palpations: Abdomen is soft  Tenderness: There is no abdominal tenderness  There is no rebound  Musculoskeletal:         General: No tenderness  Lymphadenopathy:      Cervical: No cervical adenopathy  Skin:     General: Skin is warm  Findings: No rash  Neurological:      Mental Status: She is alert and oriented to person, place, and time  Deep Tendon Reflexes: Reflexes normal    Psychiatric:         Thought Content: Thought content normal            Labs: I personally reviewed the labs and imaging pertinent to this patient care

## 2021-01-13 LAB — 1,25(OH)2D3 SERPL-MCNC: 57.6 PG/ML (ref 19.9–79.3)

## 2021-01-18 DIAGNOSIS — E05.90 HYPERTHYROIDISM: ICD-10-CM

## 2021-01-18 RX ORDER — METHIMAZOLE 10 MG/1
20 TABLET ORAL DAILY
Qty: 180 TABLET | Refills: 0 | Status: CANCELLED | OUTPATIENT
Start: 2021-01-18

## 2021-01-21 ENCOUNTER — OFFICE VISIT (OUTPATIENT)
Dept: ENDOCRINOLOGY | Facility: CLINIC | Age: 46
End: 2021-01-21
Payer: COMMERCIAL

## 2021-01-21 VITALS
HEART RATE: 78 BPM | DIASTOLIC BLOOD PRESSURE: 74 MMHG | SYSTOLIC BLOOD PRESSURE: 126 MMHG | BODY MASS INDEX: 40.35 KG/M2 | WEIGHT: 227.8 LBS

## 2021-01-21 DIAGNOSIS — E66.9 DIABETES MELLITUS TYPE 2 IN OBESE (HCC): ICD-10-CM

## 2021-01-21 DIAGNOSIS — E83.52 HYPERCALCEMIA: ICD-10-CM

## 2021-01-21 DIAGNOSIS — E78.5 DYSLIPIDEMIA, GOAL LDL BELOW 100: ICD-10-CM

## 2021-01-21 DIAGNOSIS — E05.90 HYPERTHYROIDISM: Primary | ICD-10-CM

## 2021-01-21 DIAGNOSIS — E11.69 DIABETES MELLITUS TYPE 2 IN OBESE (HCC): ICD-10-CM

## 2021-01-21 DIAGNOSIS — R65.10 SIRS (SYSTEMIC INFLAMMATORY RESPONSE SYNDROME) (HCC): ICD-10-CM

## 2021-01-21 PROCEDURE — 99214 OFFICE O/P EST MOD 30 MIN: CPT | Performed by: INTERNAL MEDICINE

## 2021-01-21 RX ORDER — METHIMAZOLE 10 MG/1
10 TABLET ORAL DAILY
Qty: 60 TABLET | Refills: 1 | Status: SHIPPED | OUTPATIENT
Start: 2021-01-21 | End: 2021-05-24 | Stop reason: SDUPTHER

## 2021-01-21 NOTE — PROGRESS NOTES
Follow-up Patient Progress Note      CC:  Follow-up of hyperthyroidism, type 2 diabetes    History of Present Illness:   57-year-old female with type 2 diabetes, hypertension, obesity, iron deficiency anemia, recent Aortitis, hypothyroidism and hypercalcemia  For her recent aortitis, she had an extensive workup and multi specialty evaluation and a prolonged hospitalization  She was placed on high-dose prednisone and slowly tapered  At the same time she was found to have significant hyperthyroidism associated with hypercalcemia that has slowly improved  She now presents for follow-up  Her last visit was 9/17/20  She reports generally feeling well  She has been compliant with her methimazole and metformin  No reported polyuria, polydipsia or blurred vision  Reports mild hair loss recently  She has gained weight with steroid use  Diet:  Admits to recent dietary indiscretions  She does not count her carbohydrates  More red meat recently  Activity:  No regular exercise    Home blood glucose monitoring: checks every 2-3 days BG is usually 80-90 fasting and 130's post prandial     Current meds:  Metformin 1000mg qam and 500mg qpm  Methimazole 20mg qdaily    Opthamology: no  Podiatry: yes, due 4/21  Influenza: Yes  Dental:  Pancreatitis: No    Ace/ARB: losartan  Statin: simvastatin    Patient Active Problem List   Diagnosis    Intramural aortic hematoma (HCC)    Retrosternal chest pain    Left flank pain    Thrombocytosis     Multiple thyroid nodules    Diabetes mellitus type 2    Iron deficiency anemia    Pulmonary hypertension ([address] )    Depression    Dyslipidemia, goal LDL below 100    Mild persistent asthma without complication    Essential hypertension    Aortitis     Acute on chronic right-sided low back pain without sciatica    Latent tuberculosis    Abnormal thyroid function test    SIRS (systemic inflammatory response syndrome)    Hyperphosphatemia    Fatigue    Abnormal LFTs  Low back pain    Hypercalcemia    Hyperthyroidism     Past Medical History:   Diagnosis Date    Asthma     Chronic anemia     [facility]     Hypertension     Hyperthyroidism     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[facility] )     TB lung, latent       Past Surgical History:   Procedure Laterality Date     SECTION      IR BIOPSY LIVER MASS  7/15/2020      Family History   Problem Relation Age of Onset    Diabetes unspecified Mother      Social History     Tobacco Use    Smoking status: Never Smoker    Smokeless tobacco: Never Used   Substance Use Topics    Alcohol use: Never     Alcohol/week: 0 0 standard drinks     Frequency: Never     Drinks per session: Patient refused     Binge frequency: Never     Allergies   Allergen Reactions    Other Allergic Rhinitis     pollen         Current Outpatient Medications:     acetaminophen (TYLENOL) 325 mg tablet, Take 650 mg by mouth every 6 (six) hours as needed for mild pain, Disp: , Rfl:     albuterol (PROVENTIL HFA,VENTOLIN HFA) 90 mcg/act inhaler, Inhale 2 puffs 2 (two) times a day, Disp: , Rfl:     aspirin (ECOTRIN LOW STRENGTH) 81 mg EC tablet, Take 81 mg by mouth daily, Disp: , Rfl:     escitalopram (LEXAPRO) 10 mg tablet, Take 10 mg by mouth daily, Disp: , Rfl:     folic acid (FOLVITE) 1 mg tablet, Take by mouth daily, Disp: , Rfl:     losartan (COZAAR) 100 MG tablet, Take 100 mg by mouth daily , Disp: , Rfl:     metFORMIN (GLUCOPHAGE) 500 mg tablet, Take 1,000 mg by mouth 2 (two) times a day with meals, Disp: , Rfl:     methotrexate 2 5 mg tablet, Take by mouth once a week, Disp: , Rfl:     metoprolol succinate (TOPROL-XL) 50 mg 24 hr tablet, Take 1 tablet (50 mg total) by mouth daily, Disp: 90 tablet, Rfl: 4    montelukast (SINGULAIR) 10 mg tablet, Take 10 mg by mouth daily at bedtime, Disp: , Rfl:     pantoprazole (PROTONIX) 40 mg tablet, Take 1 tablet (40 mg total) by mouth daily in the early morning, Disp: 30 tablet, Rfl: 0   Polysaccharide Iron Complex (POLY-IRON 150 PO), Take by mouth, Disp: , Rfl:     diclofenac sodium (VOLTAREN) 50 mg EC tablet, Take 1 tablet (50 mg total) by mouth 2 (two) times a day (Patient not taking: Reported on 12/21/2020), Disp: 30 tablet, Rfl: 0    ergocalciferol (VITAMIN D2) 50,000 units, 50,000 Units once a week , Disp: , Rfl:     Fe Heme Polypeptide-Folic Acid 40-1 MG TABS, Take 1 tablet by mouth daily (Patient not taking: Reported on 12/21/2020), Disp: , Rfl: 0    methimazole (TAPAZOLE) 10 mg tablet, Take 2 tablets (20 mg total) by mouth daily (Patient not taking: Reported on 1/21/2021), Disp: 120 tablet, Rfl: 0    simvastatin (ZOCOR) 10 mg tablet, Take 10 mg by mouth daily at bedtime, Disp: , Rfl:     Review of Systems    Physical Exam:  Body mass index is 40 35 kg/m²  /74   Pulse 78   Wt 103 kg (227 lb 12 8 oz)   LMP 04/04/2020 (Exact Date)   BMI 40 35 kg/m²    Vitals:    01/21/21 1055   Weight: 103 kg (227 lb 12 8 oz)        Physical Exam    Labs:   Lab Results   Component Value Date    HGBA1C 7 1 (A) 09/17/2020       Lab Results   Component Value Date    QJH4NGKDSCUA 1 210 01/11/2021       Lab Results   Component Value Date    CREATININE 0 98 01/11/2021    CREATININE 1 09 10/05/2020    CREATININE 0 73 06/15/2020    BUN 18 01/11/2021    K 4 4 01/11/2021     01/11/2021    CO2 23 01/11/2021     eGFR   Date Value Ref Range Status   01/11/2021 69 ml/min/1 73sq m Final       Lab Results   Component Value Date    ALT 17 01/11/2021    AST 11 01/11/2021     (H) 04/20/2020    ALKPHOS 134 (H) 01/11/2021       Lab Results   Component Value Date    CHOLESTEROL 135 04/07/2020     Lab Results   Component Value Date    HDL 34 (L) 04/07/2020     Lab Results   Component Value Date    TRIG 114 04/07/2020     No results found for: NONHDLC      Impression:  1  Hyperthyroidism    2  SIRS (systemic inflammatory response syndrome) (HCC)    3  Diabetes mellitus type 2    4  Hypercalcemia    5  Dyslipidemia, goal LDL below 100         Plan:    Diagnoses and all orders for this visit:    Hyperthyroidism  She is presently overtreated with free T4 of 0 62 ng/dL and TSH 1 2 uIU per mL  She also seems to have some symptoms of hypothyroidism  Note that her hyperthyroidism seems to be correlating chronologically with her acute aortitis  She was advised to reduce her methimazole dose to 10 mg q day  Repeat thyroid functions in 6 weeks  Follow-up in 3 months  -     methimazole (TAPAZOLE) 10 mg tablet; Take 1 tablet (10 mg total) by mouth daily  -     T4, free; Future  -     TSH, 3rd generation; Future    Diabetes mellitus type 2  She has recently worsened glycemia with an A1c of 7 1%  Her past A1c was 6 3%  Recommended carbohydrate consistent diet, daily activity and medical fitness  She was also educated about G LP 1 agonists, the uses and side effects  I think she will be a good candidate for G LP 1 agonist treatment  Patient wishes to try lifestyle modification 1st and wishes to consider this next visit if there is no improvement  Advised to increase back to metformin 1 g twice daily  Advised to maintain blood glucose logs  Follow-up in 3 months  -     Ambulatory referral to Diabetic Education; Future  -     Hemoglobin A1C; Future  -     Microalbumin / creatinine urine ratio; Future    Hypercalcemia  Seems to have improved  Her corrected calcium was 10 1 mg/dL recently  Will continue to monitor  Morbid obesity  Advised diet, lifestyle modifications, outpatient sleep evaluation  Patient wishes to try lifestyle modifications and diet first       Son was updated in office  I have spent 35 minutes with patient today in which greater than 50% of this time was spent in counseling/coordination of care  Discussed with the patient and all questioned fully answered  She will call me if any problems arise      Educated/ Counseled patient on diagnostic test results, prognosis, risk vs benefit of treatment options, importance of treatment compliance, healthy life and lifestyle choices        Nesha Richardson

## 2021-02-04 ENCOUNTER — TELEMEDICINE (OUTPATIENT)
Dept: DIABETES SERVICES | Facility: CLINIC | Age: 46
End: 2021-02-04
Payer: COMMERCIAL

## 2021-02-04 DIAGNOSIS — E66.9 DIABETES MELLITUS TYPE 2 IN OBESE (HCC): ICD-10-CM

## 2021-02-04 DIAGNOSIS — E11.69 DIABETES MELLITUS TYPE 2 IN OBESE (HCC): ICD-10-CM

## 2021-02-04 PROCEDURE — G0109 DIAB MANAGE TRN IND/GROUP: HCPCS | Performed by: DIETITIAN, REGISTERED

## 2021-02-04 NOTE — PATIENT INSTRUCTIONS
Class Assessment AVS    Please call [phone] to schedule to attend virtual Living Well with Diabetes Classes  Testing frequency: 2 times per day, test in pairs around meals    Goal Blood Sugars:   Premeal , even better <110  2hr after a meal <180, even better <140  A1C <7%, even better <6 5%  Thank you for coming to the [facility] for education today  Please feel free to call with any questions or concerns      Virgilio Resendez  [address] [place] [address] [id number] [place] [id number]

## 2021-02-04 NOTE — PROGRESS NOTES
Virtual Regular Visit      Assessment/Plan:    Problem List Items Addressed This Visit        Endocrine    Diabetes mellitus type 2               Reason for visit is   Chief Complaint   Patient presents with    Diabetes Type 2    Virtual Regular Visit        Encounter provider Benjamin Davis    Provider located at [address] [facility],[address] [facility]  [address] [address]      Recent Visits  No visits were found meeting these conditions  Showing recent visits within past 7 days and meeting all other requirements     Today's Visits  Date Type Provider Dept   21 Telemedicine Mari Resendez Pg Diabetic Education 4315 [facility] today's visits and meeting all other requirements     Future Appointments  No visits were found meeting these conditions  Showing future appointments within next 150 days and meeting all other requirements        The patient was identified by name and date of birth  Maurayojanahelenalexander Jose Luislewis was informed that this is a telemedicine visit and that the visit is being conducted through [employer] and patient was informed that this is a secure, HIPAA-compliant platform  She agrees to proceed     My office door was closed  No one else was in the room  She acknowledged consent and understanding of privacy and security of the video platform  The patient has agreed to participate and understands they can discontinue the visit at any time  Patient is aware this is a billable service  Subjective  Tatiana Balbuena is a 50 y o  female , please see documentation below         HPI     Past Medical History:   Diagnosis Date    Asthma     Chronic anemia     [facility] [address] [facility]     Hypertension     Hyperthyroidism     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[facility] [address] [facility]     TB lung, latent        Past Surgical History:   Procedure Laterality Date     SECTION      IR BIOPSY LIVER MASS  7/15/2020       Current Outpatient Medications   Medication Sig Dispense Refill    metFORMIN (GLUCOPHAGE) 500 mg tablet Take 1,000 mg by mouth 2 (two) times a day with meals      acetaminophen (TYLENOL) 325 mg tablet Take 650 mg by mouth every 6 (six) hours as needed for mild pain      albuterol (PROVENTIL HFA,VENTOLIN HFA) 90 mcg/act inhaler Inhale 2 puffs 2 (two) times a day      aspirin (ECOTRIN LOW STRENGTH) 81 mg EC tablet Take 81 mg by mouth daily      diclofenac sodium (VOLTAREN) 50 mg EC tablet Take 1 tablet (50 mg total) by mouth 2 (two) times a day (Patient not taking: Reported on 12/21/2020) 30 tablet 0    ergocalciferol (VITAMIN D2) 50,000 units 50,000 Units once a week       escitalopram (LEXAPRO) 10 mg tablet Take 10 mg by mouth daily      Fe Heme Polypeptide-Folic Acid 45-9 MG TABS Take 1 tablet by mouth daily (Patient not taking: Reported on 24/59/6879)  0    folic acid (FOLVITE) 1 mg tablet Take by mouth daily      losartan (COZAAR) 100 MG tablet Take 100 mg by mouth daily       methimazole (TAPAZOLE) 10 mg tablet Take 1 tablet (10 mg total) by mouth daily 60 tablet 1    methotrexate 2 5 mg tablet Take by mouth once a week      metoprolol succinate (TOPROL-XL) 50 mg 24 hr tablet Take 1 tablet (50 mg total) by mouth daily 90 tablet 4    montelukast (SINGULAIR) 10 mg tablet Take 10 mg by mouth daily at bedtime      pantoprazole (PROTONIX) 40 mg tablet Take 1 tablet (40 mg total) by mouth daily in the early morning 30 tablet 0    Polysaccharide Iron Complex (POLY-IRON 150 PO) Take by mouth      simvastatin (ZOCOR) 10 mg tablet Take 10 mg by mouth daily at bedtime       No current facility-administered medications for this visit  Allergies   Allergen Reactions    Other Allergic Rhinitis     pollen       Review of Systems    Video Exam    There were no vitals filed for this visit      Physical Exam     I spent 40 minutes directly with the patient during this visit      [facility] acknowledges that she has consented to an online visit or consultation  She understands that the online visit is based solely on information provided by her, and that, in the absence of a face-to-face physical evaluation by the physician, the diagnosis she receives is both limited and provisional in terms of accuracy and completeness  This is not intended to replace a full medical face-to-face evaluation by the physician  Tatiana Balbuena understands and accepts these terms  Type 2 Diabetes Class Assessment    HPI: Met with [facility] [facility] Sree for DSME Initial visit  [address] has Type 2 Diabetes  Part way through today's visit Epic lost connection and I was unable to reconnect  Then, a few minutes later, [employer] Teams stopped working  Once I rebooted the computer I was unable to reconnect with the patient to complete the visit questions  I left a voice message on her phone and will send additional materials via email  She will call to choose classes  Diabetes Assessment  Visit Type: Initial visit  Present at Session: patient   Special Learning Needs: No  Barriers to Learning: no barriers    How do you feel about making lifestyle changes at this time? I want to lose weight and maintain health  How would you rate your current knowledge of diabetes? fair  How confident are you that will be able to take better control of your diabetes?: good    How long have you had diabetes? 2 years  Have you had diabetes education in the past?: No  Do you have any family members with diabetes?: Yes - mother, she passed away  Do you monitor your blood sugar? yes  Type of blood sugar monitor: One Touch Verio  How old is your meter?: 3 months old  How often do you test your blood sugars?:2-3 time per week  Do you keep a written record of your blood sugars?  Yes   Blood sugar log with patient today and reviewed by educator?: No   Blood Sugar ranges:    Fastin-95   Before meals: doesn't check   2 hours after meals: 130-150  Any financial concerns pertaining to your diabetes supplies, medication or care?: No  Have you ever experienced hypoglycemia?:  Yes - 63 once in the middle of the night  Have you ever been hospitalized or gone to the ER due to your blood sugars?: No  How do you treat low blood sugars?: educated  How do you treat high blood sugars? educated  Do you wear a Diabetes I D ?: did not address  Where do you dispose of your sharps (needles,lancetes)? : in an empty pill container, then in the trash    Ht Readings from Last 1 Encounters:   01/12/21 5' 3" (1 6 m)     Wt Readings from Last 3 Encounters:   01/21/21 103 kg (227 lb 12 8 oz)   01/12/21 102 kg (224 lb)   12/21/20 104 kg (229 lb)        There is no height or weight on file to calculate BMI  Lab Results   Component Value Date    HGBA1C 7 1 (A) 09/17/2020    HGBA1C 6 3 (H) 04/06/2020    HGBA1C 6 0 03/05/2018       No results found for: CHOL  Lab Results   Component Value Date    HDL 34 (L) 04/07/2020     Lab Results   Component Value Date    LDLCALC 78 04/07/2020     Lab Results   Component Value Date    TRIG 114 04/07/2020     No results found for: CHOLHDL  No results found for: Tamiko Woodall    Weight Change: No    Diet Assessment    Do you follow any special diet presently?: Yes - Sudanese, plant based, eats meat only 3 times per week  Who cooks at home?:  patient  Who does the grocery shopping?: patient   How frequently do you eat out?: did not address    Activity Assessment    Exercise: encouraged physical activity    Lifestyle/Social Assessment    Racial/ethnic group:                                       Primary Language:  Other Icelandic, is fairly fluent in [place]  Marital Status:   Education Level: did not address  Work status: did not address  Type of job and hours: did not address  Who lives in your household?: did not address  Who is you primary support person(s): did not address   Describe your quality of life currently?: good  Any concerns for your safety?: No  Any Moravian or cultural practices that may affect your diabetes care: Yes - Scientology  Do you have a decrease or loss of hearing?: did not address  Do you have a decrease or loss of vision?: did not address  When was the last time you had an ophthalmology exam?: last year, has one scheduled for next week  When was the last time you had dental exam?: did not address  Do you check your feet for cracks, sores, debris?: Yes - daily  When was the last time you had podiatry or foot exam?: 4/2020  Last flu shot?: did not address  Pneumonia shot?: did not address      The patient's history was reviewed and updated as appropriate: allergies, current medications  Intervention    Diabetes Overview :   Elena Seay was instructed on basic concepts of diabetes, including identifying role of diabetes self management, basic pathophysiology and types of diabetes, A1c and blood sugar targets  Elena Seay has good understanding of material covered  Taking Medications: Instructed patient on action, side effects, efficacy, prescribed dosage and appropriate timing and frequency of administration of her diabetes medication  Elena Seay has good understanding of material covered  Monitoring Blood Sugars  Instructions for Meter Teaching- Patient instructed in the following:  Site selection and skin preparation, Loading strips and lancet device, meter activation, obtaining blood sample, test strip and lancet disposal and recording log book entries  Patient has good understanding of material covered     Testing frequency: Encouraged pair testing  Test sugars before a meal and 2hr after the same meal, rotating between breakfast, lunch, and dinner  Test sugars twice a day (3 days a week, 7 days a week)  Goal Blood Sugars:   Premeal , even better <110  2hr after a meal <180, even better <140  A1C <7%, even better <6 5%      Hypoglycemia: Instructed patient on definition/risk of hypoglycemia, treatment, causes/symptoms, when to notify provider of lows, prevention of hypoglycemia and exercise precautions  Comments: Tatiana verbalizes understanding of hypoglycemia concepts      Physical Activity: Discussed benefits of physical activity to optimize blood glucose control, encouraged activity at patient is physically able  Always consult a physician prior to starting an exercise program   Comments: Tatiana verbalizes understanding of hypoglycemia concepts        Diabetes Education Record  Cristina Del Toro received the following handouts: LWD Class Assessment handouts, class schedule, and goals sheet emailed to patient      Patient response to instruction    Comprehensiongood  Motivationgood  Expected Compliancegood    Thank you for referring your patient to [facility], it was a pleasure working with them today  Please feel free to call with any questions or concerns      Brenda Resendez  [address] [place] [id number] [facility] [id number]

## 2021-02-08 ENCOUNTER — TELEPHONE (OUTPATIENT)
Dept: INFECTIOUS DISEASES | Facility: CLINIC | Age: 46
End: 2021-02-08

## 2021-02-08 NOTE — TELEPHONE ENCOUNTER
Contacted pt per Dr Faye Benjamin     Patient was to have repeat CTA done in January  This is not done/scheduled  Pushed appt off for two weeks from now with Dr Faye Benjamin  Provided scheduling's phone number so that patient can get scheduled for that  If she cannot get it sooner than the appointment, she should still take it and call back to obtain an appt with Dr Faye Benjamin after CTA performed

## 2021-02-15 ENCOUNTER — HOSPITAL ENCOUNTER (OUTPATIENT)
Dept: CT IMAGING | Facility: HOSPITAL | Age: 46
Discharge: HOME | End: 2021-02-15
Payer: COMMERCIAL

## 2021-02-15 DIAGNOSIS — I77.6 AORTITIS (HCC): ICD-10-CM

## 2021-02-15 PROCEDURE — 71275 CT ANGIOGRAPHY CHEST: CPT

## 2021-02-15 RX ADMIN — IOHEXOL 100 ML: 350 INJECTION, SOLUTION INTRAVENOUS at 18:07

## 2021-02-24 ENCOUNTER — TELEMEDICINE (OUTPATIENT)
Dept: INFECTIOUS DISEASES | Facility: CLINIC | Age: 46
End: 2021-02-24
Payer: COMMERCIAL

## 2021-02-24 DIAGNOSIS — R76.12 POSITIVE QUANTIFERON-TB GOLD TEST: ICD-10-CM

## 2021-02-24 DIAGNOSIS — E05.90 HYPERTHYROIDISM: ICD-10-CM

## 2021-02-24 DIAGNOSIS — E83.52 HYPERCALCEMIA: ICD-10-CM

## 2021-02-24 DIAGNOSIS — I77.6 AORTITIS (HCC): Primary | ICD-10-CM

## 2021-02-24 PROCEDURE — 99215 OFFICE O/P EST HI 40 MIN: CPT | Performed by: INTERNAL MEDICINE

## 2021-02-24 NOTE — LETTER
February 24, 2021     Kathi Bence, [address]  [address] [address]    Patient: Marisela Balbuena   YOB: 1975   Date of Visit: 2/24/2021       Dear Dr Annmarie Farmer: Thank you for referring Marisela Balbuena to me for evaluation  Below are my notes for this consultation  If you have questions, please do not hesitate to call me  I look forward to following your patient along with you  Sincerely,        Cory Haro MD        CC: MD Sharan Jimenez Res, PA-C Charlean Bristle, MD  2/24/2021  2:06 PM  Sign when Signing Visit    Virtual Regular Visit    Assessment/Plan:  1  SIRS syndrome  Initial presentation with multiple vague complaints as below and electrolyte abnormalities along with findings on imaging  Patient's lab abnormalities have improved on thyroid medication and she is no longer having any systemic complaints  Continue to monitor for recurrence of fever/symptoms   Recommended follow up with CTS to review images as below  Ongoing close follow-up with PCP/Endocrinology/Rheumatology  Can return to ID office PRN       2  Diffuse muscle aches/pains  Patient had fluctuating muscle aches, bone pain and generalized fatigue  Ultimately felt to be multifactorial in the setting of iron infusion, problem 3 and rifampin on her last admission  Symptoms have improved and have not returned  Continue thyroid medication as below  No additional therapies from ID standpoint as below  Recommend clinical and image monitoring as below       3  Hyperthyroidism, elevated alkaline phosphatase and hypercalcemia  Thyroid testing consistent with hyperthyroidism and with calcium testing unremarkable patient felt to have hypercalcemia due to hyperthyroidism  Consideration was also for extrapulmonary TB however she was not found with active disease on workup and labs improved with thyroid medication  Low suspicion at this point for active infection/TB     Continue medications for hyperthyroidism  Ongoing close follow-up by Endocrinology   Additional monitoring as below       4  Aortitis  Patient's imaging with these episodes of fever were concerning for either aortitis, possible intramural hematoma, and questionable vasculitis  She did not respond to immunosuppression and has been off since July  Imaging reviewed and is stable/with only mild findings  Suspect this is an incidental finding and may be related to old disease/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  With negative work up suspicion is low for isolated TB of the aorta  She again remains asymptomatic  Recommend formal follow with CTS to review images  Continue to follow with her rheumatologist as needed  Monitor clinically for any return of symptoms  Surveillance images as below  If started on immunosuppression please return to our office for LTBI treatment  Follow up with PCP otherwise       5  Latent tuberculosis  During the workup of the above issues patient was found have positive QuantiFERON  Evaluations undertaken for extrapulmonary TB as well as pulmonary disease unremarkable  There is no direct evidence of active tuberculosis at this time  Patient also did not worsen or progress in the last year on/off immunosuppression and based on images  She does not wish to pursue LTBI treatment currently  Counseled on potential treatment in the future started if on immunosuppression  Counseled on symptom monitoring  Case discussed with Department of Health colleague    Would monitor for return of any symptoms  Recommend yearly chest x-ray for monitoring  Will hold off on latent TB treatment given patient's preference  If on immunosuppression can refer to our office for treatment at that time  Regimens to consider include INH/B6 v  Linezolid/ethambutol after d/w [facility]  Recommended patient return to our office sooner if symptoms developed  Patient can otherwise follow-up with PCP    Will forward office visit to endocrinology/PCP/Rheumatology/CT surgery    RTO PRN      Problem List Items Addressed This Visit        Endocrine    Hyperthyroidism       Cardiovascular and Mediastinum    Aortitis  - Primary       Other    Latent tuberculosis    Hypercalcemia           Reason for visit is   Chief Complaint   Patient presents with    Virtual Regular Visit        Encounter provider Massiel Hernández MD    Provider located at [address] [place] [address]  [phone]      Recent Visits  No visits were found meeting these conditions  Showing recent visits within past 7 days and meeting all other requirements     Today's Visits  Date Type Provider Dept   02/24/21 [facility]icine [facility]   Showing today's visits and meeting all other requirements     Future Appointments  No visits were found meeting these conditions  Showing future appointments within next 150 days and meeting all other requirements        The patient was identified by name and date of birth  Shelly Both was informed that this is a telemedicine visit and that the visit is being conducted through [facility] and patient was informed that this is a secure, HIPAA-compliant platform  She agrees to proceed     Other methods to assure confidentiality were taken headphones used  No one else was in the room  She acknowledged consent and understanding of privacy and security of the video platform  The patient has agreed to participate and understands they can discontinue the visit at any time  Patient is aware this is a billable service  Subjective  Tatiana Balbuena is a 50 y o  female  this patient is well known to me from her hospitalization last year  Since our last visit the patient has been off immunosuppressive therapy    She underwent repeat CT a which shows some mild thickening but otherwise stable findings no pulmonary findings  She denies having any significant muscle aches, back pain, fatigue, night sweats, chronic cough, hemoptysis or weight loss  Again multiple samples taken from lymph nodes and the liver were without any granulomatous changes and testing for AFB/TB were negative  The patient met very few criteria for vasculitis and her markers did not improve on any immunosuppression nor did she reactivate to TB on immunosuppression  Patient's labs have overall improved and this seems largely due to treatment of her hyperthyroidism  I reviewed with her again the fact that she does in fact have a positive QuantiFERON  She unfortunately did not tolerate rifampin regimen and had some significant side effects  At this time because she has no symptoms and she is feeling well, she does not wish to pursue latent TB treatment  I did review with the patient that there is a potential for reactivation in the future  I recommended that she have yearly x-rays with her PCP and to monitor for symptoms  I also stressed to her that if she is to go on any additional immunosuppressive therapy including steroids that she contact our office to discuss latent TB treatment at that time  She is agreeable to doing so  She also reports she was seen by Ophthalmology, office visit reviewed and patient reports that there was no significant finding or issues  She plans to follow-up otherwise with her PCP and with endocrinology  Additional questions answered  Reviewed the patient's case with Department of Health colleague      HPI     Past Medical History:   Diagnosis Date    Asthma     Chronic anemia     [facility] mellitus ([facility] [address] [facility]     Hypertension     Hyperthyroidism     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[facility] [address] [facility]     TB lung, latent        Past Surgical History:   Procedure Laterality Date     SECTION      IR BIOPSY LIVER MASS  7/15/2020       Current Outpatient Medications   Medication Sig Dispense Refill    acetaminophen (TYLENOL) 325 mg tablet Take 650 mg by mouth every 6 (six) hours as needed for mild pain      albuterol (PROVENTIL HFA,VENTOLIN HFA) 90 mcg/act inhaler Inhale 2 puffs 2 (two) times a day      aspirin (ECOTRIN LOW STRENGTH) 81 mg EC tablet Take 81 mg by mouth daily      diclofenac sodium (VOLTAREN) 50 mg EC tablet Take 1 tablet (50 mg total) by mouth 2 (two) times a day (Patient not taking: Reported on 12/21/2020) 30 tablet 0    ergocalciferol (VITAMIN D2) 50,000 units 50,000 Units once a week       escitalopram (LEXAPRO) 10 mg tablet Take 10 mg by mouth daily      Fe Heme Polypeptide-Folic Acid 89-7 MG TABS Take 1 tablet by mouth daily (Patient not taking: Reported on 17/82/7721)  0    folic acid (FOLVITE) 1 mg tablet Take by mouth daily      losartan (COZAAR) 100 MG tablet Take 100 mg by mouth daily       metFORMIN (GLUCOPHAGE) 500 mg tablet Take 1,000 mg by mouth 2 (two) times a day with meals      methimazole (TAPAZOLE) 10 mg tablet Take 1 tablet (10 mg total) by mouth daily 60 tablet 1    methotrexate 2 5 mg tablet Take by mouth once a week      metoprolol succinate (TOPROL-XL) 50 mg 24 hr tablet Take 1 tablet (50 mg total) by mouth daily 90 tablet 4    montelukast (SINGULAIR) 10 mg tablet Take 10 mg by mouth daily at bedtime      pantoprazole (PROTONIX) 40 mg tablet Take 1 tablet (40 mg total) by mouth daily in the early morning 30 tablet 0    Polysaccharide Iron Complex (POLY-IRON 150 PO) Take by mouth      simvastatin (ZOCOR) 10 mg tablet Take 10 mg by mouth daily at bedtime       No current facility-administered medications for this visit  Allergies   Allergen Reactions    Other Allergic Rhinitis     pollen       Review of Systems   All other systems reviewed and are negative  Video Exam    There were no vitals filed for this visit  Physical Exam  Constitutional:       General: She is not in acute distress  Appearance: Normal appearance  She is obese     HENT:      Head: Normocephalic and atraumatic  Right Ear: External ear normal       Left Ear: External ear normal       Nose: Nose normal  No congestion  Mouth/Throat:      Mouth: Mucous membranes are moist    Eyes:      General: No scleral icterus  Extraocular Movements: Extraocular movements intact  Neck:      Musculoskeletal: Normal range of motion  Pulmonary:      Effort: Pulmonary effort is normal  No respiratory distress  Skin:     General: Skin is warm and dry  Coloration: Skin is not jaundiced  Findings: No rash  Neurological:      General: No focal deficit present  Mental Status: She is alert and oriented to person, place, and time  VIRTUAL VISIT DISCLAIMER    Tatiana Balbuena acknowledges that she has consented to an online visit or consultation  She understands that the online visit is based solely on information provided by her, and that, in the absence of a face-to-face physical evaluation by the physician, the diagnosis she receives is both limited and provisional in terms of accuracy and completeness  This is not intended to replace a full medical face-to-face evaluation by the physician  Tatiana Balbuena understands and accepts these terms

## 2021-02-24 NOTE — PATIENT INSTRUCTIONS
We will forward this office visit to your other providers (PCP/Endocine/Rheum/CTS)  Would recommend that you have yearly visit with your PCP along with yearly chest x-ray monitor for reactivation of TB  You are recommended to reach out to our office if you are going to start any immunosuppressive medications including steroids as it would be ideal that your treated for possible latent tuberculosis before starting those medications  Please continue to follow with endocrinology for treatment of your hyperthyroidism  Please continue to follow with your PCP  You are recommended to formally follow-up with CT surgery and review your imaging  No further formal follow-up required in our office  Please contact our office should you have any questions or if follow-up is needed later on

## 2021-02-24 NOTE — PROGRESS NOTES
Virtual Regular Visit    Assessment/Plan:  1  SIRS syndrome  Initial presentation with multiple vague complaints as below and electrolyte abnormalities along with findings on imaging  Patient's lab abnormalities have improved on thyroid medication and she is no longer having any systemic complaints  Continue to monitor for recurrence of fever/symptoms   Recommended follow up with CTS to review images as below  Ongoing close follow-up with PCP/Endocrinology/Rheumatology  Can return to ID office PRN       2  Diffuse muscle aches/pains  Patient had fluctuating muscle aches, bone pain and generalized fatigue  Ultimately felt to be multifactorial in the setting of iron infusion, problem 3 and rifampin on her last admission  Symptoms have improved and have not returned  Continue thyroid medication as below  No additional therapies from ID standpoint as below  Recommend clinical and image monitoring as below       3  Hyperthyroidism, elevated alkaline phosphatase and hypercalcemia  Thyroid testing consistent with hyperthyroidism and with calcium testing unremarkable patient felt to have hypercalcemia due to hyperthyroidism  Consideration was also for extrapulmonary TB however she was not found with active disease on workup and labs improved with thyroid medication  Low suspicion at this point for active infection/TB  Continue medications for hyperthyroidism  Ongoing close follow-up by Endocrinology   Additional monitoring as below       4  Aortitis  Patient's imaging with these episodes of fever were concerning for either aortitis, possible intramural hematoma, and questionable vasculitis  She did not respond to immunosuppression and has been off since July  Imaging reviewed and is stable/with only mild findings  Suspect this is an incidental finding and may be related to old disease/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  With negative work up suspicion is low for isolated TB of the aorta   She again remains asymptomatic  Recommend formal follow with CTS to review images  Continue to follow with her rheumatologist as needed  Monitor clinically for any return of symptoms  Surveillance images as below  If started on immunosuppression please return to our office for LTBI treatment  Follow up with PCP otherwise       5  Latent tuberculosis  During the workup of the above issues patient was found have positive QuantiFERON  Evaluations undertaken for extrapulmonary TB as well as pulmonary disease unremarkable  There is no direct evidence of active tuberculosis at this time  Patient also did not worsen or progress in the last year on/off immunosuppression and based on images  She does not wish to pursue LTBI treatment currently  Counseled on potential treatment in the future started if on immunosuppression  Counseled on symptom monitoring  Case discussed with Department of Health colleague    Would monitor for return of any symptoms  Recommend yearly chest x-ray for monitoring  Will hold off on latent TB treatment given patient's preference  If on immunosuppression can refer to our office for treatment at that time  Regimens to consider include INH/B6 v  Linezolid/ethambutol after d/w [facility]  Recommended patient return to our office sooner if symptoms developed  Patient can otherwise follow-up with PCP    Will forward office visit to endocrinology/PCP/Rheumatology/CT surgery    RTO PRN      Problem List Items Addressed This Visit        Endocrine    Hyperthyroidism       Cardiovascular and Mediastinum    Aortitis  - Primary       Other    Latent tuberculosis    Hypercalcemia           Reason for visit is   Chief Complaint   Patient presents with    Virtual Regular Visit        Encounter provider Ozzie Sebastian MD    Provider located at [address] [place] [address]  [phone]      Recent Visits  No visits were found meeting these conditions  Showing recent visits within past 7 days and meeting all other requirements     Today's Visits  Date Type Provider Dept   02/24/21 Telemedicine Tram Santos MD [facility]   Showing today's visits and meeting all other requirements     Future Appointments  No visits were found meeting these conditions  Showing future appointments within next 150 days and meeting all other requirements        The patient was identified by name and date of birth  Mansoor Mcdonough was informed that this is a telemedicine visit and that the visit is being conducted through [facility] and patient was informed that this is a secure, HIPAA-compliant platform  She agrees to proceed     Other methods to assure confidentiality were taken headphones used  No one else was in the room  She acknowledged consent and understanding of privacy and security of the video platform  The patient has agreed to participate and understands they can discontinue the visit at any time  Patient is aware this is a billable service  Subjective  Tatinaa Balbuena is a 50 y o  female  this patient is well known to me from her hospitalization last year  Since our last visit the patient has been off immunosuppressive therapy  She underwent repeat CT a which shows some mild thickening but otherwise stable findings no pulmonary findings  She denies having any significant muscle aches, back pain, fatigue, night sweats, chronic cough, hemoptysis or weight loss  Again multiple samples taken from lymph nodes and the liver were without any granulomatous changes and testing for AFB/TB were negative  The patient met very few criteria for vasculitis and her markers did not improve on any immunosuppression nor did she reactivate to TB on immunosuppression  Patient's labs have overall improved and this seems largely due to treatment of her hyperthyroidism    I reviewed with her again the fact that she does in fact have a positive QuantiFERON  She unfortunately did not tolerate rifampin regimen and had some significant side effects  At this time because she has no symptoms and she is feeling well, she does not wish to pursue latent TB treatment  I did review with the patient that there is a potential for reactivation in the future  I recommended that she have yearly x-rays with her PCP and to monitor for symptoms  I also stressed to her that if she is to go on any additional immunosuppressive therapy including steroids that she contact our office to discuss latent TB treatment at that time  She is agreeable to doing so  She also reports she was seen by Ophthalmology, office visit reviewed and patient reports that there was no significant finding or issues  She plans to follow-up otherwise with her PCP and with endocrinology  Additional questions answered  Reviewed the patient's case with Department of Health colleague      HPI     Past Medical History:   Diagnosis Date    Asthma     Chronic anemia     Diabetes mellitus ([address] )     Hypertension     Hyperthyroidism     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HCC)     TB lung, latent        Past Surgical History:   Procedure Laterality Date     SECTION      IR BIOPSY LIVER MASS  7/15/2020       Current Outpatient Medications   Medication Sig Dispense Refill    acetaminophen (TYLENOL) 325 mg tablet Take 650 mg by mouth every 6 (six) hours as needed for mild pain      albuterol (PROVENTIL HFA,VENTOLIN HFA) 90 mcg/act inhaler Inhale 2 puffs 2 (two) times a day      aspirin (ECOTRIN LOW STRENGTH) 81 mg EC tablet Take 81 mg by mouth daily      diclofenac sodium (VOLTAREN) 50 mg EC tablet Take 1 tablet (50 mg total) by mouth 2 (two) times a day (Patient not taking: Reported on 2020) 30 tablet 0    ergocalciferol (VITAMIN D2) 50,000 units 50,000 Units once a week       escitalopram (LEXAPRO) 10 mg tablet Take 10 mg by mouth daily      Fe Heme Polypeptide-Folic Acid 88-6 MG TABS Take 1 tablet by mouth daily (Patient not taking: Reported on 23/28/7244)  0    folic acid (FOLVITE) 1 mg tablet Take by mouth daily      losartan (COZAAR) 100 MG tablet Take 100 mg by mouth daily       metFORMIN (GLUCOPHAGE) 500 mg tablet Take 1,000 mg by mouth 2 (two) times a day with meals      methimazole (TAPAZOLE) 10 mg tablet Take 1 tablet (10 mg total) by mouth daily 60 tablet 1    methotrexate 2 5 mg tablet Take by mouth once a week      metoprolol succinate (TOPROL-XL) 50 mg 24 hr tablet Take 1 tablet (50 mg total) by mouth daily 90 tablet 4    montelukast (SINGULAIR) 10 mg tablet Take 10 mg by mouth daily at bedtime      pantoprazole (PROTONIX) 40 mg tablet Take 1 tablet (40 mg total) by mouth daily in the early morning 30 tablet 0    Polysaccharide Iron Complex (POLY-IRON 150 PO) Take by mouth      simvastatin (ZOCOR) 10 mg tablet Take 10 mg by mouth daily at bedtime       No current facility-administered medications for this visit  Allergies   Allergen Reactions    Other Allergic Rhinitis     pollen       Review of Systems   All other systems reviewed and are negative  Video Exam    There were no vitals filed for this visit  Physical Exam  Constitutional:       General: She is not in acute distress  Appearance: Normal appearance  She is obese  HENT:      Head: Normocephalic and atraumatic  Right Ear: External ear normal       Left Ear: External ear normal       Nose: Nose normal  No congestion  Mouth/Throat:      Mouth: Mucous membranes are moist    Eyes:      General: No scleral icterus  Extraocular Movements: Extraocular movements intact  Neck:      Musculoskeletal: Normal range of motion  Pulmonary:      Effort: Pulmonary effort is normal  No respiratory distress  Skin:     General: Skin is warm and dry  Coloration: Skin is not jaundiced  Findings: No rash     Neurological:      General: No focal deficit present  Mental Status: She is alert and oriented to person, place, and time  VIRTUAL VISIT DISCLAIMER    Tatiana Balbuena acknowledges that she has consented to an online visit or consultation  She understands that the online visit is based solely on information provided by her, and that, in the absence of a face-to-face physical evaluation by the physician, the diagnosis she receives is both limited and provisional in terms of accuracy and completeness  This is not intended to replace a full medical face-to-face evaluation by the physician  Tatiana Balbuena understands and accepts these terms

## 2021-03-01 ENCOUNTER — TELEMEDICINE (OUTPATIENT)
Dept: CARDIAC SURGERY | Facility: CLINIC | Age: 46
End: 2021-03-01
Payer: COMMERCIAL

## 2021-03-01 DIAGNOSIS — I77.6 AORTITIS (HCC): Primary | ICD-10-CM

## 2021-03-01 PROCEDURE — 99214 OFFICE O/P EST MOD 30 MIN: CPT | Performed by: THORACIC SURGERY (CARDIOTHORACIC VASCULAR SURGERY)

## 2021-03-01 NOTE — PROGRESS NOTES
Virtual Brief Visit    Assessment/Plan:  CTA chest:  IMPRESSION:  1  Unchanged mild aortic arch wall thickening with mild mediastinal lymph node enlargement  Once again, given the patient's hypercalcemia and uveitis, sarcoid and other granulomatous diseases should be excluded  2   Stable left thyroid nodule, for which nonemergent thyroid ultrasound is recommended      PLAN:  Follow up in 1 year with repeat chest CTA    Problem List Items Addressed This Visit        Cardiovascular and Mediastinum    Aortitis  - Primary          Reason for visit is follow up with CTA  Chief Complaint   Patient presents with    Virtual Brief Visit        Encounter provider Hayden Garcia DO    Provider located at [address] [place] [id number] [phone]    Recent Visits  No visits were found meeting these conditions  Showing recent visits within past 7 days and meeting all other requirements     Today's Visits  Date Type Provider Dept   03/01/21 Telemedicine Hayden Garcia DO [facility]   Showing today's visits and meeting all other requirements     Future Appointments  No visits were found meeting these conditions  Showing future appointments within next 150 days and meeting all other requirements      It was my intent to perform this visit via video technology but the patient was not able to do a video connection so the visit was completed via audio telephone only  After connecting via telephone, the patient was identified by name and date of birth  Huong Sawyer was informed that this is a telemedicine visit and that the visit is being conducted via telephone for the purpose of follow up of aortitis and review of recent CTA chest   My office door was closed  No one else was in the room  She acknowledged consent and understanding of privacy and security of the platform   The patient has agreed to participate and understands she can discontinue the visit at any time  Patient is aware this is a billable service  Subjective/HPI    Tatiana Balbuena is a 50 y o  female with PMHx of large vessel vasculitis, aortitis, hyperthyroidism, DM2, HTN, asthma and chronic anemia  Patient was seen in the ER in 2020 with SIRS  CT scan demonstrated findings c/w aortitis  She was seen several times in the ER after initial presentation  CT in July  scan demonstrated thickening or the aortic arch with narrowing of the proximal left subclavian artery c/w aortitis  She had under gone extensive w/u and found to have latent TB and treated appropriately  She was seen in our office 2020 for initial consultation for aortitis  CT scan at that time showed improvement in the aortic thickening and patient reported improvement in symptoms  Today is a 6 month follow up with repeat imaging  Patient reports she is doing well  She denies chest pain, lightheadedness, numbness, tingling, back pain or abdominal pain  She has been gradually increasing her walking and  now walking 30 min per day  She reports some SOB at 30 minutes time  She reports her BP is well controlled       Past Medical History:   Diagnosis Date    Asthma     Chronic anemia     Diabetes mellitus ([address] )     Hypertension     Hyperthyroidism     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HCC)     TB lung, latent        Past Surgical History:   Procedure Laterality Date     SECTION      IR BIOPSY LIVER MASS  7/15/2020       Current Outpatient Medications   Medication Sig Dispense Refill    acetaminophen (TYLENOL) 325 mg tablet Take 650 mg by mouth every 6 (six) hours as needed for mild pain      albuterol (PROVENTIL HFA,VENTOLIN HFA) 90 mcg/act inhaler Inhale 2 puffs 2 (two) times a day      aspirin (ECOTRIN LOW STRENGTH) 81 mg EC tablet Take 81 mg by mouth daily      diclofenac sodium (VOLTAREN) 50 mg EC tablet Take 1 tablet (50 mg total) by mouth 2 (two) times a day 30 tablet 0    escitalopram (LEXAPRO) 10 mg tablet Take 10 mg by mouth daily      folic acid (FOLVITE) 1 mg tablet Take by mouth daily      losartan (COZAAR) 100 MG tablet Take 100 mg by mouth daily       metFORMIN (GLUCOPHAGE) 500 mg tablet Take 1,000 mg by mouth 2 (two) times a day with meals      methimazole (TAPAZOLE) 10 mg tablet Take 1 tablet (10 mg total) by mouth daily 60 tablet 1    methotrexate 2 5 mg tablet Take by mouth once a week      metoprolol succinate (TOPROL-XL) 50 mg 24 hr tablet Take 1 tablet (50 mg total) by mouth daily 90 tablet 4    montelukast (SINGULAIR) 10 mg tablet Take 10 mg by mouth daily at bedtime      pantoprazole (PROTONIX) 40 mg tablet Take 1 tablet (40 mg total) by mouth daily in the early morning 30 tablet 0    Polysaccharide Iron Complex (POLY-IRON 150 PO) Take by mouth      simvastatin (ZOCOR) 10 mg tablet Take 10 mg by mouth daily at bedtime      Fe Heme Polypeptide-Folic Acid 47-5 MG TABS Take 1 tablet by mouth daily (Patient not taking: Reported on 12/21/2020)  0     No current facility-administered medications for this visit           Allergies   Allergen Reactions    Other Allergic Rhinitis     pollen     Review of Systems - History obtained from chart review and the patient  General ROS: negative  Psychological ROS: negative  Ophthalmic ROS: negative  ENT ROS: negative  Allergy and Immunology ROS: negative  Hematological and Lymphatic ROS: negative  Endocrine ROS: negative  Respiratory ROS: no cough, shortness of breath, or wheezing  Cardiovascular ROS: positive for - dyspnea on exertion  negative for - chest pain, edema, irregular heartbeat, loss of consciousness, murmur, orthopnea, palpitations, paroxysmal nocturnal dyspnea or rapid heart rate  Gastrointestinal ROS: no abdominal pain, change in bowel habits, or black or bloody stools  Genito-Urinary ROS: no dysuria, trouble voiding, or hematuria  Musculoskeletal ROS: negative  Neurological ROS: no TIA or stroke symptoms  Dermatological ROS: negative      There were no vitals filed for this visit  I spent 20 minutes directly with the patient during this visit    [address] Sree acknowledges that she has consented to an online visit or consultation  She understands that the online visit is based solely on information provided by her, and that, in the absence of a face-to-face physical evaluation by the physician, the diagnosis she receives is both limited and provisional in terms of accuracy and completeness  This is not intended to replace a full medical face-to-face evaluation by the physician  Tatiana Balbuena understands and accepts these terms

## 2021-03-10 ENCOUNTER — TELEMEDICINE (OUTPATIENT)
Dept: DIABETES SERVICES | Facility: CLINIC | Age: 46
End: 2021-03-10
Payer: COMMERCIAL

## 2021-03-10 DIAGNOSIS — E66.9 DIABETES MELLITUS TYPE 2 IN OBESE (HCC): Primary | ICD-10-CM

## 2021-03-10 DIAGNOSIS — E11.69 DIABETES MELLITUS TYPE 2 IN OBESE (HCC): Primary | ICD-10-CM

## 2021-03-10 PROCEDURE — G0109 DIAB MANAGE TRN IND/GROUP: HCPCS | Performed by: DIETITIAN, REGISTERED

## 2021-03-11 NOTE — PROGRESS NOTES
Virtual Regular Visit      Assessment/Plan:    Problem List Items Addressed This Visit        Endocrine    Diabetes mellitus type 2 - Primary               Reason for visit is   Chief Complaint   Patient presents with    Diabetes Type 2    Virtual Regular Visit        Encounter provider Felisha Gonzalez    Provider located at [address] [place] [address]   [facility] [id number] [phone]      Recent Visits  Date Type Provider Dept   03/10/21 Telemedicine Skinny Resendez [facility] [place]   Showing recent visits within past 7 days and meeting all other requirements     Future Appointments  No visits were found meeting these conditions  Showing future appointments within next 150 days and meeting all other requirements        The patient was identified by name and date of birth  James Dunbar was informed that this is a telemedicine visit and that the visit is being conducted through [employer] and patient was informed that this is a secure, HIPAA-compliant platform  She agrees to proceed     My office door was closed  No one else was in the room  She acknowledged consent and understanding of privacy and security of the video platform  The patient has agreed to participate and understands they can discontinue the visit at any time  Patient is aware this is a billable service  Subjective  Tatiana Balbuena is a 50 y o  female , please see documentation below         HPI     Past Medical History:   Diagnosis Date    Asthma     Chronic anemia     Diabetes mellitus ([facility] [address] [facility] )     Hypertension     Hyperthyroidism     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HCC)     TB lung, latent        Past Surgical History:   Procedure Laterality Date     SECTION      IR BIOPSY LIVER MASS  7/15/2020       Current Outpatient Medications   Medication Sig Dispense Refill    metFORMIN (GLUCOPHAGE) 500 mg tablet Take 1,000 mg by mouth 2 (two) times a day with meals      acetaminophen (TYLENOL) 325 mg tablet Take 650 mg by mouth every 6 (six) hours as needed for mild pain      albuterol (PROVENTIL HFA,VENTOLIN HFA) 90 mcg/act inhaler Inhale 2 puffs 2 (two) times a day      aspirin (ECOTRIN LOW STRENGTH) 81 mg EC tablet Take 81 mg by mouth daily      diclofenac sodium (VOLTAREN) 50 mg EC tablet Take 1 tablet (50 mg total) by mouth 2 (two) times a day 30 tablet 0    escitalopram (LEXAPRO) 10 mg tablet Take 10 mg by mouth daily      Fe Heme Polypeptide-Folic Acid 93-0 MG TABS Take 1 tablet by mouth daily (Patient not taking: Reported on 22/91/5222)  0    folic acid (FOLVITE) 1 mg tablet Take by mouth daily      losartan (COZAAR) 100 MG tablet Take 100 mg by mouth daily       methimazole (TAPAZOLE) 10 mg tablet Take 1 tablet (10 mg total) by mouth daily 60 tablet 1    methotrexate 2 5 mg tablet Take by mouth once a week      metoprolol succinate (TOPROL-XL) 50 mg 24 hr tablet Take 1 tablet (50 mg total) by mouth daily 90 tablet 4    montelukast (SINGULAIR) 10 mg tablet Take 10 mg by mouth daily at bedtime      pantoprazole (PROTONIX) 40 mg tablet Take 1 tablet (40 mg total) by mouth daily in the early morning 30 tablet 0    Polysaccharide Iron Complex (POLY-IRON 150 PO) Take by mouth      simvastatin (ZOCOR) 10 mg tablet Take 10 mg by mouth daily at bedtime       No current facility-administered medications for this visit  Allergies   Allergen Reactions    Other Allergic Rhinitis     pollen       Review of Systems    Video Exam    There were no vitals filed for this visit  Physical Exam     I spent 90 minutes directly with the patient during this visit      [address] [facility] [place] acknowledges that she has consented to an online visit or consultation   She understands that the online visit is based solely on information provided by her, and that, in the absence of a face-to-face physical evaluation by the physician, the diagnosis she receives is both limited and provisional in terms of accuracy and completeness  This is not intended to replace a full medical face-to-face evaluation by the physician  Tatiana Balbuena understands and accepts these terms  Living Well with Diabetes Group Class #2    [address] [place] attended the Living Well with Diabetes Group Class #2  During class, [address] was instructed on the following topics: Macronutrients, Carbohydrate sources, What one serving of carbohydrate equals, effects of diet on blood glucose levels, effect of carbohydrates on blood glucose levels, basics of meal planning: balance, portions, meal times, measurements, reading food labels to determine carbohydrates  [address] [place] [address] participated in group activities of reading labels together and completing the meal planning activity  [address] [place] [address] will follow up with class #3  Will call with any questions or concerns prior to next session  The patient's history was reviewed and updated as appropriate: allergies, current medications  Lab Results   Component Value Date    HGBA1C 7 1 (A) 09/17/2020       Diabetes Education Record  [address] [place] [address] was provided Living Well with Diabetes Class #2 book      Patient response to instruction    Comprehensiongood  Motivationgood  Expected Compliancegood    Begin Time: 6pm  End Time: 7:30 pm  Referring Provider: Florentin French    Thank you for referring your patient to [facility], it was a pleasure working with them today  Please feel free to call with any questions or concerns      Skinny Arias [facility]   [email or web address] [id number] [phone]

## 2021-05-14 ENCOUNTER — OFFICE VISIT (OUTPATIENT)
Dept: HEMATOLOGY ONCOLOGY | Facility: CLINIC | Age: 46
End: 2021-05-14
Payer: COMMERCIAL

## 2021-05-14 VITALS
WEIGHT: 239 LBS | HEIGHT: 63 IN | OXYGEN SATURATION: 97 % | DIASTOLIC BLOOD PRESSURE: 74 MMHG | HEART RATE: 96 BPM | SYSTOLIC BLOOD PRESSURE: 134 MMHG | RESPIRATION RATE: 17 BRPM | BODY MASS INDEX: 42.35 KG/M2 | TEMPERATURE: 98.6 F

## 2021-05-14 DIAGNOSIS — D75.839 THROMBOCYTOSIS: ICD-10-CM

## 2021-05-14 DIAGNOSIS — D50.9 IRON DEFICIENCY ANEMIA, UNSPECIFIED IRON DEFICIENCY ANEMIA TYPE: Primary | ICD-10-CM

## 2021-05-14 PROCEDURE — 99214 OFFICE O/P EST MOD 30 MIN: CPT | Performed by: INTERNAL MEDICINE

## 2021-05-14 RX ORDER — FERROUS SULFATE TAB EC 324 MG (65 MG FE EQUIVALENT) 324 (65 FE) MG
324 TABLET DELAYED RESPONSE ORAL
Qty: 60 TABLET | Refills: 2 | Status: SHIPPED | OUTPATIENT
Start: 2021-05-14 | End: 2021-12-21 | Stop reason: ALTCHOICE

## 2021-05-14 NOTE — PROGRESS NOTES
[facility] [place]  [address] [facility] [address]  [phone]    Santiago Balbuena,1975, [id number]  05/14/21    Discussion:   In summary, this is a 75-year-old female history of anemia and thrombocytosis  Previous iron studies in January 2021 showed saturation 11 consistent with iron deficiency  Microcytosis is also supportive  Previous blood work included hemoglobin electrophoresis and alpha chain analysis both of which were normal   It is conceivable that hematologic abnormalities are related to iron deficiency  If we correct iron deficiency and her numbers normalize, connection is supported  Alternatively, if iron studies are normalized and anemia, microcytosis, thrombocytosis remain, they are likely not related to iron deficiency  She has been taking an oral supplement containing iron  I would prefer specific iron supplementation and made arrangements for the same  The patient declined parental iron supplementation  She has had previous elevation of CRP and sed rate consistent with aortitis  This may explain her hematologic abnormalities as well  I discussed the above with the patient  The patient  voiced understanding and agreement   ______________________________________________________________________    Chief Complaint   Patient presents with    Follow-up       HPI:  Oncology History    No history exists  Interval History:  Clinically stable  ECOG-  0 - Asymptomatic    Review of Systems   Constitutional: Negative for appetite change, diaphoresis, fatigue and fever  HENT: Negative for sinus pain  Eyes: Negative for discharge  Respiratory: Negative for cough and shortness of breath  Cardiovascular: Negative for chest pain  Gastrointestinal: Negative for abdominal pain, constipation and diarrhea  Endocrine: Negative for cold intolerance  Genitourinary: Negative for difficulty urinating and hematuria     Musculoskeletal: Negative for joint swelling  Skin: Negative for rash  Allergic/Immunologic: Negative for environmental allergies  Neurological: Negative for dizziness and headaches  Hematological: Negative for adenopathy  Psychiatric/Behavioral: Negative for agitation         Past Medical History:   Diagnosis Date    Asthma     Chronic anemia     Diabetes mellitus ([facility] )     Hypertension     Hyperthyroidism     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[facility] )     TB lung, latent      Patient Active Problem List   Diagnosis    Intramural aortic hematoma (HCC)    Retrosternal chest pain    Left flank pain    Thrombocytosis     Multiple thyroid nodules    Diabetes mellitus type 2    Iron deficiency anemia    Pulmonary hypertension (HCC)    Depression    Dyslipidemia, goal LDL below 100    Mild persistent asthma without complication    Essential hypertension    Aortitis     Acute on chronic right-sided low back pain without sciatica    Latent tuberculosis    Abnormal thyroid function test    SIRS (systemic inflammatory response syndrome)    Hyperphosphatemia    Fatigue    Abnormal LFTs    Low back pain    Hypercalcemia    Hyperthyroidism       Current Outpatient Medications:     acetaminophen (TYLENOL) 325 mg tablet, Take 650 mg by mouth every 6 (six) hours as needed for mild pain, Disp: , Rfl:     albuterol (PROVENTIL HFA,VENTOLIN HFA) 90 mcg/act inhaler, Inhale 2 puffs 2 (two) times a day, Disp: , Rfl:     aspirin (ECOTRIN LOW STRENGTH) 81 mg EC tablet, Take 81 mg by mouth daily, Disp: , Rfl:     Cobalamin Combinations (VITAMIN B12-FOLIC ACID PO), Take by mouth, Disp: , Rfl:     diclofenac sodium (VOLTAREN) 50 mg EC tablet, Take 1 tablet (50 mg total) by mouth 2 (two) times a day, Disp: 30 tablet, Rfl: 0    escitalopram (LEXAPRO) 10 mg tablet, Take 10 mg by mouth daily, Disp: , Rfl:     losartan (COZAAR) 100 MG tablet, Take 100 mg by mouth daily , Disp: , Rfl:     metFORMIN (GLUCOPHAGE) 500 mg tablet, Take 1,000 mg by mouth 2 (two) times a day with meals, Disp: , Rfl:     methimazole (TAPAZOLE) 10 mg tablet, Take 1 tablet (10 mg total) by mouth daily, Disp: 60 tablet, Rfl: 1    methotrexate 2 5 mg tablet, Take by mouth once a week, Disp: , Rfl:     metoprolol succinate (TOPROL-XL) 50 mg 24 hr tablet, Take 1 tablet (50 mg total) by mouth daily, Disp: 90 tablet, Rfl: 4    montelukast (SINGULAIR) 10 mg tablet, Take 10 mg by mouth daily at bedtime, Disp: , Rfl:     pantoprazole (PROTONIX) 40 mg tablet, Take 1 tablet (40 mg total) by mouth daily in the early morning, Disp: 30 tablet, Rfl: 0    simvastatin (ZOCOR) 10 mg tablet, Take 10 mg by mouth daily at bedtime, Disp: , Rfl:   Allergies   Allergen Reactions    Other Allergic Rhinitis     pollen     Past Surgical History:   Procedure Laterality Date     SECTION      IR BIOPSY LIVER MASS  7/15/2020     Social History     Objective:  Vitals:    21 1120   BP: 134/74   BP Location: Left arm   Patient Position: Sitting   Pulse: 96   Resp: 17   Temp: 98 6 °F (37 °C)   TempSrc: Tympanic   SpO2: 97%   Weight: 108 kg (239 lb)   Height: 5' 3" (1 6 m)     Physical Exam  Constitutional:       Appearance: She is well-developed  HENT:      Head: Normocephalic and atraumatic  Eyes:      Pupils: Pupils are equal, round, and reactive to light  Neck:      Musculoskeletal: Neck supple  Cardiovascular:      Rate and Rhythm: Normal rate  Heart sounds: No murmur  Pulmonary:      Effort: No respiratory distress  Breath sounds: No wheezing or rales  Abdominal:      General: There is no distension  Palpations: Abdomen is soft  Tenderness: There is no abdominal tenderness  There is no rebound  Musculoskeletal:         General: No tenderness  Lymphadenopathy:      Cervical: No cervical adenopathy  Skin:     General: Skin is warm  Findings: No rash     Neurological:      Mental Status: She is alert and oriented to person, place, and time       Deep Tendon Reflexes: Reflexes normal    Psychiatric:         Thought Content: Thought content normal            Labs: I personally reviewed the labs and imaging pertinent to this patient care

## 2021-05-24 DIAGNOSIS — E05.90 HYPERTHYROIDISM: ICD-10-CM

## 2021-05-24 RX ORDER — METHIMAZOLE 10 MG/1
10 TABLET ORAL DAILY
Qty: 30 TABLET | Refills: 1 | Status: SHIPPED | OUTPATIENT
Start: 2021-05-24 | End: 2021-06-03

## 2021-05-25 ENCOUNTER — APPOINTMENT (OUTPATIENT)
Dept: LAB | Facility: MEDICAL CENTER | Age: 46
End: 2021-05-25
Payer: COMMERCIAL

## 2021-05-25 ENCOUNTER — TRANSCRIBE ORDERS (OUTPATIENT)
Dept: LAB | Facility: MEDICAL CENTER | Age: 46
End: 2021-05-25

## 2021-05-25 DIAGNOSIS — E05.90 HYPERTHYROIDISM: ICD-10-CM

## 2021-05-25 DIAGNOSIS — I77.6 AORTITIS (HCC): ICD-10-CM

## 2021-05-25 LAB
T4 FREE SERPL-MCNC: 0.67 NG/DL (ref 0.76–1.46)
TSH SERPL DL<=0.05 MIU/L-ACNC: 2.41 UIU/ML (ref 0.36–3.74)

## 2021-05-25 PROCEDURE — 36415 COLL VENOUS BLD VENIPUNCTURE: CPT

## 2021-05-25 PROCEDURE — 84439 ASSAY OF FREE THYROXINE: CPT

## 2021-05-25 PROCEDURE — 84443 ASSAY THYROID STIM HORMONE: CPT

## 2021-05-26 ENCOUNTER — TELEPHONE (OUTPATIENT)
Dept: DIABETES SERVICES | Facility: CLINIC | Age: 46
End: 2021-05-26

## 2021-05-26 NOTE — TELEPHONE ENCOUNTER
I spoke to [facility] about scheduling Diabetes Education Classes as she was originally scheduled in March, 2021  She does not feel the need for classes at this time

## 2021-06-03 ENCOUNTER — OFFICE VISIT (OUTPATIENT)
Dept: ENDOCRINOLOGY | Facility: CLINIC | Age: 46
End: 2021-06-03
Payer: COMMERCIAL

## 2021-06-03 VITALS
SYSTOLIC BLOOD PRESSURE: 146 MMHG | DIASTOLIC BLOOD PRESSURE: 86 MMHG | HEART RATE: 80 BPM | WEIGHT: 242.4 LBS | HEIGHT: 63 IN | BODY MASS INDEX: 42.95 KG/M2

## 2021-06-03 DIAGNOSIS — E61.1 IRON DEFICIENCY: ICD-10-CM

## 2021-06-03 DIAGNOSIS — E11.69 DIABETES MELLITUS TYPE 2 IN OBESE (HCC): ICD-10-CM

## 2021-06-03 DIAGNOSIS — E66.9 DIABETES MELLITUS TYPE 2 IN OBESE (HCC): ICD-10-CM

## 2021-06-03 DIAGNOSIS — E66.01 MORBID OBESITY (HCC): ICD-10-CM

## 2021-06-03 DIAGNOSIS — E04.2 MULTIPLE THYROID NODULES: ICD-10-CM

## 2021-06-03 DIAGNOSIS — E05.90 HYPERTHYROIDISM: Primary | ICD-10-CM

## 2021-06-03 PROCEDURE — 99214 OFFICE O/P EST MOD 30 MIN: CPT | Performed by: INTERNAL MEDICINE

## 2021-06-03 RX ORDER — METHIMAZOLE 5 MG/1
5 TABLET ORAL DAILY
Qty: 60 TABLET | Refills: 1 | Status: SHIPPED | OUTPATIENT
Start: 2021-06-03 | End: 2021-08-17

## 2021-06-03 NOTE — PATIENT INSTRUCTIONS
Take methimazole 5mg daily for 4 weeks then have blood tests done  We will discuss further dose changes  Please get Thyroid US

## 2021-06-03 NOTE — LETTER
Medical Fitness Referral    Patient: Kalyan Ames  : 1975  MRN: [id number]  Address: [address] [place] [address] [place]  [address]  Phone Number: [phone]  E-mail: [email or web address]    [] Medical Disorders (Ex  Diabetes)   [] Cardiovascular Disorders (Ex  Hypertension)   [] Pulmonary Disorders (Ex  Asthma)   [] Cancer Disorders (Ex  Breast, Prostate)   [] Immunological & Hematological Disorders (Rheumatoid Arthritis)   [] Neurological Disorders (Ex  Parkinson's Disease)   [] Cognitive Disorders (Ex  Dementia)   [] Children & Adolescents (Ex  BMI)   [] Older Adults (Ex  Balance & Ambulation)   [] Female Specific Disorders (Ex  Osteoporosis)       Diagnoses:   1  Hyperthyroidism  methimazole (TAPAZOLE) 5 mg tablet    T4, free    TSH, 3rd generation   2  Diabetes mellitus type 2     3  Morbid obesity ([address] )     4  Iron deficiency     5   Multiple thyroid nodules       Additional Comments:     Service Requested:  [x] Medical Fitness Consult- Screening For Appropriateness of Medical Fitness Program   [x] Medical Fitness Program- Assessment of Body Composition, Aerobic, Anaerobic, Muscle Strength & Endurance, Flexibility, Neuromuscular & Functional Fitness   [x] Medical Fitness Assessment- Individualized Evidence-Based Exercise Program       Requesting Provider: Destiny Vergara MD  Date: 21

## 2021-06-03 NOTE — PROGRESS NOTES
Follow-up Patient Progress Note      CC:  Follow-up of hyperthyroidism, type 2 diabetes    History of Present Illness:   75-year-old female with type 2 diabetes, hypertension, obesity, iron deficiency anemia, recent Aortitis, hyperthyroidism and hypercalcemia  Last visit was 1/21/21  For her recent aortitis, she had an extensive workup and multi specialty evaluation and a prolonged hospitalization  She was placed on high-dose prednisone and slowly tapered  At the same time she was found to have significant hyperthyroidism associated with hypercalcemia that has slowly improved  Corrected Calcium 10 1mg/dL, TSH 2 4uIU/mL and fT4 0 67ng/dL    She reports generally feeling well  She has been compliant with her methimazole and metformin  No reported polyuria, polydipsia or blurred vision  Gained 20 lbs since last visit  She was started on vitamin D2 50K for level of 16ng/mL  Diet:  Admits to recent dietary indiscretions  She does not count her carbohydrates  More red meat recently  Activity:  No regular exercise  Home blood glucose monitoring: checks every 3times daily  BG is usually 80-90 fasting and 130-150mg/dL post prandial  Did not bring logs   Recent A1c was 6 3%    Current meds:  Metformin 1000mg qam and 500mg qpm - PCP adjusted dose  Methimazole 10mg qdaily    Opthamology: no  Podiatry: No  Influenza: Yes, COVID -  yes  Dental:  Pancreatitis: No    Ace/ARB: losartan  Statin: simvastatin    Patient Active Problem List   Diagnosis    Intramural aortic hematoma (HCC)    Retrosternal chest pain    Left flank pain    Thrombocytosis     Multiple thyroid nodules    Diabetes mellitus type 2    Iron deficiency anemia    Pulmonary hypertension ([address] )    Depression    Dyslipidemia, goal LDL below 100    Mild persistent asthma without complication    Essential hypertension    Aortitis     Acute on chronic right-sided low back pain without sciatica    Latent tuberculosis    Abnormal thyroid function test    SIRS (systemic inflammatory response syndrome)    Hyperphosphatemia    Fatigue    Abnormal LFTs    Low back pain    Hypercalcemia    Hyperthyroidism     Past Medical History:   Diagnosis Date    Asthma     Chronic anemia     [facility]     Hypertension     Hyperthyroidism     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[facility] )     TB lung, latent       Past Surgical History:   Procedure Laterality Date     SECTION      IR BIOPSY LIVER MASS  7/15/2020      Family History   Problem Relation Age of Onset    Diabetes unspecified Mother      Social History     Tobacco Use    Smoking status: Never Smoker    Smokeless tobacco: Never Used   Substance Use Topics    Alcohol use: Never     Alcohol/week: 0 0 standard drinks     Frequency: Never     Drinks per session: Patient refused     Binge frequency: Never     Allergies   Allergen Reactions    Other Allergic Rhinitis     pollen         Current Outpatient Medications:     acetaminophen (TYLENOL) 325 mg tablet, Take 650 mg by mouth every 6 (six) hours as needed for mild pain, Disp: , Rfl:     albuterol (PROVENTIL HFA,VENTOLIN HFA) 90 mcg/act inhaler, Inhale 2 puffs 2 (two) times a day, Disp: , Rfl:     aspirin (ECOTRIN LOW STRENGTH) 81 mg EC tablet, Take 81 mg by mouth daily, Disp: , Rfl:     Cobalamin Combinations (VITAMIN B12-FOLIC ACID PO), Take by mouth, Disp: , Rfl:     diclofenac sodium (VOLTAREN) 50 mg EC tablet, Take 1 tablet (50 mg total) by mouth 2 (two) times a day, Disp: 30 tablet, Rfl: 0    escitalopram (LEXAPRO) 10 mg tablet, Take 10 mg by mouth daily, Disp: , Rfl:     ferrous sulfate 324 (65 Fe) mg, Take 1 tablet (324 mg total) by mouth 2 (two) times a day before meals, Disp: 60 tablet, Rfl: 2    losartan (COZAAR) 100 MG tablet, Take 100 mg by mouth daily , Disp: , Rfl:     metFORMIN (GLUCOPHAGE) 500 mg tablet, Take 1,000 mg by mouth 2 (two) times a day with meals, Disp: , Rfl:     methimazole (TAPAZOLE) 10 mg tablet, Take 1 tablet (10 mg total) by mouth daily, Disp: 30 tablet, Rfl: 1    methotrexate 2 5 mg tablet, Take by mouth once a week, Disp: , Rfl:     metoprolol succinate (TOPROL-XL) 50 mg 24 hr tablet, Take 1 tablet (50 mg total) by mouth daily, Disp: 90 tablet, Rfl: 4    montelukast (SINGULAIR) 10 mg tablet, Take 10 mg by mouth daily at bedtime, Disp: , Rfl:     pantoprazole (PROTONIX) 40 mg tablet, Take 1 tablet (40 mg total) by mouth daily in the early morning, Disp: 30 tablet, Rfl: 0    simvastatin (ZOCOR) 10 mg tablet, Take 10 mg by mouth daily at bedtime, Disp: , Rfl:     Review of Systems   Constitutional: Positive for fatigue  HENT: Negative  Eyes: Negative  Respiratory: Negative  Cardiovascular: Negative  Gastrointestinal: Negative  Endocrine: Negative  Musculoskeletal: Negative  Skin: Negative  Allergic/Immunologic: Negative  Neurological: Negative  Hematological: Negative  Psychiatric/Behavioral: Negative  Physical Exam:  Body mass index is 42 94 kg/m²  /86   Pulse 80   Ht 5' 3" (1 6 m)   Wt 110 kg (242 lb 6 4 oz)   LMP 04/04/2020 (Exact Date)   BMI 42 94 kg/m²    Vitals:    06/03/21 0825   Weight: 110 kg (242 lb 6 4 oz)        Physical Exam  Constitutional:       Appearance: She is well-developed  HENT:      Head: Normocephalic  Eyes:      Pupils: Pupils are equal, round, and reactive to light  Neck:      Musculoskeletal: Normal range of motion  Thyroid: No thyromegaly  Comments: Palpable and visible goiter with possible underlying lt nodule  Cardiovascular:      Rate and Rhythm: Normal rate  Pulses: no weak pulses          Dorsalis pedis pulses are 2+ on the right side and 2+ on the left side  Posterior tibial pulses are 2+ on the right side and 2+ on the left side  Heart sounds: Normal heart sounds  Pulmonary:      Effort: Pulmonary effort is normal       Breath sounds: Normal breath sounds     Abdominal: General: Bowel sounds are normal       Palpations: Abdomen is soft  Musculoskeletal:         General: No deformity  Feet:      Right foot:      Skin integrity: No ulcer, skin breakdown, erythema, warmth, callus or dry skin  Left foot:      Skin integrity: No ulcer, skin breakdown, erythema, warmth, callus or dry skin  Skin:     Capillary Refill: Capillary refill takes less than 2 seconds  Coloration: Skin is not pale  Findings: No rash  Neurological:      Mental Status: She is alert and oriented to person, place, and time  Patient's shoes and socks removed  Right Foot/Ankle   Right Foot Inspection  Skin Exam: skin normal and skin intact no dry skin, no warmth, no callus, no erythema, no maceration, no abnormal color, no pre-ulcer, no ulcer and no callus                          Toe Exam: ROM and strength within normal limits  Sensory   Vibration: intact  Proprioception: intact   Monofilament testing: intact  Vascular  Capillary refills: < 3 seconds  The right DP pulse is 2+  The right PT pulse is 2+  Left Foot/Ankle  Left Foot Inspection  Skin Exam: skin normal and skin intactno dry skin, no warmth, no erythema, no maceration, normal color, no pre-ulcer, no ulcer and no callus                         Toe Exam: ROM and strength within normal limits                   Sensory   Vibration: intact  Proprioception: intact  Monofilament: intact  Vascular  Capillary refills: < 3 seconds  The left DP pulse is 2+  The left PT pulse is 2+  Assign Risk Category:  No deformity present; No loss of protective sensation;  No weak pulses       Risk: 0      Labs:   Lab Results   Component Value Date    HGBA1C 7 1 (A) 09/17/2020       Lab Results   Component Value Date    TRH4BEUZQGXM 2 410 05/25/2021       Lab Results   Component Value Date    CREATININE 0 98 01/11/2021    CREATININE 1 09 10/05/2020    CREATININE 0 73 06/15/2020    BUN 18 01/11/2021    K 4 4 01/11/2021     01/11/2021    CO2 23 01/11/2021     eGFR   Date Value Ref Range Status   01/11/2021 69 ml/min/1 73sq m Final       Lab Results   Component Value Date    ALT 17 01/11/2021    AST 11 01/11/2021     (H) 04/20/2020    ALKPHOS 134 (H) 01/11/2021       Lab Results   Component Value Date    CHOLESTEROL 135 04/07/2020     Lab Results   Component Value Date    HDL 34 (L) 04/07/2020     Lab Results   Component Value Date    TRIG 114 04/07/2020     No results found for: NONHDLC      Impression:  1  Hyperthyroidism    2  Diabetes mellitus type 2    3  Morbid obesity ([address] )    4  Iron deficiency    5  Multiple thyroid nodules         Plan:    Diagnoses and all orders for this visit:    Hyperthyroidism  She is overtreated with free T4 of 0 67 ng/dL and TSH 2 4 uIU per mL  She does not have any symptoms but has gained weight  She was advised to reduce her methimazole dose to 5 mg q day  Will repeat TSH and fT4 in 4 weeks based on which further dose reduction will be planned  Follow-up in 4 months  -     methimazole (TAPAZOLE) 5 mg tablet; Take 1 tablet (5 mg total) by mouth daily  -     T4, free; Future  -     TSH, 3rd generation; Future    Diabetes mellitus type 2  She has improved glycemia with an A1c of 6 3%  Recommended carbohydrate consistent diet, daily activity and medical fitness  Referral made to medical fitness  She was also educated about G LP 1 agonists, the uses and side effects  I think she will be a good candidate for G LP 1 agonist treatment  Patient wishes to try lifestyle modification 1st and wishes to consider this next visit if there is no improvement  Metformin cut down to 1000-500mg by PCP  Advised to maintain blood glucose logs  Follow-up in 4 months  Hypercalcemia  Her corrected calcium was 10 1 mg/dL recently  Will continue to monitor  Morbid obesity  Advised diet, lifestyle modifications, outpatient sleep evaluation    Patient wishes to try lifestyle modifications and diet first  Consider GLP1 agonist     Thyroid nodule  Palpable goiter and Lt thyroid nodule  Waiting US thyroid  Will do it today  Son was updated in office  I have spent 35 minutes with patient today in which greater than 50% of this time was spent in counseling/coordination of care  Discussed with the patient and all questioned fully answered  She will call me if any problems arise  Educated/ Counseled patient on diagnostic test results, prognosis, risk vs benefit of treatment options, importance of treatment compliance, healthy life and lifestyle choices        [address]

## 2021-06-04 ENCOUNTER — TELEPHONE (OUTPATIENT)
Dept: ADMINISTRATIVE | Facility: OTHER | Age: 46
End: 2021-06-04

## 2021-06-04 NOTE — LETTER
Diabetic Eye Exam Form    Date Requested: 21  Patient: Khris Balbuena  Patient : 1975   Referring Provider: Katerin Gasca MD    Dilated Retinal Exam, Optomap-Iris Exam, or Fundus Photography Done         Yes ([place] one above)         No     Date of Diabetic Eye Exam ______________________________  Left Eye      Exam did show retinopathy    Exam did not show retinopathy         Mild       Moderate       None       Proliferative       Severe     Right Eye     Exam did show retinopathy    Exam did not show retinopathy         Mild       Moderate       None       Proliferative       Severe     Comments __________________________________________________________    Practice Providing Exam ______________________________________________    Exam Performed By (print name) _______________________________________      Provider Signature ___________________________________________________      These reports are needed for  compliance    Please fax this completed form and a copy of the Diabetic Eye Exam report to our office located at [address] [place] [address] as soon as possible to [phone] pablito Young Stake: Phone [phone]    We thank you for your assistance in treating our mutual patient     [facility] [phone] F [phone]

## 2021-06-04 NOTE — TELEPHONE ENCOUNTER
----- Message from Hayley Richard sent at 6/3/2021  8:32 AM EDT -----  06/03/21 8:32 AM    Hello, our patient [address] [place] has had  diabetic eye exam completed/performed  Please assist in obtaining the exclusion documentation by [facility] [phone]  The date of service is about 3 months ago       Thank you,  Hayley Richard  [facility]

## 2021-06-04 NOTE — LETTER
Diabetic Eye Exam Form    Date Requested: 21  Patient: Corrine Rey Sree  Patient : 1975   Referring Provider: Travis Gonzalez MD    Dilated Retinal Exam, Optomap-Iris Exam, or Fundus Photography Done         Yes ([place] one above)         No     Date of Diabetic Eye Exam ______________________________  Left Eye      Exam did show retinopathy    Exam did not show retinopathy         Mild       Moderate       None       Proliferative       Severe     Right Eye     Exam did show retinopathy    Exam did not show retinopathy         Mild       Moderate       None       Proliferative       Severe     Comments __________________________________________________________    Practice Providing Exam ______________________________________________    Exam Performed By (print name) _______________________________________      Provider Signature ___________________________________________________      These reports are needed for  compliance    Please fax this completed form and a copy of the Diabetic Eye Exam report to our office located at [address] [place] [address] as soon as possible to [phone] attention Eitan Foley: Phone [phone]    We thank you for your assistance in treating our mutual patient     [facility] [phone] F [phone]

## 2021-06-08 ENCOUNTER — HOSPITAL ENCOUNTER (OUTPATIENT)
Dept: ULTRASOUND IMAGING | Facility: HOSPITAL | Age: 46
Discharge: HOME | End: 2021-06-08
Payer: COMMERCIAL

## 2021-06-08 DIAGNOSIS — E04.2 MULTIPLE THYROID NODULES: ICD-10-CM

## 2021-06-08 PROCEDURE — 76536 US EXAM OF HEAD AND NECK: CPT

## 2021-06-08 NOTE — TELEPHONE ENCOUNTER
Upon review of the In Basket request and the patient's chart, initial outreach has been made via fax, please see Contacts section for details       Thank you  Karen Nguyễn

## 2021-06-16 NOTE — RESULT ENCOUNTER NOTE
US thyroid shows multiple nodules but none meet criteria for a biopsy  We will repeat US thyroid in 6-12 months for surveillance

## 2021-06-17 NOTE — TELEPHONE ENCOUNTER
As a follow-up, a second attempt has been made for outreach via fax, please see Contacts section for details      Thank you  Vicente Montes

## 2021-06-18 NOTE — TELEPHONE ENCOUNTER
Upon review of the In Basket request we have found as a result of outreach that patient did not have the requested item(s) completed  Pt is not a patient    Any additional questions or concerns should be emailed to the Practice Liaisons via [email or web address] email, please do not reply via In Basket      Thank you  Lenny García

## 2021-08-17 ENCOUNTER — APPOINTMENT (OUTPATIENT)
Dept: LAB | Facility: HOSPITAL | Age: 46
End: 2021-08-17
Attending: INTERNAL MEDICINE
Payer: COMMERCIAL

## 2021-08-17 ENCOUNTER — OFFICE VISIT (OUTPATIENT)
Dept: HEMATOLOGY ONCOLOGY | Facility: CLINIC | Age: 46
End: 2021-08-17
Payer: COMMERCIAL

## 2021-08-17 VITALS
HEIGHT: 63 IN | DIASTOLIC BLOOD PRESSURE: 74 MMHG | OXYGEN SATURATION: 98 % | BODY MASS INDEX: 42.17 KG/M2 | WEIGHT: 238 LBS | RESPIRATION RATE: 18 BRPM | SYSTOLIC BLOOD PRESSURE: 124 MMHG | TEMPERATURE: 98.1 F | HEART RATE: 79 BPM

## 2021-08-17 DIAGNOSIS — D75.839 THROMBOCYTOSIS: ICD-10-CM

## 2021-08-17 DIAGNOSIS — D50.9 IRON DEFICIENCY ANEMIA, UNSPECIFIED IRON DEFICIENCY ANEMIA TYPE: ICD-10-CM

## 2021-08-17 DIAGNOSIS — D50.9 IRON DEFICIENCY ANEMIA, UNSPECIFIED IRON DEFICIENCY ANEMIA TYPE: Primary | ICD-10-CM

## 2021-08-17 LAB
FERRITIN SERPL-MCNC: 23 NG/ML (ref 8–388)
IRON SATN MFR SERPL: 8 %
IRON SERPL-MCNC: 26 UG/DL (ref 50–170)
TIBC SERPL-MCNC: 336 UG/DL (ref 250–450)

## 2021-08-17 PROCEDURE — 83550 IRON BINDING TEST: CPT

## 2021-08-17 PROCEDURE — 99214 OFFICE O/P EST MOD 30 MIN: CPT | Performed by: INTERNAL MEDICINE

## 2021-08-17 PROCEDURE — 82728 ASSAY OF FERRITIN: CPT

## 2021-08-17 PROCEDURE — 83540 ASSAY OF IRON: CPT

## 2021-08-17 PROCEDURE — 36415 COLL VENOUS BLD VENIPUNCTURE: CPT

## 2021-08-17 NOTE — PROGRESS NOTES
[facility] [place]  [address] [address]  [phone]    Mat Balbuena,1975, [id number]  08/17/21    Discussion:   In summary, this is a 25-year-old female history of microcytic anemia, thrombocytosis and iron deficiency  She has been on oral iron supplementation  Recent CBC shows White count 11 5, hemoglobin 10 4, MCV 78, platelet count 784  Previous differential was normal   Previous CRP was 107, sed rate 98  I asked her to have iron panel done today and call in 1-2 days for results  If depressed, parental iron supplementation will be recommended  If corrected, other diagnostic considerations would follow  I discussed the above with the patient  The patient  voiced understanding and agreement   ______________________________________________________________________    Chief Complaint   Patient presents with    Follow-up       HPI:  Oncology History    No history exists  Interval History:  Clinically stable  ECOG-  0 - Asymptomatic    Review of Systems   Constitutional: Negative for appetite change, diaphoresis, fatigue and fever  HENT: Negative for sinus pain  Eyes: Negative for discharge  Respiratory: Negative for cough and shortness of breath  Cardiovascular: Negative for chest pain  Gastrointestinal: Negative for abdominal pain, constipation and diarrhea  Endocrine: Negative for cold intolerance  Genitourinary: Negative for difficulty urinating and hematuria  Musculoskeletal: Negative for joint swelling  Skin: Negative for rash  Allergic/Immunologic: Negative for environmental allergies  Neurological: Negative for dizziness and headaches  Hematological: Negative for adenopathy  Psychiatric/Behavioral: Negative for agitation         Past Medical History:   Diagnosis Date    Asthma     Chronic anemia     Diabetes mellitus ([facility] [address] )     Hypertension     Hyperthyroidism     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     TB lung, latent      Patient Active Problem List   Diagnosis    Intramural aortic hematoma (HCC)    Retrosternal chest pain    Left flank pain    Thrombocytosis     Multiple thyroid nodules    Diabetes mellitus type 2    Iron deficiency anemia    Pulmonary hypertension (HCC)    Depression    Dyslipidemia, goal LDL below 100    Mild persistent asthma without complication    Essential hypertension    Aortitis     Acute on chronic right-sided low back pain without sciatica    Latent tuberculosis    Abnormal thyroid function test    SIRS (systemic inflammatory response syndrome)    Hyperphosphatemia    Fatigue    Abnormal LFTs    Low back pain    Hypercalcemia    Hyperthyroidism       Current Outpatient Medications:     acetaminophen (TYLENOL) 325 mg tablet, Take 650 mg by mouth every 6 (six) hours as needed for mild pain, Disp: , Rfl:     albuterol (PROVENTIL HFA,VENTOLIN HFA) 90 mcg/act inhaler, Inhale 2 puffs 2 (two) times a day, Disp: , Rfl:     aspirin (ECOTRIN LOW STRENGTH) 81 mg EC tablet, Take 81 mg by mouth daily, Disp: , Rfl:     Cobalamin Combinations (VITAMIN B12-FOLIC ACID PO), Take by mouth, Disp: , Rfl:     diclofenac sodium (VOLTAREN) 50 mg EC tablet, Take 1 tablet (50 mg total) by mouth 2 (two) times a day, Disp: 30 tablet, Rfl: 0    escitalopram (LEXAPRO) 10 mg tablet, Take 10 mg by mouth daily, Disp: , Rfl:     ferrous sulfate 324 (65 Fe) mg, Take 1 tablet (324 mg total) by mouth 2 (two) times a day before meals, Disp: 60 tablet, Rfl: 2    losartan (COZAAR) 100 MG tablet, Take 100 mg by mouth daily , Disp: , Rfl:     metFORMIN (GLUCOPHAGE) 500 mg tablet, Take 1,000 mg by mouth 2 (two) times a day with meals, Disp: , Rfl:     methimazole (TAPAZOLE) 5 mg tablet, Take 1 tablet (5 mg total) by mouth daily, Disp: 60 tablet, Rfl: 1    methotrexate 2 5 mg tablet, Take by mouth once a week, Disp: , Rfl:     metoprolol succinate (TOPROL-XL) 50 mg 24 hr tablet, TAKE (1) TABLET DAILY , Disp: 90 tablet, Rfl: 4    montelukast (SINGULAIR) 10 mg tablet, Take 10 mg by mouth daily at bedtime, Disp: , Rfl:     pantoprazole (PROTONIX) 40 mg tablet, Take 1 tablet (40 mg total) by mouth daily in the early morning, Disp: 30 tablet, Rfl: 0    simvastatin (ZOCOR) 10 mg tablet, Take 10 mg by mouth daily at bedtime, Disp: , Rfl:   Allergies   Allergen Reactions    Other Allergic Rhinitis     pollen     Past Surgical History:   Procedure Laterality Date     SECTION      IR BIOPSY LIVER MASS  7/15/2020     Social History     Objective:  Vitals:    21 1040   BP: 124/74   BP Location: Right arm   Patient Position: Sitting   Pulse: 79   Resp: 18   Temp: 98 1 °F (36 7 °C)   TempSrc: Tympanic   SpO2: 98%   Weight: 108 kg (238 lb)   Height: 5' 3" (1 6 m)     Physical Exam  Constitutional:       Appearance: She is well-developed  HENT:      Head: Normocephalic and atraumatic  Eyes:      Pupils: Pupils are equal, round, and reactive to light  Cardiovascular:      Rate and Rhythm: Normal rate  Heart sounds: No murmur heard  Pulmonary:      Effort: No respiratory distress  Breath sounds: No wheezing or rales  Abdominal:      General: There is no distension  Palpations: Abdomen is soft  Tenderness: There is no abdominal tenderness  There is no rebound  Musculoskeletal:         General: No tenderness  Cervical back: Neck supple  Lymphadenopathy:      Cervical: No cervical adenopathy  Skin:     General: Skin is warm  Findings: No rash  Neurological:      Mental Status: She is alert and oriented to person, place, and time  Deep Tendon Reflexes: Reflexes normal    Psychiatric:         Thought Content: Thought content normal            Labs: I personally reviewed the labs and imaging pertinent to this patient care

## 2021-08-20 ENCOUNTER — TELEPHONE (OUTPATIENT)
Dept: HEMATOLOGY ONCOLOGY | Facility: CLINIC | Age: 46
End: 2021-08-20

## 2021-08-20 DIAGNOSIS — R79.0 LOW IRON STORES: Primary | ICD-10-CM

## 2021-08-20 RX ORDER — SODIUM CHLORIDE 9 MG/ML
20 INJECTION, SOLUTION INTRAVENOUS ONCE
Status: CANCELLED | OUTPATIENT
Start: 2021-08-27

## 2021-08-20 NOTE — TELEPHONE ENCOUNTER
Patient was recently seen by Dr Joey Cook  Per OV note:  I asked her to have iron panel done today and call in 1-2 days for results  If depressed, parental iron supplementation will be recommended  If corrected, other diagnostic considerations would follow        Will send to Dr Rosanna Swan RN to review with MD  Patient aware of plan

## 2021-08-20 NOTE — TELEPHONE ENCOUNTER
Patient would like their lab results     Person calling in  Patient   Lab Draw Date 08/17/2021   Lab Draw Place [facility]   Call Back Number [phone]

## 2021-08-20 NOTE — TELEPHONE ENCOUNTER
Reviewed patient's iron studies with Dr Abdifatah Khanna  Pt to have Venofer 300 mg x 3 treatments  Attempted to call pt with the dates and times, without success  Left VM with dates, times and location     (8/31/21 @ 1400, 9/7/21 @ 1330, 9/16/21 @ 1330 -NICHOLAS

## 2021-08-20 NOTE — TELEPHONE ENCOUNTER
Called and spoke with Rufus Cervantes states that she does not want to have IV Iron infusion  She is scared, and has poor venous access  Reviewed that if she changes her mind to call and we will re-schedule her treatments

## 2021-08-26 ENCOUNTER — TELEPHONE (OUTPATIENT)
Dept: HEMATOLOGY ONCOLOGY | Facility: CLINIC | Age: 46
End: 2021-08-26

## 2021-08-26 NOTE — TELEPHONE ENCOUNTER
Patient calling with questions concerns about IV iron  Patient asked about PRBC transfusion  I explained that this is not the same as IV iron  Patient asking if she can come to the office sooner to have a visit with Dr LAKSHMI ESCOBAR OF [facility] or [facility] in the [place] office  Soonest appointments were about 1 month away  Patient would like to come in sooner as she is very concerned about her number and treatment options  Patient is scared to get IV iron      Will send to RN to see if sooner appointment can be made  Call back number for patient [phone]

## 2021-08-26 NOTE — TELEPHONE ENCOUNTER
Returned call to pt  Advised her I was able to schedule her a sooner appointment on 9/17 with [facility] to discuss her concerns  I let her know I would keep an eye out for cancellations and get her in sooner if able  She verbalized understanding

## 2021-08-31 ENCOUNTER — HOSPITAL ENCOUNTER (OUTPATIENT)
Dept: INFUSION CENTER | Facility: HOSPITAL | Age: 46
Discharge: HOME | End: 2021-08-31
Attending: INTERNAL MEDICINE

## 2021-09-17 ENCOUNTER — OFFICE VISIT (OUTPATIENT)
Dept: HEMATOLOGY ONCOLOGY | Facility: CLINIC | Age: 46
End: 2021-09-17
Payer: COMMERCIAL

## 2021-09-17 VITALS
HEART RATE: 80 BPM | HEIGHT: 63 IN | DIASTOLIC BLOOD PRESSURE: 79 MMHG | BODY MASS INDEX: 42.7 KG/M2 | WEIGHT: 241 LBS | SYSTOLIC BLOOD PRESSURE: 147 MMHG

## 2021-09-17 DIAGNOSIS — R79.0 LOW IRON STORES: Primary | ICD-10-CM

## 2021-09-17 DIAGNOSIS — D50.9 IRON DEFICIENCY ANEMIA, UNSPECIFIED IRON DEFICIENCY ANEMIA TYPE: ICD-10-CM

## 2021-09-17 PROCEDURE — 99214 OFFICE O/P EST MOD 30 MIN: CPT | Performed by: NURSE PRACTITIONER

## 2021-09-17 RX ORDER — SODIUM CHLORIDE 9 MG/ML
20 INJECTION, SOLUTION INTRAVENOUS ONCE
Status: CANCELLED | OUTPATIENT
Start: 2021-09-28

## 2021-09-17 RX ORDER — ACETAMINOPHEN 325 MG/1
650 TABLET ORAL ONCE
Status: CANCELLED
Start: 2021-09-28 | End: 2021-09-29

## 2021-09-17 NOTE — PROGRESS NOTES
[id number] [address] [place] [address]  [phone]  Hematology Ambulatory Follow-Up  [facility], 1975, [id number]  9/17/2021    Assessment/Plan:    1  Low iron stores  2  Iron deficiency anemia, unspecified iron deficiency anemia type   Patient is a 53-year-old female with a history of microcytic anemia, thrombocytosis, and iron deficiency  Iron deficiency is likely secondary to menorrhagia verses inadequate intake  Her most recent iron saturation is 8% with a ferritin level of 23, hemoglobin of 10 4 with an MCV of 78, indicating she is iron deficient  Patient is currently on oral iron and tolerates without difficulty  We discussed iron transfusion however patient is hesitant  Patient had previously had Feraheme 510 mg x2 in March 2020 however the 2nd infusion was aborted shortly after initiation secondary to development of adverse reactions  Patient states she developed back pain with diaphoresis and chills and elevated blood pressure  She was given Solu-Medrol and discharged once stable  She questioned whether she could receive a blood transfusion to improve her hemoglobin and iron levels  I explained that her hemoglobin is not critically low and she is not a candidate for blood transfusion  I reviewed her chart in Care everywhere  Her adverse reactions included hypertension, visual changes, shortness of breath and chest pain  These were resolved after administration of Solu-Medrol  I discussed using Venofer rather than Feraheme  We will titrate the dose and provide premedications with Tylenol Benadryl ondansetron and dexamethasone  Patient is agreeable to try Venofer 200 mg IV x5 doses with premedications  I reviewed indications and adverse reactions including chest heaviness, muscle cramping, shortness of breath, headache    We will make arrangements in the next few weeks and plan to see her in 3 months with repeat blood work  Patient verbalized understanding and is in agreement with the plan  - CBC and differential; Future  - Comprehensive metabolic panel; Future  - Iron Panel (Includes Ferritin, Iron Sat%, Iron, and TIBC); Future    Barrier(s) to care: None  The patient is able to self care  Interval history:  Clinically stable    Review of Systems   Constitutional: Positive for fatigue  Negative for activity change, appetite change, fever and unexpected weight change  Respiratory: Negative for cough and shortness of breath  Cardiovascular: Negative for chest pain and leg swelling  Gastrointestinal: Negative for abdominal pain, constipation, diarrhea and nausea  Endocrine: Negative for cold intolerance and heat intolerance  Genitourinary: Positive for menstrual problem (Menorrhagia)  Musculoskeletal: Negative for arthralgias and myalgias  Skin: Negative  Neurological: Negative for dizziness, weakness and headaches  Hematological: Negative for adenopathy  Does not bruise/bleed easily         Patient Active Problem List   Diagnosis    Intramural aortic hematoma (HCC)    Retrosternal chest pain    Left flank pain    Thrombocytosis     Multiple thyroid nodules    Diabetes mellitus type 2    Iron deficiency anemia    Pulmonary hypertension (HCC)    Depression    Dyslipidemia, goal LDL below 100    Mild persistent asthma without complication    Essential hypertension    Aortitis     Acute on chronic right-sided low back pain without sciatica    Latent tuberculosis    Abnormal thyroid function test    SIRS (systemic inflammatory response syndrome)    Hyperphosphatemia    Fatigue    Abnormal LFTs    Low back pain    Hypercalcemia    Hyperthyroidism    Low iron stores       Past Medical History:   Diagnosis Date    Asthma     Chronic anemia     Diabetes mellitus ([address] )     Hypertension     Hyperthyroidism     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HCC)     TB lung, latent        Past Surgical History:   Procedure Laterality Date     SECTION      IR BIOPSY LIVER MASS  7/15/2020       Family History   Problem Relation Age of Onset    Diabetes unspecified Mother        Social History     Socioeconomic History    Marital status: /Civil Union     Spouse name: None    Number of children: None    Years of education: None    Highest education level: None   Occupational History    Occupation: not working   Tobacco Use    Smoking status: Never Smoker    Smokeless tobacco: Never Used   Vaping Use    Vaping Use: Never used   Substance and Sexual Activity    Alcohol use: Never     Alcohol/week: 0 0 standard drinks    Drug use: Never    Sexual activity: Yes     Partners: Male   Other Topics Concern    None   Social History Narrative    None     Social Determinants of Health     Financial Resource Strain:     Difficulty of Paying Living Expenses:    Food Insecurity:     Worried About Running Out of Food in the Last Year:     Ran Out of Food in the Last Year:    Transportation Needs:     Lack of Transportation (Medical):      Lack of Transportation (Non-Medical):    Physical Activity:     Days of Exercise per Week:     Minutes of Exercise per Session:    Stress:     Feeling of Stress :    Social Connections:     Frequency of Communication with Friends and Family:     Frequency of Social Gatherings with Friends and Family:     Attends Rastafarian Services:     Active Member of Clubs or Organizations:     Attends Club or Organization Meetings:     Marital Status:    Intimate Partner Violence:     Fear of Current or Ex-Partner:     Emotionally Abused:     Physically Abused:     Sexually Abused:          Current Outpatient Medications:     acetaminophen (TYLENOL) 325 mg tablet, Take 650 mg by mouth every 6 (six) hours as needed for mild pain, Disp: , Rfl:     albuterol (PROVENTIL HFA,VENTOLIN HFA) 90 mcg/act inhaler, Inhale 2 puffs 2 (two) times a day, Disp: , Rfl:    aspirin (ECOTRIN LOW STRENGTH) 81 mg EC tablet, Take 81 mg by mouth daily, Disp: , Rfl:     Cobalamin Combinations (VITAMIN B12-FOLIC ACID PO), Take by mouth, Disp: , Rfl:     diclofenac sodium (VOLTAREN) 50 mg EC tablet, Take 1 tablet (50 mg total) by mouth 2 (two) times a day, Disp: 30 tablet, Rfl: 0    escitalopram (LEXAPRO) 10 mg tablet, Take 10 mg by mouth daily, Disp: , Rfl:     ferrous sulfate 324 (65 Fe) mg, Take 1 tablet (324 mg total) by mouth 2 (two) times a day before meals, Disp: 60 tablet, Rfl: 2    losartan (COZAAR) 100 MG tablet, Take 100 mg by mouth daily , Disp: , Rfl:     metFORMIN (GLUCOPHAGE) 500 mg tablet, Take 1,000 mg by mouth 2 (two) times a day with meals, Disp: , Rfl:     methimazole (TAPAZOLE) 5 mg tablet, Take 1 tablet (5 mg total) by mouth daily, Disp: 60 tablet, Rfl: 1    methotrexate 2 5 mg tablet, Take by mouth once a week, Disp: , Rfl:     metoprolol succinate (TOPROL-XL) 50 mg 24 hr tablet, TAKE (1) TABLET DAILY  , Disp: 90 tablet, Rfl: 4    montelukast (SINGULAIR) 10 mg tablet, Take 10 mg by mouth daily at bedtime, Disp: , Rfl:     pantoprazole (PROTONIX) 40 mg tablet, Take 1 tablet (40 mg total) by mouth daily in the early morning, Disp: 30 tablet, Rfl: 0    simvastatin (ZOCOR) 10 mg tablet, Take 10 mg by mouth daily at bedtime, Disp: , Rfl:     Allergies   Allergen Reactions    Other Allergic Rhinitis     pollen       Objective:  /79   Pulse 80   Ht 5' 3" (1 6 m)   Wt 109 kg (241 lb)   LMP 04/04/2020 (Exact Date)   BMI 42 69 kg/m²    Physical Exam  Constitutional:       Appearance: Normal appearance  She is well-developed  HENT:      Head: Normocephalic and atraumatic  Eyes:      Conjunctiva/sclera: Conjunctivae normal       Pupils: Pupils are equal, round, and reactive to light  Cardiovascular:      Rate and Rhythm: Normal rate and regular rhythm  Pulses: Normal pulses  Heart sounds: Normal heart sounds  No murmur heard       Pulmonary: Effort: Pulmonary effort is normal  No respiratory distress  Breath sounds: Normal breath sounds  Abdominal:      General: Bowel sounds are normal       Palpations: Abdomen is soft  Musculoskeletal:         General: Normal range of motion  Cervical back: Normal range of motion and neck supple  Lymphadenopathy:      Cervical: No cervical adenopathy  Skin:     General: Skin is warm and dry  Capillary Refill: Capillary refill takes less than 2 seconds  Neurological:      Mental Status: She is alert and oriented to person, place, and time  Psychiatric:         Behavior: Behavior normal        Result Review  Labs:     No visits with results within 1 Month(s) from this visit  Latest known visit with results is:   Appointment on 08/17/2021   Component Date Value Ref Range Status    Iron Saturation 08/17/2021 8  % Final    TIBC 08/17/2021 336  250 - 450 ug/dL Final    Iron 08/17/2021 26* 50 - 170 ug/dL Final    Patients treated with metal-binding drugs (ie  Deferoxamine) may have depressed iron values   Ferritin 08/17/2021 23  8 - 388 ng/mL Final         Please note: This report has been generated by a voice recognition software system  Therefore there may be syntax, spelling, and/or grammatical errors  Please call if you have any questions

## 2021-09-28 ENCOUNTER — HOSPITAL ENCOUNTER (OUTPATIENT)
Dept: INFUSION CENTER | Facility: HOSPITAL | Age: 46
Discharge: HOME | End: 2021-09-28
Attending: INTERNAL MEDICINE
Payer: COMMERCIAL

## 2021-09-28 VITALS
HEART RATE: 94 BPM | DIASTOLIC BLOOD PRESSURE: 70 MMHG | SYSTOLIC BLOOD PRESSURE: 146 MMHG | TEMPERATURE: 97.9 F | RESPIRATION RATE: 16 BRPM | OXYGEN SATURATION: 98 %

## 2021-09-28 DIAGNOSIS — D50.9 IRON DEFICIENCY ANEMIA, UNSPECIFIED IRON DEFICIENCY ANEMIA TYPE: ICD-10-CM

## 2021-09-28 DIAGNOSIS — R79.0 LOW IRON STORES: Primary | ICD-10-CM

## 2021-09-28 PROCEDURE — 96365 THER/PROPH/DIAG IV INF INIT: CPT

## 2021-09-28 PROCEDURE — 96367 TX/PROPH/DG ADDL SEQ IV INF: CPT

## 2021-09-28 RX ORDER — ACETAMINOPHEN 325 MG/1
650 TABLET ORAL ONCE
Status: COMPLETED | OUTPATIENT
Start: 2021-09-28 | End: 2021-09-28

## 2021-09-28 RX ORDER — ACETAMINOPHEN 325 MG/1
650 TABLET ORAL ONCE
Status: CANCELLED
Start: 2021-10-04 | End: 2021-10-05

## 2021-09-28 RX ORDER — SODIUM CHLORIDE 9 MG/ML
20 INJECTION, SOLUTION INTRAVENOUS ONCE
Status: COMPLETED | OUTPATIENT
Start: 2021-09-28 | End: 2021-09-28

## 2021-09-28 RX ORDER — SODIUM CHLORIDE 9 MG/ML
20 INJECTION, SOLUTION INTRAVENOUS ONCE
Status: CANCELLED | OUTPATIENT
Start: 2021-10-04

## 2021-09-28 RX ADMIN — ACETAMINOPHEN 650 MG: 325 TABLET, FILM COATED ORAL at 11:32

## 2021-09-28 RX ADMIN — DIPHENHYDRAMINE HYDROCHLORIDE 25 MG: 50 INJECTION, SOLUTION INTRAMUSCULAR; INTRAVENOUS at 12:17

## 2021-09-28 RX ADMIN — IRON SUCROSE 200 MG: 20 INJECTION, SOLUTION INTRAVENOUS at 12:49

## 2021-09-28 RX ADMIN — DEXAMETHASONE SODIUM PHOSPHATE: 10 INJECTION, SOLUTION INTRAMUSCULAR; INTRAVENOUS at 11:22

## 2021-09-28 RX ADMIN — SODIUM CHLORIDE 20 ML/HR: 0.9 INJECTION, SOLUTION INTRAVENOUS at 11:22

## 2021-10-04 ENCOUNTER — HOSPITAL ENCOUNTER (OUTPATIENT)
Dept: INFUSION CENTER | Facility: HOSPITAL | Age: 46
Discharge: HOME | End: 2021-10-04
Attending: INTERNAL MEDICINE
Payer: COMMERCIAL

## 2021-10-04 VITALS
HEART RATE: 70 BPM | TEMPERATURE: 97.9 F | SYSTOLIC BLOOD PRESSURE: 142 MMHG | DIASTOLIC BLOOD PRESSURE: 62 MMHG | RESPIRATION RATE: 16 BRPM

## 2021-10-04 DIAGNOSIS — D50.9 IRON DEFICIENCY ANEMIA, UNSPECIFIED IRON DEFICIENCY ANEMIA TYPE: ICD-10-CM

## 2021-10-04 DIAGNOSIS — R79.0 LOW IRON STORES: Primary | ICD-10-CM

## 2021-10-04 PROCEDURE — 96367 TX/PROPH/DG ADDL SEQ IV INF: CPT

## 2021-10-04 PROCEDURE — 96365 THER/PROPH/DIAG IV INF INIT: CPT

## 2021-10-04 RX ORDER — ACETAMINOPHEN 325 MG/1
650 TABLET ORAL ONCE
Status: CANCELLED
Start: 2021-10-11 | End: 2021-10-05

## 2021-10-04 RX ORDER — SODIUM CHLORIDE 9 MG/ML
20 INJECTION, SOLUTION INTRAVENOUS ONCE
Status: CANCELLED | OUTPATIENT
Start: 2021-10-11

## 2021-10-04 RX ORDER — ACETAMINOPHEN 325 MG/1
650 TABLET ORAL ONCE
Status: COMPLETED | OUTPATIENT
Start: 2021-10-04 | End: 2021-10-04

## 2021-10-04 RX ORDER — SODIUM CHLORIDE 9 MG/ML
20 INJECTION, SOLUTION INTRAVENOUS ONCE
Status: COMPLETED | OUTPATIENT
Start: 2021-10-04 | End: 2021-10-04

## 2021-10-04 RX ADMIN — DIPHENHYDRAMINE HYDROCHLORIDE 25 MG: 50 INJECTION, SOLUTION INTRAMUSCULAR; INTRAVENOUS at 13:32

## 2021-10-04 RX ADMIN — DEXAMETHASONE SODIUM PHOSPHATE: 10 INJECTION, SOLUTION INTRAMUSCULAR; INTRAVENOUS at 14:06

## 2021-10-04 RX ADMIN — ACETAMINOPHEN 650 MG: 325 TABLET, FILM COATED ORAL at 13:28

## 2021-10-04 RX ADMIN — IRON SUCROSE 200 MG: 20 INJECTION, SOLUTION INTRAVENOUS at 14:34

## 2021-10-04 RX ADMIN — SODIUM CHLORIDE 20 ML/HR: 0.9 INJECTION, SOLUTION INTRAVENOUS at 13:20

## 2021-10-11 ENCOUNTER — HOSPITAL ENCOUNTER (OUTPATIENT)
Dept: INFUSION CENTER | Facility: HOSPITAL | Age: 46
Discharge: HOME | End: 2021-10-11
Attending: INTERNAL MEDICINE
Payer: COMMERCIAL

## 2021-10-11 VITALS
DIASTOLIC BLOOD PRESSURE: 67 MMHG | HEART RATE: 77 BPM | TEMPERATURE: 97 F | RESPIRATION RATE: 16 BRPM | SYSTOLIC BLOOD PRESSURE: 150 MMHG

## 2021-10-11 DIAGNOSIS — D50.9 IRON DEFICIENCY ANEMIA, UNSPECIFIED IRON DEFICIENCY ANEMIA TYPE: ICD-10-CM

## 2021-10-11 DIAGNOSIS — R79.0 LOW IRON STORES: Primary | ICD-10-CM

## 2021-10-11 PROCEDURE — 96365 THER/PROPH/DIAG IV INF INIT: CPT

## 2021-10-11 PROCEDURE — 96367 TX/PROPH/DG ADDL SEQ IV INF: CPT

## 2021-10-11 RX ORDER — ACETAMINOPHEN 325 MG/1
650 TABLET ORAL ONCE
Status: CANCELLED
Start: 2021-10-18 | End: 2021-10-18

## 2021-10-11 RX ORDER — SODIUM CHLORIDE 9 MG/ML
20 INJECTION, SOLUTION INTRAVENOUS ONCE
Status: COMPLETED | OUTPATIENT
Start: 2021-10-11 | End: 2021-10-11

## 2021-10-11 RX ORDER — SODIUM CHLORIDE 9 MG/ML
20 INJECTION, SOLUTION INTRAVENOUS ONCE
Status: CANCELLED | OUTPATIENT
Start: 2021-10-18

## 2021-10-11 RX ORDER — ACETAMINOPHEN 325 MG/1
650 TABLET ORAL ONCE
Status: COMPLETED | OUTPATIENT
Start: 2021-10-11 | End: 2021-10-11

## 2021-10-11 RX ADMIN — DIPHENHYDRAMINE HYDROCHLORIDE 25 MG: 50 INJECTION, SOLUTION INTRAMUSCULAR; INTRAVENOUS at 10:23

## 2021-10-11 RX ADMIN — IRON SUCROSE 200 MG: 20 INJECTION, SOLUTION INTRAVENOUS at 11:22

## 2021-10-11 RX ADMIN — ACETAMINOPHEN 650 MG: 325 TABLET, FILM COATED ORAL at 10:22

## 2021-10-11 RX ADMIN — DEXAMETHASONE SODIUM PHOSPHATE: 10 INJECTION, SOLUTION INTRAMUSCULAR; INTRAVENOUS at 10:49

## 2021-10-11 RX ADMIN — SODIUM CHLORIDE 20 ML/HR: 0.9 INJECTION, SOLUTION INTRAVENOUS at 10:10

## 2021-10-18 ENCOUNTER — HOSPITAL ENCOUNTER (OUTPATIENT)
Dept: INFUSION CENTER | Facility: HOSPITAL | Age: 46
Discharge: HOME | End: 2021-10-18
Attending: INTERNAL MEDICINE
Payer: COMMERCIAL

## 2021-10-18 VITALS
RESPIRATION RATE: 16 BRPM | TEMPERATURE: 99.3 F | HEART RATE: 78 BPM | SYSTOLIC BLOOD PRESSURE: 130 MMHG | DIASTOLIC BLOOD PRESSURE: 70 MMHG

## 2021-10-18 DIAGNOSIS — R79.0 LOW IRON STORES: Primary | ICD-10-CM

## 2021-10-18 DIAGNOSIS — D50.9 IRON DEFICIENCY ANEMIA, UNSPECIFIED IRON DEFICIENCY ANEMIA TYPE: ICD-10-CM

## 2021-10-18 PROCEDURE — 96367 TX/PROPH/DG ADDL SEQ IV INF: CPT

## 2021-10-18 PROCEDURE — 96365 THER/PROPH/DIAG IV INF INIT: CPT

## 2021-10-18 RX ORDER — ACETAMINOPHEN 325 MG/1
650 TABLET ORAL ONCE
Status: COMPLETED | OUTPATIENT
Start: 2021-10-18 | End: 2021-10-18

## 2021-10-18 RX ORDER — ACETAMINOPHEN 325 MG/1
650 TABLET ORAL ONCE
Status: CANCELLED
Start: 2021-10-25 | End: 2021-10-25

## 2021-10-18 RX ORDER — SODIUM CHLORIDE 9 MG/ML
20 INJECTION, SOLUTION INTRAVENOUS ONCE
Status: CANCELLED | OUTPATIENT
Start: 2021-10-25

## 2021-10-18 RX ORDER — SODIUM CHLORIDE 9 MG/ML
20 INJECTION, SOLUTION INTRAVENOUS ONCE
Status: COMPLETED | OUTPATIENT
Start: 2021-10-18 | End: 2021-10-18

## 2021-10-18 RX ADMIN — ACETAMINOPHEN 650 MG: 325 TABLET, FILM COATED ORAL at 12:45

## 2021-10-18 RX ADMIN — IRON SUCROSE 200 MG: 20 INJECTION, SOLUTION INTRAVENOUS at 13:42

## 2021-10-18 RX ADMIN — SODIUM CHLORIDE 20 ML/HR: 0.9 INJECTION, SOLUTION INTRAVENOUS at 12:35

## 2021-10-18 RX ADMIN — DIPHENHYDRAMINE HYDROCHLORIDE 25 MG: 50 INJECTION, SOLUTION INTRAMUSCULAR; INTRAVENOUS at 12:46

## 2021-10-18 RX ADMIN — DEXAMETHASONE SODIUM PHOSPHATE: 10 INJECTION, SOLUTION INTRAMUSCULAR; INTRAVENOUS at 13:11

## 2021-10-25 ENCOUNTER — HOSPITAL ENCOUNTER (OUTPATIENT)
Dept: INFUSION CENTER | Facility: HOSPITAL | Age: 46
Discharge: HOME | End: 2021-10-25
Attending: INTERNAL MEDICINE
Payer: COMMERCIAL

## 2021-10-25 VITALS
HEART RATE: 77 BPM | DIASTOLIC BLOOD PRESSURE: 60 MMHG | SYSTOLIC BLOOD PRESSURE: 118 MMHG | RESPIRATION RATE: 16 BRPM | TEMPERATURE: 98.8 F

## 2021-10-25 DIAGNOSIS — D50.9 IRON DEFICIENCY ANEMIA, UNSPECIFIED IRON DEFICIENCY ANEMIA TYPE: ICD-10-CM

## 2021-10-25 DIAGNOSIS — R79.0 LOW IRON STORES: Primary | ICD-10-CM

## 2021-10-25 PROCEDURE — 96367 TX/PROPH/DG ADDL SEQ IV INF: CPT

## 2021-10-25 PROCEDURE — 96365 THER/PROPH/DIAG IV INF INIT: CPT

## 2021-10-25 RX ORDER — SODIUM CHLORIDE 9 MG/ML
20 INJECTION, SOLUTION INTRAVENOUS ONCE
Status: CANCELLED | OUTPATIENT
Start: 2021-10-25

## 2021-10-25 RX ORDER — SODIUM CHLORIDE 9 MG/ML
20 INJECTION, SOLUTION INTRAVENOUS ONCE
Status: COMPLETED | OUTPATIENT
Start: 2021-10-25 | End: 2021-10-25

## 2021-10-25 RX ORDER — ACETAMINOPHEN 325 MG/1
650 TABLET ORAL ONCE
Status: CANCELLED
Start: 2021-10-25 | End: 2021-10-25

## 2021-10-25 RX ORDER — ACETAMINOPHEN 325 MG/1
650 TABLET ORAL ONCE
Status: COMPLETED | OUTPATIENT
Start: 2021-10-25 | End: 2021-10-25

## 2021-10-25 RX ADMIN — ACETAMINOPHEN 650 MG: 325 TABLET, FILM COATED ORAL at 09:08

## 2021-10-25 RX ADMIN — IRON SUCROSE 200 MG: 20 INJECTION, SOLUTION INTRAVENOUS at 10:03

## 2021-10-25 RX ADMIN — SODIUM CHLORIDE 20 ML/HR: 0.9 INJECTION, SOLUTION INTRAVENOUS at 09:03

## 2021-10-25 RX ADMIN — DIPHENHYDRAMINE HYDROCHLORIDE 25 MG: 50 INJECTION, SOLUTION INTRAMUSCULAR; INTRAVENOUS at 09:05

## 2021-10-25 RX ADMIN — DEXAMETHASONE SODIUM PHOSPHATE: 10 INJECTION, SOLUTION INTRAMUSCULAR; INTRAVENOUS at 09:33

## 2021-12-13 ENCOUNTER — APPOINTMENT (OUTPATIENT)
Dept: LAB | Facility: MEDICAL CENTER | Age: 46
End: 2021-12-13
Payer: COMMERCIAL

## 2021-12-13 DIAGNOSIS — R79.0 LOW IRON STORES: ICD-10-CM

## 2021-12-13 DIAGNOSIS — D50.9 IRON DEFICIENCY ANEMIA, UNSPECIFIED IRON DEFICIENCY ANEMIA TYPE: ICD-10-CM

## 2021-12-13 LAB
ALBUMIN SERPL BCP-MCNC: 2.7 G/DL (ref 3.5–5)
ALP SERPL-CCNC: 138 U/L (ref 46–116)
ALT SERPL W P-5'-P-CCNC: 14 U/L (ref 12–78)
ANION GAP SERPL CALCULATED.3IONS-SCNC: 7 MMOL/L (ref 4–13)
AST SERPL W P-5'-P-CCNC: 7 U/L (ref 5–45)
BASOPHILS # BLD AUTO: 0.13 THOUSANDS/ΜL (ref 0–0.1)
BASOPHILS NFR BLD AUTO: 1 % (ref 0–1)
BILIRUB SERPL-MCNC: 0.22 MG/DL (ref 0.2–1)
BUN SERPL-MCNC: 13 MG/DL (ref 5–25)
CALCIUM ALBUM COR SERPL-MCNC: 10 MG/DL (ref 8.3–10.1)
CALCIUM SERPL-MCNC: 9 MG/DL (ref 8.3–10.1)
CHLORIDE SERPL-SCNC: 108 MMOL/L (ref 100–108)
CO2 SERPL-SCNC: 23 MMOL/L (ref 21–32)
CREAT SERPL-MCNC: 1 MG/DL (ref 0.6–1.3)
EOSINOPHIL # BLD AUTO: 0.38 THOUSAND/ΜL (ref 0–0.61)
EOSINOPHIL NFR BLD AUTO: 3 % (ref 0–6)
ERYTHROCYTE [DISTWIDTH] IN BLOOD BY AUTOMATED COUNT: 20.1 % (ref 11.6–15.1)
FERRITIN SERPL-MCNC: 216 NG/ML (ref 8–388)
GFR SERPL CREATININE-BSD FRML MDRD: 67 ML/MIN/1.73SQ M
GLUCOSE P FAST SERPL-MCNC: 172 MG/DL (ref 65–99)
HCT VFR BLD AUTO: 35 % (ref 34.8–46.1)
HGB BLD-MCNC: 10.6 G/DL (ref 11.5–15.4)
IMM GRANULOCYTES # BLD AUTO: 0.08 THOUSAND/UL (ref 0–0.2)
IMM GRANULOCYTES NFR BLD AUTO: 1 % (ref 0–2)
IRON SATN MFR SERPL: 11 % (ref 15–50)
IRON SERPL-MCNC: 26 UG/DL (ref 50–170)
LYMPHOCYTES # BLD AUTO: 2.36 THOUSANDS/ΜL (ref 0.6–4.47)
LYMPHOCYTES NFR BLD AUTO: 21 % (ref 14–44)
MCH RBC QN AUTO: 23.8 PG (ref 26.8–34.3)
MCHC RBC AUTO-ENTMCNC: 30.3 G/DL (ref 31.4–37.4)
MCV RBC AUTO: 79 FL (ref 82–98)
MONOCYTES # BLD AUTO: 0.63 THOUSAND/ΜL (ref 0.17–1.22)
MONOCYTES NFR BLD AUTO: 6 % (ref 4–12)
NEUTROPHILS # BLD AUTO: 7.59 THOUSANDS/ΜL (ref 1.85–7.62)
NEUTS SEG NFR BLD AUTO: 68 % (ref 43–75)
NRBC BLD AUTO-RTO: 0 /100 WBCS
PLATELET # BLD AUTO: 523 THOUSANDS/UL (ref 149–390)
PMV BLD AUTO: 9.8 FL (ref 8.9–12.7)
POTASSIUM SERPL-SCNC: 4.1 MMOL/L (ref 3.5–5.3)
PROT SERPL-MCNC: 8.1 G/DL (ref 6.4–8.2)
RBC # BLD AUTO: 4.45 MILLION/UL (ref 3.81–5.12)
SODIUM SERPL-SCNC: 138 MMOL/L (ref 136–145)
TIBC SERPL-MCNC: 239 UG/DL (ref 250–450)
WBC # BLD AUTO: 11.17 THOUSAND/UL (ref 4.31–10.16)

## 2021-12-13 PROCEDURE — 85025 COMPLETE CBC W/AUTO DIFF WBC: CPT

## 2021-12-13 PROCEDURE — 82728 ASSAY OF FERRITIN: CPT

## 2021-12-13 PROCEDURE — 80053 COMPREHEN METABOLIC PANEL: CPT

## 2021-12-13 PROCEDURE — 36415 COLL VENOUS BLD VENIPUNCTURE: CPT

## 2021-12-13 PROCEDURE — 83540 ASSAY OF IRON: CPT

## 2021-12-13 PROCEDURE — 83550 IRON BINDING TEST: CPT

## 2021-12-21 ENCOUNTER — OFFICE VISIT (OUTPATIENT)
Dept: HEMATOLOGY ONCOLOGY | Facility: CLINIC | Age: 46
End: 2021-12-21
Payer: COMMERCIAL

## 2021-12-21 VITALS
BODY MASS INDEX: 42.7 KG/M2 | WEIGHT: 241 LBS | DIASTOLIC BLOOD PRESSURE: 79 MMHG | HEIGHT: 63 IN | SYSTOLIC BLOOD PRESSURE: 140 MMHG | HEART RATE: 82 BPM

## 2021-12-21 DIAGNOSIS — D50.9 IRON DEFICIENCY ANEMIA, UNSPECIFIED IRON DEFICIENCY ANEMIA TYPE: Primary | ICD-10-CM

## 2021-12-21 DIAGNOSIS — R79.0 LOW IRON STORES: ICD-10-CM

## 2021-12-21 DIAGNOSIS — D75.839 THROMBOCYTOSIS: ICD-10-CM

## 2021-12-21 PROCEDURE — 99214 OFFICE O/P EST MOD 30 MIN: CPT | Performed by: NURSE PRACTITIONER

## 2021-12-21 RX ORDER — ACETAMINOPHEN 325 MG/1
650 TABLET ORAL ONCE
Status: CANCELLED
Start: 2022-01-12 | End: 2022-01-12

## 2021-12-21 RX ORDER — SODIUM CHLORIDE 9 MG/ML
20 INJECTION, SOLUTION INTRAVENOUS ONCE
Status: CANCELLED | OUTPATIENT
Start: 2022-01-12

## 2021-12-27 DIAGNOSIS — D50.9 IRON DEFICIENCY ANEMIA, UNSPECIFIED IRON DEFICIENCY ANEMIA TYPE: ICD-10-CM

## 2021-12-27 DIAGNOSIS — R79.0 LOW IRON STORES: Primary | ICD-10-CM

## 2021-12-27 DIAGNOSIS — D75.839 THROMBOCYTOSIS: ICD-10-CM

## 2022-01-13 DIAGNOSIS — D50.9 IRON DEFICIENCY ANEMIA, UNSPECIFIED IRON DEFICIENCY ANEMIA TYPE: ICD-10-CM

## 2022-01-13 DIAGNOSIS — D75.839 THROMBOCYTOSIS: ICD-10-CM

## 2022-01-13 DIAGNOSIS — R79.0 LOW IRON STORES: Primary | ICD-10-CM

## 2022-01-13 RX ORDER — SODIUM CHLORIDE 9 MG/ML
20 INJECTION, SOLUTION INTRAVENOUS ONCE
Status: CANCELLED | OUTPATIENT
Start: 2022-01-17

## 2022-01-13 RX ORDER — ACETAMINOPHEN 325 MG/1
650 TABLET ORAL ONCE
Status: CANCELLED
Start: 2022-01-17 | End: 2022-01-17

## 2022-01-17 ENCOUNTER — HOSPITAL ENCOUNTER (OUTPATIENT)
Dept: INFUSION CENTER | Facility: HOSPITAL | Age: 47
Discharge: HOME | End: 2022-01-17
Attending: INTERNAL MEDICINE
Payer: COMMERCIAL

## 2022-01-17 VITALS
TEMPERATURE: 98 F | OXYGEN SATURATION: 93 % | HEART RATE: 76 BPM | RESPIRATION RATE: 16 BRPM | DIASTOLIC BLOOD PRESSURE: 69 MMHG | SYSTOLIC BLOOD PRESSURE: 158 MMHG

## 2022-01-17 DIAGNOSIS — D75.839 THROMBOCYTOSIS: ICD-10-CM

## 2022-01-17 DIAGNOSIS — R79.0 LOW IRON STORES: Primary | ICD-10-CM

## 2022-01-17 DIAGNOSIS — D50.9 IRON DEFICIENCY ANEMIA, UNSPECIFIED IRON DEFICIENCY ANEMIA TYPE: ICD-10-CM

## 2022-01-17 PROCEDURE — 96367 TX/PROPH/DG ADDL SEQ IV INF: CPT

## 2022-01-17 PROCEDURE — 96365 THER/PROPH/DIAG IV INF INIT: CPT

## 2022-01-17 RX ORDER — SODIUM CHLORIDE 9 MG/ML
20 INJECTION, SOLUTION INTRAVENOUS ONCE
Status: COMPLETED | OUTPATIENT
Start: 2022-01-17 | End: 2022-01-17

## 2022-01-17 RX ORDER — ACETAMINOPHEN 325 MG/1
650 TABLET ORAL ONCE
Status: CANCELLED
Start: 2022-03-14 | End: 2022-01-22

## 2022-01-17 RX ORDER — SODIUM CHLORIDE 9 MG/ML
20 INJECTION, SOLUTION INTRAVENOUS ONCE
Status: CANCELLED | OUTPATIENT
Start: 2022-03-14

## 2022-01-17 RX ORDER — ACETAMINOPHEN 325 MG/1
650 TABLET ORAL ONCE
Status: COMPLETED | OUTPATIENT
Start: 2022-01-17 | End: 2022-01-17

## 2022-01-17 RX ADMIN — IRON SUCROSE 200 MG: 20 INJECTION, SOLUTION INTRAVENOUS at 11:39

## 2022-01-17 RX ADMIN — ACETAMINOPHEN 650 MG: 325 TABLET, FILM COATED ORAL at 10:38

## 2022-01-17 RX ADMIN — DEXAMETHASONE SODIUM PHOSPHATE: 10 INJECTION, SOLUTION INTRAMUSCULAR; INTRAVENOUS at 10:39

## 2022-01-17 RX ADMIN — DIPHENHYDRAMINE HYDROCHLORIDE 50 MG: 50 INJECTION, SOLUTION INTRAMUSCULAR; INTRAVENOUS at 11:09

## 2022-01-17 RX ADMIN — SODIUM CHLORIDE 20 ML/HR: 0.9 INJECTION, SOLUTION INTRAVENOUS at 10:35

## 2022-02-20 ENCOUNTER — APPOINTMENT (EMERGENCY)
Dept: RADIOLOGY | Facility: HOSPITAL | Age: 47
End: 2022-02-20
Payer: COMMERCIAL

## 2022-02-20 ENCOUNTER — HOSPITAL ENCOUNTER (EMERGENCY)
Facility: HOSPITAL | Age: 47
Discharge: HOME | End: 2022-02-20
Attending: EMERGENCY MEDICINE
Payer: COMMERCIAL

## 2022-02-20 VITALS
WEIGHT: 245.37 LBS | HEART RATE: 80 BPM | TEMPERATURE: 96.9 F | DIASTOLIC BLOOD PRESSURE: 96 MMHG | OXYGEN SATURATION: 98 % | SYSTOLIC BLOOD PRESSURE: 151 MMHG | RESPIRATION RATE: 18 BRPM | BODY MASS INDEX: 43.47 KG/M2

## 2022-02-20 DIAGNOSIS — R06.00 DYSPNEA: Primary | ICD-10-CM

## 2022-02-20 LAB
ANION GAP SERPL CALCULATED.3IONS-SCNC: 12 MMOL/L (ref 4–13)
APTT PPP: 28 SECONDS (ref 23–37)
BASOPHILS # BLD AUTO: 0.12 THOUSANDS/ΜL (ref 0–0.1)
BASOPHILS NFR BLD AUTO: 1 % (ref 0–1)
BUN SERPL-MCNC: 20 MG/DL (ref 5–25)
CALCIUM SERPL-MCNC: 8.8 MG/DL (ref 8.3–10.1)
CARDIAC TROPONIN I PNL SERPL HS: 5 NG/L
CHLORIDE SERPL-SCNC: 105 MMOL/L (ref 100–108)
CO2 SERPL-SCNC: 22 MMOL/L (ref 21–32)
CREAT SERPL-MCNC: 1.12 MG/DL (ref 0.6–1.3)
D DIMER PPP FEU-MCNC: 0.45 UG/ML FEU
EOSINOPHIL # BLD AUTO: 0.52 THOUSAND/ΜL (ref 0–0.61)
EOSINOPHIL NFR BLD AUTO: 5 % (ref 0–6)
ERYTHROCYTE [DISTWIDTH] IN BLOOD BY AUTOMATED COUNT: 19.8 % (ref 11.6–15.1)
GFR SERPL CREATININE-BSD FRML MDRD: 58 ML/MIN/1.73SQ M
GLUCOSE SERPL-MCNC: 147 MG/DL (ref 65–140)
HCT VFR BLD AUTO: 33.6 % (ref 34.8–46.1)
HGB BLD-MCNC: 10.3 G/DL (ref 11.5–15.4)
IMM GRANULOCYTES # BLD AUTO: 0.06 THOUSAND/UL (ref 0–0.2)
IMM GRANULOCYTES NFR BLD AUTO: 1 % (ref 0–2)
INR PPP: 1.02 (ref 0.84–1.19)
LYMPHOCYTES # BLD AUTO: 3.9 THOUSANDS/ΜL (ref 0.6–4.47)
LYMPHOCYTES NFR BLD AUTO: 34 % (ref 14–44)
MCH RBC QN AUTO: 25 PG (ref 26.8–34.3)
MCHC RBC AUTO-ENTMCNC: 30.7 G/DL (ref 31.4–37.4)
MCV RBC AUTO: 82 FL (ref 82–98)
MONOCYTES # BLD AUTO: 0.84 THOUSAND/ΜL (ref 0.17–1.22)
MONOCYTES NFR BLD AUTO: 7 % (ref 4–12)
NEUTROPHILS # BLD AUTO: 6.11 THOUSANDS/ΜL (ref 1.85–7.62)
NEUTS SEG NFR BLD AUTO: 52 % (ref 43–75)
NRBC BLD AUTO-RTO: 0 /100 WBCS
NT-PROBNP SERPL-MCNC: 274 PG/ML
PLATELET # BLD AUTO: 455 THOUSANDS/UL (ref 149–390)
PMV BLD AUTO: 10.1 FL (ref 8.9–12.7)
POTASSIUM SERPL-SCNC: 4 MMOL/L (ref 3.5–5.3)
PROTHROMBIN TIME: 12.9 SECONDS (ref 11.6–14.5)
RBC # BLD AUTO: 4.12 MILLION/UL (ref 3.81–5.12)
SODIUM SERPL-SCNC: 139 MMOL/L (ref 136–145)
WBC # BLD AUTO: 11.55 THOUSAND/UL (ref 4.31–10.16)

## 2022-02-20 PROCEDURE — 36415 COLL VENOUS BLD VENIPUNCTURE: CPT | Performed by: EMERGENCY MEDICINE

## 2022-02-20 PROCEDURE — 85379 FIBRIN DEGRADATION QUANT: CPT | Performed by: EMERGENCY MEDICINE

## 2022-02-20 PROCEDURE — 80048 BASIC METABOLIC PNL TOTAL CA: CPT | Performed by: EMERGENCY MEDICINE

## 2022-02-20 PROCEDURE — 85610 PROTHROMBIN TIME: CPT | Performed by: EMERGENCY MEDICINE

## 2022-02-20 PROCEDURE — 83880 ASSAY OF NATRIURETIC PEPTIDE: CPT | Performed by: EMERGENCY MEDICINE

## 2022-02-20 PROCEDURE — 93005 ELECTROCARDIOGRAM TRACING: CPT

## 2022-02-20 PROCEDURE — 94640 AIRWAY INHALATION TREATMENT: CPT

## 2022-02-20 PROCEDURE — 99285 EMERGENCY DEPT VISIT HI MDM: CPT | Performed by: EMERGENCY MEDICINE

## 2022-02-20 PROCEDURE — 85730 THROMBOPLASTIN TIME PARTIAL: CPT | Performed by: EMERGENCY MEDICINE

## 2022-02-20 PROCEDURE — 99285 EMERGENCY DEPT VISIT HI MDM: CPT

## 2022-02-20 PROCEDURE — 84484 ASSAY OF TROPONIN QUANT: CPT | Performed by: EMERGENCY MEDICINE

## 2022-02-20 PROCEDURE — 85025 COMPLETE CBC W/AUTO DIFF WBC: CPT | Performed by: EMERGENCY MEDICINE

## 2022-02-20 PROCEDURE — 71045 X-RAY EXAM CHEST 1 VIEW: CPT

## 2022-02-20 RX ORDER — IPRATROPIUM BROMIDE AND ALBUTEROL SULFATE 2.5; .5 MG/3ML; MG/3ML
3 SOLUTION RESPIRATORY (INHALATION) ONCE
Status: COMPLETED | OUTPATIENT
Start: 2022-02-20 | End: 2022-02-20

## 2022-02-20 RX ADMIN — IPRATROPIUM BROMIDE AND ALBUTEROL SULFATE 3 ML: 2.5; .5 SOLUTION RESPIRATORY (INHALATION) at 23:12

## 2022-02-21 ENCOUNTER — TELEPHONE (OUTPATIENT)
Dept: HEMATOLOGY ONCOLOGY | Facility: CLINIC | Age: 47
End: 2022-02-21

## 2022-02-21 LAB
ATRIAL RATE: 75 BPM
P AXIS: 46 DEGREES
PR INTERVAL: 192 MS
QRS AXIS: 37 DEGREES
QRSD INTERVAL: 88 MS
QT INTERVAL: 406 MS
QTC INTERVAL: 453 MS
T WAVE AXIS: 76 DEGREES
VENTRICULAR RATE: 75 BPM

## 2022-02-21 PROCEDURE — 93010 ELECTROCARDIOGRAM REPORT: CPT | Performed by: INTERNAL MEDICINE

## 2022-02-21 NOTE — ED PROVIDER NOTES
History  Chief Complaint   Patient presents with    Shortness of Breath     Started with SOB 2 hrs ago, went to lay down and thats when it started  Stated it feels similar to her asthma  75-year-old female presents for evaluation of dyspnea  Patient has history of asthma, pulmonary hypertension, she is also being evaluated by specialist for possible sarcoid or vasculitic disease  Patient states she had a normal day, patient attempted to lay down for sleep when she developed dyspnea  This is about 2 hours prior to arrival   She tried an albuterol inhaler at work without relief and presents  Patient believes it feels similar to asthma exacerbations  She has an associated "spider crawling" sensation diffusely  She has had no recent illnesses, fevers or chills  She denies coughing, chest pain  She denies lower extremity swelling  Patient does report history of VTE after IV placement, she is maintained on aspirin  Prior to Admission Medications   Prescriptions Last Dose Informant Patient Reported? Taking?    Cobalamin Combinations (VITAMIN B12-FOLIC ACID PO)  Self Yes No   Sig: Take by mouth   acetaminophen (TYLENOL) 325 mg tablet  Self Yes No   Sig: Take 650 mg by mouth every 6 (six) hours as needed for mild pain   albuterol (PROVENTIL HFA,VENTOLIN HFA) 90 mcg/act inhaler  Self Yes No   Sig: Inhale 2 puffs 2 (two) times a day   aspirin (ECOTRIN LOW STRENGTH) 81 mg EC tablet  Self Yes No   Sig: Take 81 mg by mouth daily   diclofenac sodium (VOLTAREN) 50 mg EC tablet  Self No No   Sig: Take 1 tablet (50 mg total) by mouth 2 (two) times a day   escitalopram (LEXAPRO) 10 mg tablet  Self Yes No   Sig: Take 10 mg by mouth daily   losartan (COZAAR) 100 MG tablet  Self Yes No   Sig: Take 100 mg by mouth daily    metFORMIN (GLUCOPHAGE) 500 mg tablet  Self Yes No   Sig: Take 1,000 mg by mouth 2 (two) times a day with meals   methimazole (TAPAZOLE) 5 mg tablet  Self No No   Sig: Take 1 tablet (5 mg total) by mouth daily   methotrexate 2 5 mg tablet  Self Yes No   Sig: Take by mouth once a week   metoprolol succinate (TOPROL-XL) 50 mg 24 hr tablet  Self No No   Sig: TAKE (1) TABLET DAILY  montelukast (SINGULAIR) 10 mg tablet  Self Yes No   Sig: Take 10 mg by mouth daily at bedtime   pantoprazole (PROTONIX) 40 mg tablet  Self No No   Sig: Take 1 tablet (40 mg total) by mouth daily in the early morning   simvastatin (ZOCOR) 10 mg tablet  Self Yes No   Sig: Take 10 mg by mouth daily at bedtime      Facility-Administered Medications: None       Past Medical History:   Diagnosis Date    Asthma     Chronic anemia     Diabetes mellitus ([facility] )     Hypertension     Hyperthyroidism     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[facility] )     TB lung, latent        Past Surgical History:   Procedure Laterality Date     SECTION      IR BIOPSY LIVER MASS  7/15/2020       Family History   Problem Relation Age of Onset    Diabetes unspecified Mother      I have reviewed and agree with the history as documented  E-Cigarette/Vaping    E-Cigarette Use Never User      E-Cigarette/Vaping Substances    Nicotine No     THC No     CBD No     Flavoring No     Other No     Unknown No      Social History     Tobacco Use    Smoking status: Never Smoker    Smokeless tobacco: Never Used   Vaping Use    Vaping Use: Never used   Substance Use Topics    Alcohol use: Never     Alcohol/week: 0 0 standard drinks    Drug use: Never       Review of Systems   Constitutional: Negative for appetite change, chills and fever  Respiratory: Positive for shortness of breath  Negative for cough  Cardiovascular: Negative for chest pain and leg swelling  Gastrointestinal: Negative for abdominal pain, nausea and vomiting  All other systems reviewed and are negative  Physical Exam  Physical Exam  Vitals reviewed  Constitutional:       General: She is not in acute distress  Appearance: She is well-developed  She is obese   She is not toxic-appearing  HENT:      Head: Normocephalic and atraumatic  Right Ear: External ear normal       Left Ear: External ear normal    Eyes:      General:         Right eye: No discharge  Left eye: No discharge  Extraocular Movements: Extraocular movements intact  Cardiovascular:      Rate and Rhythm: Normal rate and regular rhythm  Pulses: Normal pulses  Pulmonary:      Effort: Tachypnea present  Breath sounds: No decreased breath sounds, wheezing, rhonchi or rales  Abdominal:      General: There is no distension  Palpations: Abdomen is soft  Tenderness: There is no abdominal tenderness  There is no guarding or rebound  Musculoskeletal:         General: No deformity or signs of injury  Right lower leg: No edema  Left lower leg: No edema  Skin:     General: Skin is warm  Coloration: Skin is not jaundiced or pale  Neurological:      General: No focal deficit present  Mental Status: She is alert  Mental status is at baseline           Vital Signs  ED Triage Vitals [02/20/22 2239]   Temperature Pulse Respirations Blood Pressure SpO2   (!) 96 9 °F (36 1 °C) 86 20 141/96 98 %      Temp Source Heart Rate Source Patient Position - Orthostatic VS BP Location FiO2 (%)   Temporal Monitor Sitting Left arm --      Pain Score       --           Vitals:    02/20/22 2239 02/20/22 2300 02/20/22 2330   BP: 141/96 160/70 151/96   Pulse: 86 75 80   Patient Position - Orthostatic VS: Sitting Sitting          Visual Acuity      ED Medications  Medications   ipratropium-albuterol (DUO-NEB) 0 5-2 5 mg/3 mL inhalation solution 3 mL (3 mL Nebulization Given 2/20/22 2312)       Diagnostic Studies  Results Reviewed     Procedure Component Value Units Date/Time    HS Troponin 0hr (reflex protocol) [[id number]]  (Normal) Collected: 02/20/22 2250    Lab Status: Final result Specimen: Blood from Arm, Right Updated: 02/20/22 2317     hs TnI 0hr 5 ng/L     Basic metabolic panel [[id number]]  (Abnormal) Collected: 02/20/22 2250    Lab Status: Final result Specimen: Blood from Arm, Right Updated: 02/20/22 2316     Sodium 139 mmol/L      Potassium 4 0 mmol/L      Chloride 105 mmol/L      CO2 22 mmol/L      ANION GAP 12 mmol/L      BUN 20 mg/dL      Creatinine 1 12 mg/dL      Glucose 147 mg/dL      Calcium 8 8 mg/dL      eGFR 58 ml/min/1 73sq m     Narrative:      [place] guidelines for Chronic Kidney Disease (CKD):     Stage 1 with normal or high GFR (GFR > 90 mL/min/1 73 square meters)    Stage 2 Mild CKD (GFR = 60-89 mL/min/1 73 square meters)    Stage 3A Moderate CKD (GFR = 45-59 mL/min/1 73 square meters)    Stage 3B Moderate CKD (GFR = 30-44 mL/min/1 73 square meters)    Stage 4 Severe CKD (GFR = 15-29 mL/min/1 73 square meters)    Stage 5 End Stage CKD (GFR <15 mL/min/1 73 square meters)  Note: GFR calculation is accurate only with a steady state creatinine    NT-BNP PRO [[id number]]  (Abnormal) Collected: 02/20/22 2250    Lab Status: Final result Specimen: Blood from Arm, Right Updated: 02/20/22 2316     NT-proBNP 274 pg/mL     D-Dimer [[id number]]  (Normal) Collected: 02/20/22 2250    Lab Status: Final result Specimen: Blood from Arm, Right Updated: 02/20/22 2309     D-Dimer, Quant 0 45 ug/ml FEU     Protime-INR [[id number]]  (Normal) Collected: 02/20/22 2250    Lab Status: Final result Specimen: Blood from Arm, Right Updated: 02/20/22 2307     Protime 12 9 seconds      INR 1 02    APTT [[id number]]  (Normal) Collected: 02/20/22 2250    Lab Status: Final result Specimen: Blood from Arm, Right Updated: 02/20/22 2307     PTT 28 seconds     CBC and differential [[id number]]  (Abnormal) Collected: 02/20/22 2250    Lab Status: Final result Specimen: Blood from Arm, Right Updated: 02/20/22 2255     WBC 11 55 Thousand/uL      RBC 4 12 Million/uL      Hemoglobin 10 3 g/dL      Hematocrit 33 6 %      MCV 82 fL      MCH 25 0 pg      MCHC 30 7 g/dL      RDW 19 8 % MPV 10 1 fL      Platelets 428 Thousands/uL      nRBC 0 /100 WBCs      Neutrophils Relative 52 %      Immat GRANS % 1 %      Lymphocytes Relative 34 %      Monocytes Relative 7 %      Eosinophils Relative 5 %      Basophils Relative 1 %      Neutrophils Absolute 6 11 Thousands/µL      Immature Grans Absolute 0 06 Thousand/uL      Lymphocytes Absolute 3 90 Thousands/µL      Monocytes Absolute 0 84 Thousand/µL      Eosinophils Absolute 0 52 Thousand/µL      Basophils Absolute 0 12 Thousands/µL                  XR chest 1 view portable   Final Result by Lexi Billy MD (02/21 0645)      No acute cardiopulmonary disease  Workstation performed: [id number]                    Procedures  Procedures         ED Course  ED Course as of 02/21/22 2126   Matt Scott Feb 20, 2022 2259 Hemoglobin(!): 10 3  Stable   2259 Procedure Note: EKG  Date/Time: 02/20/22 11:00 PM   Interpreted by: Vinny Lovett  Indications / Diagnosis: dyspnea  ECG reviewed by me, the ED Provider: yes   The EKG demonstrates:  Rhythm: normal sinus  Intervals: normal intervals  Axis: normal axis  QRS/Blocks: normal QRS  ST Changes: No acute ST Changes, no STD/NAKITA  TWI to avl seen on previous, q wave in III seen on previous        2315 D-Dimer, Quant: 0 45   2315 XR chest 1 view portable  No acute cardiopulmonary disease   2337 Reports relief in her symptoms, both respiratory and tingling/spider sensation  Discussed results with patient, negative work-up  She does admit to increased stress and anxiety recently  Patient feels okay with discharge to home  SBIRT 20yo+      Most Recent Value   SBIRT (22 yo +)    In order to provide better care to our patients, we are screening all of our patients for alcohol and drug use  Would it be okay to ask you these screening questions? Yes Filed at: 02/20/2022 2240   Initial Alcohol Screen: US AUDIT-C     1   How often do you have a drink containing alcohol? 0 Filed at: 02/20/2022 2240   2  How many drinks containing alcohol do you have on a typical day you are drinking? 0 Filed at: 02/20/2022 2240   3a  Male UNDER 65: How often do you have five or more drinks on one occasion? 0 Filed at: 02/20/2022 2240   3b  FEMALE Any Age, or MALE 65+: How often do you have 4 or more drinks on one occassion? 0 Filed at: 02/20/2022 2240   Audit-C Score 0 Filed at: 02/20/2022 2240   VALERIY: How many times in the past year have you    Used an illegal drug or used a prescription medication for non-medical reasons? Never Filed at: 02/20/2022 2240                    MDM  Number of Diagnoses or Management Options  Dyspnea  Diagnosis management comments: 25-year-old female presents for evaluation of dyspnea  Patient has history of asthma believes this feels similar, her lungs are clear  She maintain oxygen levels on room air  Does report history of previous VTE  Will administer DuoNeb treatment  Will evaluate with labs including D-dimer  Disposition  Final diagnoses:   Dyspnea     Time reflects when diagnosis was documented in both MDM as applicable and the Disposition within this note     Time User Action Codes Description Comment    2/20/2022 11:42 PM Maria L Monterrosoer Add [R06 00] Dyspnea       ED Disposition     ED Disposition Condition Date/Time Comment    Discharge Stable Sun Feb 20, 2022 11:42  [facility],[address] [facility] Sree discharge to home/self care              Follow-up Information     Follow up With Specialties Details Why Contact Info Additional Information    Fei Monique, [address]       [facility] Emergency Medicine  If symptoms worsen [address] [id number]  [facility], [facility], [facility] [employer] [place], [place], [address]          Discharge Medication List as of 2/20/2022 11:43 PM      CONTINUE these medications which have NOT CHANGED    Details   acetaminophen (TYLENOL) 325 mg tablet Take 650 mg by mouth every 6 (six) hours as needed for mild pain, Historical Med      albuterol (PROVENTIL HFA,VENTOLIN HFA) 90 mcg/act inhaler Inhale 2 puffs 2 (two) times a day, Historical Med      aspirin (ECOTRIN LOW STRENGTH) 81 mg EC tablet Take 81 mg by mouth daily, Historical Med      Cobalamin Combinations (VITAMIN B12-FOLIC ACID PO) Take by mouth, Historical Med      diclofenac sodium (VOLTAREN) 50 mg EC tablet Take 1 tablet (50 mg total) by mouth 2 (two) times a day, Starting Sat 4/11/2020, Normal      escitalopram (LEXAPRO) 10 mg tablet Take 10 mg by mouth daily, Historical Med      losartan (COZAAR) 100 MG tablet Take 100 mg by mouth daily , Historical Med      metFORMIN (GLUCOPHAGE) 500 mg tablet Take 1,000 mg by mouth 2 (two) times a day with meals, Historical Med      methimazole (TAPAZOLE) 5 mg tablet Take 1 tablet (5 mg total) by mouth daily, Starting u 6/3/2021, Until Tue 8/17/2021, Normal      methotrexate 2 5 mg tablet Take by mouth once a week, Historical Med      metoprolol succinate (TOPROL-XL) 50 mg 24 hr tablet TAKE (1) TABLET DAILY , Normal      montelukast (SINGULAIR) 10 mg tablet Take 10 mg by mouth daily at bedtime, Historical Med      pantoprazole (PROTONIX) 40 mg tablet Take 1 tablet (40 mg total) by mouth daily in the early morning, Starting Tue 5/19/2020, Normal      simvastatin (ZOCOR) 10 mg tablet Take 10 mg by mouth daily at bedtime, Historical Med             No discharge procedures on file      PDMP Review       Value Time User    PDMP Reviewed  Yes 4/16/2020  1:21 AM Luis Madden DO          ED Provider  Electronically Signed by           Rachell Matias DO  02/21/22 0648

## 2022-03-14 ENCOUNTER — HOSPITAL ENCOUNTER (OUTPATIENT)
Dept: INFUSION CENTER | Facility: HOSPITAL | Age: 47
Discharge: HOME | End: 2022-03-14
Attending: INTERNAL MEDICINE
Payer: COMMERCIAL

## 2022-03-14 VITALS
HEART RATE: 77 BPM | RESPIRATION RATE: 16 BRPM | DIASTOLIC BLOOD PRESSURE: 65 MMHG | SYSTOLIC BLOOD PRESSURE: 142 MMHG | TEMPERATURE: 98 F

## 2022-03-14 DIAGNOSIS — R79.0 LOW IRON STORES: ICD-10-CM

## 2022-03-14 DIAGNOSIS — D75.839 THROMBOCYTOSIS: Primary | ICD-10-CM

## 2022-03-14 DIAGNOSIS — D50.9 IRON DEFICIENCY ANEMIA, UNSPECIFIED IRON DEFICIENCY ANEMIA TYPE: ICD-10-CM

## 2022-03-14 DIAGNOSIS — D75.839 THROMBOCYTOSIS: ICD-10-CM

## 2022-03-14 PROCEDURE — 96365 THER/PROPH/DIAG IV INF INIT: CPT

## 2022-03-14 PROCEDURE — 96367 TX/PROPH/DG ADDL SEQ IV INF: CPT

## 2022-03-14 RX ORDER — ACETAMINOPHEN 325 MG/1
650 TABLET ORAL ONCE
Status: CANCELLED
Start: 2022-05-16 | End: 2022-05-09

## 2022-03-14 RX ORDER — SODIUM CHLORIDE 9 MG/ML
20 INJECTION, SOLUTION INTRAVENOUS ONCE
Status: CANCELLED | OUTPATIENT
Start: 2022-05-16

## 2022-03-14 RX ORDER — SODIUM CHLORIDE 9 MG/ML
20 INJECTION, SOLUTION INTRAVENOUS ONCE
Status: COMPLETED | OUTPATIENT
Start: 2022-03-14 | End: 2022-03-14

## 2022-03-14 RX ORDER — ACETAMINOPHEN 325 MG/1
650 TABLET ORAL ONCE
Status: COMPLETED | OUTPATIENT
Start: 2022-03-14 | End: 2022-03-14

## 2022-03-14 RX ADMIN — ACETAMINOPHEN 650 MG: 325 TABLET ORAL at 10:16

## 2022-03-14 RX ADMIN — SODIUM CHLORIDE 20 ML/HR: 9 INJECTION, SOLUTION INTRAVENOUS at 10:10

## 2022-03-14 RX ADMIN — SODIUM CHLORIDE 200 MG: 9 INJECTION, SOLUTION INTRAVENOUS at 11:08

## 2022-03-14 RX ADMIN — DIPHENHYDRAMINE HYDROCHLORIDE 50 MG: 50 INJECTION, SOLUTION INTRAMUSCULAR; INTRAVENOUS at 10:14

## 2022-03-14 RX ADMIN — DEXAMETHASONE SODIUM PHOSPHATE: 10 INJECTION, SOLUTION INTRAMUSCULAR; INTRAVENOUS at 10:39

## 2022-05-13 DIAGNOSIS — D75.839 THROMBOCYTOSIS: ICD-10-CM

## 2022-05-13 DIAGNOSIS — R79.0 LOW IRON STORES: Primary | ICD-10-CM

## 2022-05-13 DIAGNOSIS — D50.9 IRON DEFICIENCY ANEMIA, UNSPECIFIED IRON DEFICIENCY ANEMIA TYPE: ICD-10-CM

## 2022-05-13 RX ORDER — SODIUM CHLORIDE 9 MG/ML
20 INJECTION, SOLUTION INTRAVENOUS ONCE
Status: CANCELLED | OUTPATIENT
Start: 2022-05-16

## 2022-05-13 RX ORDER — ACETAMINOPHEN 325 MG/1
650 TABLET ORAL ONCE
Status: CANCELLED
Start: 2022-05-16 | End: 2022-05-16

## 2022-05-16 ENCOUNTER — HOSPITAL ENCOUNTER (OUTPATIENT)
Dept: INFUSION CENTER | Facility: HOSPITAL | Age: 47
Discharge: HOME | End: 2022-05-16
Attending: INTERNAL MEDICINE
Payer: COMMERCIAL

## 2022-05-16 VITALS
HEART RATE: 78 BPM | RESPIRATION RATE: 16 BRPM | SYSTOLIC BLOOD PRESSURE: 139 MMHG | TEMPERATURE: 98.7 F | OXYGEN SATURATION: 92 % | DIASTOLIC BLOOD PRESSURE: 64 MMHG

## 2022-05-16 DIAGNOSIS — D50.9 IRON DEFICIENCY ANEMIA, UNSPECIFIED IRON DEFICIENCY ANEMIA TYPE: ICD-10-CM

## 2022-05-16 DIAGNOSIS — R79.0 LOW IRON STORES: Primary | ICD-10-CM

## 2022-05-16 DIAGNOSIS — D75.839 THROMBOCYTOSIS: ICD-10-CM

## 2022-05-16 PROCEDURE — 96367 TX/PROPH/DG ADDL SEQ IV INF: CPT

## 2022-05-16 PROCEDURE — 96365 THER/PROPH/DIAG IV INF INIT: CPT

## 2022-05-16 RX ORDER — SODIUM CHLORIDE 9 MG/ML
20 INJECTION, SOLUTION INTRAVENOUS ONCE
Status: COMPLETED | OUTPATIENT
Start: 2022-05-16 | End: 2022-05-16

## 2022-05-16 RX ORDER — SODIUM CHLORIDE 9 MG/ML
20 INJECTION, SOLUTION INTRAVENOUS ONCE
OUTPATIENT
Start: 2022-07-11

## 2022-05-16 RX ORDER — ACETAMINOPHEN 325 MG/1
650 TABLET ORAL ONCE
Status: COMPLETED | OUTPATIENT
Start: 2022-05-16 | End: 2022-05-16

## 2022-05-16 RX ORDER — ACETAMINOPHEN 325 MG/1
650 TABLET ORAL ONCE
Start: 2022-07-11 | End: 2022-07-11

## 2022-05-16 RX ADMIN — IRON SUCROSE 200 MG: 20 INJECTION, SOLUTION INTRAVENOUS at 12:21

## 2022-05-16 RX ADMIN — ACETAMINOPHEN 650 MG: 325 TABLET, FILM COATED ORAL at 11:08

## 2022-05-16 RX ADMIN — SODIUM CHLORIDE 20 ML/HR: 9 INJECTION, SOLUTION INTRAVENOUS at 11:04

## 2022-05-16 RX ADMIN — DIPHENHYDRAMINE HYDROCHLORIDE 50 MG: 50 INJECTION, SOLUTION INTRAMUSCULAR; INTRAVENOUS at 11:48

## 2022-05-16 RX ADMIN — DEXAMETHASONE SODIUM PHOSPHATE: 10 INJECTION, SOLUTION INTRAMUSCULAR; INTRAVENOUS at 11:11

## 2022-06-14 DIAGNOSIS — R79.0 LOW IRON STORES: Primary | ICD-10-CM

## 2022-06-14 DIAGNOSIS — D50.9 IRON DEFICIENCY ANEMIA, UNSPECIFIED IRON DEFICIENCY ANEMIA TYPE: ICD-10-CM

## 2022-06-14 DIAGNOSIS — D75.839 THROMBOCYTOSIS: ICD-10-CM

## 2022-06-15 ENCOUNTER — APPOINTMENT (OUTPATIENT)
Dept: LAB | Facility: MEDICAL CENTER | Age: 47
End: 2022-06-15
Payer: COMMERCIAL

## 2022-06-15 DIAGNOSIS — D50.9 IRON DEFICIENCY ANEMIA, UNSPECIFIED IRON DEFICIENCY ANEMIA TYPE: ICD-10-CM

## 2022-06-15 DIAGNOSIS — D75.839 THROMBOCYTOSIS: ICD-10-CM

## 2022-06-15 DIAGNOSIS — R79.0 LOW IRON STORES: ICD-10-CM

## 2022-06-15 LAB
ALBUMIN SERPL BCP-MCNC: 3 G/DL (ref 3.5–5)
ALP SERPL-CCNC: 122 U/L (ref 46–116)
ALT SERPL W P-5'-P-CCNC: 22 U/L (ref 12–78)
ANION GAP SERPL CALCULATED.3IONS-SCNC: 7 MMOL/L (ref 4–13)
AST SERPL W P-5'-P-CCNC: 10 U/L (ref 5–45)
BASOPHILS # BLD AUTO: 0.11 THOUSANDS/ΜL (ref 0–0.1)
BASOPHILS NFR BLD AUTO: 1 % (ref 0–1)
BILIRUB SERPL-MCNC: 0.31 MG/DL (ref 0.2–1)
BUN SERPL-MCNC: 15 MG/DL (ref 5–25)
CALCIUM ALBUM COR SERPL-MCNC: 10.1 MG/DL (ref 8.3–10.1)
CALCIUM SERPL-MCNC: 9.3 MG/DL (ref 8.3–10.1)
CHLORIDE SERPL-SCNC: 108 MMOL/L (ref 100–108)
CO2 SERPL-SCNC: 22 MMOL/L (ref 21–32)
CREAT SERPL-MCNC: 1.13 MG/DL (ref 0.6–1.3)
EOSINOPHIL # BLD AUTO: 0.56 THOUSAND/ΜL (ref 0–0.61)
EOSINOPHIL NFR BLD AUTO: 6 % (ref 0–6)
ERYTHROCYTE [DISTWIDTH] IN BLOOD BY AUTOMATED COUNT: 19.3 % (ref 11.6–15.1)
FERRITIN SERPL-MCNC: 203 NG/ML (ref 8–388)
GFR SERPL CREATININE-BSD FRML MDRD: 57 ML/MIN/1.73SQ M
GLUCOSE P FAST SERPL-MCNC: 89 MG/DL (ref 65–99)
HCT VFR BLD AUTO: 38.8 % (ref 34.8–46.1)
HGB BLD-MCNC: 11.8 G/DL (ref 11.5–15.4)
IMM GRANULOCYTES # BLD AUTO: 0.08 THOUSAND/UL (ref 0–0.2)
IMM GRANULOCYTES NFR BLD AUTO: 1 % (ref 0–2)
IRON SATN MFR SERPL: 15 % (ref 15–50)
IRON SERPL-MCNC: 41 UG/DL (ref 50–170)
LYMPHOCYTES # BLD AUTO: 2.86 THOUSANDS/ΜL (ref 0.6–4.47)
LYMPHOCYTES NFR BLD AUTO: 29 % (ref 14–44)
MCH RBC QN AUTO: 25.1 PG (ref 26.8–34.3)
MCHC RBC AUTO-ENTMCNC: 30.4 G/DL (ref 31.4–37.4)
MCV RBC AUTO: 82 FL (ref 82–98)
MONOCYTES # BLD AUTO: 0.65 THOUSAND/ΜL (ref 0.17–1.22)
MONOCYTES NFR BLD AUTO: 7 % (ref 4–12)
NEUTROPHILS # BLD AUTO: 5.59 THOUSANDS/ΜL (ref 1.85–7.62)
NEUTS SEG NFR BLD AUTO: 56 % (ref 43–75)
NRBC BLD AUTO-RTO: 0 /100 WBCS
PLATELET # BLD AUTO: 431 THOUSANDS/UL (ref 149–390)
PMV BLD AUTO: 10.2 FL (ref 8.9–12.7)
POTASSIUM SERPL-SCNC: 4.4 MMOL/L (ref 3.5–5.3)
PROT SERPL-MCNC: 7.8 G/DL (ref 6.4–8.2)
RBC # BLD AUTO: 4.71 MILLION/UL (ref 3.81–5.12)
SODIUM SERPL-SCNC: 137 MMOL/L (ref 136–145)
TIBC SERPL-MCNC: 269 UG/DL (ref 250–450)
WBC # BLD AUTO: 9.85 THOUSAND/UL (ref 4.31–10.16)

## 2022-06-15 PROCEDURE — 83540 ASSAY OF IRON: CPT | Performed by: NURSE PRACTITIONER

## 2022-06-15 PROCEDURE — 85025 COMPLETE CBC W/AUTO DIFF WBC: CPT | Performed by: NURSE PRACTITIONER

## 2022-06-15 PROCEDURE — 82728 ASSAY OF FERRITIN: CPT | Performed by: NURSE PRACTITIONER

## 2022-06-15 PROCEDURE — 80053 COMPREHEN METABOLIC PANEL: CPT

## 2022-06-15 PROCEDURE — 36415 COLL VENOUS BLD VENIPUNCTURE: CPT | Performed by: NURSE PRACTITIONER

## 2022-06-15 PROCEDURE — 83550 IRON BINDING TEST: CPT | Performed by: NURSE PRACTITIONER

## 2022-06-28 ENCOUNTER — OFFICE VISIT (OUTPATIENT)
Dept: HEMATOLOGY ONCOLOGY | Facility: CLINIC | Age: 47
End: 2022-06-28
Payer: COMMERCIAL

## 2022-06-28 VITALS
HEIGHT: 63 IN | BODY MASS INDEX: 41.82 KG/M2 | TEMPERATURE: 99.2 F | SYSTOLIC BLOOD PRESSURE: 161 MMHG | WEIGHT: 236 LBS | HEART RATE: 84 BPM | DIASTOLIC BLOOD PRESSURE: 77 MMHG

## 2022-06-28 DIAGNOSIS — E55.9 VITAMIN D DEFICIENCY: Primary | ICD-10-CM

## 2022-06-28 DIAGNOSIS — D50.9 IRON DEFICIENCY ANEMIA, UNSPECIFIED IRON DEFICIENCY ANEMIA TYPE: ICD-10-CM

## 2022-06-28 DIAGNOSIS — D75.839 THROMBOCYTOSIS: ICD-10-CM

## 2022-06-28 PROCEDURE — 99214 OFFICE O/P EST MOD 30 MIN: CPT | Performed by: NURSE PRACTITIONER

## 2022-06-28 RX ORDER — MELATONIN
1000 DAILY
Qty: 90 TABLET | Refills: 3 | Status: SHIPPED | OUTPATIENT
Start: 2022-06-28

## 2022-06-28 NOTE — PROGRESS NOTES
[address] [address] [address]  [phone]  Hematology Ambulatory Follow-Up  [facility], 1975, [id number]  6/28/2022    Assessment/Plan:    1  Vitamin D deficiency  2  Thrombocytosis   3  Iron deficiency anemia, unspecified iron deficiency anemia type   Patient is a 41-year-old female with history of microcytic anemia, thrombocytosis and iron deficiency, likely secondary to menorrhagia versus decreased intake patient has had Venofer and Feraheme in the past   Her most recent infusion of Venofer was 05/16/2022  Patient requires premedications with Benadryl, Tylenol, ondansetron, and Decadron secondary to reactions  Patient states she had a heavy feeling in her arms with tingling during the iron infusion  She denied any shortness of breath or chest heaviness  Most recent iron saturation from 06/15/2022 was 15%, ferritin level 203  CBC reveals a normal hemoglobin 11 8, MCV 82, platelets 925 otherwise normal CBC and differential    Patient reports history of vitamin-D deficiency  Per chart review previous vitamin-D was 19  She was taking supplementation however has been out of it for a few months  She requests a refill while she is here  I will refill this for the moment but advised her to contact her PCP in the future  We will otherwise plan to see her in  6 months with three-month interval labs  Patient verbalized understanding and is in agreement with the plan  - CBC and differential; Standing  - Comprehensive metabolic panel; Standing  - Iron Panel (Includes Ferritin, Iron Sat%, Iron, and TIBC); Standing  - CBC and differential  - Comprehensive metabolic panel  - Iron Panel (Includes Ferritin, Iron Sat%, Iron, and TIBC)  - cholecalciferol (VITAMIN D3) 1,000 units tablet; Take 1 tablet (1,000 Units total) by mouth daily  Dispense: 90 tablet;  Refill: 3    Barrier(s) to care: None  The patient is able to self [address]    Interval history:  Clinically stable    Review of Systems   Constitutional: Positive for fatigue  Negative for activity change, appetite change, fever and unexpected weight change  Respiratory: Negative for cough and shortness of breath  Cardiovascular: Negative for chest pain and leg swelling  Gastrointestinal: Positive for constipation  Negative for abdominal pain, diarrhea and nausea  Endocrine: Negative for cold intolerance and heat intolerance  Musculoskeletal: Negative for arthralgias and myalgias  Skin: Negative  Neurological: Negative for dizziness, weakness and headaches  Hematological: Negative for adenopathy  Does not bruise/bleed easily       Patient Active Problem List   Diagnosis    Intramural aortic hematoma (HCC)    Retrosternal chest pain    Left flank pain    Thrombocytosis     Multiple thyroid nodules    Diabetes mellitus type 2    Iron deficiency anemia    Pulmonary hypertension (HCC)    Depression    Dyslipidemia, goal LDL below 100    Mild persistent asthma without complication    Essential hypertension    Aortitis     Acute on chronic right-sided low back pain without sciatica    Latent tuberculosis    Abnormal thyroid function test    SIRS (systemic inflammatory response syndrome)    Hyperphosphatemia    Fatigue    Abnormal LFTs    Low back pain    Hypercalcemia    Hyperthyroidism    Low iron stores     Past Surgical History:   Procedure Laterality Date     SECTION      IR BIOPSY LIVER MASS  7/15/2020       Current Outpatient Medications:     cholecalciferol (VITAMIN D3) 1,000 units tablet, Take 1 tablet (1,000 Units total) by mouth daily, Disp: 90 tablet, Rfl: 3    acetaminophen (TYLENOL) 325 mg tablet, Take 650 mg by mouth every 6 (six) hours as needed for mild pain, Disp: , Rfl:     albuterol (PROVENTIL HFA,VENTOLIN HFA) 90 mcg/act inhaler, Inhale 2 puffs 2 (two) times a day, Disp: , Rfl:     aspirin (ECOTRIN LOW STRENGTH) 81 mg EC tablet, Take 81 mg by mouth daily, Disp: , Rfl:     Cobalamin Combinations (VITAMIN B12-FOLIC ACID PO), Take by mouth, Disp: , Rfl:     diclofenac sodium (VOLTAREN) 50 mg EC tablet, Take 1 tablet (50 mg total) by mouth 2 (two) times a day, Disp: 30 tablet, Rfl: 0    escitalopram (LEXAPRO) 10 mg tablet, Take 10 mg by mouth daily, Disp: , Rfl:     losartan (COZAAR) 100 MG tablet, Take 100 mg by mouth daily , Disp: , Rfl:     metFORMIN (GLUCOPHAGE) 500 mg tablet, Take 1,000 mg by mouth 2 (two) times a day with meals, Disp: , Rfl:     methimazole (TAPAZOLE) 5 mg tablet, Take 1 tablet (5 mg total) by mouth daily, Disp: 60 tablet, Rfl: 1    methotrexate 2 5 mg tablet, Take by mouth once a week, Disp: , Rfl:     metoprolol succinate (TOPROL-XL) 50 mg 24 hr tablet, TAKE (1) TABLET DAILY  , Disp: 90 tablet, Rfl: 4    montelukast (SINGULAIR) 10 mg tablet, Take 10 mg by mouth daily at bedtime, Disp: , Rfl:     pantoprazole (PROTONIX) 40 mg tablet, Take 1 tablet (40 mg total) by mouth daily in the early morning, Disp: 30 tablet, Rfl: 0    simvastatin (ZOCOR) 10 mg tablet, Take 10 mg by mouth daily at bedtime, Disp: , Rfl:     Allergies   Allergen Reactions    Other Allergic Rhinitis     pollen       Objective:  /77   Pulse 84   Temp 99 2 °F (37 3 °C)   Ht 5' 3" (1 6 m)   Wt 107 kg (236 lb)   LMP 04/04/2020 (Exact Date)   BMI 41 81 kg/m²    Physical Exam  Vitals reviewed  Constitutional:       Appearance: Normal appearance  She is well-developed  She is obese  HENT:      Head: Normocephalic and atraumatic  Eyes:      Pupils: Pupils are equal, round, and reactive to light  Pulmonary:      Effort: Pulmonary effort is normal  No respiratory distress  Musculoskeletal:         General: Normal range of motion  Cervical back: Normal range of motion  Lymphadenopathy:      Cervical: No cervical adenopathy  Skin:     General: Skin is dry  Neurological:      Mental Status: She is alert and oriented to person, place, and time  Psychiatric:         Behavior: Behavior normal      Result Review  Labs:  Appointment on 06/15/2022   Component Date Value Ref Range Status    Sodium 06/15/2022 137  136 - 145 mmol/L Final    Potassium 06/15/2022 4 4  3 5 - 5 3 mmol/L Final    Chloride 06/15/2022 108  100 - 108 mmol/L Final    CO2 06/15/2022 22  21 - 32 mmol/L Final    ANION GAP 06/15/2022 7  4 - 13 mmol/L Final    BUN 06/15/2022 15  5 - 25 mg/dL Final    Creatinine 06/15/2022 1 13  0 60 - 1 30 mg/dL Final    Standardized to IDMS reference method    Glucose, Fasting 06/15/2022 89  65 - 99 mg/dL Final    Specimen collection should occur prior to Sulfasalazine administration due to the potential for falsely depressed results  Specimen collection should occur prior to Sulfapyridine administration due to the potential for falsely elevated results   Calcium 06/15/2022 9 3  8 3 - 10 1 mg/dL Final    Corrected Calcium 06/15/2022 10 1  8 3 - 10 1 mg/dL Final    AST 06/15/2022 10  5 - 45 U/L Final    Specimen collection should occur prior to Sulfasalazine administration due to the potential for falsely depressed results   ALT 06/15/2022 22  12 - 78 U/L Final    Specimen collection should occur prior to Sulfasalazine and/or Sulfapyridine administration due to the potential for falsely depressed results   Alkaline Phosphatase 06/15/2022 122 (A) 46 - 116 U/L Final    Total Protein 06/15/2022 7 8  6 4 - 8 2 g/dL Final    Albumin 06/15/2022 3 0 (A) 3 5 - 5 0 g/dL Final    Total Bilirubin 06/15/2022 0 31  0 20 - 1 00 mg/dL Final    Use of this assay is not recommended for patients undergoing treatment with eltrombopag due to the potential for falsely elevated results   eGFR 06/15/2022 57  ml/min/1 73sq m Final     Please note: This report has been generated by a voice recognition software system   Therefore there may be syntax, spelling, and/or grammatical errors  Please call if you have any questions

## 2022-08-17 DIAGNOSIS — R00.0 TACHYCARDIA: ICD-10-CM

## 2022-08-17 RX ORDER — METOPROLOL SUCCINATE 50 MG/1
TABLET, EXTENDED RELEASE ORAL
Qty: 30 TABLET | Refills: 0 | Status: SHIPPED | OUTPATIENT
Start: 2022-08-17 | End: 2022-09-16

## 2022-08-17 NOTE — TELEPHONE ENCOUNTER
Requested medication(s) are due for refill today: yes  Patient has already received a courtesy refill: no  Other reason request has been forwarded to provider: Pt has not been seen due to her being as needed   We will give 30 days only this one time

## 2022-09-01 ENCOUNTER — OFFICE VISIT (OUTPATIENT)
Dept: URGENT CARE | Facility: CLINIC | Age: 47
End: 2022-09-01
Payer: COMMERCIAL

## 2022-09-01 VITALS
SYSTOLIC BLOOD PRESSURE: 130 MMHG | TEMPERATURE: 98.6 F | HEART RATE: 62 BPM | DIASTOLIC BLOOD PRESSURE: 70 MMHG | OXYGEN SATURATION: 98 % | RESPIRATION RATE: 16 BRPM

## 2022-09-01 DIAGNOSIS — K08.89 PAIN, DENTAL: Primary | ICD-10-CM

## 2022-09-01 PROCEDURE — S9088 SERVICES PROVIDED IN URGENT: HCPCS

## 2022-09-01 PROCEDURE — 99202 OFFICE O/P NEW SF 15 MIN: CPT

## 2022-09-01 RX ORDER — AMOXICILLIN 875 MG/1
875 TABLET, COATED ORAL 2 TIMES DAILY
Qty: 20 TABLET | Refills: 0 | Status: SHIPPED | OUTPATIENT
Start: 2022-09-01 | End: 2022-09-11

## 2022-09-01 NOTE — PROGRESS NOTES
[facility]        NAME: Alicia Truong is a 50 y o  female  : 1975    MRN: [id number]  DATE: 2022  TIME: 10:05 AM    Assessment and Plan   Pain, dental [K08 89]  1  Pain, dental  amoxicillin (AMOXIL) 875 mg tablet         Patient Instructions     Take probiotics or eat yogurt with live cultures to promote gut health  Follow up with PCP in 3-5 days  Proceed to  ER if symptoms worsen  Chief Complaint     Chief Complaint   Patient presents with    Dental Pain     Right upper second tooth, started this AM         History of Present Illness       Dental Pain   This is a new problem  The current episode started yesterday  The problem occurs constantly  The problem has been gradually worsening  The pain is mild  Pertinent negatives include no facial pain, fever, oral bleeding or sinus pressure  She has tried acetaminophen for the symptoms  The treatment provided mild relief  Review of Systems   Review of Systems   Constitutional: Negative for activity change, appetite change, chills, fatigue and fever  HENT: Positive for dental problem  Negative for sinus pressure, trouble swallowing and voice change  Respiratory: Negative for chest tightness, shortness of breath and wheezing  Cardiovascular: Negative for chest pain  Gastrointestinal: Negative for abdominal pain  Musculoskeletal: Negative for arthralgias  Neurological: Negative for dizziness and headaches  All other systems reviewed and are negative          Current Medications       Current Outpatient Medications:     acetaminophen (TYLENOL) 325 mg tablet, Take 650 mg by mouth every 6 (six) hours as needed for mild pain, Disp: , Rfl:     albuterol (PROVENTIL HFA,VENTOLIN HFA) 90 mcg/act inhaler, Inhale 2 puffs 2 (two) times a day, Disp: , Rfl:     amoxicillin (AMOXIL) 875 mg tablet, Take 1 tablet (875 mg total) by mouth 2 (two) times a day for 10 days, Disp: 20 tablet, Rfl: 0    aspirin (ECOTRIN LOW STRENGTH) 81 mg EC tablet, Take 81 mg by mouth daily, Disp: , Rfl:     cholecalciferol (VITAMIN D3) 1,000 units tablet, Take 1 tablet (1,000 Units total) by mouth daily, Disp: 90 tablet, Rfl: 3    Cobalamin Combinations (VITAMIN B12-FOLIC ACID PO), Take by mouth, Disp: , Rfl:     diclofenac sodium (VOLTAREN) 50 mg EC tablet, Take 1 tablet (50 mg total) by mouth 2 (two) times a day, Disp: 30 tablet, Rfl: 0    escitalopram (LEXAPRO) 10 mg tablet, Take 10 mg by mouth daily, Disp: , Rfl:     losartan (COZAAR) 100 MG tablet, Take 100 mg by mouth daily , Disp: , Rfl:     metFORMIN (GLUCOPHAGE) 500 mg tablet, Take 1,000 mg by mouth 2 (two) times a day with meals, Disp: , Rfl:     methotrexate 2 5 mg tablet, Take by mouth once a week, Disp: , Rfl:     metoprolol succinate (TOPROL-XL) 50 mg 24 hr tablet, TAKE (1) TABLET DAILY  , Disp: 30 tablet, Rfl: 0    montelukast (SINGULAIR) 10 mg tablet, Take 10 mg by mouth daily at bedtime, Disp: , Rfl:     pantoprazole (PROTONIX) 40 mg tablet, Take 1 tablet (40 mg total) by mouth daily in the early morning, Disp: 30 tablet, Rfl: 0    simvastatin (ZOCOR) 10 mg tablet, Take 10 mg by mouth daily at bedtime, Disp: , Rfl:     methimazole (TAPAZOLE) 5 mg tablet, Take 1 tablet (5 mg total) by mouth daily, Disp: 60 tablet, Rfl: 1    Current Allergies     Allergies as of 2022 - Reviewed 2022   Allergen Reaction Noted    Other Allergic Rhinitis 2018            The following portions of the patient's history were reviewed and updated as appropriate: allergies, current medications, past family history, past medical history, past social history, past surgical history and problem list      Past Medical History:   Diagnosis Date    Asthma     Chronic anemia     Diabetes mellitus ([facility] [address] [facility]     Hypertension     Hyperthyroidism     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[facility] [address] [facility]     TB lung, latent        Past Surgical History:   Procedure Laterality Date     SECTION      IR BIOPSY LIVER MASS 7/15/2020       Family History   Problem Relation Age of Onset    Diabetes unspecified Mother          Medications have been verified  Objective   /70   Pulse 62   Temp 98 6 °F (37 °C)   Resp 16   LMP 04/04/2020 (Exact Date)   SpO2 98%        Physical Exam     Physical Exam  Vitals and nursing note reviewed  Constitutional:       General: She is not in acute distress  Appearance: Normal appearance  She is normal weight  She is not ill-appearing or toxic-appearing  HENT:      Mouth/Throat:      Lips: Pink  Mouth: No oral lesions  Dentition: Dental tenderness and dental caries present  No gingival swelling or dental abscesses  Pharynx: Oropharynx is clear  Cardiovascular:      Rate and Rhythm: Normal rate  Pulses: Normal pulses  Pulmonary:      Effort: Pulmonary effort is normal    Musculoskeletal:         General: Normal range of motion  Skin:     General: Skin is warm and dry  Capillary Refill: Capillary refill takes less than 2 seconds  Neurological:      General: No focal deficit present  Mental Status: She is alert and oriented to person, place, and time

## 2022-09-16 DIAGNOSIS — R00.0 TACHYCARDIA: ICD-10-CM

## 2022-09-16 RX ORDER — METOPROLOL SUCCINATE 50 MG/1
TABLET, EXTENDED RELEASE ORAL
Qty: 30 TABLET | Refills: 6 | Status: SHIPPED | OUTPATIENT
Start: 2022-09-16

## 2022-09-25 ENCOUNTER — APPOINTMENT (EMERGENCY)
Dept: CT IMAGING | Facility: HOSPITAL | Age: 47
End: 2022-09-25
Payer: COMMERCIAL

## 2022-09-25 ENCOUNTER — HOSPITAL ENCOUNTER (EMERGENCY)
Facility: HOSPITAL | Age: 47
Discharge: HOME | End: 2022-09-26
Attending: EMERGENCY MEDICINE
Payer: COMMERCIAL

## 2022-09-25 DIAGNOSIS — R53.1 GENERALIZED WEAKNESS: Primary | ICD-10-CM

## 2022-09-25 DIAGNOSIS — Z98.890 POST-OPERATIVE STATE: ICD-10-CM

## 2022-09-25 LAB
ALBUMIN SERPL BCP-MCNC: 2.7 G/DL (ref 3.5–5)
ANION GAP SERPL CALCULATED.3IONS-SCNC: 10 MMOL/L (ref 4–13)
BASE EX.OXY STD BLDV CALC-SCNC: 96.5 % (ref 60–80)
BASE EXCESS BLDV CALC-SCNC: -4.2 MMOL/L
BASOPHILS # BLD AUTO: 0.1 THOUSANDS/ΜL (ref 0–0.1)
BASOPHILS NFR BLD AUTO: 1 % (ref 0–1)
BUN SERPL-MCNC: 32 MG/DL (ref 5–25)
CALCIUM SERPL-MCNC: 8.8 MG/DL (ref 8.3–10.1)
CARDIAC TROPONIN I PNL SERPL HS: 6 NG/L (ref 8–18)
CARDIAC TROPONIN I PNL SERPL HS: 7 NG/L (ref 8–18)
CARDIAC TROPONIN I PNL SERPL HS: 8 NG/L (ref 8–18)
CHLORIDE SERPL-SCNC: 106 MMOL/L (ref 96–108)
CO2 SERPL-SCNC: 22 MMOL/L (ref 21–32)
CREAT SERPL-MCNC: 1.86 MG/DL (ref 0.6–1.3)
EOSINOPHIL # BLD AUTO: 0.27 THOUSAND/ΜL (ref 0–0.61)
EOSINOPHIL NFR BLD AUTO: 2 % (ref 0–6)
ERYTHROCYTE [DISTWIDTH] IN BLOOD BY AUTOMATED COUNT: 18.6 % (ref 11.6–15.1)
GFR SERPL CREATININE-BSD FRML MDRD: 31 ML/MIN/1.73SQ M
GLUCOSE SERPL-MCNC: 106 MG/DL (ref 65–140)
HCO3 BLDV-SCNC: 20.4 MMOL/L (ref 24–30)
HCT VFR BLD AUTO: 28.2 % (ref 34.8–46.1)
HGB BLD-MCNC: 8.7 G/DL (ref 11.5–15.4)
IMM GRANULOCYTES # BLD AUTO: 0.12 THOUSAND/UL (ref 0–0.2)
IMM GRANULOCYTES NFR BLD AUTO: 1 % (ref 0–2)
LYMPHOCYTES # BLD AUTO: 3.3 THOUSANDS/ΜL (ref 0.6–4.47)
LYMPHOCYTES NFR BLD AUTO: 26 % (ref 14–44)
MAGNESIUM SERPL-MCNC: 1.8 MG/DL (ref 1.6–2.6)
MCH RBC QN AUTO: 25.8 PG (ref 26.8–34.3)
MCHC RBC AUTO-ENTMCNC: 30.9 G/DL (ref 31.4–37.4)
MCV RBC AUTO: 84 FL (ref 82–98)
MONOCYTES # BLD AUTO: 1.07 THOUSAND/ΜL (ref 0.17–1.22)
MONOCYTES NFR BLD AUTO: 8 % (ref 4–12)
NEUTROPHILS # BLD AUTO: 7.94 THOUSANDS/ΜL (ref 1.85–7.62)
NEUTS SEG NFR BLD AUTO: 62 % (ref 43–75)
NRBC BLD AUTO-RTO: 0 /100 WBCS
O2 CT BLDV-SCNC: 12.5 ML/DL
PCO2 BLDV: 35.5 MM HG (ref 42–50)
PH BLDV: 7.38 [PH] (ref 7.3–7.4)
PLATELET # BLD AUTO: 338 THOUSANDS/UL (ref 149–390)
PMV BLD AUTO: 11.1 FL (ref 8.9–12.7)
PO2 BLDV: 107.8 MM HG (ref 35–45)
POTASSIUM SERPL-SCNC: 4.3 MMOL/L (ref 3.5–5.3)
RBC # BLD AUTO: 3.37 MILLION/UL (ref 3.81–5.12)
SODIUM SERPL-SCNC: 138 MMOL/L (ref 135–147)
TSH SERPL DL<=0.05 MIU/L-ACNC: 2.34 UIU/ML (ref 0.45–4.5)
WBC # BLD AUTO: 12.8 THOUSAND/UL (ref 4.31–10.16)

## 2022-09-25 PROCEDURE — 99285 EMERGENCY DEPT VISIT HI MDM: CPT

## 2022-09-25 PROCEDURE — 85025 COMPLETE CBC W/AUTO DIFF WBC: CPT | Performed by: EMERGENCY MEDICINE

## 2022-09-25 PROCEDURE — 83735 ASSAY OF MAGNESIUM: CPT | Performed by: EMERGENCY MEDICINE

## 2022-09-25 PROCEDURE — 96365 THER/PROPH/DIAG IV INF INIT: CPT

## 2022-09-25 PROCEDURE — 84484 ASSAY OF TROPONIN QUANT: CPT | Performed by: EMERGENCY MEDICINE

## 2022-09-25 PROCEDURE — 96366 THER/PROPH/DIAG IV INF ADDON: CPT

## 2022-09-25 PROCEDURE — 80048 BASIC METABOLIC PNL TOTAL CA: CPT | Performed by: EMERGENCY MEDICINE

## 2022-09-25 PROCEDURE — 70491 CT SOFT TISSUE NECK W/DYE: CPT

## 2022-09-25 PROCEDURE — 93005 ELECTROCARDIOGRAM TRACING: CPT

## 2022-09-25 PROCEDURE — 36415 COLL VENOUS BLD VENIPUNCTURE: CPT | Performed by: EMERGENCY MEDICINE

## 2022-09-25 PROCEDURE — G1004 CDSM NDSC: HCPCS

## 2022-09-25 PROCEDURE — 99285 EMERGENCY DEPT VISIT HI MDM: CPT | Performed by: EMERGENCY MEDICINE

## 2022-09-25 PROCEDURE — 82805 BLOOD GASES W/O2 SATURATION: CPT | Performed by: EMERGENCY MEDICINE

## 2022-09-25 PROCEDURE — 84443 ASSAY THYROID STIM HORMONE: CPT | Performed by: EMERGENCY MEDICINE

## 2022-09-25 PROCEDURE — 82040 ASSAY OF SERUM ALBUMIN: CPT | Performed by: EMERGENCY MEDICINE

## 2022-09-25 PROCEDURE — 84439 ASSAY OF FREE THYROXINE: CPT | Performed by: EMERGENCY MEDICINE

## 2022-09-25 RX ORDER — SODIUM CHLORIDE, SODIUM GLUCONATE, SODIUM ACETATE, POTASSIUM CHLORIDE, MAGNESIUM CHLORIDE, SODIUM PHOSPHATE, DIBASIC, AND POTASSIUM PHOSPHATE .53; .5; .37; .037; .03; .012; .00082 G/100ML; G/100ML; G/100ML; G/100ML; G/100ML; G/100ML; G/100ML
1000 INJECTION, SOLUTION INTRAVENOUS ONCE
Status: COMPLETED | OUTPATIENT
Start: 2022-09-25 | End: 2022-09-26

## 2022-09-25 RX ADMIN — SODIUM CHLORIDE, SODIUM GLUCONATE, SODIUM ACETATE, POTASSIUM CHLORIDE, MAGNESIUM CHLORIDE, SODIUM PHOSPHATE, DIBASIC, AND POTASSIUM PHOSPHATE 1000 ML: .53; .5; .37; .037; .03; .012; .00082 INJECTION, SOLUTION INTRAVENOUS at 21:22

## 2022-09-25 RX ADMIN — IOHEXOL 85 ML: 350 INJECTION, SOLUTION INTRAVENOUS at 21:39

## 2022-09-26 VITALS
BODY MASS INDEX: 43.35 KG/M2 | TEMPERATURE: 97.1 F | DIASTOLIC BLOOD PRESSURE: 70 MMHG | HEART RATE: 71 BPM | RESPIRATION RATE: 18 BRPM | SYSTOLIC BLOOD PRESSURE: 158 MMHG | OXYGEN SATURATION: 95 % | WEIGHT: 244.71 LBS

## 2022-09-26 LAB
ATRIAL RATE: 70 BPM
P AXIS: 33 DEGREES
PR INTERVAL: 170 MS
QRS AXIS: 33 DEGREES
QRSD INTERVAL: 78 MS
QT INTERVAL: 400 MS
QTC INTERVAL: 432 MS
T WAVE AXIS: 77 DEGREES
T4 FREE SERPL-MCNC: 0.73 NG/DL (ref 0.76–1.46)
VENTRICULAR RATE: 70 BPM

## 2022-09-26 PROCEDURE — 93010 ELECTROCARDIOGRAM REPORT: CPT | Performed by: INTERNAL MEDICINE

## 2022-09-26 NOTE — DISCHARGE INSTRUCTIONS
The CT did not show any abnormal features related to your surgery  Your labs did not show any new abnormal findings that account for your symptoms  Please continue all your current medications at their current dosages and frequencies  You should see Dr Syd Jerez for further evaluation  He will be in the office today  If you develop a fever, vomiting blood, blood in stool, passing out, shortness of breath, or chest pain, please go to the ER

## 2022-09-26 NOTE — ED PROVIDER NOTES
History  Chief Complaint   Patient presents with    Weakness - Generalized     Patient had thyroid removed two days ago  Patient c/o weakness starting this morning  Denies numbness, fever, chills/ body aches  This is a pleasant 77-year-old woman who underwent total thyroidectomy due to goiter on 23 September 2022 at [facility]  She was kept overnight and received treatment for mild hypocalcemia; she was discharged the following day in good condition  The procedure itself was reported as uncomplicated  She noted some mild dysphonia and odynophagia following procedure but states both have increased today  She presents today because she has developed generalized fatigue and weakness throughout the course the day today as well as some sense of increasing swelling at the surgical incision site    She denies any specific chest pain/dyspnea/nausea/vomiting/drooling/extremity paresthesias/extremity weakness  She does not have orthopnea  No fevers or chills  She has not had any facial paresthesias  No drainage or discharge from the site  She has applied topical ointment to the area as recommended by her surgeon  She does not have an appointment scheduled for follow-up with her surgeon yet  She was prescribed appropriate medications following total thyroidectomy; I reviewed her discharge instructions and the records in [facility]  Surgeon Dr Lucy Montes De Oca (ENT)    Multiple potential etiologies of symptoms including electrolyte disturbances which would be of high is concern given that she just has had a total thyroidectomy  Additionally, patient is reporting some increasing pain/swelling at the site and has some dysphonia  She states this has increased today above whenever she had postoperatively  In conjunction with the apparent swelling at the surgical site, I have concerns about the development of seroma or other fluid collection    Will obtain appropriate electrolyte testing and CT soft tissue of the neck  History provided by:  Medical records, patient and relative (Patient's son accompanies her)      Prior to Admission Medications   Prescriptions Last Dose Informant Patient Reported? Taking? Cobalamin Combinations (VITAMIN B12-FOLIC ACID PO)  Self Yes No   Sig: Take by mouth   acetaminophen (TYLENOL) 325 mg tablet  Self Yes No   Sig: Take 650 mg by mouth every 6 (six) hours as needed for mild pain   albuterol (PROVENTIL HFA,VENTOLIN HFA) 90 mcg/act inhaler  Self Yes No   Sig: Inhale 2 puffs 2 (two) times a day   aspirin (ECOTRIN LOW STRENGTH) 81 mg EC tablet  Self Yes No   Sig: Take 81 mg by mouth daily   cholecalciferol (VITAMIN D3) 1,000 units tablet   No No   Sig: Take 1 tablet (1,000 Units total) by mouth daily   diclofenac sodium (VOLTAREN) 50 mg EC tablet  Self No No   Sig: Take 1 tablet (50 mg total) by mouth 2 (two) times a day   escitalopram (LEXAPRO) 10 mg tablet  Self Yes No   Sig: Take 10 mg by mouth daily   losartan (COZAAR) 100 MG tablet  Self Yes No   Sig: Take 100 mg by mouth daily    metFORMIN (GLUCOPHAGE) 500 mg tablet  Self Yes No   Sig: Take 1,000 mg by mouth 2 (two) times a day with meals   methimazole (TAPAZOLE) 5 mg tablet  Self No No   Sig: Take 1 tablet (5 mg total) by mouth daily   methotrexate 2 5 mg tablet  Self Yes No   Sig: Take by mouth once a week   metoprolol succinate (TOPROL-XL) 50 mg 24 hr tablet   No No   Sig: TAKE (1) TABLET DAILY     montelukast (SINGULAIR) 10 mg tablet  Self Yes No   Sig: Take 10 mg by mouth daily at bedtime   pantoprazole (PROTONIX) 40 mg tablet  Self No No   Sig: Take 1 tablet (40 mg total) by mouth daily in the early morning   simvastatin (ZOCOR) 10 mg tablet  Self Yes No   Sig: Take 10 mg by mouth daily at bedtime      Facility-Administered Medications: None       Past Medical History:   Diagnosis Date    Asthma     Chronic anemia     Diabetes mellitus ([address] )     Hypertension     Hyperthyroidism     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[facility] [address] )     TB lung, latent        Past Surgical History:   Procedure Laterality Date     SECTION      IR BIOPSY LIVER MASS  07/15/2020    THYROIDECTOMY         Family History   Problem Relation Age of Onset    Diabetes unspecified Mother      I have reviewed and agree with the history as documented  E-Cigarette/Vaping    E-Cigarette Use Never User      E-Cigarette/Vaping Substances    Nicotine No     THC No     CBD No     Flavoring No     Other No     Unknown No      Social History     Tobacco Use    Smoking status: Never Smoker    Smokeless tobacco: Never Used   Vaping Use    Vaping Use: Never used   Substance Use Topics    Alcohol use: Never     Alcohol/week: 0 0 standard drinks    Drug use: Never       Review of Systems   Constitutional: Positive for fatigue  Negative for chills, diaphoresis and fever  HENT: Positive for trouble swallowing and voice change  Respiratory: Negative for cough, chest tightness, shortness of breath and stridor  Gastrointestinal: Negative for abdominal distention, abdominal pain, nausea and vomiting  Skin: Positive for wound  Negative for color change, pallor and rash  Neurological: Positive for weakness  Negative for dizziness, syncope, speech difficulty, light-headedness and numbness  Hematological: Negative  All other systems reviewed and are negative  Physical Exam  Physical Exam  Vitals and nursing note reviewed  Constitutional:       General: She is awake  She is not in acute distress  Appearance: Normal appearance  She is well-developed  HENT:      Head: Normocephalic and atraumatic  Right Ear: Hearing and external ear normal       Left Ear: Hearing and external ear normal    Neck:      Trachea: Trachea and phonation normal       Comments: Surgical incision of thyroidectomy with sutures in place  There is mild ecchymosis on the inferior margin of the incision  There is no drainage from the incision site  There is mild fluctuance of the surgical site--this area has expected post-operative ttp  No crepitus  There is moderate dysphonia  No stridor or stertor  Cardiovascular:      Rate and Rhythm: Normal rate and regular rhythm  Pulses:           Radial pulses are 2+ on the right side and 2+ on the left side  Dorsalis pedis pulses are 2+ on the right side and 2+ on the left side  Posterior tibial pulses are 2+ on the right side and 2+ on the left side  Heart sounds: Normal heart sounds, S1 normal and S2 normal  No murmur heard  No friction rub  No gallop  Pulmonary:      Effort: Pulmonary effort is normal  No respiratory distress  Breath sounds: Normal breath sounds  No stridor  No decreased breath sounds, wheezing, rhonchi or rales  Lymphadenopathy:      Cervical: No cervical adenopathy  Skin:     General: Skin is warm and dry  Neurological:      Mental Status: She is alert and oriented to person, place, and time  GCS: GCS eye subscore is 4  GCS verbal subscore is 5  GCS motor subscore is 6  Cranial Nerves: No cranial nerve deficit  Sensory: No sensory deficit  Motor: No abnormal muscle tone  Deep Tendon Reflexes:      Reflex Scores:       Tricep reflexes are 2+ on the right side and 2+ on the left side  Bicep reflexes are 2+ on the right side and 2+ on the left side  Brachioradialis reflexes are 2+ on the right side and 2+ on the left side  Patellar reflexes are 2+ on the right side and 2+ on the left side  Comments: PERRLA; EOMI  Sensation intact to light touch over face in V1-V3 distribution bilaterally  Facial expressions symmetric  Tongue/uvula midline  Shoulder shrug equal bilaterally  Strength 5/5 in UE/LE bilaterally  Sensation intact to light touch in UE/LE bilaterally           Vital Signs  ED Triage Vitals [09/25/22 2050]   Temperature Pulse Respirations Blood Pressure SpO2   (!) 97 1 °F (36 2 °C) 72 16 (!) 195/79 94 % Temp src Heart Rate Source Patient Position - Orthostatic VS BP Location FiO2 (%)   -- Monitor Lying -- --      Pain Score       6           Vitals:    09/25/22 2050 09/25/22 2230 09/25/22 2300   BP: (!) 195/79 (!) 175/79 170/73   Pulse: 72 75 72   Patient Position - Orthostatic VS: Lying  Lying         Visual Acuity      ED Medications  Medications   multi-electrolyte (ISOLYTE-S PH 7 4) bolus 1,000 mL (0 mL Intravenous Stopped 9/26/22 0001)   iohexol (OMNIPAQUE) 350 MG/ML injection (MULTI-DOSE) 85 mL (85 mL Intravenous Given 9/25/22 2139)       Diagnostic Studies  Results Reviewed     Procedure Component Value Units Date/Time    High Sensitivity Troponin I Random [[id number]]  (Abnormal) Collected: 09/25/22 2300    Lab Status: Final result Specimen: Blood from Arm, Right Updated: 09/25/22 2331     HS TnI random 7 ng/L     High Sensitivity Troponin I Random [[id number]]  (Abnormal) Collected: 09/25/22 2223    Lab Status: Final result Specimen: Blood from Hand, Right Updated: 09/25/22 2253     HS TnI random 6 ng/L     Albumin [[id number]]  (Abnormal) Collected: 09/25/22 2056    Lab Status: Final result Specimen: Blood from Hand, Left Updated: 09/25/22 2150     Albumin 2 7 g/dL     Basic metabolic panel [[id number]]  (Abnormal) Collected: 09/25/22 2056    Lab Status: Final result Specimen: Blood from Hand, Left Updated: 09/25/22 2138     Sodium 138 mmol/L      Potassium 4 3 mmol/L      Chloride 106 mmol/L      CO2 22 mmol/L      ANION GAP 10 mmol/L      BUN 32 mg/dL      Creatinine 1 86 mg/dL      Glucose 106 mg/dL      Calcium 8 8 mg/dL      eGFR 31 ml/min/1 73sq m     Narrative:      Vijaya guidelines for Chronic Kidney Disease (CKD):     Stage 1 with normal or high GFR (GFR > 90 mL/min/1 73 square meters)    Stage 2 Mild CKD (GFR = 60-89 mL/min/1 73 square meters)    Stage 3A Moderate CKD (GFR = 45-59 mL/min/1 73 square meters)    Stage 3B Moderate CKD (GFR = 30-44 mL/min/1 73 square meters)    Stage 4 Severe CKD (GFR = 15-29 mL/min/1 73 square meters)    Stage 5 End Stage CKD (GFR <15 mL/min/1 73 square meters)  Note: GFR calculation is accurate only with a steady state creatinine    Magnesium [[id number]]  (Normal) Collected: 09/25/22 2056    Lab Status: Final result Specimen: Blood from Hand, Left Updated: 09/25/22 2138     Magnesium 1 8 mg/dL     TSH [[id number]]  (Normal) Collected: 09/25/22 2056    Lab Status: Final result Specimen: Blood from Hand, Left Updated: 09/25/22 2138     TSH 3RD GENERATON 2 342 uIU/mL     Narrative:      Patients undergoing fluorescein dye angiography may retain small amounts of fluorescein in the body for 48-72 hours post procedure  Samples containing fluorescein can produce falsely depressed TSH values  If the patient had this procedure,a specimen should be resubmitted post fluorescein clearance        High Sensitivity Troponin I Random [[id number]]  (Normal) Collected: 09/25/22 2056    Lab Status: Final result Specimen: Blood from Arm, Left Updated: 09/25/22 2138     HS TnI random 8 ng/L     Blood gas, venous [[id number]]  (Abnormal) Collected: 09/25/22 2056    Lab Status: Final result Specimen: Blood from Arm, Left Updated: 09/25/22 2121     pH, Sorin 7 378     pCO2, Sorin 35 5 mm Hg      pO2, Sorin 107 8 mm Hg      HCO3, Sorin 20 4 mmol/L      Base Excess, Sorin -4 2 mmol/L      O2 Content, Sorin 12 5 ml/dL      O2 HGB, VENOUS 96 5 %     CBC and differential [[id number]]  (Abnormal) Collected: 09/25/22 2056    Lab Status: Final result Specimen: Blood from Arm, Left Updated: 09/25/22 2119     WBC 12 80 Thousand/uL      RBC 3 37 Million/uL      Hemoglobin 8 7 g/dL      Hematocrit 28 2 %      MCV 84 fL      MCH 25 8 pg      MCHC 30 9 g/dL      RDW 18 6 %      MPV 11 1 fL      Platelets 928 Thousands/uL      nRBC 0 /100 WBCs      Neutrophils Relative 62 %      Immat GRANS % 1 %      Lymphocytes Relative 26 %      Monocytes Relative 8 %      Eosinophils Relative 2 % Basophils Relative 1 %      Neutrophils Absolute 7 94 Thousands/µL      Immature Grans Absolute 0 12 Thousand/uL      Lymphocytes Absolute 3 30 Thousands/µL      Monocytes Absolute 1 07 Thousand/µL      Eosinophils Absolute 0 27 Thousand/µL      Basophils Absolute 0 10 Thousands/µL     T4, free [[id number]] Collected: 09/25/22 2056    Lab Status: In process Specimen: Blood from Hand, Left Updated: 09/25/22 2114                 CT soft tissue neck with contrast   Final Result by Ward Ordonez DO (09/25 2212)      Postsurgical changes of total thyroidectomy  Small volume of high attenuation material along the right aspect of the trachea of the thyroidectomy bed favored to represent blood products rather than residual thyroid tissue, however, correlate with    surgical history and clinical signs and symptoms of bleeding  Superior mediastinal lymphadenopathy, similar to CT of 4/14/2020  Continued clinical follow-up recommended  Small bilateral pleural effusions  The study was marked in [facility] for immediate notification        Workstation performed: [id number]                    Procedures  Procedures         ED Course  ED Course as of 09/26/22 0016   Marquise Montoya Sep 25, 2022   2111 ECG NSR 70 bpm   QRS 78 Qtc 432  No acute st/t changes  No ectopy  Normal axis  No Q waves  Interpreted by me   2126 Blood gas, venous(!)  Mild metabolic acidosis; respiratory compensation  Hyperoxia   2127 CBC and differential(!)  Elevated WBC, likely reactive in this setting  New anemia compared to prior  Plt wnl   2129 Most recent Hg values in Care Everywhere:  10 7 g/dl on 4 Aug 2022  11 5 g/dl on 13 May 2022   2132 Patient to CT scan now   2139 High Sensitivity Troponin I Random  Normal not c/w ACS but will require repeat value   2140 TSH  Normal   2140 Magnesium  Normal   6537 Basic metabolic panel(!)  Potassium and calcium normal  Glucose normal  Renal function at prior baseline   2143 CT completed and awaiting interpretation   2156 Albumin(!)  Hypoabluminemia  Adjusted calcium wnl   2216 CT soft tissue neck with contrast        IMPRESSION:     Postsurgical changes of total thyroidectomy  Small volume of high attenuation material along the right aspect of the trachea of the thyroidectomy bed favored to represent blood products rather than residual thyroid tissue, however, correlate with   surgical history and clinical signs and symptoms of bleeding      Superior mediastinal lymphadenopathy, similar to CT of 4/14/2020  Continued clinical follow-up recommended      Small bilateral pleural effusions       2248 Repeat troponin was drawn too early; repeat troponin will be drawn at 2300 which is T+2 hr   2309 High Sensitivity Troponin I Random(!)  This value was drawn early; repeat troponin is currently pending   2321 D/w Dr Tang Lanier of ENT  He is familiar with the patient and assisted with the procedure on 23 September  We reviewed the patient's symptoms, exam findings in the ED, and results of the diagnostic workup  Material noted on CT is wound packing placed during surgery; this is an expected finding  Dr Jerald Garcia will be in office 26 Sept: she can be seen any time tomorrow for re-examination  2339 High Sensitivity Troponin I Random(!)  Delta troponin not consistent with ACS         MDM  Number of Diagnoses or Management Options  Generalized weakness  Post-operative state from total thyroidectomy  Diagnosis management comments: Patient has no significant abnormalities on laboratory testing to account for symptoms  She is generally weak but has no other focal symptoms apart from the tenderness directly in the area of her surgical procedure  She has expected postoperative changes after thyroidectomy seen on CT scan  No evidence of ACS, significant electrolyte disturbance, or acute kidney injury  Recommended she continue on her current postoperative medications as previously prescribed   She can see her surgeon later today, 26 September for further evaluation  ED return for concerning features such as chest pain, dyspnea, syncope  All questions were answered to her satisfaction and to her son's at a/an prior to discharge  Disposition  Final diagnoses:   Generalized weakness   Post-operative state from total thyroidectomy     Time reflects when diagnosis was documented in both MDM as applicable and the Disposition within this note     Time User Action Codes Description Comment    9/25/2022 11:48 PM Vanessa Marie [R53 1] Generalized weakness     9/25/2022 11:48 PM Vanessa Marie [E93 [id number]] Post-operative state     9/25/2022 11:49 PM Tesha Villarreal 1521 [O01 [id number]] Post-operative state from total thyroidectomy       ED Disposition     ED Disposition   Discharge    Condition   Stable    Date/Time   Sun Sep 25, 2022 11:48 PM    Comment   Tatiana Balbuena discharge to home/self care  Follow-up Information     Follow up With Specialties Details Why [address] [facility] Otolaryngology Go in 1 day For further evaluation [address] [facility] [id number]  [phone]            Patient's Medications   Discharge Prescriptions    No medications on file       No discharge procedures on file      PDMP Review       Value Time User    PDMP Reviewed  Yes 4/16/2020  1:21 AM Fouzia Cobb DO          ED Provider  Electronically Signed by           Eitan Chavarria DO  09/26/22 0021

## 2022-11-22 ENCOUNTER — HOSPITAL ENCOUNTER (EMERGENCY)
Facility: HOSPITAL | Age: 47
Discharge: HOME | End: 2022-11-22
Attending: EMERGENCY MEDICINE

## 2022-11-22 ENCOUNTER — APPOINTMENT (EMERGENCY)
Dept: RADIOLOGY | Facility: HOSPITAL | Age: 47
End: 2022-11-22

## 2022-11-22 VITALS
SYSTOLIC BLOOD PRESSURE: 168 MMHG | DIASTOLIC BLOOD PRESSURE: 78 MMHG | OXYGEN SATURATION: 98 % | TEMPERATURE: 98 F | HEART RATE: 88 BPM | RESPIRATION RATE: 19 BRPM

## 2022-11-22 DIAGNOSIS — M79.675 PAIN IN LEFT TOE(S): Primary | ICD-10-CM

## 2022-11-23 NOTE — DISCHARGE INSTRUCTIONS
Rest, ice, compression, elevation  ACE wrap and surgical shoe as needed  Can bear weight as tolerated  Continue OTC medications as needed for pain relief  Follow up with PCP or return to ER as needed  If having persistent toe/foot pain, would recommend follow up with podiatry

## 2022-11-23 NOTE — ED PROVIDER NOTES
History  Chief Complaint   Patient presents with   • Foot Injury     Tripped today and fell over her left foot  Believes she may have broken a toe      50year old female presents ambulatory from home accompanied by daughter for evaluation of pain in her toes on left foot  Pt notes she was walking today and tripped forward bending her toes back  She did not fall down but states weight came down on her foot  She reports in her 2nd, 3rd and 4th toes which radiates into foot  Denies swelling  Denies numbness, tingling  Denies prior foot problems  She reports pain has been intermittent  Worse with walking/weight bearing  No reported alleviating factors  No specific treatments tried  Pt voices concern she may have broken a toe  ROS otherwise negative  Denies fever, chills, cough, congestion or recent illness  Denies chest pain, SOB, N/V/D/C, abdominal pain  Denies headache, dizziness, lightheadedness, visual disturbance  Denies any urinary complaints such as dysuria, frequency, urgency, hematuria  Denies flank or back pain  No rashes or wounds reported  History provided by:  Patient   used: No        Prior to Admission Medications   Prescriptions Last Dose Informant Patient Reported? Taking?    Cobalamin Combinations (VITAMIN B12-FOLIC ACID PO)   Yes No   Sig: Take by mouth   acetaminophen (TYLENOL) 325 mg tablet   Yes No   Sig: Take 650 mg by mouth every 6 (six) hours as needed for mild pain   albuterol (PROVENTIL HFA,VENTOLIN HFA) 90 mcg/act inhaler   Yes No   Sig: Inhale 2 puffs 2 (two) times a day   aspirin (ECOTRIN LOW STRENGTH) 81 mg EC tablet   Yes No   Sig: Take 81 mg by mouth daily   cholecalciferol (VITAMIN D3) 1,000 units tablet   No No   Sig: Take 1 tablet (1,000 Units total) by mouth daily   diclofenac sodium (VOLTAREN) 50 mg EC tablet   No No   Sig: Take 1 tablet (50 mg total) by mouth 2 (two) times a day   escitalopram (LEXAPRO) 10 mg tablet   Yes No   Sig: Take 10 mg by mouth daily   losartan (COZAAR) 100 MG tablet   Yes No   Sig: Take 100 mg by mouth daily    metFORMIN (GLUCOPHAGE) 500 mg tablet   Yes No   Sig: Take 1,000 mg by mouth 2 (two) times a day with meals   methimazole (TAPAZOLE) 5 mg tablet   No No   Sig: Take 1 tablet (5 mg total) by mouth daily   methotrexate 2 5 mg tablet   Yes No   Sig: Take by mouth once a week   metoprolol succinate (TOPROL-XL) 50 mg 24 hr tablet   No No   Sig: TAKE (1) TABLET DAILY  montelukast (SINGULAIR) 10 mg tablet   Yes No   Sig: Take 10 mg by mouth daily at bedtime   pantoprazole (PROTONIX) 40 mg tablet   No No   Sig: Take 1 tablet (40 mg total) by mouth daily in the early morning   simvastatin (ZOCOR) 10 mg tablet   Yes No   Sig: Take 10 mg by mouth daily at bedtime      Facility-Administered Medications: None       Past Medical History:   Diagnosis Date   • Asthma    • Chronic anemia    • Diabetes mellitus (HCC)    • Hypertension    • Hyperthyroidism    • 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HCC)    • TB lung, latent        Past Surgical History:   Procedure Laterality Date   •  SECTION     • IR BIOPSY LIVER MASS  07/15/2020   • THYROIDECTOMY         Family History   Problem Relation Age of Onset   • Diabetes unspecified Mother      I have reviewed and agree with the history as documented  E-Cigarette/Vaping   • E-Cigarette Use Never User      E-Cigarette/Vaping Substances   • Nicotine No    • THC No    • CBD No    • Flavoring No    • Other No    • Unknown No      Social History     Tobacco Use   • Smoking status: Never   • Smokeless tobacco: Never   Vaping Use   • Vaping Use: Never used   Substance Use Topics   • Alcohol use: Never     Alcohol/week: 0 0 standard drinks   • Drug use: Never       Review of Systems   Constitutional: Negative  Negative for chills, fatigue and fever  HENT: Negative  Negative for congestion, rhinorrhea and sore throat  Eyes: Negative  Negative for visual disturbance  Respiratory: Negative  Negative for cough, shortness of breath and wheezing  Cardiovascular: Negative  Negative for chest pain, palpitations and leg swelling  Gastrointestinal: Negative  Negative for abdominal pain, constipation, diarrhea, nausea and vomiting  Genitourinary: Negative  Musculoskeletal: Positive for arthralgias  Negative for back pain, myalgias and neck pain  Skin: Negative  Negative for rash  Neurological: Negative  Negative for dizziness, light-headedness, numbness and headaches  Psychiatric/Behavioral: Negative  Negative for confusion  All other systems reviewed and are negative  Physical Exam  Physical Exam  Vitals and nursing note reviewed  Constitutional:       General: She is awake  She is not in acute distress  Appearance: She is well-developed, overweight and well-nourished  She is not toxic-appearing  HENT:      Head: Normocephalic and atraumatic  Right Ear: Hearing and external ear normal       Left Ear: Hearing and external ear normal       Mouth/Throat:      Mouth: Oropharynx is clear and moist and mucous membranes are normal  Mucous membranes are moist       Pharynx: Oropharynx is clear  Uvula midline  Eyes:      General: Lids are normal  No scleral icterus  Extraocular Movements: EOM normal       Conjunctiva/sclera: Conjunctivae normal       Pupils: Pupils are equal, round, and reactive to light  Neck:      Trachea: Trachea normal  No tracheal deviation  Cardiovascular:      Rate and Rhythm: Normal rate and regular rhythm  Pulses: Normal pulses  Dorsalis pedis pulses are 2+ on the right side and 2+ on the left side  Posterior tibial pulses are 2+ on the right side and 2+ on the left side  Heart sounds: Normal heart sounds, S1 normal and S2 normal    Pulmonary:      Effort: Pulmonary effort is normal  No tachypnea or respiratory distress  Breath sounds: Normal breath sounds  Abdominal:      Tenderness:  There is no CVA tenderness  Musculoskeletal:         General: No edema  Cervical back: Normal range of motion and neck supple  Right lower leg: No edema  Left lower leg: No edema  Left foot: Normal range of motion and normal capillary refill  Tenderness present  No swelling, deformity, laceration or crepitus  Normal pulse  Feet:       Comments: No overlying skin changes  Nails intact  Skin:     General: Skin is warm and dry  Capillary Refill: Capillary refill takes less than 2 seconds  Findings: No abrasion, bruising, erythema, laceration, rash or wound  Neurological:      General: No focal deficit present  Mental Status: She is alert and oriented to person, place, and time  GCS: GCS eye subscore is 4  GCS verbal subscore is 5  GCS motor subscore is 6  Cranial Nerves: No cranial nerve deficit  Sensory: Sensation is intact  No sensory deficit  Motor: Motor function is intact  No weakness or abnormal muscle tone     Psychiatric:         Mood and Affect: Mood and affect and mood normal          Speech: Speech normal          Behavior: Behavior normal          Vital Signs  ED Triage Vitals   Temperature Pulse Respirations Blood Pressure SpO2   11/22/22 1905 11/22/22 1905 11/22/22 1905 11/22/22 1906 11/22/22 1905   98 °F (36 7 °C) 88 19 168/78 98 %      Temp src Heart Rate Source Patient Position - Orthostatic VS BP Location FiO2 (%)   -- 11/22/22 1905 -- -- --    Monitor         Pain Score       11/22/22 1905       8           Vitals:    11/22/22 1905 11/22/22 1906   BP:  168/78   Pulse: 88          Visual Acuity      ED Medications  Medications - No data to display    Diagnostic Studies  Results Reviewed     None                 XR foot 3+ views LEFT    (Results Pending)              Procedures  Procedures         ED Course  ED Course as of 11/22/22 2148 Tue Nov 22, 2022 1923 XR foot 3+ views LEFT  Independently viewed and interpreted by me - no fracture or acute osseous findings; pending official read  Pt placed in ACE wrap and post op shoe  Pt neurovascularly intact post application  Reviewed symptomatic management  Probable contusion vs sprain  Pt declined an assistive device such as crutches  Reviewed RICE therapy  Continue OTC medications as needed for pain relief  Strict return precautions outlined  Advised outpatient follow up with podiatry if symptoms not improving or return to ER for change in condition as outlined  Pt and daughter verbalized understanding and had no further questions  MDM  Number of Diagnoses or Management Options  Pain in left toe(s): new and requires workup     Amount and/or Complexity of Data Reviewed  Tests in the radiology section of CPT®: ordered and reviewed  Decide to obtain previous medical records or to obtain history from someone other than the patient: yes  Obtain history from someone other than the patient: yes  Review and summarize past medical records: yes  Independent visualization of images, tracings, or specimens: yes    Patient Progress  Patient progress: improved      Disposition  Final diagnoses:   Pain in left toe(s)     Time reflects when diagnosis was documented in both MDM as applicable and the Disposition within this note     Time User Action Codes Description Comment    11/22/2022  7:25 PM Lizy Shirts Add [M79 [id number]] Pain in left toe(s)       ED Disposition     ED Disposition   Discharge    Condition   Stable    Date/Time   Tue Nov 22, 2022  7:25 PM    Comment   Tatiana Balbuena discharge to home/self care                 Follow-up Information     Follow up With Specialties Details Why Contact Info Additional Information    [facility] Go to  As needed [address] [facility] [address]  [address] [facility], [address] [place] [address], [phone], [place] [address] Emergency Department Emergency Medicine  As needed [phone]  [facility] Emergency Department, [facility], [place], [place], [address]          Discharge Medication List as of 11/22/2022  7:27 PM      CONTINUE these medications which have NOT CHANGED    Details   acetaminophen (TYLENOL) 325 mg tablet Take 650 mg by mouth every 6 (six) hours as needed for mild pain, Historical Med      albuterol (PROVENTIL HFA,VENTOLIN HFA) 90 mcg/act inhaler Inhale 2 puffs 2 (two) times a day, Historical Med      aspirin (ECOTRIN LOW STRENGTH) 81 mg EC tablet Take 81 mg by mouth daily, Historical Med      cholecalciferol (VITAMIN D3) 1,000 units tablet Take 1 tablet (1,000 Units total) by mouth daily, Starting Tue 6/28/2022, Normal      Cobalamin Combinations (VITAMIN B12-FOLIC ACID PO) Take by mouth, Historical Med      diclofenac sodium (VOLTAREN) 50 mg EC tablet Take 1 tablet (50 mg total) by mouth 2 (two) times a day, Starting Sat 4/11/2020, Normal      escitalopram (LEXAPRO) 10 mg tablet Take 10 mg by mouth daily, Historical Med      losartan (COZAAR) 100 MG tablet Take 100 mg by mouth daily , Historical Med      metFORMIN (GLUCOPHAGE) 500 mg tablet Take 1,000 mg by mouth 2 (two) times a day with meals, Historical Med      methimazole (TAPAZOLE) 5 mg tablet Take 1 tablet (5 mg total) by mouth daily, Starting Thu 6/3/2021, Until Tue 8/17/2021, Normal      methotrexate 2 5 mg tablet Take by mouth once a week, Historical Med      metoprolol succinate (TOPROL-XL) 50 mg 24 hr tablet TAKE (1) TABLET DAILY , Normal      montelukast (SINGULAIR) 10 mg tablet Take 10 mg by mouth daily at bedtime, Historical Med      pantoprazole (PROTONIX) 40 mg tablet Take 1 tablet (40 mg total) by mouth daily in the early morning, Starting Tue 5/19/2020, Normal      simvastatin (ZOCOR) 10 mg tablet Take 10 mg by mouth daily at bedtime, Historical Med             No discharge procedures on file      PDMP Review       Value Time User    PDMP Reviewed  Yes 4/16/2020  1:21 AM Yoan Galvez DO          ED Provider  Electronically Signed by           Kenia Merrill PA-C  11/22/22 5913

## 2022-12-27 ENCOUNTER — OFFICE VISIT (OUTPATIENT)
Dept: HEMATOLOGY ONCOLOGY | Facility: CLINIC | Age: 47
End: 2022-12-27

## 2022-12-27 VITALS
SYSTOLIC BLOOD PRESSURE: 155 MMHG | OXYGEN SATURATION: 97 % | TEMPERATURE: 97.7 F | WEIGHT: 233 LBS | HEIGHT: 63 IN | BODY MASS INDEX: 41.29 KG/M2 | DIASTOLIC BLOOD PRESSURE: 107 MMHG | HEART RATE: 80 BPM

## 2022-12-27 DIAGNOSIS — E53.8 VITAMIN B 12 DEFICIENCY: ICD-10-CM

## 2022-12-27 DIAGNOSIS — D50.9 IRON DEFICIENCY ANEMIA, UNSPECIFIED IRON DEFICIENCY ANEMIA TYPE: ICD-10-CM

## 2022-12-27 DIAGNOSIS — D75.839 THROMBOCYTOSIS: Primary | ICD-10-CM

## 2022-12-27 DIAGNOSIS — R53.83 OTHER FATIGUE: ICD-10-CM

## 2022-12-27 RX ORDER — IRON,CARBONYL/ASCORBIC ACID 65MG-125MG
TABLET, DELAYED RELEASE (ENTERIC COATED) ORAL
COMMUNITY
Start: 2022-11-18

## 2022-12-27 RX ORDER — LEVOTHYROXINE SODIUM 0.1 MG/1
TABLET ORAL
COMMUNITY
Start: 2022-11-07

## 2022-12-27 RX ORDER — OMEPRAZOLE 20 MG/1
CAPSULE, DELAYED RELEASE ORAL
COMMUNITY
Start: 2022-12-12

## 2022-12-27 RX ORDER — FLUTICASONE PROPIONATE AND SALMETEROL XINAFOATE 230; 21 UG/1; UG/1
AEROSOL, METERED RESPIRATORY (INHALATION)
COMMUNITY
Start: 2022-12-19

## 2022-12-27 RX ORDER — ATORVASTATIN CALCIUM 40 MG/1
TABLET, FILM COATED ORAL
COMMUNITY
Start: 2022-11-10

## 2022-12-27 NOTE — PROGRESS NOTES
[address] [address] [address]  [phone]  Hematology Ambulatory Follow-Up  [facility], 1975, [id number]  12/27/2022    Assessment/Plan:    1  Thrombocytosis  2  Iron deficiency anemia, unspecified iron deficiency anemia type  3  Vitamin B 12 deficiency  4  Other fatigue   Patient is a 73-year-old female with a history of thrombocytosis, iron deficiency, B12 deficiency likely secondary to menorrhagia versus decreased dietary iron  Most recent hemoglobin 10 2, platelets 775, iron saturation 9% ferritin level 217  Vitamin B12 level greater than 2000  Patient completed most recently in May 2022  She required Benadryl Zofran Tylenol and Decadron premed  Overall she is able to function without restriction  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  She is following with [facility]  She is currently on oral iron tolerating without difficulty  We discussed discharge back to PCP versus continuing to monitor  At this time we will see her in 6 months if labs are stable will likely discharge  Patient and her daughter verbalized understanding and are agreeable with the plan  - CBC and differential; Standing  - Comprehensive metabolic panel; Standing  - Iron Panel (Includes Ferritin, Iron Sat%, Iron, and TIBC); Standing  - Vitamin B12; Standing  - CBC and differential  - Comprehensive metabolic panel  - Iron Panel (Includes Ferritin, Iron Sat%, Iron, and TIBC)  - Vitamin B12    Barrier(s) to care: None  The patient is able to self care  [address] [facility]    Interval history: Clinically stable    Review of Systems   Constitutional: Negative for activity change, appetite change, fatigue, fever and unexpected weight change  HENT: Positive for voice change (Hoarseness)  Respiratory: Negative for cough and shortness of breath  Cardiovascular: Negative for chest pain and leg swelling  Gastrointestinal: Negative for abdominal pain, constipation, diarrhea and nausea  Endocrine: Negative for cold intolerance and heat intolerance  Musculoskeletal: Negative for arthralgias and myalgias  Skin: Negative  Neurological: Negative for dizziness, weakness and headaches  Hematological: Negative for adenopathy  Does not bruise/bleed easily  Past Medical History:   Diagnosis Date   • Asthma    • Chronic anemia    • Diabetes mellitus ([facility] [address] )    • Hypertension    • Hyperthyroidism    • 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HCC)    • TB lung, latent      Past Surgical History:   Procedure Laterality Date   •  SECTION     • IR BIOPSY LIVER MASS  07/15/2020   • THYROIDECTOMY         Current Outpatient Medications:   •  acetaminophen (TYLENOL) 325 mg tablet, Take 650 mg by mouth every 6 (six) hours as needed for mild pain, Disp: , Rfl:   •  albuterol (PROVENTIL HFA,VENTOLIN HFA) 90 mcg/act inhaler, Inhale 2 puffs 2 (two) times a day, Disp: , Rfl:   •  aspirin (ECOTRIN LOW STRENGTH) 81 mg EC tablet, Take 81 mg by mouth daily, Disp: , Rfl:   •  cholecalciferol (VITAMIN D3) 1,000 units tablet, Take 1 tablet (1,000 Units total) by mouth daily, Disp: 90 tablet, Rfl: 3  •  diclofenac sodium (VOLTAREN) 50 mg EC tablet, Take 1 tablet (50 mg total) by mouth 2 (two) times a day, Disp: 30 tablet, Rfl: 0  •  escitalopram (LEXAPRO) 10 mg tablet, Take 10 mg by mouth daily, Disp: , Rfl:   •  levothyroxine 100 mcg tablet, , Disp: , Rfl:   •  losartan (COZAAR) 100 MG tablet, Take 100 mg by mouth daily , Disp: , Rfl:   •  metFORMIN (GLUCOPHAGE) 500 mg tablet, Take 1,000 mg by mouth 2 (two) times a day with meals, Disp: , Rfl:   •  metoprolol succinate (TOPROL-XL) 50 mg 24 hr tablet, TAKE (1) TABLET DAILY  , Disp: 30 tablet, Rfl: 6  •  montelukast (SINGULAIR) 10 mg tablet, Take 10 mg by mouth daily at bedtime, Disp: , Rfl:   •  omeprazole (PriLOSEC) 20 mg delayed release capsule, , Disp: , Rfl:   •  Vitron-C  MG TABS, , Disp: , Rfl:   •  Advair -21 MCG/ACT inhaler, , Disp: , Rfl:   •  atorvastatin (LIPITOR) 40 mg tablet, , Disp: , Rfl:   •  methimazole (TAPAZOLE) 5 mg tablet, Take 1 tablet (5 mg total) by mouth daily (Patient not taking: Reported on 12/27/2022), Disp: 60 tablet, Rfl: 1    Allergies   Allergen Reactions   • Other Allergic Rhinitis     pollen     Objective:  BP (!) 155/107   Pulse 80   Temp 97 7 °F (36 5 °C) (Temporal)   Ht 5' 3" (1 6 m)   Wt 106 kg (233 lb)   LMP 04/04/2020 (Exact Date)   SpO2 97%   BMI 41 27 kg/m²    Physical Exam  Vitals reviewed  Constitutional:       Appearance: Normal appearance  She is well-developed  HENT:      Head: Normocephalic and atraumatic  Mouth/Throat:      Comments: Hoarseness to voice  Eyes:      Conjunctiva/sclera: Conjunctivae normal       Pupils: Pupils are equal, round, and reactive to light  Neck:      Comments: Recent thyroid surgery incision intact  Pulmonary:      Effort: Pulmonary effort is normal  No respiratory distress  Musculoskeletal:         General: Normal range of motion  Cervical back: Normal range of motion  Lymphadenopathy:      Cervical: No cervical adenopathy  Skin:     General: Skin is warm and dry  Capillary Refill: Capillary refill takes less than 2 seconds  Neurological:      Mental Status: She is alert and oriented to person, place, and time  Psychiatric:         Behavior: Behavior normal      Result Review  Labs:  No visits with results within 1 Month(s) from this visit     Latest known visit with results is:   Admission on 09/25/2022, Discharged on 09/26/2022   Component Date Value Ref Range Status   • WBC 09/25/2022 12 80 (H)  4 31 - 10 16 Thousand/uL Final   • RBC 09/25/2022 3 37 (L)  3 81 - 5 12 Million/uL Final   • Hemoglobin 09/25/2022 8 7 (L)  11 5 - 15 4 g/dL Final   • Hematocrit 09/25/2022 28 2 (L)  34 8 - 46 1 % Final   • MCV 09/25/2022 84 82 - 98 fL Final   • MCH 09/25/2022 25 8 (L)  26 8 - 34 3 pg Final   • MCHC 09/25/2022 30 9 (L)  31 4 - 37 4 g/dL Final   • RDW 09/25/2022 18 6 (H)  11 6 - 15 1 % Final   • MPV 09/25/2022 11 1  8 9 - 12 7 fL Final   • Platelets 77/12/2319 338  149 - 390 Thousands/uL Final   • nRBC 09/25/2022 0  /100 WBCs Final   • Neutrophils Relative 09/25/2022 62  43 - 75 % Final   • Immat GRANS % 09/25/2022 1  0 - 2 % Final   • Lymphocytes Relative 09/25/2022 26  14 - 44 % Final   • Monocytes Relative 09/25/2022 8  4 - 12 % Final   • Eosinophils Relative 09/25/2022 2  0 - 6 % Final   • Basophils Relative 09/25/2022 1  0 - 1 % Final   • Neutrophils Absolute 09/25/2022 7 94 (H)  1 85 - 7 62 Thousands/µL Final   • Immature Grans Absolute 09/25/2022 0 12  0 00 - 0 20 Thousand/uL Final   • Lymphocytes Absolute 09/25/2022 3 30  0 60 - 4 47 Thousands/µL Final   • Monocytes Absolute 09/25/2022 1 07  0 17 - 1 22 Thousand/µL Final   • Eosinophils Absolute 09/25/2022 0 27  0 00 - 0 61 Thousand/µL Final   • Basophils Absolute 09/25/2022 0 10  0 00 - 0 10 Thousands/µL Final   • Sodium 09/25/2022 138  135 - 147 mmol/L Final   • Potassium 09/25/2022 4 3  3 5 - 5 3 mmol/L Final   • Chloride 09/25/2022 106  96 - 108 mmol/L Final   • CO2 09/25/2022 22  21 - 32 mmol/L Final   • ANION GAP 09/25/2022 10  4 - 13 mmol/L Final   • BUN 09/25/2022 32 (H)  5 - 25 mg/dL Final   • Creatinine 09/25/2022 1 86 (H)  0 60 - 1 30 mg/dL Final    Standardized to IDMS reference method   • Glucose 09/25/2022 106  65 - 140 mg/dL Final    If the patient is fasting, the ADA then defines impaired fasting glucose as > 100 mg/dL and diabetes as > or equal to 123 mg/dL  Specimen collection should occur prior to Sulfasalazine administration due to the potential for falsely depressed results  Specimen collection should occur prior to Sulfapyridine administration due to the potential for falsely elevated results     • Calcium 09/25/2022 8 8  8 3 - 10 1 mg/dL Final   • eGFR 09/25/2022 31  ml/min/1 73sq m Final   • Magnesium 09/25/2022 1 8  1 6 - 2 6 mg/dL Final   • TSH 3RD GENERATON 09/25/2022 2 342  0 450 - 4 500 uIU/mL Final    The recommended reference ranges for TSH during pregnancy are as follows:   First trimester 0 1 to 2 5 uIU/mL   Second trimester  0 2 to 3 0 uIU/mL   Third trimester 0 3 to 3 0 uIU/m    Note: Normal ranges may not apply to patients who are transgender, non-binary, or whose legal sex, sex at birth, and gender identity differ  Adult TSH (3rd generation) reference range follows the recommended guidelines of the American Thyroid Association, January, 2020  • Free T4 09/25/2022 0 73 (L)  0 76 - 1 46 ng/dL Final    Specimen collection should occur prior to Sulfasalazine administration due to the potential for falsely elevated results  • pH, Sorin 09/25/2022 7 378  7 300 - 7 400 Final   • pCO2, Sorin 09/25/2022 35 5 (L)  42 0 - 50 0 mm Hg Final   • pO2, Sorin 09/25/2022 107 8 (H)  35 0 - 45 0 mm Hg Final   • HCO3, Sorin 09/25/2022 20 4 (L)  24 - 30 mmol/L Final   • Base Excess, Sorin 09/25/2022 -4 2  mmol/L Final   • O2 Content, Sorin 09/25/2022 12 5  ml/dL Final   • O2 HGB, VENOUS 09/25/2022 96 5 (H)  60 0 - 80 0 % Final   • HS TnI random 09/25/2022 8  8 - 18 ng/L Final    Comment:                                              Initial (time 0) result  If >=50 ng/L, Myocardial injury suggested ;  Type of myocardial injury and treatment strategy  to be determined  If 5-49 ng/L, a delta result at 2 hours and or 4 hours will be needed to further evaluate  If <4 ng/L, and chest pain has been >3 hours since onset, patient may qualify for discharge based on the HEART score in the ED  If <5 ng/L and <3hours since onset of chest pain, a delta result at 2 hours will be needed to further evaluate  HS Troponin 99th Percentile URL of a Health Population=12 ng/L with a 95% Confidence Interval of 8-18 ng/L      Second Troponin (time 2 hours)  If calculated delta >= 20 ng/L, Myocardial injury suggested ; Type of myocardial injury and treatment strategy to be determined  If 5-49 ng/L and the calculated delta is 5-19 ng/L, consult medical service for evaluation  Continue evaluation for ischemia on ecg and other possible etiology and repeat hs troponin at 4 hours  If delta                            is <5 ng/L at 2 hours, consider discharge based on risk stratification via the HEART score (if in ED), or JUANA risk score in IP/Observation  HS Troponin 99th Percentile URL of a Health Population=12 ng/L with a 95% Confidence Interval of 8-18 ng/L  • Ventricular Rate 09/25/2022 70  BPM Final   • Atrial Rate 09/25/2022 70  BPM Final   • ME Interval 09/25/2022 170  ms Final   • QRSD Interval 09/25/2022 78  ms Final   • QT Interval 09/25/2022 400  ms Final   • QTC Interval 09/25/2022 432  ms Final   • P Axis 09/25/2022 33  degrees Final   • QRS Axis 09/25/2022 33  degrees Final   • T Wave Axis 09/25/2022 77  degrees Final   • Albumin 09/25/2022 2 7 (L)  3 5 - 5 0 g/dL Final   • HS TnI random 09/25/2022 6 (L)  8 - 18 ng/L Final    Comment:                                              Initial (time 0) result  If >=50 ng/L, Myocardial injury suggested ;  Type of myocardial injury and treatment strategy  to be determined  If 5-49 ng/L, a delta result at 2 hours and or 4 hours will be needed to further evaluate  If <4 ng/L, and chest pain has been >3 hours since onset, patient may qualify for discharge based on the HEART score in the ED  If <5 ng/L and <3hours since onset of chest pain, a delta result at 2 hours will be needed to further evaluate  HS Troponin 99th Percentile URL of a Health Population=12 ng/L with a 95% Confidence Interval of 8-18 ng/L  Second Troponin (time 2 hours)  If calculated delta >= 20 ng/L,  Myocardial injury suggested ; Type of myocardial injury and treatment strategy to be determined    If 5-49 ng/L and the calculated delta is 5-19 ng/L, consult medical service for evaluation  Continue evaluation for ischemia on ecg and other possible etiology and repeat hs troponin at 4 hours  If delta                            is <5 ng/L at 2 hours, consider discharge based on risk stratification via the HEART score (if in ED), or JUANA risk score in IP/Observation  HS Troponin 99th Percentile URL of a Health Population=12 ng/L with a 95% Confidence Interval of 8-18 ng/L    • HS TnI random 09/25/2022 7 (L)  8 - 18 ng/L Final    Comment:                                              Initial (time 0) result  If >=50 ng/L, Myocardial injury suggested ;  Type of myocardial injury and treatment strategy  to be determined  If 5-49 ng/L, a delta result at 2 hours and or 4 hours will be needed to further evaluate  If <4 ng/L, and chest pain has been >3 hours since onset, patient may qualify for discharge based on the HEART score in the ED  If <5 ng/L and <3hours since onset of chest pain, a delta result at 2 hours will be needed to further evaluate  HS Troponin 99th Percentile URL of a Health Population=12 ng/L with a 95% Confidence Interval of 8-18 ng/L  Second Troponin (time 2 hours)  If calculated delta >= 20 ng/L,  Myocardial injury suggested ; Type of myocardial injury and treatment strategy to be determined  If 5-49 ng/L and the calculated delta is 5-19 ng/L, consult medical service for evaluation  Continue evaluation for ischemia on ecg and other possible etiology and repeat hs troponin at 4 hours  If delta                            is <5 ng/L at 2 hours, consider discharge based on risk stratification via the HEART score (if in ED), or JUANA risk score in IP/Observation  HS Troponin 99th Percentile URL of a Health Population=12 ng/L with a 95% Confidence Interval of 8-18 ng/L  Please note: This report has been generated by a voice recognition software system  Therefore there may be syntax, spelling, and/or grammatical errors   Please call if you have any questions

## 2023-05-23 ENCOUNTER — OFFICE VISIT (OUTPATIENT)
Dept: HEMATOLOGY ONCOLOGY | Facility: CLINIC | Age: 48
End: 2023-05-23

## 2023-05-23 VITALS
HEIGHT: 63 IN | DIASTOLIC BLOOD PRESSURE: 86 MMHG | HEART RATE: 76 BPM | SYSTOLIC BLOOD PRESSURE: 138 MMHG | BODY MASS INDEX: 41.71 KG/M2 | TEMPERATURE: 99.5 F | WEIGHT: 235.4 LBS | OXYGEN SATURATION: 98 %

## 2023-05-23 DIAGNOSIS — D50.8 IRON DEFICIENCY ANEMIA SECONDARY TO INADEQUATE DIETARY IRON INTAKE: Primary | ICD-10-CM

## 2023-05-23 DIAGNOSIS — D75.839 THROMBOCYTOSIS: ICD-10-CM

## 2023-05-23 RX ORDER — IRON,CARBONYL/ASCORBIC ACID 65MG-125MG
1 TABLET, DELAYED RELEASE (ENTERIC COATED) ORAL DAILY
Qty: 90 TABLET | Refills: 3 | Status: SHIPPED | OUTPATIENT
Start: 2023-05-23

## 2023-05-23 NOTE — PROGRESS NOTES
[address] [address]  [address] [id number] [phone]  Hematology Ambulatory Follow-Up  [facility], 1975, [id number]  5/23/2023    Assessment/Plan:    1  Iron deficiency anemia secondary to inadequate dietary iron intake  2  Thrombocytosis    Patient is a 15-year-old female with a history of thrombocytosis, iron deficiency, vitamin B 12 deficiency likely secondary to menorrhagia versus decreased oral intake  She is tolerating oral iron without difficulty  Most recent labs from 3/2/2023 show an iron saturation of 15%, ferritin 119, hemoglobin 10 8, MCV 82, platelets 967 otherwise normal CBC  Iron deficiency is likely secondary to menorrhagia versus decreased iron intake  Overall she is able to function without restriction  We discussed continuing oral iron repeating labs in 3 months and again in 6 months  If all is stable at 6 months we will likely discharge back to her PCP  If indicated we will make arrangements for iron infusions  Patient verbalized understanding and is in agreement with the plan  - Iron-Vitamin C (Vitron-C)  MG TABS; Take 1 tablet by mouth in the morning  Dispense: 90 tablet; Refill: 3  - CBC; Standing  - Iron Panel (Includes Ferritin, Iron Sat%, Iron, and TIBC); Standing  - CBC  - Iron Panel (Includes Ferritin, Iron Sat%, Iron, and TIBC)    Barrier(s) to care: None  The patient is able to self care  [address] [place] [address]    Interval history: clinically stable    Review of Systems   Constitutional: Negative for activity change, appetite change, fatigue, fever and unexpected weight change  Respiratory: Negative for cough and shortness of breath  Cardiovascular: Negative for chest pain and leg swelling  Gastrointestinal: Negative for abdominal pain, constipation, diarrhea and nausea     Endocrine: Negative for cold intolerance and heat intolerance  Musculoskeletal: Negative for arthralgias and myalgias  Skin: Negative  Neurological: Negative for dizziness, weakness and headaches  Hematological: Negative for adenopathy  Does not bruise/bleed easily  Past Medical History:   Diagnosis Date   • Asthma    • Chronic anemia    • Diabetes mellitus ([facility] [address] )    • Hypertension    • Hyperthyroidism    • 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HCC)    • TB lung, latent      Past Surgical History:   Procedure Laterality Date   •  SECTION     • IR BIOPSY LIVER MASS  07/15/2020   • THYROIDECTOMY         Current Outpatient Medications:   •  acetaminophen (TYLENOL) 325 mg tablet, Take 650 mg by mouth every 6 (six) hours as needed for mild pain, Disp: , Rfl:   •  Advair -21 MCG/ACT inhaler, , Disp: , Rfl:   •  albuterol (PROVENTIL HFA,VENTOLIN HFA) 90 mcg/act inhaler, Inhale 2 puffs 2 (two) times a day, Disp: , Rfl:   •  aspirin (ECOTRIN LOW STRENGTH) 81 mg EC tablet, Take 81 mg by mouth daily, Disp: , Rfl:   •  atorvastatin (LIPITOR) 40 mg tablet, , Disp: , Rfl:   •  cholecalciferol (VITAMIN D3) 1,000 units tablet, Take 1 tablet (1,000 Units total) by mouth daily, Disp: 90 tablet, Rfl: 3  •  diclofenac sodium (VOLTAREN) 50 mg EC tablet, Take 1 tablet (50 mg total) by mouth 2 (two) times a day, Disp: 30 tablet, Rfl: 0  •  escitalopram (LEXAPRO) 10 mg tablet, Take 10 mg by mouth daily, Disp: , Rfl:   •  Iron-Vitamin C (Vitron-C)  MG TABS, Take 1 tablet by mouth in the morning, Disp: 90 tablet, Rfl: 3  •  levothyroxine 100 mcg tablet, , Disp: , Rfl:   •  losartan (COZAAR) 100 MG tablet, Take 100 mg by mouth daily , Disp: , Rfl:   •  metFORMIN (GLUCOPHAGE) 500 mg tablet, Take 1,000 mg by mouth 2 (two) times a day with meals, Disp: , Rfl:   •  metoprolol succinate (TOPROL-XL) 50 mg 24 hr tablet, TAKE (1) TABLET DAILY  , Disp: 30 tablet, Rfl: 6  •  montelukast (SINGULAIR) 10 mg tablet, Take 10 mg by mouth daily at bedtime, Disp: , Rfl:   •  omeprazole "(PriLOSEC) 20 mg delayed release capsule, , Disp: , Rfl:     Allergies   Allergen Reactions   • Other Allergic Rhinitis     pollen       Objective:  /86   Pulse 76   Temp 99 5 °F (37 5 °C) (Tympanic)   Ht 5' 3\" (1 6 m)   Wt 107 kg (235 lb 6 4 oz)   LMP 04/04/2020 (Exact Date)   SpO2 98%   BMI 41 70 kg/m²    Physical Exam  Vitals reviewed  Constitutional:       Appearance: Normal appearance  She is well-developed  HENT:      Head: Normocephalic and atraumatic  Eyes:      Conjunctiva/sclera: Conjunctivae normal       Pupils: Pupils are equal, round, and reactive to light  Pulmonary:      Effort: Pulmonary effort is normal  No respiratory distress  Musculoskeletal:         General: Normal range of motion  Cervical back: Normal range of motion  Lymphadenopathy:      Cervical: No cervical adenopathy  Skin:     General: Skin is dry  Neurological:      Mental Status: She is alert and oriented to person, place, and time  Psychiatric:         Behavior: Behavior normal      Please note: This report has been generated by a voice recognition software system  Therefore there may be syntax, spelling, and/or grammatical errors  Please call if you have any questions    "

## 2023-08-22 ENCOUNTER — HOSPITAL ENCOUNTER (EMERGENCY)
Facility: HOSPITAL | Age: 48
Discharge: HOME | End: 2023-08-23
Attending: EMERGENCY MEDICINE | Admitting: EMERGENCY MEDICINE
Payer: COMMERCIAL

## 2023-08-22 VITALS
OXYGEN SATURATION: 98 % | HEIGHT: 63 IN | RESPIRATION RATE: 18 BRPM | BODY MASS INDEX: 42.5 KG/M2 | DIASTOLIC BLOOD PRESSURE: 77 MMHG | TEMPERATURE: 97.4 F | HEART RATE: 72 BPM | SYSTOLIC BLOOD PRESSURE: 182 MMHG | WEIGHT: 239.86 LBS

## 2023-08-22 DIAGNOSIS — I35.1 AORTIC INSUFFICIENCY: ICD-10-CM

## 2023-08-22 DIAGNOSIS — R60.0 BILATERAL LOWER EXTREMITY EDEMA: Primary | ICD-10-CM

## 2023-08-22 LAB
ALBUMIN SERPL BCP-MCNC: 3.6 G/DL (ref 3.5–5)
ALP SERPL-CCNC: 113 U/L (ref 34–104)
ALT SERPL W P-5'-P-CCNC: 15 U/L (ref 7–52)
ANION GAP SERPL CALCULATED.3IONS-SCNC: 10 MMOL/L
AST SERPL W P-5'-P-CCNC: 14 U/L (ref 13–39)
BACTERIA UR QL AUTO: ABNORMAL /HPF
BASOPHILS # BLD AUTO: 0.09 THOUSANDS/ÂΜL (ref 0–0.1)
BASOPHILS NFR BLD AUTO: 1 % (ref 0–1)
BILIRUB SERPL-MCNC: 0.27 MG/DL (ref 0.2–1)
BILIRUB UR QL STRIP: NEGATIVE
BNP SERPL-MCNC: 147 PG/ML (ref 0–100)
BUN SERPL-MCNC: 28 MG/DL (ref 5–25)
CALCIUM SERPL-MCNC: 8.9 MG/DL (ref 8.4–10.2)
CARDIAC TROPONIN I PNL SERPL HS: 8 NG/L (ref 8–18)
CHLORIDE SERPL-SCNC: 106 MMOL/L (ref 96–108)
CLARITY UR: CLEAR
CO2 SERPL-SCNC: 22 MMOL/L (ref 21–32)
COLOR UR: YELLOW
CREAT SERPL-MCNC: 1.5 MG/DL (ref 0.6–1.3)
D DIMER PPP FEU-MCNC: 0.81 UG/ML FEU
EOSINOPHIL # BLD AUTO: 0.6 THOUSAND/ÂΜL (ref 0–0.61)
EOSINOPHIL NFR BLD AUTO: 7 % (ref 0–6)
ERYTHROCYTE [DISTWIDTH] IN BLOOD BY AUTOMATED COUNT: 19.8 % (ref 11.6–15.1)
EXT PREGNANCY TEST URINE: NEGATIVE
EXT. CONTROL: NORMAL
GFR SERPL CREATININE-BSD FRML MDRD: 40 ML/MIN/1.73SQ M
GLUCOSE SERPL-MCNC: 113 MG/DL (ref 65–140)
GLUCOSE UR STRIP-MCNC: NEGATIVE MG/DL
HCT VFR BLD AUTO: 35.3 % (ref 34.8–46.1)
HGB BLD-MCNC: 10.7 G/DL (ref 11.5–15.4)
HGB UR QL STRIP.AUTO: ABNORMAL
IMM GRANULOCYTES # BLD AUTO: 0.05 THOUSAND/UL (ref 0–0.2)
IMM GRANULOCYTES NFR BLD AUTO: 1 % (ref 0–2)
KETONES UR STRIP-MCNC: NEGATIVE MG/DL
LEUKOCYTE ESTERASE UR QL STRIP: NEGATIVE
LYMPHOCYTES # BLD AUTO: 2.53 THOUSANDS/ÂΜL (ref 0.6–4.47)
LYMPHOCYTES NFR BLD AUTO: 30 % (ref 14–44)
MCH RBC QN AUTO: 25.2 PG (ref 26.8–34.3)
MCHC RBC AUTO-ENTMCNC: 30.3 G/DL (ref 31.4–37.4)
MCV RBC AUTO: 83 FL (ref 82–98)
MONOCYTES # BLD AUTO: 0.75 THOUSAND/ÂΜL (ref 0.17–1.22)
MONOCYTES NFR BLD AUTO: 9 % (ref 4–12)
NEUTROPHILS # BLD AUTO: 4.55 THOUSANDS/ÂΜL (ref 1.85–7.62)
NEUTS SEG NFR BLD AUTO: 52 % (ref 43–75)
NITRITE UR QL STRIP: NEGATIVE
NON-SQ EPI CELLS URNS QL MICRO: ABNORMAL /HPF
NRBC BLD AUTO-RTO: 0 /100 WBCS
PH UR STRIP.AUTO: 5.5 [PH]
PLATELET # BLD AUTO: 376 THOUSANDS/UL (ref 149–390)
PMV BLD AUTO: 11.2 FL (ref 8.9–12.7)
POTASSIUM SERPL-SCNC: 4.1 MMOL/L (ref 3.5–5.3)
PROT SERPL-MCNC: 7.2 G/DL (ref 6.4–8.4)
PROT UR STRIP-MCNC: >=300 MG/DL
RBC # BLD AUTO: 4.24 MILLION/UL (ref 3.81–5.12)
RBC #/AREA URNS AUTO: ABNORMAL /HPF
SODIUM SERPL-SCNC: 138 MMOL/L (ref 135–147)
SP GR UR STRIP.AUTO: 1.02 (ref 1–1.03)
UROBILINOGEN UR QL STRIP.AUTO: 0.2 E.U./DL
WBC # BLD AUTO: 8.57 THOUSAND/UL (ref 4.31–10.16)
WBC #/AREA URNS AUTO: ABNORMAL /HPF

## 2023-08-22 PROCEDURE — 81001 URINALYSIS AUTO W/SCOPE: CPT | Performed by: EMERGENCY MEDICINE

## 2023-08-22 PROCEDURE — 99283 EMERGENCY DEPT VISIT LOW MDM: CPT

## 2023-08-22 PROCEDURE — 84484 ASSAY OF TROPONIN QUANT: CPT | Performed by: EMERGENCY MEDICINE

## 2023-08-22 PROCEDURE — 85025 COMPLETE CBC W/AUTO DIFF WBC: CPT | Performed by: EMERGENCY MEDICINE

## 2023-08-22 PROCEDURE — 83880 ASSAY OF NATRIURETIC PEPTIDE: CPT | Performed by: EMERGENCY MEDICINE

## 2023-08-22 PROCEDURE — 80053 COMPREHEN METABOLIC PANEL: CPT | Performed by: EMERGENCY MEDICINE

## 2023-08-22 PROCEDURE — 36415 COLL VENOUS BLD VENIPUNCTURE: CPT | Performed by: EMERGENCY MEDICINE

## 2023-08-22 PROCEDURE — 99284 EMERGENCY DEPT VISIT MOD MDM: CPT | Performed by: EMERGENCY MEDICINE

## 2023-08-22 PROCEDURE — 93005 ELECTROCARDIOGRAM TRACING: CPT

## 2023-08-22 PROCEDURE — 85379 FIBRIN DEGRADATION QUANT: CPT | Performed by: EMERGENCY MEDICINE

## 2023-08-22 PROCEDURE — 81025 URINE PREGNANCY TEST: CPT | Performed by: EMERGENCY MEDICINE

## 2023-08-23 ENCOUNTER — HOSPITAL ENCOUNTER (OUTPATIENT)
Dept: NON INVASIVE DIAGNOSTICS | Facility: HOSPITAL | Age: 48
Discharge: HOME | End: 2023-08-23
Attending: EMERGENCY MEDICINE
Payer: COMMERCIAL

## 2023-08-23 DIAGNOSIS — R60.0 BILATERAL LOWER EXTREMITY EDEMA: ICD-10-CM

## 2023-08-23 LAB
ATRIAL RATE: 67 BPM
P AXIS: 41 DEGREES
PR INTERVAL: 198 MS
QRS AXIS: 66 DEGREES
QRSD INTERVAL: 84 MS
QT INTERVAL: 450 MS
QTC INTERVAL: 475 MS
T WAVE AXIS: 14 DEGREES
VENTRICULAR RATE: 67 BPM

## 2023-08-23 PROCEDURE — 93970 EXTREMITY STUDY: CPT

## 2023-08-23 PROCEDURE — 93970 EXTREMITY STUDY: CPT | Performed by: SURGERY

## 2023-08-23 PROCEDURE — 93010 ELECTROCARDIOGRAM REPORT: CPT | Performed by: INTERNAL MEDICINE

## 2023-08-23 NOTE — ED PROVIDER NOTES
History  Chief Complaint   Patient presents with   • Leg Swelling     Patient reports left leg swelling following a 20 hour flight. Patient states that she has left leg pain, left lower back pain, and had swelling in bilateral hands yesterday which resolved. Javad Garcia is a 46y.o. year old female with PMH of aortitis, aortic intramural hematoma, moderate AI, anemia, HTN and DM presenting to the [facility] ED for leg swelling. Patient returned from [place] two days ago. She was in [place] for two months. Patient reporting edema and mild pain in bilateral lower extremities for the past three days. She denies fevers/chills, chest pain, dyspnea or abdominal pain. She also noticed swelling in her hands and face though she states that has resolved. Patient has no history of DVT/PE previously. She is not on [facility] medications. Patient has not taken/received any medications at home for relief of symptoms. Patient is following with cardiothoracic surgery in the [address] [place] [address] given history of aortitis and ascending aortic aneurysm. History provided by:  Medical records and patient   used: No        Prior to Admission Medications   Prescriptions Last Dose Informant Patient Reported? Taking?    Advair -21 MCG/ACT inhaler   Yes No   Iron-Vitamin C (Vitron-C)  MG TABS   No No   Sig: Take 1 tablet by mouth in the morning   acetaminophen (TYLENOL) 325 mg tablet  Self Yes No   Sig: Take 650 mg by mouth every 6 (six) hours as needed for mild pain   albuterol (PROVENTIL HFA,VENTOLIN HFA) 90 mcg/act inhaler  Self Yes No   Sig: Inhale 2 puffs 2 (two) times a day   aspirin (ECOTRIN LOW STRENGTH) 81 mg EC tablet  Self Yes No   Sig: Take 81 mg by mouth daily   atorvastatin (LIPITOR) 40 mg tablet   Yes No   cholecalciferol (VITAMIN D3) 1,000 units tablet   No No   Sig: Take 1 tablet (1,000 Units total) by mouth daily   diclofenac sodium (VOLTAREN) 50 mg EC tablet  Self No No   Sig: Take 1 tablet (50 mg total) by mouth 2 (two) times a day   escitalopram (LEXAPRO) 10 mg tablet  Self Yes No   Sig: Take 10 mg by mouth daily   levothyroxine 100 mcg tablet   Yes No   losartan (COZAAR) 100 MG tablet  Self Yes No   Sig: Take 100 mg by mouth daily    metFORMIN (GLUCOPHAGE) 500 mg tablet  Self Yes No   Sig: Take 1,000 mg by mouth 2 (two) times a day with meals   metoprolol succinate (TOPROL-XL) 50 mg 24 hr tablet   No No   Sig: TAKE (1) TABLET DAILY. montelukast (SINGULAIR) 10 mg tablet  Self Yes No   Sig: Take 10 mg by mouth daily at bedtime   omeprazole (PriLOSEC) 20 mg delayed release capsule   Yes No      Facility-Administered Medications: None       Past Medical History:   Diagnosis Date   • Asthma    • Chronic anemia    • Diabetes mellitus (HCC)    • Hypertension    • Hyperthyroidism    • 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HCC)    • TB lung, latent        Past Surgical History:   Procedure Laterality Date   •  SECTION     • IR BIOPSY LIVER MASS  07/15/2020   • THYROIDECTOMY         Family History   Problem Relation Age of Onset   • Diabetes unspecified Mother      I have reviewed and agree with the history as documented. E-Cigarette/Vaping   • E-Cigarette Use Never User      E-Cigarette/Vaping Substances   • Nicotine No    • THC No    • CBD No    • Flavoring No    • Other No    • Unknown No      Social History     Tobacco Use   • Smoking status: Never   • Smokeless tobacco: Never   Vaping Use   • Vaping Use: Never used   Substance Use Topics   • Alcohol use: Never     Alcohol/week: 0.0 standard drinks of alcohol   • Drug use: Never       Review of Systems   Constitutional: Negative for chills and fever. HENT: Negative for congestion and rhinorrhea. Respiratory: Negative for cough and shortness of breath. Cardiovascular: Positive for leg swelling. Negative for chest pain. Gastrointestinal: Negative for abdominal pain, nausea and vomiting.    Genitourinary: Negative for dysuria, flank pain and hematuria. Musculoskeletal: Positive for back pain and joint swelling. Negative for arthralgias. Skin: Negative for rash. Neurological: Negative for weakness and numbness. All other systems reviewed and are negative. Physical Exam  Physical Exam  Vitals and nursing note reviewed. Constitutional:       General: She is not in acute distress. Appearance: Normal appearance. She is well-developed. She is not ill-appearing, toxic-appearing or diaphoretic. HENT:      Head: Normocephalic and atraumatic. Nose: No congestion or rhinorrhea. Eyes:      General:         Right eye: No discharge. Left eye: No discharge. Cardiovascular:      Rate and Rhythm: Normal rate and regular rhythm. Heart sounds: Murmur heard. Pulmonary:      Effort: Pulmonary effort is normal. No accessory muscle usage or respiratory distress. Breath sounds: Normal breath sounds. No stridor. No decreased breath sounds, wheezing, rhonchi or rales. Abdominal:      General: There is no distension. Palpations: Abdomen is soft. Tenderness: There is no abdominal tenderness. There is no right CVA tenderness, left CVA tenderness, guarding or rebound. Musculoskeletal:      Cervical back: Normal range of motion and neck supple. No rigidity. Right lower leg: No tenderness. Edema present. Left lower leg: No tenderness. Edema present. Skin:     Capillary Refill: Capillary refill takes less than 2 seconds. Findings: No rash. Neurological:      Mental Status: She is alert and oriented to person, place, and time. GCS: GCS eye subscore is 4. GCS verbal subscore is 5. GCS motor subscore is 6. Cranial Nerves: No dysarthria or facial asymmetry. Comments: 5/5 strength b/l UE/LE. Sensation grossly intact throughout.    Psychiatric:         Mood and Affect: Mood normal.         Behavior: Behavior normal.         Vital Signs  ED Triage Vitals [08/22/23 2211]   Temperature Pulse Respirations Blood Pressure SpO2   (!) 97.4 °F (36.3 °C) 72 18 (!) 182/77 98 %      Temp Source Heart Rate Source Patient Position - Orthostatic VS BP Location FiO2 (%)   Temporal Monitor Lying Right arm --      Pain Score       4           Vitals:    08/22/23 2211   BP: (!) 182/77   Pulse: 72   Patient Position - Orthostatic VS: Lying         Visual Acuity      ED Medications  Medications - No data to display    Diagnostic Studies  Results Reviewed     Procedure Component Value Units Date/Time    D-dimer, quantitative [[id number]]  (Abnormal) Collected: 08/22/23 2237    Lab Status: Final result Specimen: Blood from Arm, Right Updated: 08/22/23 2324     D-Dimer, Quant 0.81 ug/ml FEU     Narrative: In the evaluation for possible pulmonary embolism, in the appropriate (Well's Score of 4 or less) patient, the age adjusted d-dimer cutoff for this patient can be calculated as:    Age x 0.01 (in ug/mL) for Age-adjusted D-dimer exclusion threshold for a patient over 50 years.     High Sensitivity Troponin I Random [[id number]]  (Normal) Collected: 08/22/23 2237    Lab Status: Final result Specimen: Blood from Arm, Right Updated: 08/22/23 2313     HS TnI random 8 ng/L     B-Type Natriuretic Peptide(BNP) [[id number]]  (Abnormal) Collected: 08/22/23 2237    Lab Status: Final result Specimen: Blood from Arm, Right Updated: 08/22/23 2311      pg/mL     Comprehensive metabolic panel [[id number]]  (Abnormal) Collected: 08/22/23 2237    Lab Status: Final result Specimen: Blood from Arm, Right Updated: 08/22/23 2305     Sodium 138 mmol/L      Potassium 4.1 mmol/L      Chloride 106 mmol/L      CO2 22 mmol/L      ANION GAP 10 mmol/L      BUN 28 mg/dL      Creatinine 1.50 mg/dL      Glucose 113 mg/dL      Calcium 8.9 mg/dL      AST 14 U/L      ALT 15 U/L      Alkaline Phosphatase 113 U/L      Total Protein 7.2 g/dL      Albumin 3.6 g/dL      Total Bilirubin 0.27 mg/dL      eGFR 40 ml/min/1.73sq m     Narrative:      Consolidated [employer] guidelines for Chronic Kidney Disease (CKD):   •  Stage 1 with normal or high GFR (GFR > 90 mL/min/1.73 square meters)  •  Stage 2 Mild CKD (GFR = 60-89 mL/min/1.73 square meters)  •  Stage 3A Moderate CKD (GFR = 45-59 mL/min/1.73 square meters)  •  Stage 3B Moderate CKD (GFR = 30-44 mL/min/1.73 square meters)  •  Stage 4 Severe CKD (GFR = 15-29 mL/min/1.73 square meters)  •  Stage 5 End Stage CKD (GFR <15 mL/min/1.73 square meters)  Note: GFR calculation is accurate only with a steady state creatinine    Urine Microscopic [[id number]]  (Abnormal) Collected: 08/22/23 2237    Lab Status: Final result Specimen: Urine, Clean Catch Updated: 08/22/23 2300     RBC, UA 0-1 /hpf      WBC, UA 0-5 /hpf      Epithelial Cells Moderate /hpf      Bacteria, UA Moderate /hpf     UA w Reflex to Microscopic w Reflex to Culture [[id number]]  (Abnormal) Collected: 08/22/23 2237    Lab Status: Final result Specimen: Urine, Clean Catch Updated: 08/22/23 2252     Color, UA Yellow     Clarity, UA Clear     Specific Gravity, UA 1.025     pH, UA 5.5     Leukocytes, UA Negative     Nitrite, UA Negative     Protein, UA >=300 mg/dl      Glucose, UA Negative mg/dl      Ketones, UA Negative mg/dl      Urobilinogen, UA 0.2 E.U./dl      Bilirubin, UA Negative     Occult Blood, UA Trace-Intact    CBC and differential [[id number]]  (Abnormal) Collected: 08/22/23 2237    Lab Status: Final result Specimen: Blood from Arm, Right Updated: 08/22/23 2250     WBC 8.57 Thousand/uL      RBC 4.24 Million/uL      Hemoglobin 10.7 g/dL      Hematocrit 35.3 %      MCV 83 fL      MCH 25.2 pg      MCHC 30.3 g/dL      RDW 19.8 %      MPV 11.2 fL      Platelets 488 Thousands/uL      nRBC 0 /100 WBCs      Neutrophils Relative 52 %      Immat GRANS % 1 %      Lymphocytes Relative 30 %      Monocytes Relative 9 %      Eosinophils Relative 7 %      Basophils Relative 1 %      Neutrophils Absolute 4.55 Thousands/µL      Immature Grans Absolute 0.05 Thousand/uL      Lymphocytes Absolute 2.53 Thousands/µL      Monocytes Absolute 0.75 Thousand/µL      Eosinophils Absolute 0.60 Thousand/µL      Basophils Absolute 0.09 Thousands/µL     POCT pregnancy, urine [[id number]]  (Normal) Resulted: 08/22/23 2232    Lab Status: Final result Updated: 08/22/23 2232     EXT Preg Test, Ur Negative     Control Valid                 VAS lower limb venous duplex study, complete bilateral    (Results Pending)              Procedures  Procedures         ED Course  ED Course as of 08/23/23 0532   Tue Aug 22, 2023   2231 Procedure Note: EKG  Date/Time: 08/22/23 10:31 PM   Interpreted by: Odalys Mayo DO  Indications / Diagnosis: leg swelling  ECG reviewed by me, the ED Provider: yes   The EKG demonstrates:  Rhythm: normal sinus rhythm 67 BPM  Intervals: Normal UT and QT intervals  Axis: Normal axis  QRS/Blocks: Normal QRS  ST Changes: No acute ST/T waves changes. No NAKITA. No TWI. SBIRT 22yo+    Flowsheet Row Most Recent Value   Initial Alcohol Screen:  AUDIT-C     1. How often do you have a drink containing alcohol? 0 Filed at: 08/22/2023 2211   Audit-C Score 0 Filed at: 08/22/2023 2211   VALERIY: How many times in the past year have you. .. Used an illegal drug or used a prescription medication for non-medical reasons? Never Filed at: 08/22/2023 2211                    Medical Decision Making    46 y.o. female presenting for lower extremity edema. Patient denies chest pain, dyspnea or abdominal pain. I do not suspect current symptoms are related to history of thoracic aortic aneurysm. Mild symmetric lower extremity edema on exam.  Given recent travel will evaluate for DVT with D-dimer. Will check labs to screen for CHF, electrolyte abnormality anemia, CKD. Reassessment: patient remained well appearing during ED course. Unfortunately no vascular tech available overnight. Bedside POCUS showed bilateral compressible popliteal veins.    Given history of TAA will defer lovenox anticoagulation. Emphasized need for prompt outpatient LE duplex to excdude DVT as etiology of symptoms. BNP only mildly elevated, possibly related to known aortic insufficieny. Will defer diuretics at this time. Disposition: I have discussed with the patient our plan to discharge them from the ED and the patient is in agreement with this plan. Discharge Plan: Rx for vascular duplex. PCP and CT surgery f/u. RTED precautions emphasized. The patient was provided a written after visit summary with strict RTED precautions. Followup: I have discussed with the patient plan to follow up with their PCP and CT surgery. Contact information provided in AVS.    Amount and/or Complexity of Data Reviewed  Labs: ordered. Disposition  Final diagnoses:   Bilateral lower extremity edema   Aortic insufficiency     Time reflects when diagnosis was documented in both MDM as applicable and the Disposition within this note     Time User Action Codes Description Comment    8/22/2023 11:45 PM Henretta Kawasaki Add [R60.0] Bilateral lower extremity edema     8/22/2023 11:51 PM Henretta Kawasaki Add [I35.1] Aortic insufficiency       ED Disposition     ED Disposition   Discharge    Condition   Stable    Date/Time   Wed Aug 23, 2023 12:30 AM    Comment   Tatiana Balbuena discharge to home/self care. Follow-up Information     Follow up With Specialties Details Why Yanick Melgar MD Family Medicine Schedule an appointment as soon as possible for a visit  To make appointment for reevaluation in 1-3 days. [address] Maranda Hager, DO  Call today To make appointment for reevaluation in 1-3 days.  [facility]  [phone]            Discharge Medication List as of 8/23/2023 12:30 AM      CONTINUE these medications which have NOT CHANGED    Details   acetaminophen (TYLENOL) 325 mg tablet Take 650 mg by mouth every 6 (six) hours as needed for mild pain, Historical Med      Advair -21 MCG/ACT inhaler Starting Mon 12/19/2022, Historical Med      albuterol (PROVENTIL HFA,VENTOLIN HFA) 90 mcg/act inhaler Inhale 2 puffs 2 (two) times a day, Historical Med      aspirin (ECOTRIN LOW STRENGTH) 81 mg EC tablet Take 81 mg by mouth daily, Historical Med      atorvastatin (LIPITOR) 40 mg tablet Starting u 11/10/2022, Historical Med      cholecalciferol (VITAMIN D3) 1,000 units tablet Take 1 tablet (1,000 Units total) by mouth daily, Starting Tue 6/28/2022, Normal      diclofenac sodium (VOLTAREN) 50 mg EC tablet Take 1 tablet (50 mg total) by mouth 2 (two) times a day, Starting Sat 4/11/2020, Normal      escitalopram (LEXAPRO) 10 mg tablet Take 10 mg by mouth daily, Historical Med      Iron-Vitamin C (Vitron-C)  MG TABS Take 1 tablet by mouth in the morning, Starting Tue 5/23/2023, Normal      levothyroxine 100 mcg tablet Starting Mon 11/7/2022, Historical Med      losartan (COZAAR) 100 MG tablet Take 100 mg by mouth daily , Historical Med      metFORMIN (GLUCOPHAGE) 500 mg tablet Take 1,000 mg by mouth 2 (two) times a day with meals, Historical Med      metoprolol succinate (TOPROL-XL) 50 mg 24 hr tablet TAKE (1) TABLET DAILY., Normal      montelukast (SINGULAIR) 10 mg tablet Take 10 mg by mouth daily at bedtime, Historical Med      omeprazole (PriLOSEC) 20 mg delayed release capsule Starting Mon 12/12/2022, Historical Med             Outpatient Discharge Orders   VAS lower limb venous duplex study, complete bilateral   Standing Status: Future Standing Exp.  Date: 08/22/27       PDMP Review       Value Time User    PDMP Reviewed  Yes 4/16/2020  1:21 AM Darling Romano DO          ED Provider  Electronically Signed by           Kris Wiggins DO  08/23/23 0507

## 2023-08-23 NOTE — DISCHARGE INSTRUCTIONS
You have been seen for leg swelling. Please complete the ultrasound as ordered to exclude blood clots as the cause of your symptoms. Return to the emergency department if you develop worsening leg swelling, chest pain, trouble breathing, abdominal pain or any other symptoms of concern. Please follow up with your PCP and cardiothoracic surgeon by calling the number provided.

## 2023-10-29 ENCOUNTER — HOSPITAL ENCOUNTER (EMERGENCY)
Facility: HOSPITAL | Age: 48
Discharge: HOME | End: 2023-10-29
Attending: EMERGENCY MEDICINE
Payer: COMMERCIAL

## 2023-10-29 ENCOUNTER — APPOINTMENT (EMERGENCY)
Dept: CT IMAGING | Facility: HOSPITAL | Age: 48
End: 2023-10-29
Payer: COMMERCIAL

## 2023-10-29 ENCOUNTER — APPOINTMENT (EMERGENCY)
Dept: RADIOLOGY | Facility: HOSPITAL | Age: 48
End: 2023-10-29
Payer: COMMERCIAL

## 2023-10-29 VITALS
TEMPERATURE: 97.3 F | SYSTOLIC BLOOD PRESSURE: 163 MMHG | DIASTOLIC BLOOD PRESSURE: 83 MMHG | OXYGEN SATURATION: 97 % | HEART RATE: 67 BPM | HEIGHT: 63 IN | RESPIRATION RATE: 18 BRPM | BODY MASS INDEX: 42.19 KG/M2 | WEIGHT: 238.1 LBS

## 2023-10-29 DIAGNOSIS — R07.9 CHEST PAIN: Primary | ICD-10-CM

## 2023-10-29 LAB
2HR DELTA HS TROPONIN: -1 NG/L
ALBUMIN SERPL BCP-MCNC: 3.5 G/DL (ref 3.5–5)
ALP SERPL-CCNC: 107 U/L (ref 34–104)
ALT SERPL W P-5'-P-CCNC: 10 U/L (ref 7–52)
ANION GAP SERPL CALCULATED.3IONS-SCNC: 12 MMOL/L
APTT PPP: 25 SECONDS (ref 23–37)
AST SERPL W P-5'-P-CCNC: 14 U/L (ref 13–39)
BASOPHILS # BLD MANUAL: 0 THOUSAND/UL (ref 0–0.1)
BASOPHILS NFR MAR MANUAL: 0 % (ref 0–1)
BILIRUB SERPL-MCNC: 0.27 MG/DL (ref 0.2–1)
BUN SERPL-MCNC: 30 MG/DL (ref 5–25)
CALCIUM SERPL-MCNC: 9.2 MG/DL (ref 8.4–10.2)
CARDIAC TROPONIN I PNL SERPL HS: 7 NG/L
CARDIAC TROPONIN I PNL SERPL HS: 8 NG/L
CHLORIDE SERPL-SCNC: 106 MMOL/L (ref 96–108)
CO2 SERPL-SCNC: 18 MMOL/L (ref 21–32)
CREAT SERPL-MCNC: 1.58 MG/DL (ref 0.6–1.3)
EOSINOPHIL # BLD MANUAL: 0.8 THOUSAND/UL (ref 0–0.4)
EOSINOPHIL NFR BLD MANUAL: 9 % (ref 0–6)
ERYTHROCYTE [DISTWIDTH] IN BLOOD BY AUTOMATED COUNT: 17.7 % (ref 11.6–15.1)
GFR SERPL CREATININE-BSD FRML MDRD: 37 ML/MIN/1.73SQ M
GLUCOSE SERPL-MCNC: 163 MG/DL (ref 65–140)
HCT VFR BLD AUTO: 35.9 % (ref 34.8–46.1)
HGB BLD-MCNC: 10.9 G/DL (ref 11.5–15.4)
INR PPP: 0.97 (ref 0.84–1.19)
LYMPHOCYTES # BLD AUTO: 2.13 THOUSAND/UL (ref 0.6–4.47)
LYMPHOCYTES # BLD AUTO: 24 % (ref 14–44)
MCH RBC QN AUTO: 24.7 PG (ref 26.8–34.3)
MCHC RBC AUTO-ENTMCNC: 30.4 G/DL (ref 31.4–37.4)
MCV RBC AUTO: 81 FL (ref 82–98)
MONOCYTES # BLD AUTO: 0.62 THOUSAND/UL (ref 0–1.22)
MONOCYTES NFR BLD: 7 % (ref 4–12)
NEUTROPHILS # BLD MANUAL: 5.32 THOUSAND/UL (ref 1.85–7.62)
NEUTS SEG NFR BLD AUTO: 60 % (ref 43–75)
PLATELET # BLD AUTO: 402 THOUSANDS/UL (ref 149–390)
PLATELET BLD QL SMEAR: ADEQUATE
PMV BLD AUTO: 10.7 FL (ref 8.9–12.7)
POTASSIUM SERPL-SCNC: 4.6 MMOL/L (ref 3.5–5.3)
PROT SERPL-MCNC: 7.7 G/DL (ref 6.4–8.4)
PROTHROMBIN TIME: 12.8 SECONDS (ref 11.6–14.5)
RBC # BLD AUTO: 4.41 MILLION/UL (ref 3.81–5.12)
RBC MORPH BLD: NORMAL
SODIUM SERPL-SCNC: 136 MMOL/L (ref 135–147)
WBC # BLD AUTO: 8.86 THOUSAND/UL (ref 4.31–10.16)

## 2023-10-29 PROCEDURE — G1004 CDSM NDSC: HCPCS

## 2023-10-29 PROCEDURE — 71045 X-RAY EXAM CHEST 1 VIEW: CPT

## 2023-10-29 PROCEDURE — 74174 CTA ABD&PLVS W/CONTRAST: CPT

## 2023-10-29 PROCEDURE — 85730 THROMBOPLASTIN TIME PARTIAL: CPT

## 2023-10-29 PROCEDURE — 71275 CT ANGIOGRAPHY CHEST: CPT

## 2023-10-29 PROCEDURE — 85027 COMPLETE CBC AUTOMATED: CPT

## 2023-10-29 PROCEDURE — 99285 EMERGENCY DEPT VISIT HI MDM: CPT

## 2023-10-29 PROCEDURE — 80053 COMPREHEN METABOLIC PANEL: CPT

## 2023-10-29 PROCEDURE — 99285 EMERGENCY DEPT VISIT HI MDM: CPT | Performed by: EMERGENCY MEDICINE

## 2023-10-29 PROCEDURE — 84484 ASSAY OF TROPONIN QUANT: CPT

## 2023-10-29 PROCEDURE — 85610 PROTHROMBIN TIME: CPT

## 2023-10-29 PROCEDURE — 85007 BL SMEAR W/DIFF WBC COUNT: CPT

## 2023-10-29 PROCEDURE — 93005 ELECTROCARDIOGRAM TRACING: CPT

## 2023-10-29 PROCEDURE — 36415 COLL VENOUS BLD VENIPUNCTURE: CPT

## 2023-10-29 RX ORDER — NITROGLYCERIN 0.4 MG/1
0.4 TABLET SUBLINGUAL ONCE
Status: COMPLETED | OUTPATIENT
Start: 2023-10-29 | End: 2023-10-29

## 2023-10-29 RX ADMIN — NITROGLYCERIN 0.5 INCH: 20 OINTMENT TOPICAL at 09:37

## 2023-10-29 RX ADMIN — NITROGLYCERIN 0.4 MG: 0.4 TABLET SUBLINGUAL at 08:37

## 2023-10-29 RX ADMIN — IOHEXOL 100 ML: 350 INJECTION, SOLUTION INTRAVENOUS at 09:24

## 2023-10-29 NOTE — ED ATTENDING ATTESTATION
10/29/2023    Johnnette HolsteinDO, saw and evaluated the patient. I have discussed the patient with Dr Janeth Martinez and agree with her findings, Plan of Care, and MDM as documented in her note, except where noted. All available labs and Radiology studies were reviewed. I was present for key portions of any procedure(s) performed by Dr Janeth Martinez, and I was immediately available to provide assistance. At this point I agree with the current assessment done in the Emergency Department. I have conducted an independent evaluation of this patient. The history and physical is as follows: This is a 20-year-old woman with the noted past medical history who presents to the emergency department stating that she developed left-sided chest pain approximately 30 minutes prior to arrival when she was lying in bed reading a prayer book. Pain radiates into the left arm; she noted that she felt somewhat dyspneic upon getting up and moving around after the onset of pain, although the dyspnea is less severe now. The pain itself did not get worse upon moving around. She denied that the pain radiated to her back although she did indicate it did so to Dr Janeth Martinez. Patient did not take or use anything for her symptoms at home. No diaphoresis/nausea/vomiting/abdominal pain/syncope/palpitations. Had otherwise been feeling well prior to onset of symptoms. Has known aortitis followed in the [facility] system with recent CTA chest which is excerpted below. Otherwise has known cardiovascular risk factors included in the past medical history. Prior stress echo in July 2020 with results noted below. No prior cardiac catheterization. ROS as per HPI; otherwise negative. General: Awake/alert/mild painful distress. Vital signs and nursing notes reviewed    HEENT:  Normocephalic/atraumatic  External ear/hearing wnl bilaterally. Neck:  Phonation normal with no stridor/dysphonia. Normal active ROM.   No palpable masses or thyromegaly. No overlying skin changes. Cardiac:  Radial pulses 2+ bilaterally  DP/PT pulses 2+ bilaterally  RRR with Q4/I0; 3/6 systolic murmur loudest at RUSB. No radiation to carotids. Pulmonary:   No respiratory distress. No accessory muscle use  Lungs CTA b/l with no w/c/r    Abdomen:  Flat. Nondistended. Nontender. No palpable masses. No guarding/rebound ttp. Skin:  Warm/dry. No diaphoresis. No visible rashes or skin changes. Neuro:  GCS 15. PERRLA; EOMI. Facial expressions symmetric. Tongue/uvula midline. Shoulder shrug equal bilaterally  Strength 5/5 in UE/LE bilaterally  Intact sensation in UE/LE bilaterally. DDx includes but is not limited to ACS/dissection/ptx/pna/pericarditis/myocarditis. Will assess ACS with ecg/troponin initially Ptx/pna will be assessed by chest imaging. Appears unlikely to be PE given nature of sx. No ECG findings suggest pericarditis and sx do not appear to fit with typical pericarditis chest pain. Given known aortic pathology, dissection is possible: will obtain CTA chest.      ED Course  ED Course as of 10/29/23 1229   Sun Oct 29, 2023   7970 CTA chest 20 Oct 2023:    IMPRESSION:  Unchanged intimal thickening of the ascending thoracic aorta and arch compatible with aortitis. Maximal luminal diameter of 4.5 cm. Critical stenosis of the origin of the left subclavian artery. Moderate enlarged main pulmonary artery, finding compatible with chronic pulmonary arterial hypertension. Unchanged prominent mediastinal lymph nodes. 0820 ECG NSR 73 bpm   QRS 84 QTc 467  Mild baseline variability  Normal axis  No ectopy  No Q waves  T wave flattening V5 similar to 22 August 2023.   T wave flattening in 1/aVL increased compared to 22 August 2023  TWI I/AVL  No ST elevations  Interpreted by me   3242 CBC and differential(!)  WBC wnl  Mild anemia comparable to most recent prior and significantly improved from prior values  Plt elevated, likely reactive in this setting   0846 ECG NSR 72 bpm   QRS 84 QTc 464  TWI I/AVL  No acute ST changes  No ectopy  Normal axis  Similar TWI 18 May 2020 but increased from 22 Aug 2023  Interpreted by me   0854 APTT  Normal   0854 Protime-INR  Normal   0904 Comprehensive metabolic panel(!)  Decreased CO2. Electrolytes otherwise normal.  Renal function near baseline.   0906 HS Troponin 0hr (reflex protocol)  Non-ischemic but will require repeat value   0911 Patient to CT scan now   0920 CT completed and awaiting interpretation. By my interpretation, no obvious dissection flap is present nor any evidence of pneumothorax nor large central pulmonary embolism. 1032 CTA dissection protocol chest abdomen pelvis w wo contrast    IMPRESSION:     Stable ectasia of the ascending aorta. Stable mild wall thickening of the aortic arch. No discrete intramural hematoma or aortic dissection seen. No gross central pulmonary embolism. Stable borderline prominent mediastinal lymph nodes. No acute CT findings in the abdomen or pelvis. Additional findings as above     1041 Updated patient and her son regarding results of work-up thus far including plan for repeat cardiac enzymes   1122 HS Troponin I 2hr  Delta of -1 not c/w ACS     Stress echo 30 July 2020:    SUMMARY:  -  Stress results: There was resting hypertension with an appropriate blood pressure response to stress. There was no chest pain during stress. -  ECG conclusions: The stress ECG was negative for ischemia and normal.  -  Perfusion imaging: There was a large, moderately severe, fixed myocardial perfusion defect of the anterior wall likely due to attenuation from breast tissue. Wall motion and thickening appeared normal although significantly decreased  counts limit the assessment.  -  Gated SPECT: The calculated left ventricular ejection fraction was 58 %. There was no diagnostic evidence for left ventricular regional abnormality.      IMPRESSIONS: No evidence of ischemia after pharmacologic vasodilation without reproduction of symptoms. A large fixed anterior perfusion defect was present, which is likely from breast attenuation. Clinical correlation is advised. Work-up ultimately unrevealing as to etiology of patient's chest pain. We were to confirm definitively that she does not have an acute aortic emergency, pneumothorax, ACS, lower respiratory tract infection. BP and pain improved with nitroglycerin in the emergency department. Follows closely with cardiology already given her existing vascular disease. I see no indication for any additional medications to be started at this point. Close follow-up with her cardiologist most appropriate. She should of course maintain all her current medications at current dosages and frequencies. ED return for chest pain suspicious for ACS; discharge instructions reflected this. All questions were answered to her satisfaction prior to discharge. She expressed understanding and agreed to plan.     Critical Care Time  Procedures

## 2023-10-29 NOTE — DISCHARGE INSTRUCTIONS
Please continue all your current medications at the current dosages and frequencies. Please make an appointment with your cardiologist for soon as you are able. If you develop chest pain that spreads out of your chest, chest pain that causes you to pass out, chest pain with vomiting, or chest pain worse with physical activity, please go to the ER.

## 2023-10-29 NOTE — ED PROVIDER NOTES
History  Chief Complaint   Patient presents with    Chest Pain     49-year-old female with past medical history of hypertension, diabetes, intimal thickening of the ascending thoracic aorta and arch compatible with aortitis and chronic pulmonary arterial hypertension, presents today for evaluation of chest pain. Patient states about 30 minutes ago when she was lying down and reading, she developed sudden onset substernal chest pressure radiating to the left side of her chest and down her left arm. Reports associated shortness of breath and numbness and tingling in her left arm. Reports some pain in her left upper back as well but states it is much more severe in her chest.  Denies fevers, chills, recent illness, diaphoresis, nausea, vomiting, abdominal pain. Denies any history of prior. Denies any exertional or pleuritic component to the pain. Chest Pain  Associated symptoms: back pain and shortness of breath    Associated symptoms: no abdominal pain, no cough, no diaphoresis, no fever, no nausea, no palpitations and not vomiting        Prior to Admission Medications   Prescriptions Last Dose Informant Patient Reported? Taking?    Advair -21 MCG/ACT inhaler   Yes No   Iron-Vitamin C (Vitron-C)  MG TABS   No No   Sig: Take 1 tablet by mouth in the morning   acetaminophen (TYLENOL) 325 mg tablet  Self Yes No   Sig: Take 650 mg by mouth every 6 (six) hours as needed for mild pain   albuterol (PROVENTIL HFA,VENTOLIN HFA) 90 mcg/act inhaler  Self Yes No   Sig: Inhale 2 puffs 2 (two) times a day   aspirin (ECOTRIN LOW STRENGTH) 81 mg EC tablet  Self Yes No   Sig: Take 81 mg by mouth daily   atorvastatin (LIPITOR) 40 mg tablet   Yes No   cholecalciferol (VITAMIN D3) 1,000 units tablet   No No   Sig: Take 1 tablet (1,000 Units total) by mouth daily   diclofenac sodium (VOLTAREN) 50 mg EC tablet  Self No No   Sig: Take 1 tablet (50 mg total) by mouth 2 (two) times a day   escitalopram (LEXAPRO) 10 mg tablet  Self Yes No   Sig: Take 10 mg by mouth daily   levothyroxine 100 mcg tablet   Yes No   losartan (COZAAR) 100 MG tablet  Self Yes No   Sig: Take 100 mg by mouth daily    metFORMIN (GLUCOPHAGE) 500 mg tablet  Self Yes No   Sig: Take 1,000 mg by mouth 2 (two) times a day with meals   metoprolol succinate (TOPROL-XL) 50 mg 24 hr tablet   No No   Sig: TAKE (1) TABLET DAILY. montelukast (SINGULAIR) 10 mg tablet  Self Yes No   Sig: Take 10 mg by mouth daily at bedtime   omeprazole (PriLOSEC) 20 mg delayed release capsule   Yes No      Facility-Administered Medications: None       Past Medical History:   Diagnosis Date    Asthma     Chronic anemia     Diabetes mellitus (HCC)     Hypertension     Hyperthyroidism     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HCC)     TB lung, latent        Past Surgical History:   Procedure Laterality Date     SECTION      IR BIOPSY LIVER MASS  07/15/2020    THYROIDECTOMY         Family History   Problem Relation Age of Onset    Diabetes unspecified Mother      I have reviewed and agree with the history as documented. E-Cigarette/Vaping    E-Cigarette Use Never User      E-Cigarette/Vaping Substances    Nicotine No     THC No     CBD No     Flavoring No     Other No     Unknown No      Social History     Tobacco Use    Smoking status: Never    Smokeless tobacco: Never   Vaping Use    Vaping Use: Never used   Substance Use Topics    Alcohol use: Never     Alcohol/week: 0.0 standard drinks of alcohol    Drug use: Never        Review of Systems   Constitutional:  Negative for chills, diaphoresis and fever. HENT:  Negative for ear pain and sore throat. Eyes:  Negative for pain and visual disturbance. Respiratory:  Positive for shortness of breath. Negative for cough. Cardiovascular:  Positive for chest pain. Negative for palpitations and leg swelling. Gastrointestinal:  Negative for abdominal pain, diarrhea, nausea and vomiting.    Genitourinary:  Negative for dysuria and hematuria. Musculoskeletal:  Positive for back pain. Negative for arthralgias. Skin:  Negative for color change and rash. Neurological:  Negative for seizures and syncope. All other systems reviewed and are negative. Physical Exam  ED Triage Vitals [10/29/23 0819]   Temperature Pulse Respirations Blood Pressure SpO2   (!) 97.3 °F (36.3 °C) 71 19 (!) 173/96 98 %      Temp Source Heart Rate Source Patient Position - Orthostatic VS BP Location FiO2 (%)   Temporal Monitor Lying Left arm --      Pain Score       10 - Worst Possible Pain             Orthostatic Vital Signs  Vitals:    10/29/23 0928 10/29/23 1003 10/29/23 1101 10/29/23 1129   BP: 153/70 149/67 150/73 163/83   Pulse: 64 67 65 67   Patient Position - Orthostatic VS:           Physical Exam  Vitals and nursing note reviewed. Constitutional:       General: She is in acute distress (appears uncomfortable). Appearance: She is well-developed. She is obese. She is not diaphoretic. HENT:      Head: Normocephalic and atraumatic. Eyes:      Conjunctiva/sclera: Conjunctivae normal.   Cardiovascular:      Rate and Rhythm: Normal rate and regular rhythm. Pulses:           Radial pulses are 2+ on the right side and 2+ on the left side. Dorsalis pedis pulses are 2+ on the right side and 2+ on the left side. Heart sounds: Murmur heard. Pulmonary:      Effort: Pulmonary effort is normal. No respiratory distress. Breath sounds: Normal breath sounds. Chest:      Chest wall: No tenderness (no reproducible chest wall or back tenderness). Abdominal:      Palpations: Abdomen is soft. Tenderness: There is no abdominal tenderness. Musculoskeletal:         General: No swelling. Cervical back: Neck supple. Right lower leg: No tenderness. No edema. Left lower leg: No tenderness. No edema. Skin:     General: Skin is warm and dry. Capillary Refill: Capillary refill takes less than 2 seconds.    Neurological: General: No focal deficit present. Mental Status: She is alert. Sensory: Sensation is intact. Motor: Motor function is intact.          ED Medications  Medications   nitroglycerin (NITROSTAT) SL tablet 0.4 mg (0.4 mg Sublingual Given 10/29/23 0837)   nitroglycerin (NITRO-BID) 2 % TD ointment 0.5 inch (0.5 inches Topical Given 10/29/23 0937)   iohexol (OMNIPAQUE) 350 MG/ML injection (MULTI-DOSE) 100 mL (100 mL Intravenous Given 10/29/23 0924)       Diagnostic Studies  Results Reviewed       Procedure Component Value Units Date/Time    HS Troponin I 2hr [[id number]]  (Normal) Collected: 10/29/23 1044    Lab Status: Final result Specimen: Blood from Arm, Right Updated: 10/29/23 1117     hs TnI 2hr 7 ng/L      Delta 2hr hsTnI -1 ng/L     HS Troponin 0hr (reflex protocol) [[id number]]  (Normal) Collected: 10/29/23 0838    Lab Status: Final result Specimen: Blood from Arm, Right Updated: 10/29/23 0906     hs TnI 0hr 8 ng/L     Comprehensive metabolic panel [[id number]]  (Abnormal) Collected: 10/29/23 0838    Lab Status: Final result Specimen: Blood from Arm, Right Updated: 10/29/23 0901     Sodium 136 mmol/L      Potassium 4.6 mmol/L      Chloride 106 mmol/L      CO2 18 mmol/L      ANION GAP 12 mmol/L      BUN 30 mg/dL      Creatinine 1.58 mg/dL      Glucose 163 mg/dL      Calcium 9.2 mg/dL      AST 14 U/L      ALT 10 U/L      Alkaline Phosphatase 107 U/L      Total Protein 7.7 g/dL      Albumin 3.5 g/dL      Total Bilirubin 0.27 mg/dL      eGFR 37 ml/min/1.73sq m     Narrative:      [place] guidelines for Chronic Kidney Disease (CKD):     Stage 1 with normal or high GFR (GFR > 90 mL/min/1.73 square meters)    Stage 2 Mild CKD (GFR = 60-89 mL/min/1.73 square meters)    Stage 3A Moderate CKD (GFR = 45-59 mL/min/1.73 square meters)    Stage 3B Moderate CKD (GFR = 30-44 mL/min/1.73 square meters)    Stage 4 Severe CKD (GFR = 15-29 mL/min/1.73 square meters)    Stage 5 End Stage CKD (GFR <15 mL/min/1.73 square meters)  Note: GFR calculation is accurate only with a steady state creatinine    RBC Morphology Reflex Test [[id number]] Collected: 10/29/23 0838    Lab Status: Final result Specimen: Blood from Arm, Right Updated: 10/29/23 0901    CBC and differential [[id number]]  (Abnormal) Collected: 10/29/23 0838    Lab Status: Final result Specimen: Blood from Arm, Right Updated: 10/29/23 0854     WBC 8.86 Thousand/uL      RBC 4.41 Million/uL      Hemoglobin 10.9 g/dL      Hematocrit 35.9 %      MCV 81 fL      MCH 24.7 pg      MCHC 30.4 g/dL      RDW 17.7 %      MPV 10.7 fL      Platelets 112 Thousands/uL     Narrative: This is an appended report. These results have been appended to a previously verified report. Manual Differential(PHLEBS Do Not Order) [[id number]]  (Abnormal) Collected: 10/29/23 0838    Lab Status: Final result Specimen: Blood from Arm, Right Updated: 10/29/23 0854     Segmented % 60 %      Lymphocytes % 24 %      Monocytes % 7 %      Eosinophils, % 9 %      Basophils % 0 %      Absolute Neutrophils 5.32 Thousand/uL      Lymphocytes Absolute 2.13 Thousand/uL      Monocytes Absolute 0.62 Thousand/uL      Eosinophils Absolute 0.80 Thousand/uL      Basophils Absolute 0.00 Thousand/uL      Total Counted --     RBC Morphology Normal     Platelet Estimate Adequate    Protime-INR [[id number]]  (Normal) Collected: 10/29/23 0838    Lab Status: Final result Specimen: Blood from Arm, Right Updated: 10/29/23 0854     Protime 12.8 seconds      INR 0.97    APTT [[id number]]  (Normal) Collected: 10/29/23 0838    Lab Status: Final result Specimen: Blood from Arm, Right Updated: 10/29/23 0854     PTT 25 seconds                    CTA dissection protocol chest abdomen pelvis w wo contrast   Final Result by Lizy Yang MD (10/29 1028)      Stable ectasia of the ascending aorta. Stable mild wall thickening of the aortic arch. No discrete intramural hematoma or aortic dissection seen.       No gross central pulmonary embolism. Stable borderline prominent mediastinal lymph nodes. No acute CT findings in the abdomen or pelvis. Additional findings as above                  Workstation performed: [id number]         XR chest 1 view portable   ED Interpretation by Marychuy Roche DO (10/29 6823)   Airway is midline. Lungs are clear bilaterally with no evidence of pulmonary vascular congestion/focal infiltrate/pleural effusion/pneumothorax. Cardiac and mediastinal silhouettes are within normal limits.  Osseous structures appear normal.              Procedures  ECG 12 Lead Documentation Only    Date/Time: 10/29/2023 8:42 AM    Performed by: Milagros Clements MD  Authorized by: Milagros Clements MD    Indications / Diagnosis:  Chest pain  ECG reviewed by me, the ED Provider: yes    Patient location:  ED  Previous ECG:     Previous ECG:  Compared to current  Interpretation:     Interpretation: abnormal    Quality:     Tracing quality:  Limited by artifact  Rate:     ECG rate:  73    ECG rate assessment: normal    Rhythm:     Rhythm: sinus rhythm    Ectopy:     Ectopy: none    T waves:     T waves: flattening and inverted      Flattening:  V5 and I    Inverted:  V6  Comments:      Normal sinus rhythm, normal axis, normal intervals, T wave inversion in V6 unchanged from prior, T wave flattening slightly increased from prior EKG   ECG 12 Lead Documentation Only    Date/Time: 10/29/2023 8:50 AM    Performed by: Milagros Clements MD  Authorized by: Milagros Clements MD    Indications / Diagnosis:  Chest pain  ECG reviewed by me, the ED Provider: yes    Patient location:  ED  Previous ECG:     Previous ECG:  Compared to current  Interpretation:     Interpretation: abnormal    Rate:     ECG rate:  72    ECG rate assessment: normal    Rhythm:     Rhythm: sinus rhythm    T waves:     T waves: inverted      Inverted:  I, aVL and V6  Comments:      T wave inversions in high lateral leads and new since Aug 2023 but no dynamic EKG changes compared to prior that was obtained on arrival   ECG 12 Lead Documentation Only    Date/Time: 10/29/2023 10:49 AM    Performed by: Yoselin Dorado MD  Authorized by: Yoselin Dorado MD    Indications / Diagnosis:  Chest pain  ECG reviewed by me, the ED Provider: yes    Patient location:  ED  Previous ECG:     Previous ECG:  Compared to current    Similarity:  No change  Interpretation:     Interpretation: abnormal    Rate:     ECG rate:  66    ECG rate assessment: normal    Rhythm:     Rhythm: sinus rhythm    Ectopy:     Ectopy: none    QRS:     QRS axis:  Normal    QRS intervals:  Normal  Conduction:     Conduction: abnormal      Abnormal conduction: 1st degree    ST segments:     ST segments:  Normal  T waves:     T waves: inverted      Inverted:  V6, I and aVL  Comments:      T wave inversions in V6 and high lateral leads unchanged from prior         ED Course  ED Course as of 10/29/23 1200   Sun Oct 29, 2023   0854 Patient had significant symptomatic improvement following SL nitro x 1. Resting more comfortably in exam bed. Repeat BP decreased to 145/69 following nitro. HEART Risk Score      Flowsheet Row Most Recent Value   Heart Score Risk Calculator    History 2 Filed at: 10/29/2023 0923   ECG 1 Filed at: 10/29/2023 6597   Age 1 Filed at: 10/29/2023 9083   Risk Factors 2 Filed at: 10/29/2023 4298   Troponin 0 Filed at: 10/29/2023 8376   HEART Score 6 Filed at: 10/29/2023 1322                        SBIRT 22yo+      Flowsheet Row Most Recent Value   Initial Alcohol Screen: US AUDIT-C     1. How often do you have a drink containing alcohol? 0 Filed at: 10/29/2023 0818   2. How many drinks containing alcohol do you have on a typical day you are drinking? 0 Filed at: 10/29/2023 0818   3a. Male UNDER 65: How often do you have five or more drinks on one occasion? 0 Filed at: 10/29/2023 0818   3b. FEMALE Any Age, or MALE 65+: How often do you have 4 or more drinks on one occassion?  0 Filed at: 10/29/2023 0818   Audit-C Score 0 Filed at: 10/29/2023 0138   VALERIY: How many times in the past year have you. .. Used an illegal drug or used a prescription medication for non-medical reasons? Never Filed at: 10/29/2023 [facility]                  Medical Decision Making  20-year-old female with past medical history of hypertension, diabetes, intimal thickening of the ascending thoracic aorta and arch compatible with aortitis and chronic pulmonary arterial hypertension, presents today for evaluation of chest pain. DDX including but not limited to: ACS, MI, pneumothorax, pneumonia, dissection, pleurisy, pericarditis, myocarditis, GERD, musculoskeletal etiology, hypertensive emergency. Given hypertension, back pain, and uncomfortable appearance in the setting of known aortitis, will evaluate for dissection with CTA. CTA revealed stable ectasia of the ascending aorta, along with stable mild wall thickening of the aortic arch, unchanged from prior study. Delta troponin negative. Patient remains well-appearing, resting comfortably in bed, and chest pain has completely resolved. HEART score 6. Patient already has cardiologist who she follows closely with and understands plan to follow up with cardiology next week. Reviewed return precautions. Feels comfortable with discharge home. Amount and/or Complexity of Data Reviewed  Labs: ordered. Radiology: ordered and independent interpretation performed. Risk  Prescription drug management. Disposition  Final diagnoses:   Chest pain     Time reflects when diagnosis was documented in both MDM as applicable and the Disposition within this note       Time User Action Codes Description Comment    10/29/2023 11:33 AM Ben Gallegos Add [R07.9] Chest pain           ED Disposition       ED Disposition   Discharge    Condition   Stable    Date/Time   Sun Oct 29, 2023 2823 [id number] [facility] discharge to home/self care.                    Follow-up Information       Follow up With Specialties Details Why Contact Info Additional Information    Deepthi Mcdermott MD Family Medicine Schedule an appointment as soon as possible for a visit   [address] [facility] [address] [facility] Cardiology Schedule an appointment as soon as possible for a visit  Please schedule appt with your cardiologist for this week [address] [place] [id number] [facility] Cardiology [address], [place] [facility], [address], [facility]            Discharge Medication List as of 10/29/2023 11:53 AM        CONTINUE these medications which have NOT CHANGED    Details   acetaminophen (TYLENOL) 325 mg tablet Take 650 mg by mouth every 6 (six) hours as needed for mild pain, Historical Med      Advair -21 MCG/ACT inhaler Starting Mon 12/19/2022, Historical Med      albuterol (PROVENTIL HFA,VENTOLIN HFA) 90 mcg/act inhaler Inhale 2 puffs 2 (two) times a day, Historical Med      aspirin (ECOTRIN LOW STRENGTH) 81 mg EC tablet Take 81 mg by mouth daily, Historical Med      atorvastatin (LIPITOR) 40 mg tablet Starting Thu 11/10/2022, Historical Med      cholecalciferol (VITAMIN D3) 1,000 units tablet Take 1 tablet (1,000 Units total) by mouth daily, Starting Tue 6/28/2022, Normal      diclofenac sodium (VOLTAREN) 50 mg EC tablet Take 1 tablet (50 mg total) by mouth 2 (two) times a day, Starting Sat 4/11/2020, Normal      escitalopram (LEXAPRO) 10 mg tablet Take 10 mg by mouth daily, Historical Med      Iron-Vitamin C (Vitron-C)  MG TABS Take 1 tablet by mouth in the morning, Starting Tue 5/23/2023, Normal      levothyroxine 100 mcg tablet Starting Mon 11/7/2022, Historical Med      losartan (COZAAR) 100 MG tablet Take 100 mg by mouth daily , Historical Med      metFORMIN (GLUCOPHAGE) 500 mg tablet Take 1,000 mg by mouth 2 (two) times a day with meals, Historical Med      metoprolol succinate (TOPROL-XL) 50 mg 24 hr tablet TAKE (1) TABLET DAILY., Normal      montelukast (SINGULAIR) 10 mg tablet Take 10 mg by mouth daily at bedtime, Historical Med      omeprazole (PriLOSEC) 20 mg delayed release capsule Starting Mon 12/12/2022, Historical Med           No discharge procedures on file. PDMP Review         Value Time User    PDMP Reviewed  Yes 4/16/2020  1:21 AM Kiko Feldman DO             ED Provider  Attending physically available and evaluated [facility] Sree. I managed the patient along with the ED Attending.     Electronically Signed by           Yoselin Dorado MD  10/29/23 1200

## 2023-10-29 NOTE — ED TRIAGE NOTES
Patient reports to this ED c/o midsternal chest pain with radiation to the left arm, SOB present. Pain rating 10/10.  Patient reports pain began approx one half hour before arrival.

## 2023-10-30 LAB
ATRIAL RATE: 66 BPM
ATRIAL RATE: 72 BPM
ATRIAL RATE: 73 BPM
P AXIS: 51 DEGREES
P AXIS: 52 DEGREES
P AXIS: 57 DEGREES
PR INTERVAL: 196 MS
PR INTERVAL: 204 MS
PR INTERVAL: 212 MS
QRS AXIS: 44 DEGREES
QRS AXIS: 53 DEGREES
QRS AXIS: 58 DEGREES
QRSD INTERVAL: 84 MS
QRSD INTERVAL: 84 MS
QRSD INTERVAL: 88 MS
QT INTERVAL: 424 MS
QT INTERVAL: 424 MS
QT INTERVAL: 436 MS
QTC INTERVAL: 457 MS
QTC INTERVAL: 464 MS
QTC INTERVAL: 467 MS
T WAVE AXIS: 52 DEGREES
T WAVE AXIS: 75 DEGREES
T WAVE AXIS: 86 DEGREES
VENTRICULAR RATE: 66 BPM
VENTRICULAR RATE: 72 BPM
VENTRICULAR RATE: 73 BPM

## 2023-10-30 PROCEDURE — 93010 ELECTROCARDIOGRAM REPORT: CPT | Performed by: INTERNAL MEDICINE

## 2023-11-20 ENCOUNTER — APPOINTMENT (OUTPATIENT)
Dept: LAB | Facility: MEDICAL CENTER | Age: 48
End: 2023-11-20
Payer: COMMERCIAL

## 2023-11-20 LAB
ALBUMIN SERPL BCP-MCNC: 3.6 G/DL (ref 3.5–5)
ALP SERPL-CCNC: 118 U/L (ref 34–104)
ALT SERPL W P-5'-P-CCNC: 11 U/L (ref 7–52)
ANION GAP SERPL CALCULATED.3IONS-SCNC: 11 MMOL/L
AST SERPL W P-5'-P-CCNC: 11 U/L (ref 13–39)
BASOPHILS # BLD AUTO: 0.1 THOUSANDS/ÂΜL (ref 0–0.1)
BASOPHILS NFR BLD AUTO: 1 % (ref 0–1)
BILIRUB SERPL-MCNC: 0.28 MG/DL (ref 0.2–1)
BUN SERPL-MCNC: 25 MG/DL (ref 5–25)
CALCIUM SERPL-MCNC: 8.7 MG/DL (ref 8.4–10.2)
CHLORIDE SERPL-SCNC: 104 MMOL/L (ref 96–108)
CO2 SERPL-SCNC: 22 MMOL/L (ref 21–32)
CREAT SERPL-MCNC: 1.56 MG/DL (ref 0.6–1.3)
EOSINOPHIL # BLD AUTO: 0.66 THOUSAND/ÂΜL (ref 0–0.61)
EOSINOPHIL NFR BLD AUTO: 6 % (ref 0–6)
ERYTHROCYTE [DISTWIDTH] IN BLOOD BY AUTOMATED COUNT: 17.7 % (ref 11.6–15.1)
FERRITIN SERPL-MCNC: 64 NG/ML (ref 11–307)
GFR SERPL CREATININE-BSD FRML MDRD: 38 ML/MIN/1.73SQ M
GLUCOSE SERPL-MCNC: 163 MG/DL (ref 65–140)
HCT VFR BLD AUTO: 36.2 % (ref 34.8–46.1)
HGB BLD-MCNC: 11 G/DL (ref 11.5–15.4)
IMM GRANULOCYTES # BLD AUTO: 0.07 THOUSAND/UL (ref 0–0.2)
IMM GRANULOCYTES NFR BLD AUTO: 1 % (ref 0–2)
IRON SATN MFR SERPL: 11 % (ref 15–50)
IRON SERPL-MCNC: 26 UG/DL (ref 50–212)
LYMPHOCYTES # BLD AUTO: 2.02 THOUSANDS/ÂΜL (ref 0.6–4.47)
LYMPHOCYTES NFR BLD AUTO: 19 % (ref 14–44)
MCH RBC QN AUTO: 25.3 PG (ref 26.8–34.3)
MCHC RBC AUTO-ENTMCNC: 30.4 G/DL (ref 31.4–37.4)
MCV RBC AUTO: 83 FL (ref 82–98)
MONOCYTES # BLD AUTO: 0.67 THOUSAND/ÂΜL (ref 0.17–1.22)
MONOCYTES NFR BLD AUTO: 6 % (ref 4–12)
NEUTROPHILS # BLD AUTO: 6.92 THOUSANDS/ÂΜL (ref 1.85–7.62)
NEUTS SEG NFR BLD AUTO: 67 % (ref 43–75)
NRBC BLD AUTO-RTO: 0 /100 WBCS
PLATELET # BLD AUTO: 443 THOUSANDS/UL (ref 149–390)
PMV BLD AUTO: 11.3 FL (ref 8.9–12.7)
POTASSIUM SERPL-SCNC: 4 MMOL/L (ref 3.5–5.3)
PROT SERPL-MCNC: 8 G/DL (ref 6.4–8.4)
RBC # BLD AUTO: 4.35 MILLION/UL (ref 3.81–5.12)
SODIUM SERPL-SCNC: 137 MMOL/L (ref 135–147)
TIBC SERPL-MCNC: 242 UG/DL (ref 250–450)
UIBC SERPL-MCNC: 216 UG/DL (ref 155–355)
VIT B12 SERPL-MCNC: 1116 PG/ML (ref 180–914)
WBC # BLD AUTO: 10.44 THOUSAND/UL (ref 4.31–10.16)

## 2023-11-21 ENCOUNTER — OFFICE VISIT (OUTPATIENT)
Dept: HEMATOLOGY ONCOLOGY | Facility: CLINIC | Age: 48
End: 2023-11-21
Payer: COMMERCIAL

## 2023-11-21 VITALS
SYSTOLIC BLOOD PRESSURE: 150 MMHG | OXYGEN SATURATION: 98 % | DIASTOLIC BLOOD PRESSURE: 84 MMHG | TEMPERATURE: 98.4 F | HEIGHT: 63 IN | WEIGHT: 239 LBS | BODY MASS INDEX: 42.35 KG/M2 | HEART RATE: 79 BPM

## 2023-11-21 DIAGNOSIS — D75.839 THROMBOCYTOSIS: ICD-10-CM

## 2023-11-21 DIAGNOSIS — D50.9 IRON DEFICIENCY ANEMIA, UNSPECIFIED IRON DEFICIENCY ANEMIA TYPE: Primary | ICD-10-CM

## 2023-11-21 PROCEDURE — 99213 OFFICE O/P EST LOW 20 MIN: CPT | Performed by: NURSE PRACTITIONER

## 2023-11-21 RX ORDER — FOLIC ACID 1 MG/1
TABLET ORAL
COMMUNITY
Start: 2023-10-16

## 2023-11-21 RX ORDER — HYDROCHLOROTHIAZIDE 12.5 MG/1
CAPSULE, GELATIN COATED ORAL
COMMUNITY
Start: 2023-10-28

## 2023-11-21 RX ORDER — CARVEDILOL 6.25 MG/1
TABLET ORAL
COMMUNITY
Start: 2023-11-13

## 2023-11-21 RX ORDER — FUROSEMIDE 40 MG/1
40 TABLET ORAL DAILY
COMMUNITY
Start: 2023-08-24

## 2023-11-21 NOTE — PROGRESS NOTES
[address] [facility] HEMATOLOGY ONCOLOGY [address] [address] [phone]  Hematology Ambulatory Follow-Up  [facility], 1975, [id number]  11/21/2023    Assessment/Plan:    1. Iron deficiency anemia, unspecified iron deficiency anemia type  2. Thrombocytosis    Patient is a 61-year-old female with a history of obesity, menorrhagia, thrombocytosis, iron deficiency and vitamin B12 deficiency. She has been taking oral iron tolerating without difficulty. Most recent labs from 11/20/2023 showed WBC count of 10.44, hemoglobin 11.0, MCV 83, platelets 095 normal differential.  Iron saturation 11% ferritin 64. Vitamin B12 level 1116. We discussed continuing to monitor with labs every 3 to 6 months versus discharge back to her PCP. Patient states she is tolerating oral supplementation without difficulty. She will continue with her PCP. She is up-to-date on mammogram, Colonoscopy to be scheduled in the future. we will discharge from our practice however if patient needs our services in the future she is more than welcome to be referred back to us. Barrier(s) to care: None  The patient is able to self care. [address]    Interval history: clinically stable    Review of Systems   Constitutional:  Negative for activity change, appetite change, fatigue, fever and unexpected weight change. Respiratory:  Negative for cough and shortness of breath. Cardiovascular:  Negative for chest pain and leg swelling. Gastrointestinal:  Negative for abdominal pain, constipation, diarrhea and nausea. Endocrine: Negative for cold intolerance and heat intolerance. Musculoskeletal:  Negative for arthralgias and myalgias. Skin: Negative. Neurological:  Negative for dizziness, weakness and headaches. Hematological:  Negative for adenopathy. Does not bruise/bleed easily.      Past Medical History: Diagnosis Date    Asthma     Chronic anemia     Diabetes mellitus (HCC)     Hypertension     Hyperthyroidism     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HCC)     TB lung, latent      Past Surgical History:   Procedure Laterality Date     SECTION      IR BIOPSY LIVER MASS  07/15/2020    THYROIDECTOMY       Current Outpatient Medications:     carvedilol (COREG) 6.25 mg tablet, , Disp: , Rfl:     folic acid (FOLVITE) 1 mg tablet, , Disp: , Rfl:     furosemide (LASIX) 40 mg tablet, Take 40 mg by mouth daily, Disp: , Rfl:     hydrochlorothiazide (MICROZIDE) 12.5 mg capsule, , Disp: , Rfl:     acetaminophen (TYLENOL) 325 mg tablet, Take 650 mg by mouth every 6 (six) hours as needed for mild pain, Disp: , Rfl:     Advair -21 MCG/ACT inhaler, , Disp: , Rfl:     albuterol (PROVENTIL HFA,VENTOLIN HFA) 90 mcg/act inhaler, Inhale 2 puffs 2 (two) times a day, Disp: , Rfl:     aspirin (ECOTRIN LOW STRENGTH) 81 mg EC tablet, Take 81 mg by mouth daily, Disp: , Rfl:     atorvastatin (LIPITOR) 40 mg tablet, , Disp: , Rfl:     cholecalciferol (VITAMIN D3) 1,000 units tablet, Take 1 tablet (1,000 Units total) by mouth daily, Disp: 90 tablet, Rfl: 3    diclofenac sodium (VOLTAREN) 50 mg EC tablet, Take 1 tablet (50 mg total) by mouth 2 (two) times a day, Disp: 30 tablet, Rfl: 0    escitalopram (LEXAPRO) 10 mg tablet, Take 10 mg by mouth daily, Disp: , Rfl:     Iron-Vitamin C (Vitron-C)  MG TABS, Take 1 tablet by mouth in the morning, Disp: 90 tablet, Rfl: 3    levothyroxine 100 mcg tablet, , Disp: , Rfl:     losartan (COZAAR) 100 MG tablet, Take 100 mg by mouth daily , Disp: , Rfl:     metFORMIN (GLUCOPHAGE) 500 mg tablet, Take 1,000 mg by mouth 2 (two) times a day with meals, Disp: , Rfl:     metoprolol succinate (TOPROL-XL) 50 mg 24 hr tablet, TAKE (1) TABLET DAILY. , Disp: 30 tablet, Rfl: 6    montelukast (SINGULAIR) 10 mg tablet, Take 10 mg by mouth daily at bedtime, Disp: , Rfl:     omeprazole (PriLOSEC) 20 mg delayed release capsule, , Disp: , Rfl:     Allergies   Allergen Reactions    Other Allergic Rhinitis     pollen     Objective:  /84 (BP Location: Right arm, Patient Position: Sitting, Cuff Size: Standard)   Pulse 79   Temp 98.4 °F (36.9 °C) (Temporal)   Ht 5' 3" (1.6 m)   Wt 108 kg (239 lb)   LMP 04/04/2020 (Exact Date)   SpO2 98%   BMI 42.34 kg/m²    Physical Exam  Vitals reviewed. Constitutional:       Appearance: Normal appearance. She is well-developed. HENT:      Head: Normocephalic and atraumatic. Eyes:      Conjunctiva/sclera: Conjunctivae normal.      Pupils: Pupils are equal, round, and reactive to light. Pulmonary:      Effort: Pulmonary effort is normal. No respiratory distress. Musculoskeletal:         General: Normal range of motion. Cervical back: Normal range of motion. Lymphadenopathy:      Cervical: No cervical adenopathy. Skin:     General: Skin is dry. Neurological:      Mental Status: She is alert and oriented to person, place, and time. Psychiatric:         Behavior: Behavior normal.     Result Review  Labs:  No visits with results within 1 Week(s) from this visit.    Latest known visit with results is:   Admission on 10/29/2023, Discharged on 10/29/2023   Component Date Value Ref Range Status    Ventricular Rate 10/29/2023 73  BPM Final    Atrial Rate 10/29/2023 73  BPM Final    [place] Interval 10/29/2023 196  ms Final    QRSD Interval 10/29/2023 84  ms Final    QT Interval 10/29/2023 424  ms Final    QTC Interval 10/29/2023 467  ms Final    P Axis 10/29/2023 51  degrees Final    QRS Axis 10/29/2023 58  degrees Final    T Wave Axis 10/29/2023 52  degrees Final    WBC 10/29/2023 8.86  4.31 - 10.16 Thousand/uL Final    RBC 10/29/2023 4.41  3.81 - 5.12 Million/uL Final    Hemoglobin 10/29/2023 10.9 (L)  11.5 - 15.4 g/dL Final    Hematocrit 10/29/2023 35.9  34.8 - 46.1 % Final    MCV 10/29/2023 81 (L)  82 - 98 fL Final    MCH 10/29/2023 24.7 (L)  26.8 - 34.3 pg Final    MCHC 10/29/2023 30.4 (L)  31.4 - 37.4 g/dL Final    RDW 10/29/2023 17.7 (H)  11.6 - 15.1 % Final    MPV 10/29/2023 10.7  8.9 - 12.7 fL Final    Platelets 15/14/5563 402 (H)  149 - 390 Thousands/uL Final    Protime 10/29/2023 12.8  11.6 - 14.5 seconds Final    INR 10/29/2023 0.97  0.84 - 1.19 Final    PTT 10/29/2023 25  23 - 37 seconds Final    Therapeutic Heparin Range =  60-90 seconds    Sodium 10/29/2023 136  135 - 147 mmol/L Final    Potassium 10/29/2023 4.6  3.5 - 5.3 mmol/L Final    Chloride 10/29/2023 106  96 - 108 mmol/L Final    CO2 10/29/2023 18 (L)  21 - 32 mmol/L Final    ANION GAP 10/29/2023 12  mmol/L Final    BUN 10/29/2023 30 (H)  5 - 25 mg/dL Final    Creatinine 10/29/2023 1.58 (H)  0.60 - 1.30 mg/dL Final    Standardized to IDMS reference method    Glucose 10/29/2023 163 (H)  65 - 140 mg/dL Final    If the patient is fasting, the ADA then defines impaired fasting glucose as > 100 mg/dL and diabetes as > or equal to 123 mg/dL. Calcium 10/29/2023 9.2  8.4 - 10.2 mg/dL Final    AST 10/29/2023 14  13 - 39 U/L Final    Slightly Hemolyzed:Results may be affected. ALT 10/29/2023 10  7 - 52 U/L Final    Specimen collection should occur prior to Sulfasalazine administration due to the potential for falsely depressed results. Alkaline Phosphatase 10/29/2023 107 (H)  34 - 104 U/L Final    Total Protein 10/29/2023 7.7  6.4 - 8.4 g/dL Final    Albumin 10/29/2023 3.5  3.5 - 5.0 g/dL Final    Total Bilirubin 10/29/2023 0.27  0.20 - 1.00 mg/dL Final    Use of this assay is not recommended for patients undergoing treatment with eltrombopag due to the potential for falsely elevated results. N-acetyl-p-benzoquinone imine (metabolite of Acetaminophen) will generate erroneously low results in samples for patients that have taken an overdose of Acetaminophen.     eGFR 10/29/2023 37  ml/min/1.73sq m Final    hs TnI 0hr 10/29/2023 8  "Refer to ACS Flowchart"- see link ng/L Final    Comment: Initial (time 0) result  If >=50 ng/L, Myocardial injury suggested ;  Type of myocardial injury and treatment strategy  to be determined. If 5-49 ng/L, a delta result at 2 hours and or 4 hours will be needed to further evaluate. If <4 ng/L, and chest pain has been >3 hours since onset, patient may qualify for discharge based on the HEART score in the ED. If <5 ng/L and <3hours since onset of chest pain, a delta result at 2 hours will be needed to further evaluate. HS Troponin 99th Percentile URL of a Health Population=12 ng/L with a 95% Confidence Interval of 8-18 ng/L. Second Troponin (time 2 hours)  If calculated delta >= 20 ng/L,  Myocardial injury suggested ; Type of myocardial injury and treatment strategy to be determined. If 5-49 ng/L and the calculated delta is 5-19 ng/L, consult medical service for evaluation. Continue evaluation for ischemia on ecg and other possible etiology and repeat hs troponin at 4 hours. If delta                            is <5 ng/L at 2 hours, consider discharge based on risk stratification via the HEART score (if in ED), or JUANA risk score in IP/Observation. HS Troponin 99th Percentile URL of a Health Population=12 ng/L with a 95% Confidence Interval of 8-18 ng/L.     Ventricular Rate 10/29/2023 72  BPM Final    Atrial Rate 10/29/2023 72  BPM Final    [place] Interval 10/29/2023 204  ms Final    QRSD Interval 10/29/2023 84  ms Final    QT Interval 10/29/2023 424  ms Final    QTC Interval 10/29/2023 464  ms Final    P Axis 10/29/2023 52  degrees Final    QRS Axis 10/29/2023 44  degrees Final    T Wave Axis 10/29/2023 86  degrees Final    Segmented % 10/29/2023 60  43 - 75 % Final    Lymphocytes % 10/29/2023 24  14 - 44 % Final    Monocytes % 10/29/2023 7  4 - 12 % Final    Eosinophils, % 10/29/2023 9 (H)  0 - 6 % Final    Basophils % 10/29/2023 0  0 - 1 % Final    Absolute Neutrophils 10/29/2023 5.32  1.85 - 7.62 Thousand/uL Final    Lymphocytes Absolute 10/29/2023 2.13  0.60 - 4.47 Thousand/uL Final    Monocytes Absolute 10/29/2023 0.62  0.00 - 1.22 Thousand/uL Final    Eosinophils Absolute 10/29/2023 0.80 (H)  0.00 - 0.40 Thousand/uL Final    Basophils Absolute 10/29/2023 0.00  0.00 - 0.10 Thousand/uL Final    RBC Morphology 10/29/2023 Normal   Final    Platelet Estimate 59/48/2435 Adequate  Adequate Final    hs TnI 2hr 10/29/2023 7  "Refer to ACS Flowchart"- see link ng/L Final    Comment:                                              Initial (time 0) result  If >=50 ng/L, Myocardial injury suggested ;  Type of myocardial injury and treatment strategy  to be determined. If 5-49 ng/L, a delta result at 2 hours and or 4 hours will be needed to further evaluate. If <4 ng/L, and chest pain has been >3 hours since onset, patient may qualify for discharge based on the HEART score in the ED. If <5 ng/L and <3hours since onset of chest pain, a delta result at 2 hours will be needed to further evaluate. HS Troponin 99th Percentile URL of a Health Population=12 ng/L with a 95% Confidence Interval of 8-18 ng/L. Second Troponin (time 2 hours)  If calculated delta >= 20 ng/L,  Myocardial injury suggested ; Type of myocardial injury and treatment strategy to be determined. If 5-49 ng/L and the calculated delta is 5-19 ng/L, consult medical service for evaluation. Continue evaluation for ischemia on ecg and other possible etiology and repeat hs troponin at 4 hours. If delta                            is <5 ng/L at 2 hours, consider discharge based on risk stratification via the HEART score (if in ED), or JUANA risk score in IP/Observation. HS Troponin 99th Percentile URL of a Health Population=12 ng/L with a 95% Confidence Interval of 8-18 ng/L.     Delta 2hr hsTnI 10/29/2023 -1  <20 ng/L Final    Ventricular Rate 10/29/2023 66  BPM Final    Atrial Rate 10/29/2023 66  BPM Final    [place] Interval 10/29/2023 212  ms Final    QRSD Interval 10/29/2023 88  ms Final    QT Interval 10/29/2023 436  ms Final    QTC Interval 10/29/2023 457  ms Final    P Axis 10/29/2023 57  degrees Final    QRS Axis 10/29/2023 53  degrees Final    T Wave Axis 10/29/2023 75  degrees Final     Please note: This report has been generated by a voice recognition software system. Therefore there may be syntax, spelling, and/or grammatical errors. Please call if you have any questions.

## 2024-02-12 ENCOUNTER — HOSPITAL ENCOUNTER (EMERGENCY)
Facility: HOSPITAL | Age: 49
End: 2024-02-13
Attending: EMERGENCY MEDICINE
Payer: COMMERCIAL

## 2024-02-12 ENCOUNTER — APPOINTMENT (EMERGENCY)
Dept: CT IMAGING | Facility: HOSPITAL | Age: 49
End: 2024-02-12
Payer: COMMERCIAL

## 2024-02-12 ENCOUNTER — APPOINTMENT (EMERGENCY)
Dept: RADIOLOGY | Facility: HOSPITAL | Age: 49
End: 2024-02-12
Payer: COMMERCIAL

## 2024-02-12 ENCOUNTER — APPOINTMENT (EMERGENCY)
Dept: NON INVASIVE DIAGNOSTICS | Facility: HOSPITAL | Age: 49
End: 2024-02-12
Payer: COMMERCIAL

## 2024-02-12 DIAGNOSIS — D64.9 ANEMIA: ICD-10-CM

## 2024-02-12 DIAGNOSIS — R79.0 LOW MAGNESIUM LEVEL: ICD-10-CM

## 2024-02-12 DIAGNOSIS — I31.39 PERICARDIAL EFFUSION: ICD-10-CM

## 2024-02-12 DIAGNOSIS — I77.6 AORTITIS (HCC): Primary | ICD-10-CM

## 2024-02-12 LAB
2HR DELTA HS TROPONIN: 1 NG/L
4HR DELTA HS TROPONIN: 1 NG/L
ALBUMIN SERPL BCP-MCNC: 3.5 G/DL (ref 3.5–5)
ALP SERPL-CCNC: 85 U/L (ref 34–104)
ALT SERPL W P-5'-P-CCNC: 9 U/L (ref 7–52)
ANION GAP SERPL CALCULATED.3IONS-SCNC: 12 MMOL/L
APTT PPP: 24 SECONDS (ref 23–37)
AST SERPL W P-5'-P-CCNC: 13 U/L (ref 13–39)
BACTERIA UR QL AUTO: NORMAL /HPF
BASOPHILS # BLD AUTO: 0.11 THOUSANDS/ÂΜL (ref 0–0.1)
BASOPHILS NFR BLD AUTO: 1 % (ref 0–1)
BILIRUB DIRECT SERPL-MCNC: 0.05 MG/DL (ref 0–0.2)
BILIRUB SERPL-MCNC: 0.2 MG/DL (ref 0.2–1)
BILIRUB UR QL STRIP: NEGATIVE
BNP SERPL-MCNC: 73 PG/ML (ref 0–100)
BUN SERPL-MCNC: 28 MG/DL (ref 5–25)
CALCIUM SERPL-MCNC: 9.2 MG/DL (ref 8.4–10.2)
CARDIAC TROPONIN I PNL SERPL HS: 10 NG/L
CARDIAC TROPONIN I PNL SERPL HS: 10 NG/L
CARDIAC TROPONIN I PNL SERPL HS: 9 NG/L
CHLORIDE SERPL-SCNC: 104 MMOL/L (ref 96–108)
CLARITY UR: CLEAR
CO2 SERPL-SCNC: 20 MMOL/L (ref 21–32)
COLOR UR: YELLOW
CREAT SERPL-MCNC: 1.48 MG/DL (ref 0.6–1.3)
CRP SERPL QL: 70.6 MG/L
D DIMER PPP FEU-MCNC: 1.15 UG/ML FEU
EOSINOPHIL # BLD AUTO: 0.72 THOUSAND/ÂΜL (ref 0–0.61)
EOSINOPHIL NFR BLD AUTO: 6 % (ref 0–6)
ERYTHROCYTE [DISTWIDTH] IN BLOOD BY AUTOMATED COUNT: 18.4 % (ref 11.6–15.1)
ERYTHROCYTE [SEDIMENTATION RATE] IN BLOOD: >130 MM/HOUR (ref 0–29)
FLUAV RNA RESP QL NAA+PROBE: NEGATIVE
FLUBV RNA RESP QL NAA+PROBE: NEGATIVE
GFR SERPL CREATININE-BSD FRML MDRD: 40 ML/MIN/1.73SQ M
GLUCOSE SERPL-MCNC: 89 MG/DL (ref 65–140)
GLUCOSE UR STRIP-MCNC: NEGATIVE MG/DL
HCT VFR BLD AUTO: 28.9 % (ref 34.8–46.1)
HGB BLD-MCNC: 8.4 G/DL (ref 11.5–15.4)
HGB UR QL STRIP.AUTO: ABNORMAL
IMM GRANULOCYTES # BLD AUTO: 0.05 THOUSAND/UL (ref 0–0.2)
IMM GRANULOCYTES NFR BLD AUTO: 0 % (ref 0–2)
INR PPP: 1.05 (ref 0.84–1.19)
KETONES UR STRIP-MCNC: NEGATIVE MG/DL
LACTATE SERPL-SCNC: 1.1 MMOL/L (ref 0.5–2)
LEUKOCYTE ESTERASE UR QL STRIP: NEGATIVE
LIPASE SERPL-CCNC: 29 U/L (ref 11–82)
LYMPHOCYTES # BLD AUTO: 3.41 THOUSANDS/ÂΜL (ref 0.6–4.47)
LYMPHOCYTES NFR BLD AUTO: 30 % (ref 14–44)
MAGNESIUM SERPL-MCNC: 1.5 MG/DL (ref 1.9–2.7)
MCH RBC QN AUTO: 24.6 PG (ref 26.8–34.3)
MCHC RBC AUTO-ENTMCNC: 29.1 G/DL (ref 31.4–37.4)
MCV RBC AUTO: 85 FL (ref 82–98)
MONOCYTES # BLD AUTO: 0.82 THOUSAND/ÂΜL (ref 0.17–1.22)
MONOCYTES NFR BLD AUTO: 7 % (ref 4–12)
NEUTROPHILS # BLD AUTO: 6.24 THOUSANDS/ÂΜL (ref 1.85–7.62)
NEUTS SEG NFR BLD AUTO: 56 % (ref 43–75)
NITRITE UR QL STRIP: NEGATIVE
NON-SQ EPI CELLS URNS QL MICRO: NORMAL /HPF
NRBC BLD AUTO-RTO: 0 /100 WBCS
PH UR STRIP.AUTO: 6 [PH]
PLATELET # BLD AUTO: 462 THOUSANDS/UL (ref 149–390)
PMV BLD AUTO: 10.3 FL (ref 8.9–12.7)
POTASSIUM SERPL-SCNC: 4.4 MMOL/L (ref 3.5–5.3)
PROCALCITONIN SERPL-MCNC: 0.06 NG/ML
PROT SERPL-MCNC: 7.8 G/DL (ref 6.4–8.4)
PROT UR STRIP-MCNC: >=300 MG/DL
PROTHROMBIN TIME: 13.6 SECONDS (ref 11.6–14.5)
RBC # BLD AUTO: 3.42 MILLION/UL (ref 3.81–5.12)
RBC #/AREA URNS AUTO: NORMAL /HPF
RSV RNA RESP QL NAA+PROBE: NEGATIVE
SARS-COV-2 RNA RESP QL NAA+PROBE: NEGATIVE
SODIUM SERPL-SCNC: 136 MMOL/L (ref 135–147)
SP GR UR STRIP.AUTO: 1.02 (ref 1–1.03)
TSH SERPL DL<=0.05 MIU/L-ACNC: 3.04 UIU/ML (ref 0.45–4.5)
UROBILINOGEN UR QL STRIP.AUTO: 0.2 E.U./DL
WBC # BLD AUTO: 11.35 THOUSAND/UL (ref 4.31–10.16)
WBC #/AREA URNS AUTO: NORMAL /HPF

## 2024-02-12 PROCEDURE — 80076 HEPATIC FUNCTION PANEL: CPT | Performed by: EMERGENCY MEDICINE

## 2024-02-12 PROCEDURE — 99291 CRITICAL CARE FIRST HOUR: CPT | Performed by: EMERGENCY MEDICINE

## 2024-02-12 PROCEDURE — 85610 PROTHROMBIN TIME: CPT | Performed by: EMERGENCY MEDICINE

## 2024-02-12 PROCEDURE — 85025 COMPLETE CBC W/AUTO DIFF WBC: CPT | Performed by: EMERGENCY MEDICINE

## 2024-02-12 PROCEDURE — 96376 TX/PRO/DX INJ SAME DRUG ADON: CPT

## 2024-02-12 PROCEDURE — 0241U HB NFCT DS VIR RESP RNA 4 TRGT: CPT | Performed by: EMERGENCY MEDICINE

## 2024-02-12 PROCEDURE — 83735 ASSAY OF MAGNESIUM: CPT | Performed by: EMERGENCY MEDICINE

## 2024-02-12 PROCEDURE — 99285 EMERGENCY DEPT VISIT HI MDM: CPT

## 2024-02-12 PROCEDURE — 93970 EXTREMITY STUDY: CPT

## 2024-02-12 PROCEDURE — 36415 COLL VENOUS BLD VENIPUNCTURE: CPT | Performed by: EMERGENCY MEDICINE

## 2024-02-12 PROCEDURE — 96365 THER/PROPH/DIAG IV INF INIT: CPT

## 2024-02-12 PROCEDURE — 87040 BLOOD CULTURE FOR BACTERIA: CPT | Performed by: EMERGENCY MEDICINE

## 2024-02-12 PROCEDURE — 71275 CT ANGIOGRAPHY CHEST: CPT

## 2024-02-12 PROCEDURE — 85379 FIBRIN DEGRADATION QUANT: CPT | Performed by: EMERGENCY MEDICINE

## 2024-02-12 PROCEDURE — 84484 ASSAY OF TROPONIN QUANT: CPT | Performed by: EMERGENCY MEDICINE

## 2024-02-12 PROCEDURE — 80048 BASIC METABOLIC PNL TOTAL CA: CPT | Performed by: EMERGENCY MEDICINE

## 2024-02-12 PROCEDURE — 74177 CT ABD & PELVIS W/CONTRAST: CPT

## 2024-02-12 PROCEDURE — G1004 CDSM NDSC: HCPCS

## 2024-02-12 PROCEDURE — 83690 ASSAY OF LIPASE: CPT | Performed by: EMERGENCY MEDICINE

## 2024-02-12 PROCEDURE — 83880 ASSAY OF NATRIURETIC PEPTIDE: CPT | Performed by: EMERGENCY MEDICINE

## 2024-02-12 PROCEDURE — 84145 PROCALCITONIN (PCT): CPT | Performed by: EMERGENCY MEDICINE

## 2024-02-12 PROCEDURE — 83605 ASSAY OF LACTIC ACID: CPT | Performed by: EMERGENCY MEDICINE

## 2024-02-12 PROCEDURE — 93970 EXTREMITY STUDY: CPT | Performed by: SURGERY

## 2024-02-12 PROCEDURE — 96375 TX/PRO/DX INJ NEW DRUG ADDON: CPT

## 2024-02-12 PROCEDURE — 81001 URINALYSIS AUTO W/SCOPE: CPT | Performed by: EMERGENCY MEDICINE

## 2024-02-12 PROCEDURE — 84443 ASSAY THYROID STIM HORMONE: CPT | Performed by: EMERGENCY MEDICINE

## 2024-02-12 PROCEDURE — 93005 ELECTROCARDIOGRAM TRACING: CPT

## 2024-02-12 PROCEDURE — 85730 THROMBOPLASTIN TIME PARTIAL: CPT | Performed by: EMERGENCY MEDICINE

## 2024-02-12 PROCEDURE — 71045 X-RAY EXAM CHEST 1 VIEW: CPT

## 2024-02-12 PROCEDURE — 85652 RBC SED RATE AUTOMATED: CPT | Performed by: EMERGENCY MEDICINE

## 2024-02-12 PROCEDURE — 86140 C-REACTIVE PROTEIN: CPT | Performed by: EMERGENCY MEDICINE

## 2024-02-12 RX ORDER — MAGNESIUM SULFATE HEPTAHYDRATE 40 MG/ML
2 INJECTION, SOLUTION INTRAVENOUS ONCE
Status: COMPLETED | OUTPATIENT
Start: 2024-02-12 | End: 2024-02-12

## 2024-02-12 RX ORDER — ONDANSETRON 2 MG/ML
4 INJECTION INTRAMUSCULAR; INTRAVENOUS ONCE
Status: COMPLETED | OUTPATIENT
Start: 2024-02-12 | End: 2024-02-12

## 2024-02-12 RX ORDER — FENTANYL CITRATE 50 UG/ML
25 INJECTION, SOLUTION INTRAMUSCULAR; INTRAVENOUS ONCE
Status: COMPLETED | OUTPATIENT
Start: 2024-02-12 | End: 2024-02-12

## 2024-02-12 RX ORDER — LABETALOL HYDROCHLORIDE 5 MG/ML
20 INJECTION, SOLUTION INTRAVENOUS ONCE
Status: COMPLETED | OUTPATIENT
Start: 2024-02-12 | End: 2024-02-12

## 2024-02-12 RX ORDER — FENTANYL CITRATE 50 UG/ML
50 INJECTION, SOLUTION INTRAMUSCULAR; INTRAVENOUS ONCE
Status: COMPLETED | OUTPATIENT
Start: 2024-02-12 | End: 2024-02-12

## 2024-02-12 RX ORDER — LABETALOL HYDROCHLORIDE 5 MG/ML
10 INJECTION, SOLUTION INTRAVENOUS ONCE
Status: COMPLETED | OUTPATIENT
Start: 2024-02-12 | End: 2024-02-12

## 2024-02-12 RX ADMIN — IOHEXOL 100 ML: 350 INJECTION, SOLUTION INTRAVENOUS at 19:29

## 2024-02-12 RX ADMIN — MAGNESIUM SULFATE HEPTAHYDRATE 2 G: 40 INJECTION, SOLUTION INTRAVENOUS at 19:54

## 2024-02-12 RX ADMIN — LABETALOL HYDROCHLORIDE 10 MG: 5 INJECTION, SOLUTION INTRAVENOUS at 23:07

## 2024-02-12 RX ADMIN — FENTANYL CITRATE 25 MCG: 50 INJECTION, SOLUTION INTRAMUSCULAR; INTRAVENOUS at 22:11

## 2024-02-12 RX ADMIN — LABETALOL HYDROCHLORIDE 20 MG: 5 INJECTION, SOLUTION INTRAVENOUS at 23:51

## 2024-02-12 RX ADMIN — ONDANSETRON 4 MG: 2 INJECTION INTRAMUSCULAR; INTRAVENOUS at 22:09

## 2024-02-12 RX ADMIN — FENTANYL CITRATE 50 MCG: 50 INJECTION, SOLUTION INTRAMUSCULAR; INTRAVENOUS at 23:05

## 2024-02-12 NOTE — ED PROVIDER NOTES
History  Chief Complaint   Patient presents with    Abdominal Pain     Patient reports L sided abdominal pain that radiates. Reports started a week ago, subsided and then returned yesterday.      49 yo female presents with chest abdominal pain.  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  Patient states she had to walk up a hill to the office and she got very short of breath with ambulation ever since yesterday she has had pain under the left breast which radiates to the back.  It seems to be worse with laying down and there is a pleuritic component.  This discomfort started yesterday she denies any leg swelling but has had some pain to the left upper thigh no history of syncope no fever chills cough or upper respiratory complaints no nausea vomiting or diarrhea.  No new medications except for [place] 6.25 mg and baby aspirin and Plavix was also added.  The discomfort when she lies on her side seems to migrate to the right side.  Patient prefers to sit up. 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 Taking?   Advair -21 MCG/ACT inhaler   Yes No   Iron-Vitamin C (Vitron-C)  MG TABS   No No   Sig: Take 1 tablet by mouth  in the morning   acetaminophen (TYLENOL) 325 mg tablet  Self Yes No   Sig: Take 650 mg by mouth every 6 (six) hours as needed for mild pain   albuterol (PROVENTIL HFA,VENTOLIN HFA) 90 mcg/act inhaler  Self Yes No   Sig: Inhale 2 puffs 2 (two) times a day   aspirin (ECOTRIN LOW STRENGTH) 81 mg EC tablet  Self Yes No   Sig: Take 81 mg by mouth daily   atorvastatin (LIPITOR) 40 mg tablet   Yes No   carvedilol (COREG) 6.25 mg tablet   Yes No   cholecalciferol (VITAMIN D3) 1,000 units tablet   No No   Sig: Take 1 tablet (1,000 Units total) by mouth daily   diclofenac sodium (VOLTAREN) 50 mg EC tablet  Self No No   Sig: Take 1 tablet (50 mg total) by mouth 2 (two) times a day   escitalopram (LEXAPRO) 10 mg tablet  Self Yes No   Sig: Take 10 mg by mouth daily   folic acid (FOLVITE) 1 mg tablet   Yes No   furosemide (LASIX) 40 mg tablet   Yes No   Sig: Take 40 mg by mouth daily   hydrochlorothiazide (MICROZIDE) 12.5 mg capsule   Yes No   levothyroxine 100 mcg tablet   Yes No   losartan (COZAAR) 100 MG tablet  Self Yes No   Sig: Take 100 mg by mouth daily    metFORMIN (GLUCOPHAGE) 500 mg tablet  Self Yes No   Sig: Take 1,000 mg by mouth 2 (two) times a day with meals   metoprolol succinate (TOPROL-XL) 50 mg 24 hr tablet   No No   Sig: TAKE (1) TABLET DAILY.   montelukast (SINGULAIR) 10 mg tablet  Self Yes No   Sig: Take 10 mg by mouth daily at bedtime   omeprazole (PriLOSEC) 20 mg delayed release capsule   Yes No      Facility-Administered Medications: None       Past Medical History:   Diagnosis Date    Asthma     Chronic anemia     Diabetes mellitus (HCC)     Hypertension     Hyperthyroidism     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HCC)     TB lung, latent        Past Surgical History:   Procedure Laterality Date     SECTION      IR BIOPSY LIVER MASS  07/15/2020    LUNG BIOPSY      THYROIDECTOMY         Family History   Problem Relation Age of Onset    Diabetes unspecified Mother      I have reviewed and agree with the history  as documented.    E-Cigarette/Vaping    E-Cigarette Use Never User      E-Cigarette/Vaping Substances    Nicotine No     THC No     CBD No     Flavoring No     Other No     Unknown No      Social History     Tobacco Use    Smoking status: Never    Smokeless tobacco: Never   Vaping Use    Vaping status: Never Used   Substance Use Topics    Alcohol use: Never     Alcohol/week: 0.0 standard drinks of alcohol    Drug use: Never       Review of Systems   Constitutional:  Positive for activity change. Negative for chills, diaphoresis and fever.   HENT:  Negative for congestion, ear pain, rhinorrhea, sneezing and sore throat.    Eyes:  Negative for discharge.   Respiratory:  Positive for shortness of breath. Negative for cough.    Cardiovascular:  Positive for chest pain. Negative for leg swelling.   Gastrointestinal:  Positive for abdominal pain. Negative for blood in stool, diarrhea, nausea and vomiting.   Endocrine: Negative for polyuria.   Genitourinary:  Negative for difficulty urinating, dysuria, frequency and urgency.   Musculoskeletal:  Positive for back pain. Negative for myalgias and neck pain.   Skin:  Negative for rash.   Neurological:  Negative for dizziness, light-headedness, numbness and headaches.   Hematological:  Negative for adenopathy.   Psychiatric/Behavioral:  Negative for confusion.    All other systems reviewed and are negative.      Physical Exam  Physical Exam  Vitals and nursing note reviewed.   Constitutional:       General: She is not in acute distress.     Appearance: She is not ill-appearing, toxic-appearing or diaphoretic.   HENT:      Right Ear: Tympanic membrane normal.      Left Ear: Tympanic membrane normal.      Nose: No congestion or rhinorrhea.      Mouth/Throat:      Mouth: Mucous membranes are moist.      Pharynx: No oropharyngeal exudate or posterior oropharyngeal erythema.   Eyes:      General:         Right eye: No discharge.         Left eye: No discharge.      Extraocular  Movements: Extraocular movements intact.      Conjunctiva/sclera: Conjunctivae normal.      Pupils: Pupils are equal, round, and reactive to light.   Cardiovascular:      Rate and Rhythm: Normal rate and regular rhythm.      Pulses: Normal pulses.   Pulmonary:      Effort: No respiratory distress.      Breath sounds: No wheezing, rhonchi or rales.   Abdominal:      General: There is no distension.      Palpations: Abdomen is soft.      Tenderness: There is no abdominal tenderness. There is no right CVA tenderness, left CVA tenderness or guarding.      Comments: No reproducable abdm tendnerness; no midline T or  L spine tenderness   Genitourinary:     Comments: Refused rectal exam  Musculoskeletal:      Cervical back: Normal range of motion and neck supple. No rigidity or tenderness.      Right lower leg: No edema.      Left lower leg: No edema.      Comments: 2+ Radial pulses symmetric   Lymphadenopathy:      Cervical: No cervical adenopathy.   Skin:     General: Skin is warm and dry.      Findings: No rash.   Neurological:      General: No focal deficit present.      Mental Status: She is alert.      Cranial Nerves: No cranial nerve deficit.      Sensory: No sensory deficit.      Motor: No weakness.      Coordination: Coordination normal.   Psychiatric:         Mood and Affect: Mood normal.         Vital Signs  ED Triage Vitals   Temperature Pulse Respirations Blood Pressure SpO2   02/12/24 1658 02/12/24 1658 02/12/24 1658 02/12/24 1658 02/12/24 1658   98 °F (36.7 °C) 87 18 (!) 199/92 95 %      Temp Source Heart Rate Source Patient Position - Orthostatic VS BP Location FiO2 (%)   02/12/24 1658 02/12/24 1658 02/12/24 1658 02/12/24 1658 --   Temporal Monitor Sitting Right arm       Pain Score       02/12/24 2150       8           Vitals:    02/12/24 2332 02/12/24 2345 02/13/24 0000 02/13/24 0100   BP: (!) 187/77 167/54 (!) 178/73 161/70   Pulse: 74 77 69 72   Patient Position - Orthostatic VS:             Visual  Acuity      ED Medications  Medications   niCARdipine (CARDENE) 25 mg (STANDARD CONCENTRATION) in sodium chloride 0.9% 250 mL (7.5 mg/hr Intravenous Rate/Dose Change 2/13/24 0106)   niCARdipine (CARDENE) 2.5 mg/mL injection **ADS Override Pull** (has no administration in time range)   magnesium sulfate 2 g/50 mL IVPB (premix) 2 g (0 g Intravenous Stopped 2/12/24 2107)   iohexol (OMNIPAQUE) 350 MG/ML injection (MULTI-DOSE) 100 mL (100 mL Intravenous Given 2/12/24 1929)   ondansetron (ZOFRAN) injection 4 mg (4 mg Intravenous Given 2/12/24 2209)   fentanyl citrate (PF) 100 MCG/2ML 25 mcg (25 mcg Intravenous Given 2/12/24 2211)   fentanyl citrate (PF) 100 MCG/2ML 50 mcg (50 mcg Intravenous Given 2/12/24 2305)   labetalol (NORMODYNE) injection 10 mg (10 mg Intravenous Given 2/12/24 2307)   labetalol (NORMODYNE) injection 20 mg (20 mg Intravenous Given 2/12/24 2351)   methylPREDNISolone sodium succinate (Solu-MEDROL) injection 110 mg (110 mg Intravenous Given 2/13/24 0041)   HYDROmorphone HCl (DILAUDID) injection 0.2 mg (0.2 mg Intravenous Given 2/13/24 0104)       Diagnostic Studies  Results Reviewed       Procedure Component Value Units Date/Time    HS Troponin I 4hr [[id number]]  (Normal) Collected: 02/12/24 2148    Lab Status: Final result Specimen: Blood from Hand, Left Updated: 02/12/24 2219     hs TnI 4hr 10 ng/L      Delta 4hr hsTnI 1 ng/L     Lactic acid, plasma (w/reflex if result > 2.0) [[id number]]  (Normal) Collected: 02/12/24 2141    Lab Status: Final result Specimen: Blood from Hand, Right Updated: 02/12/24 2212     LACTIC ACID 1.1 mmol/L     Narrative:      Result may be elevated if tourniquet was used during collection.    Blood culture #2 [[id number]] Collected: 02/12/24 2148    Lab Status: In process Specimen: Blood from Hand, Left Updated: 02/12/24 2155    Blood culture #1 [[id number]] Collected: 02/12/24 2141    Lab Status: In process Specimen: Blood from Hand, Right Updated: 02/12/24 2155     Procalcitonin [[id number]]  (Normal) Collected: 02/12/24 1736    Lab Status: Final result Specimen: Blood from Arm, Right Updated: 02/12/24 2143     Procalcitonin 0.06 ng/ml     Sedimentation rate, automated [[id number]]  (Abnormal) Collected: 02/12/24 1736    Lab Status: Final result Specimen: Blood from Arm, Right Updated: 02/12/24 2139     Sed Rate >130 mm/hour     C-reactive protein [[id number]]  (Abnormal) Collected: 02/12/24 1736    Lab Status: Final result Specimen: Blood from Arm, Right Updated: 02/12/24 2135     CRP 70.6 mg/L     HS Troponin I 2hr [[id number]]  (Normal) Collected: 02/12/24 1954    Lab Status: Final result Specimen: Blood from Arm, Left Updated: 02/12/24 2026     hs TnI 2hr 10 ng/L      Delta 2hr hsTnI 1 ng/L     Urine Microscopic [[id number]]  (Normal) Collected: 02/12/24 1838    Lab Status: Final result Specimen: Urine, Clean Catch Updated: 02/12/24 1906     RBC, UA 0-1 /hpf      WBC, UA 0-1 /hpf      Epithelial Cells Occasional /hpf      Bacteria, UA Occasional /hpf     UA w Reflex to Microscopic w Reflex to Culture [[id number]]  (Abnormal) Collected: 02/12/24 1838    Lab Status: Final result Specimen: Urine, Clean Catch Updated: 02/12/24 1901     Color, UA Yellow     Clarity, UA Clear     Specific Gravity, UA 1.020     pH, UA 6.0     Leukocytes, UA Negative     Nitrite, UA Negative     Protein, UA >=300 mg/dl      Glucose, UA Negative mg/dl      Ketones, UA Negative mg/dl      Urobilinogen, UA 0.2 E.U./dl      Bilirubin, UA Negative     Occult Blood, UA Trace-lysed    HS Troponin 0hr (reflex protocol) [[id number]]  (Normal) Collected: 02/12/24 1809    Lab Status: Final result Specimen: Blood from Arm, Left Updated: 02/12/24 1853     hs TnI 0hr 9 ng/L     Lipase [[id number]]  (Normal) Collected: 02/12/24 1736    Lab Status: Final result Specimen: Blood from Arm, Right Updated: 02/12/24 1832     Lipase 29 u/L     Hepatic function panel [[id number]]  (Normal) Collected: 02/12/24 0813    Lab Status:  Final result Specimen: Blood from Arm, Right Updated: 02/12/24 1832     Total Bilirubin 0.20 mg/dL      Bilirubin, Direct 0.05 mg/dL      Alkaline Phosphatase 85 U/L      AST 13 U/L      ALT 9 U/L      Total Protein 7.8 g/dL      Albumin 3.5 g/dL     Magnesium [[id number]]  (Abnormal) Collected: 02/12/24 1736    Lab Status: Final result Specimen: Blood from Arm, Right Updated: 02/12/24 1832     Magnesium 1.5 mg/dL     Basic metabolic panel [[id number]]  (Abnormal) Collected: 02/12/24 1736    Lab Status: Final result Specimen: Blood from Arm, Right Updated: 02/12/24 1832     Sodium 136 mmol/L      Potassium 4.4 mmol/L      Chloride 104 mmol/L      CO2 20 mmol/L      ANION GAP 12 mmol/L      BUN 28 mg/dL      Creatinine 1.48 mg/dL      Glucose 89 mg/dL      Calcium 9.2 mg/dL      eGFR 40 ml/min/1.73sq m     Narrative:      National Kidney Disease Foundation guidelines for Chronic Kidney Disease (CKD):     Stage 1 with normal or high GFR (GFR > 90 mL/min/1.73 square meters)    Stage 2 Mild CKD (GFR = 60-89 mL/min/1.73 square meters)    Stage 3A Moderate CKD (GFR = 45-59 mL/min/1.73 square meters)    Stage 3B Moderate CKD (GFR = 30-44 mL/min/1.73 square meters)    Stage 4 Severe CKD (GFR = 15-29 mL/min/1.73 square meters)    Stage 5 End Stage CKD (GFR <15 mL/min/1.73 square meters)  Note: GFR calculation is accurate only with a steady state creatinine    FLU/RSV/COVID - if FLU/RSV clinically relevant [[id number]]  (Normal) Collected: 02/12/24 1736    Lab Status: Final result Specimen: Nares from Nose Updated: 02/12/24 1822     SARS-CoV-2 Negative     INFLUENZA A PCR Negative     INFLUENZA B PCR Negative     RSV PCR Negative    Narrative:      FOR PEDIATRIC PATIENTS - copy/paste COVID Guidelines URL to browser: [email or web address]    SARS-CoV-2 assay is a Nucleic Acid Amplification assay intended for the  qualitative detection of nucleic acid from SARS-CoV-2 in  nasopharyngeal  swabs. Results are for the presumptive identification of SARS-CoV-2 RNA.    Positive results are indicative of infection with SARS-CoV-2, the virus  causing COVID-19, but do not rule out bacterial infection or co-infection  with other viruses. Laboratories within the [place] and its  territories are required to report all positive results to the appropriate  public health authorities. Negative results do not preclude SARS-CoV-2  infection and should not be used as the sole basis for treatment or other  patient management decisions. Negative results must be combined with  clinical observations, patient history, and epidemiological information.  This test has not been FDA cleared or approved.    This test has been authorized by FDA under an Emergency Use Authorization  (EUA). This test is only authorized for the duration of time the  declaration that circumstances exist justifying the authorization of the  emergency use of an in vitro diagnostic tests for detection of SARS-CoV-2  virus and/or diagnosis of COVID-19 infection under section 564(b)(1) of  the Act, 21 U.S.C. 360bbb-3(b)(1), unless the authorization is terminated  or revoked sooner. The test has been validated but independent review by FDA  and CLIA is pending.    Test performed using [employer] GeneXpert: This RT-PCR assay targets N2,  a region unique to SARS-CoV-2. A conserved region in the E-gene was chosen  for pan-Sarbecovirus detection which includes SARS-CoV-2.    According to CMS-2020-01-R, this platform meets the definition of high-throughput technology.    TSH, 3rd generation with Free T4 reflex [[id number]]  (Normal) Collected: 02/12/24 1736    Lab Status: Final result Specimen: Blood from Arm, Right Updated: 02/12/24 1819     TSH 3RD GENERATON 3.040 uIU/mL     B-Type Natriuretic Peptide(BNP) [[id number]]  (Normal) Collected: 02/12/24 1736    Lab Status: Final result Specimen: Blood from Arm, Right Updated: 02/12/24 1809     BNP  73 pg/mL     D-Dimer [[id number]]  (Abnormal) Collected: 02/12/24 1736    Lab Status: Final result Specimen: Blood from Arm, Right Updated: 02/12/24 1806     D-Dimer, Quant 1.15 ug/ml FEU     Narrative:      In the evaluation for possible pulmonary embolism, in the appropriate (Well's Score of 4 or less) patient, the age adjusted d-dimer cutoff for this patient can be calculated as:    Age x 0.01 (in ug/mL) for Age-adjusted D-dimer exclusion threshold for a patient over 50 years.    Protime-INR [[id number]]  (Normal) Collected: 02/12/24 1736    Lab Status: Final result Specimen: Blood from Arm, Right Updated: 02/12/24 1754     Protime 13.6 seconds      INR 1.05    APTT [[id number]]  (Normal) Collected: 02/12/24 1736    Lab Status: Final result Specimen: Blood from Arm, Right Updated: 02/12/24 1754     PTT 24 seconds     CBC and differential [[id number]]  (Abnormal) Collected: 02/12/24 1736    Lab Status: Final result Specimen: Blood from Arm, Right Updated: 02/12/24 1744     WBC 11.35 Thousand/uL      RBC 3.42 Million/uL      Hemoglobin 8.4 g/dL      Hematocrit 28.9 %      MCV 85 fL      MCH 24.6 pg      MCHC 29.1 g/dL      RDW 18.4 %      MPV 10.3 fL      Platelets 462 Thousands/uL      nRBC 0 /100 WBCs      Neutrophils Relative 56 %      Immat GRANS % 0 %      Lymphocytes Relative 30 %      Monocytes Relative 7 %      Eosinophils Relative 6 %      Basophils Relative 1 %      Neutrophils Absolute 6.24 Thousands/µL      Immature Grans Absolute 0.05 Thousand/uL      Lymphocytes Absolute 3.41 Thousands/µL      Monocytes Absolute 0.82 Thousand/µL      Eosinophils Absolute 0.72 Thousand/µL      Basophils Absolute 0.11 Thousands/µL                    PE Study with CT Abdomen and Pelvis with contrast   Final Result by Alvin Reynoso MD (02/12 2032)      No pulmonary embolism.      Findings of acute on chronic aortitis with increased soft tissue thickening of the thoracic aorta as well as new inflammation involving the celiac  axis. Associated new moderate pericardial effusion.      The ascending thoracic aorta measures up to 4.0 cm in caliber, unchanged.      Unchanged dilation of the main pulmonary artery suggestive of pulmonary arterial hypertension.      The study was marked in EPIC for immediate notification.               Workstation performed: [id number]          VAS VENOUS DUPLEX - LOWER LIMB BILATERAL   Final Result by Fernando Crenshaw DO (02/12 2048)      XR chest 1 view portable   ED Interpretation by Lynda Hsu MD (02/12 1801)   Per my independent interpretation. Radiologist to provide formal read. No acute process no PTX no signif change vs. 12/29/2023                 Procedures  ECG 12 Lead Documentation Only    Date/Time: 2/12/2024 8:12 PM    Performed by: Lynda Hsu MD  Authorized by: Lynda Hsu MD    Indications / Diagnosis:  Chest pain  ECG reviewed by me, the ED Provider: yes    Patient location:  ED  Previous ECG:     Previous ECG:  Compared to current    Comparison ECG info:  10/29/23 10:43    Similarity:  No change  Rate:     ECG rate:  78    ECG rate assessment: normal    Rhythm:     Rhythm: sinus rhythm    QRS:     QRS axis:  Normal  Comments:       nonspecific twave changes  CriticalCare Time    Date/Time: 2/13/2024 1:11 AM    Performed by: Lynda Hsu MD  Authorized by: Lynda Hsu MD    Critical care provider statement:     Critical care time (minutes):  45    Critical care time was exclusive of:  Separately billable procedures and treating other patients and teaching time    Critical care was necessary to treat or prevent imminent or life-threatening deterioration of the following conditions:  Circulatory failure (vasculitis)    Critical care was time spent personally by me on the following activities:  Obtaining history from patient or surrogate, development of treatment plan with patient or surrogate, discussions with consultants, evaluation  of patient's response to treatment, examination of patient, ordering and performing treatments and interventions, ordering and review of laboratory studies, ordering and review of radiographic studies, re-evaluation of patient's condition and review of old charts    I assumed direction of critical care for this patient from another provider in my specialty: no             ED Course  ED Course as of 02/13/24 0112   Mon Feb 12, 2024   1926 Prelim venous doppler u/s negative for DVT bilaterally per vasc tech Curt   2119 Reviewed findings of increased soft tissue swelling around aorta. Prior records from 2020 reviewed ID workup neg rheumatology recommeded arely recommends transfer to [facility] for multidisciplinary eval   2301 Patient expresses desire to followup at [facility] confirmed; Dr. August of Critical care recommends conference with vasular PACs; recommends labetolol to help control BP   2342 Case d/w Dr. Pittman of vascular recommends steriods IV (does not offer dosing) recommends SBP less than 130 cardiology or CTS surgery consult for pericardial effsuions will update Dr. August via PACs  vascular will follow in consultation   2356 Dr. Alcantara recommends starting cardene gtt to control BP less than 130   Tue Feb 13, 2024   0017 Dr. August accepts patient in transfer to [facility]; recommends methylprednisone 1mg/kg IV ordered 110mg   0030 Dr Scherer assumes care for patient             HEART Risk Score      Flowsheet Row Most Recent Value   Heart Score Risk Calculator    History 1 Filed at: 02/12/2024 1833   ECG 1 Filed at: 02/12/2024 1833   Age 1 Filed at: 02/12/2024 1833   Risk Factors 2 Filed at: 02/12/2024 1833   Troponin 0 Filed at: 02/12/2024 1833   HEART Score 5 Filed at: 02/12/2024 1833                          SBIRT 22yo+      Flowsheet Row Most Recent Value   Initial Alcohol Screen: US AUDIT-C     1. How often do you have a drink containing alcohol? 0 Filed at: 02/12/2024 8547   2. How many drinks  containing alcohol do you have on a typical day you are drinking?  0 Filed at: 02/12/2024 1658   3a. Male UNDER 65: How often do you have five or more drinks on one occasion? 0 Filed at: 02/12/2024 1658   3b. FEMALE Any Age, or MALE 65+: How often do you have 4 or more drinks on one occassion? 0 Filed at: 02/12/2024 1658   Audit-C Score 0 Filed at: 02/12/2024 1658   VALERIY: How many times in the past year have you...    Used an illegal drug or used a prescription medication for non-medical reasons? Never Filed at: 02/12/2024 1658            Wells' Criteria for PE      Flowsheet Row Most Recent Value   Wells' Criteria for PE    Clinical signs and symptoms of DVT 0 Filed at: 02/12/2024 1833   PE is primary diagnosis or equally likely 3 Filed at: 02/12/2024 1833   HR >100 0 Filed at: 02/12/2024 1833   Immobilization at least 3 days or Surgery in the previous 4 weeks 1.5 Filed at: 02/12/2024 1833   Previous, objectively diagnosed PE or DVT 0 Filed at: 02/12/2024 1833   Hemoptysis 0 Filed at: 02/12/2024 1833   Malignancy with treatment within 6 months or palliative 0 Filed at: 02/12/2024 1833   Wells' Criteria Total 4.5 Filed at: 02/12/2024 1833                  Medical Decision Making  Mdm: Left-sided chest pain radiating to the back pleurisy dyspnea on exertion and dizziness will evaluate for CHF, acute coronary syndrome, pulmonary embolism patient has intact distal pulses.  Dissection is a possibility.  Will proceed with venous Doppler ultrasounds of the lower extremities to evaluate for DVT.  Plain film chest x-ray will be obtained to rule out pneumothorax.      Care everyhwere notes from vascular visit earlier today reviewed; D/C summareis and progress notes from 2020 reviewed     Amount and/or Complexity of Data Reviewed  Labs: ordered.  Radiology: ordered and independent interpretation performed.    Risk  Prescription drug management.             Disposition  Final diagnoses:   Aortitis (HCC) - acute on chronic with  involvment of celiac axis   Pericardial effusion - moderate   Low magnesium level   Anemia     Time reflects when diagnosis was documented in both MDM as applicable and the Disposition within this note       Time User Action Codes Description Comment    2/12/2024  9:22 PM Romanenko Lynda O Add [I77.6] Aortitis (HCC)     2/12/2024  9:22 PM Romanenko, Lynda O Modify [I77.6] Aortitis (HCC) acute on chroinc    2/12/2024  9:22 PM Romanenko Lynda O Add [I31.39] Pericardial effusion     2/12/2024  9:23 PM Romanenko, Lynda O Modify [I31.39] Pericardial effusion moderate    2/12/2024  9:31 PM Romanenko Lynda O Add [R79.0] Low magnesium level     2/12/2024  9:32 PM Romanenko Lynda O Add [D64.9] Anemia     2/13/2024 12:26 AM Romanjohnko Lynda O Modify [I77.6] 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 August accepts patient to [facility]               MD Documentation      Flowsheet Row Most Recent Value   Patient Condition The patient has been stabilized such that within reasonable medical probability, no material deterioration of the patient condition or the condition of the unborn child(gladys) is likely to result from the transfer   Reason for Transfer Level of Care needed not available at this facility   Benefits of Transfer Specialized equipment and/or services available at the receiving facility (Include comment)________________________   Risks of Transfer Potential for delay in receiving treatment   Accepting Physician Dr August   Accepting Facility Name, City & State  [facility] [place]    (Name & Tel number) PACS   Provider Certification General risk, such as traffic hazards, adverse weather conditions, rough terrain or turbulence, possible failure of equipment (including  vehicle or aircraft), or consequences of actions of persons outside the control of the transport personnel          RN Documentation      Flowsheet Row Most Recent Value   Accepting Facility Name, City & State  [facility] [place]    (Name & Tel number) PACS          Follow-up Information    None         Patient's Medications   Discharge Prescriptions    No medications on file       No discharge procedures on file.    PDMP Review         Value Time User    PDMP Reviewed  Yes 4/16/2020  1:21 AM Martin Vo DO            ED Provider  Electronically Signed by             Lynda Hsu MD  02/13/24 0112

## 2024-02-13 ENCOUNTER — HOSPITAL ENCOUNTER (INPATIENT)
Facility: HOSPITAL | Age: 49
LOS: 2 days | Discharge: HOME | DRG: 346 | End: 2024-02-15
Attending: ANESTHESIOLOGY | Admitting: ANESTHESIOLOGY
Payer: COMMERCIAL

## 2024-02-13 ENCOUNTER — APPOINTMENT (INPATIENT)
Dept: NON INVASIVE DIAGNOSTICS | Facility: HOSPITAL | Age: 49
DRG: 346 | End: 2024-02-13
Payer: COMMERCIAL

## 2024-02-13 VITALS
HEART RATE: 86 BPM | RESPIRATION RATE: 20 BRPM | SYSTOLIC BLOOD PRESSURE: 149 MMHG | OXYGEN SATURATION: 96 % | TEMPERATURE: 98 F | DIASTOLIC BLOOD PRESSURE: 67 MMHG

## 2024-02-13 DIAGNOSIS — I10 ESSENTIAL HYPERTENSION: ICD-10-CM

## 2024-02-13 DIAGNOSIS — K21.9 GERD (GASTROESOPHAGEAL REFLUX DISEASE): ICD-10-CM

## 2024-02-13 DIAGNOSIS — I31.39 PERICARDIAL EFFUSION: ICD-10-CM

## 2024-02-13 DIAGNOSIS — I77.1 SUBCLAVIAN ARTERY STENOSIS, LEFT (HCC): ICD-10-CM

## 2024-02-13 DIAGNOSIS — I77.6 AORTITIS (HCC): Primary | ICD-10-CM

## 2024-02-13 PROBLEM — E89.0 POST-SURGICAL HYPOTHYROIDISM: Status: ACTIVE | Noted: 2024-02-13

## 2024-02-13 PROBLEM — N18.30 CKD (CHRONIC KIDNEY DISEASE) STAGE 3, GFR 30-59 ML/MIN (HCC): Status: ACTIVE | Noted: 2024-02-13

## 2024-02-13 LAB
ALBUMIN SERPL BCP-MCNC: 3.3 G/DL (ref 3.5–5)
ALP SERPL-CCNC: 89 U/L (ref 34–104)
ALT SERPL W P-5'-P-CCNC: 9 U/L (ref 7–52)
ANA SER QL IA: NEGATIVE
ANION GAP SERPL CALCULATED.3IONS-SCNC: 10 MMOL/L
AST SERPL W P-5'-P-CCNC: 7 U/L (ref 13–39)
ATRIAL RATE: 78 BPM
ATRIAL RATE: 88 BPM
BASOPHILS # BLD AUTO: 0.06 THOUSANDS/ÂΜL (ref 0–0.1)
BASOPHILS NFR BLD AUTO: 1 % (ref 0–1)
BILIRUB SERPL-MCNC: 0.18 MG/DL (ref 0.2–1)
BUN SERPL-MCNC: 22 MG/DL (ref 5–25)
CA-I BLD-SCNC: 1.09 MMOL/L (ref 1.12–1.32)
CALCIUM ALBUM COR SERPL-MCNC: 8.7 MG/DL (ref 8.3–10.1)
CALCIUM SERPL-MCNC: 8.1 MG/DL (ref 8.4–10.2)
CHLORIDE SERPL-SCNC: 106 MMOL/L (ref 96–108)
CO2 SERPL-SCNC: 21 MMOL/L (ref 21–32)
CREAT SERPL-MCNC: 1.29 MG/DL (ref 0.6–1.3)
EOSINOPHIL # BLD AUTO: 0.07 THOUSAND/ÂΜL (ref 0–0.61)
EOSINOPHIL NFR BLD AUTO: 1 % (ref 0–6)
ERYTHROCYTE [DISTWIDTH] IN BLOOD BY AUTOMATED COUNT: 18.2 % (ref 11.6–15.1)
EST. AVERAGE GLUCOSE BLD GHB EST-MCNC: 163 MG/DL
GFR SERPL CREATININE-BSD FRML MDRD: 48 ML/MIN/1.73SQ M
GLUCOSE SERPL-MCNC: 160 MG/DL (ref 65–140)
GLUCOSE SERPL-MCNC: 171 MG/DL (ref 65–140)
GLUCOSE SERPL-MCNC: 180 MG/DL (ref 65–140)
GLUCOSE SERPL-MCNC: 191 MG/DL (ref 65–140)
GLUCOSE SERPL-MCNC: 222 MG/DL (ref 65–140)
HBA1C MFR BLD: 7.3 %
HCT VFR BLD AUTO: 27.4 % (ref 34.8–46.1)
HGB BLD-MCNC: 8.4 G/DL (ref 11.5–15.4)
IMM GRANULOCYTES # BLD AUTO: 0.06 THOUSAND/UL (ref 0–0.2)
IMM GRANULOCYTES NFR BLD AUTO: 1 % (ref 0–2)
LYMPHOCYTES # BLD AUTO: 1.06 THOUSANDS/ÂΜL (ref 0.6–4.47)
LYMPHOCYTES NFR BLD AUTO: 10 % (ref 14–44)
MAGNESIUM SERPL-MCNC: 1.9 MG/DL (ref 1.9–2.7)
MCH RBC QN AUTO: 24.3 PG (ref 26.8–34.3)
MCHC RBC AUTO-ENTMCNC: 30.7 G/DL (ref 31.4–37.4)
MCV RBC AUTO: 79 FL (ref 82–98)
MONOCYTES # BLD AUTO: 0.14 THOUSAND/ÂΜL (ref 0.17–1.22)
MONOCYTES NFR BLD AUTO: 1 % (ref 4–12)
NEUTROPHILS # BLD AUTO: 8.91 THOUSANDS/ÂΜL (ref 1.85–7.62)
NEUTS SEG NFR BLD AUTO: 86 % (ref 43–75)
NRBC BLD AUTO-RTO: 0 /100 WBCS
P AXIS: 32 DEGREES
P AXIS: 61 DEGREES
PHOSPHATE SERPL-MCNC: 3.3 MG/DL (ref 2.7–4.5)
PLATELET # BLD AUTO: 447 THOUSANDS/UL (ref 149–390)
PMV BLD AUTO: 10 FL (ref 8.9–12.7)
POTASSIUM SERPL-SCNC: 3.5 MMOL/L (ref 3.5–5.3)
PR INTERVAL: 180 MS
PR INTERVAL: 202 MS
PROT SERPL-MCNC: 7.7 G/DL (ref 6.4–8.4)
QRS AXIS: 43 DEGREES
QRS AXIS: 43 DEGREES
QRSD INTERVAL: 78 MS
QRSD INTERVAL: 84 MS
QT INTERVAL: 396 MS
QT INTERVAL: 420 MS
QTC INTERVAL: 451 MS
QTC INTERVAL: 508 MS
RBC # BLD AUTO: 3.46 MILLION/UL (ref 3.81–5.12)
SODIUM SERPL-SCNC: 137 MMOL/L (ref 135–147)
T WAVE AXIS: 62 DEGREES
T WAVE AXIS: 66 DEGREES
TREPONEMA PALLIDUM IGG+IGM AB [PRESENCE] IN SERUM OR PLASMA BY IMMUNOASSAY: NORMAL
VENTRICULAR RATE: 78 BPM
VENTRICULAR RATE: 88 BPM
WBC # BLD AUTO: 10.3 THOUSAND/UL (ref 4.31–10.16)

## 2024-02-13 PROCEDURE — 93010 ELECTROCARDIOGRAM REPORT: CPT | Performed by: INTERNAL MEDICINE

## 2024-02-13 PROCEDURE — 84100 ASSAY OF PHOSPHORUS: CPT | Performed by: PHYSICIAN ASSISTANT

## 2024-02-13 PROCEDURE — 86037 ANCA TITER EACH ANTIBODY: CPT | Performed by: NURSE PRACTITIONER

## 2024-02-13 PROCEDURE — 80053 COMPREHEN METABOLIC PANEL: CPT | Performed by: PHYSICIAN ASSISTANT

## 2024-02-13 PROCEDURE — 96367 TX/PROPH/DG ADDL SEQ IV INF: CPT

## 2024-02-13 PROCEDURE — 83735 ASSAY OF MAGNESIUM: CPT | Performed by: PHYSICIAN ASSISTANT

## 2024-02-13 PROCEDURE — 99222 1ST HOSP IP/OBS MODERATE 55: CPT | Performed by: SURGERY

## 2024-02-13 PROCEDURE — 96375 TX/PRO/DX INJ NEW DRUG ADDON: CPT

## 2024-02-13 PROCEDURE — 99223 1ST HOSP IP/OBS HIGH 75: CPT | Performed by: ANESTHESIOLOGY

## 2024-02-13 PROCEDURE — 93306 TTE W/DOPPLER COMPLETE: CPT | Performed by: INTERNAL MEDICINE

## 2024-02-13 PROCEDURE — 86038 ANTINUCLEAR ANTIBODIES: CPT | Performed by: NURSE PRACTITIONER

## 2024-02-13 PROCEDURE — 86780 TREPONEMA PALLIDUM: CPT | Performed by: NURSE PRACTITIONER

## 2024-02-13 PROCEDURE — 83520 IMMUNOASSAY QUANT NOS NONAB: CPT | Performed by: NURSE PRACTITIONER

## 2024-02-13 PROCEDURE — 93306 TTE W/DOPPLER COMPLETE: CPT

## 2024-02-13 PROCEDURE — 87040 BLOOD CULTURE FOR BACTERIA: CPT | Performed by: PHYSICIAN ASSISTANT

## 2024-02-13 PROCEDURE — 86430 RHEUMATOID FACTOR TEST QUAL: CPT | Performed by: NURSE PRACTITIONER

## 2024-02-13 PROCEDURE — 93005 ELECTROCARDIOGRAM TRACING: CPT

## 2024-02-13 PROCEDURE — NC001 PR NO CHARGE: Performed by: INTERNAL MEDICINE

## 2024-02-13 PROCEDURE — 83036 HEMOGLOBIN GLYCOSYLATED A1C: CPT | Performed by: PHYSICIAN ASSISTANT

## 2024-02-13 PROCEDURE — 82948 REAGENT STRIP/BLOOD GLUCOSE: CPT

## 2024-02-13 PROCEDURE — 86225 DNA ANTIBODY NATIVE: CPT | Performed by: NURSE PRACTITIONER

## 2024-02-13 PROCEDURE — 82330 ASSAY OF CALCIUM: CPT | Performed by: PHYSICIAN ASSISTANT

## 2024-02-13 PROCEDURE — 85025 COMPLETE CBC W/AUTO DIFF WBC: CPT | Performed by: ANESTHESIOLOGY

## 2024-02-13 PROCEDURE — 99222 1ST HOSP IP/OBS MODERATE 55: CPT | Performed by: INTERNAL MEDICINE

## 2024-02-13 RX ORDER — METHYLPREDNISOLONE SODIUM SUCCINATE 125 MG/2ML
110 INJECTION, POWDER, LYOPHILIZED, FOR SOLUTION INTRAMUSCULAR; INTRAVENOUS DAILY
Status: DISCONTINUED | OUTPATIENT
Start: 2024-02-14 | End: 2024-02-14

## 2024-02-13 RX ORDER — PANTOPRAZOLE SODIUM 40 MG/1
40 TABLET, DELAYED RELEASE ORAL
Status: DISCONTINUED | OUTPATIENT
Start: 2024-02-13 | End: 2024-02-15 | Stop reason: HOSPADM

## 2024-02-13 RX ORDER — CLOPIDOGREL BISULFATE 75 MG/1
75 TABLET ORAL DAILY
Status: DISCONTINUED | OUTPATIENT
Start: 2024-02-13 | End: 2024-02-15 | Stop reason: HOSPADM

## 2024-02-13 RX ORDER — ALBUTEROL SULFATE 90 UG/1
2 AEROSOL, METERED RESPIRATORY (INHALATION) 2 TIMES DAILY
Status: DISCONTINUED | OUTPATIENT
Start: 2024-02-13 | End: 2024-02-15 | Stop reason: HOSPADM

## 2024-02-13 RX ORDER — LOSARTAN POTASSIUM 50 MG/1
100 TABLET ORAL DAILY
Status: DISCONTINUED | OUTPATIENT
Start: 2024-02-13 | End: 2024-02-15 | Stop reason: HOSPADM

## 2024-02-13 RX ORDER — HYDROMORPHONE HCL IN WATER/PF 6 MG/30 ML
0.2 PATIENT CONTROLLED ANALGESIA SYRINGE INTRAVENOUS ONCE
Status: COMPLETED | OUTPATIENT
Start: 2024-02-13 | End: 2024-02-13

## 2024-02-13 RX ORDER — INSULIN LISPRO 100 [IU]/ML
2-12 INJECTION, SOLUTION INTRAVENOUS; SUBCUTANEOUS
Status: DISCONTINUED | OUTPATIENT
Start: 2024-02-13 | End: 2024-02-15 | Stop reason: HOSPADM

## 2024-02-13 RX ORDER — NICARDIPINE HYDROCHLORIDE 2.5 MG/ML
INJECTION INTRAVENOUS
Status: DISCONTINUED
Start: 2024-02-13 | End: 2024-02-13 | Stop reason: HOSPADM

## 2024-02-13 RX ORDER — FLUTICASONE PROPIONATE AND SALMETEROL XINAFOATE 230; 21 UG/1; UG/1
2 AEROSOL, METERED RESPIRATORY (INHALATION)
Status: DISCONTINUED | OUTPATIENT
Start: 2024-02-13 | End: 2024-02-15 | Stop reason: HOSPADM

## 2024-02-13 RX ORDER — MAGNESIUM SULFATE HEPTAHYDRATE 40 MG/ML
2 INJECTION, SOLUTION INTRAVENOUS ONCE
Status: COMPLETED | OUTPATIENT
Start: 2024-02-13 | End: 2024-02-13

## 2024-02-13 RX ORDER — ATORVASTATIN CALCIUM 40 MG/1
40 TABLET, FILM COATED ORAL
Status: DISCONTINUED | OUTPATIENT
Start: 2024-02-13 | End: 2024-02-15 | Stop reason: HOSPADM

## 2024-02-13 RX ORDER — LEVOTHYROXINE SODIUM 0.1 MG/1
100 TABLET ORAL
Status: DISCONTINUED | OUTPATIENT
Start: 2024-02-13 | End: 2024-02-15 | Stop reason: HOSPADM

## 2024-02-13 RX ORDER — LABETALOL HYDROCHLORIDE 5 MG/ML
10 INJECTION, SOLUTION INTRAVENOUS EVERY 4 HOURS PRN
Status: DISCONTINUED | OUTPATIENT
Start: 2024-02-13 | End: 2024-02-14

## 2024-02-13 RX ORDER — INSULIN LISPRO 100 [IU]/ML
2-12 INJECTION, SOLUTION INTRAVENOUS; SUBCUTANEOUS EVERY 6 HOURS SCHEDULED
Status: DISCONTINUED | OUTPATIENT
Start: 2024-02-13 | End: 2024-02-13

## 2024-02-13 RX ORDER — AMLODIPINE BESYLATE 5 MG/1
5 TABLET ORAL DAILY
Status: DISCONTINUED | OUTPATIENT
Start: 2024-02-14 | End: 2024-02-14

## 2024-02-13 RX ORDER — CARVEDILOL 6.25 MG/1
6.25 TABLET ORAL 2 TIMES DAILY WITH MEALS
Status: DISCONTINUED | OUTPATIENT
Start: 2024-02-13 | End: 2024-02-13

## 2024-02-13 RX ORDER — HEPARIN SODIUM 5000 [USP'U]/ML
5000 INJECTION, SOLUTION INTRAVENOUS; SUBCUTANEOUS EVERY 8 HOURS SCHEDULED
Status: DISCONTINUED | OUTPATIENT
Start: 2024-02-13 | End: 2024-02-15

## 2024-02-13 RX ORDER — POTASSIUM CHLORIDE 20 MEQ/1
40 TABLET, EXTENDED RELEASE ORAL ONCE
Status: COMPLETED | OUTPATIENT
Start: 2024-02-13 | End: 2024-02-13

## 2024-02-13 RX ORDER — METHYLPREDNISOLONE SODIUM SUCCINATE 125 MG/2ML
110 INJECTION, POWDER, LYOPHILIZED, FOR SOLUTION INTRAMUSCULAR; INTRAVENOUS ONCE
Status: COMPLETED | OUTPATIENT
Start: 2024-02-13 | End: 2024-02-13

## 2024-02-13 RX ORDER — CHLORHEXIDINE GLUCONATE ORAL RINSE 1.2 MG/ML
15 SOLUTION DENTAL EVERY 12 HOURS SCHEDULED
Status: DISCONTINUED | OUTPATIENT
Start: 2024-02-13 | End: 2024-02-14

## 2024-02-13 RX ORDER — ESCITALOPRAM OXALATE 10 MG/1
10 TABLET ORAL DAILY
Status: DISCONTINUED | OUTPATIENT
Start: 2024-02-13 | End: 2024-02-15 | Stop reason: HOSPADM

## 2024-02-13 RX ORDER — CARVEDILOL 12.5 MG/1
12.5 TABLET ORAL 2 TIMES DAILY WITH MEALS
Status: DISCONTINUED | OUTPATIENT
Start: 2024-02-13 | End: 2024-02-14

## 2024-02-13 RX ADMIN — HEPARIN SODIUM 5000 UNITS: 5000 INJECTION INTRAVENOUS; SUBCUTANEOUS at 21:52

## 2024-02-13 RX ADMIN — PANTOPRAZOLE SODIUM 40 MG: 40 TABLET, DELAYED RELEASE ORAL at 05:36

## 2024-02-13 RX ADMIN — ATORVASTATIN CALCIUM 40 MG: 40 TABLET, FILM COATED ORAL at 15:38

## 2024-02-13 RX ADMIN — LOSARTAN POTASSIUM 100 MG: 50 TABLET, FILM COATED ORAL at 08:14

## 2024-02-13 RX ADMIN — Medication 10 MG: at 14:07

## 2024-02-13 RX ADMIN — ESCITALOPRAM OXALATE 10 MG: 10 TABLET ORAL at 08:14

## 2024-02-13 RX ADMIN — LEVOTHYROXINE SODIUM 100 MCG: 100 TABLET ORAL at 05:36

## 2024-02-13 RX ADMIN — INSULIN LISPRO 2 UNITS: 100 INJECTION, SOLUTION INTRAVENOUS; SUBCUTANEOUS at 21:52

## 2024-02-13 RX ADMIN — ASPIRIN 81 MG: 81 TABLET, COATED ORAL at 08:15

## 2024-02-13 RX ADMIN — NICARDIPINE HYDROCHLORIDE 6 MG/HR: 2.5 INJECTION, SOLUTION INTRAVENOUS at 13:16

## 2024-02-13 RX ADMIN — POTASSIUM CHLORIDE 40 MEQ: 1500 TABLET, EXTENDED RELEASE ORAL at 09:45

## 2024-02-13 RX ADMIN — NICARDIPINE HYDROCHLORIDE 15 MG/HR: 2.5 INJECTION, SOLUTION INTRAVENOUS at 05:57

## 2024-02-13 RX ADMIN — Medication 10 MG: at 20:37

## 2024-02-13 RX ADMIN — NICARDIPINE HYDROCHLORIDE 15 MG/HR: 2.5 INJECTION, SOLUTION INTRAVENOUS at 04:11

## 2024-02-13 RX ADMIN — HYDROMORPHONE HYDROCHLORIDE 0.2 MG: 0.2 INJECTION, SOLUTION INTRAMUSCULAR; INTRAVENOUS; SUBCUTANEOUS at 01:04

## 2024-02-13 RX ADMIN — INSULIN LISPRO 4 UNITS: 100 INJECTION, SOLUTION INTRAVENOUS; SUBCUTANEOUS at 18:38

## 2024-02-13 RX ADMIN — INSULIN LISPRO 2 UNITS: 100 INJECTION, SOLUTION INTRAVENOUS; SUBCUTANEOUS at 11:53

## 2024-02-13 RX ADMIN — HEPARIN SODIUM 5000 UNITS: 5000 INJECTION INTRAVENOUS; SUBCUTANEOUS at 05:56

## 2024-02-13 RX ADMIN — CLOPIDOGREL BISULFATE 75 MG: 75 TABLET ORAL at 13:19

## 2024-02-13 RX ADMIN — CARVEDILOL 6.25 MG: 6.25 TABLET, FILM COATED ORAL at 08:15

## 2024-02-13 RX ADMIN — CARVEDILOL 12.5 MG: 12.5 TABLET, FILM COATED ORAL at 15:38

## 2024-02-13 RX ADMIN — INSULIN LISPRO 2 UNITS: 100 INJECTION, SOLUTION INTRAVENOUS; SUBCUTANEOUS at 07:03

## 2024-02-13 RX ADMIN — METHYLPREDNISOLONE SODIUM SUCCINATE 110 MG: 125 INJECTION, POWDER, FOR SOLUTION INTRAMUSCULAR; INTRAVENOUS at 00:41

## 2024-02-13 RX ADMIN — NICARDIPINE HYDROCHLORIDE 10 MG/HR: 2.5 INJECTION, SOLUTION INTRAVENOUS at 08:24

## 2024-02-13 RX ADMIN — ALBUTEROL SULFATE 2 PUFF: 90 AEROSOL, METERED RESPIRATORY (INHALATION) at 08:11

## 2024-02-13 RX ADMIN — CHLORHEXIDINE GLUCONATE 15 ML: 1.2 SOLUTION ORAL at 08:14

## 2024-02-13 RX ADMIN — HEPARIN SODIUM 5000 UNITS: 5000 INJECTION INTRAVENOUS; SUBCUTANEOUS at 13:20

## 2024-02-13 RX ADMIN — MAGNESIUM SULFATE HEPTAHYDRATE 2 G: 40 INJECTION, SOLUTION INTRAVENOUS at 09:44

## 2024-02-13 RX ADMIN — CHLORHEXIDINE GLUCONATE 15 ML: 1.2 SOLUTION ORAL at 21:52

## 2024-02-13 RX ADMIN — ALBUTEROL SULFATE 2 PUFF: 90 AEROSOL, METERED RESPIRATORY (INHALATION) at 17:49

## 2024-02-13 RX ADMIN — NICARDIPINE HYDROCHLORIDE 6.5 MG/HR: 2.5 INJECTION, SOLUTION INTRAVENOUS at 17:48

## 2024-02-13 RX ADMIN — SODIUM CHLORIDE 5 MG/HR: 0.9 INJECTION, SOLUTION INTRAVENOUS at 00:45

## 2024-02-13 NOTE — RESTORATIVE TECHNICIAN NOTE
Restorative Technician Note      Patient Name: Tatiana Balbuena     Note Type: Mobility  Patient Position Upon Consult: Seated edge of bed  Activity Performed: Ambulated

## 2024-02-13 NOTE — CONSULTS
Consultation - Cardiology  Tatiana Balbuena 48 y.o. female MRN: [id number]  Unit/Bed#: [facility] 520-01 Encounter: [id number]      Inpatient consult to Cardiology  Consult performed by: Jose Roberto Antony MD  Consult ordered by: Kaylee Griffiths PA-C          History of Present Illness   Physician Requesting Consult: Mando August DO  Reason for Consult / Principal Problem: aortitis     Assessment/Plan   Assessment/Plan:    #aortitis with subclavian artery stenosis s/p stenting, now celiac axis vasculitis.   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 Prior work up for aortitis included negative infectious work up except for + TB Gold.  Her autoimmune work up revealed negative SALEEM, negative Mitochoncrial AB, Negative anti-smooth muscle AB IGG.  GI symptoms likely due to celiac involvement of arteritis. She was started on IV solumedrol.   -IV solumedrol  -on aspirin/plavix for subclavian artery stent, on atorvastatin.  -recommend rheumatology consult for further steroid sparring agents.     #moderate pericardial effusion seen on CT. Mild chest pain which is now resolved but it was pleuritic and worse with laying flat so possible pericarditis.  CRP elevated but could also be from vasculitis. She is on steroids for aortitis.  If any further pericarditis like pain should start colchicine.  -TTE    #HTN: home regimen losartan 100mg daily, carvedilol 6.25mg BID, hctz 12.5mg, lasix 40mg.   Was started on cardene 15/mg.  Will transition back to home meds.     #latent TB: tried treatment but could not tolerate. TB not thought to be the cause of aortitis since it typically presents with miliary TB.       HPI: Tatiana Balbuena is a 48 y.o. 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 She has had chest pain and shortness  of breath with ambulation.  It is worse laying down or taking a deep breath in. She also reports 3 weeks of nausea, low appetite and general malaise.     In the ED:  EKG: nsr, long qt, nonspecific ST changes.   CRP 70  Hgb 8.4  Wbc 10    CTA chest  Acute on chronic aortitis with increased soft tissue thickening of the thoracic aorta and celiac axis.   The ascending thoracic aorta measures up to 4.0 cm in caliber, unchanged.   Pericardial effusion    Prior work up  Her infectious work up was negative except for + TB Gold.  Her autoimmune work up revealed negative SALEEM, negative Mitochoncrial AB, Negative anti-smooth muscle AB IGG, CRP-187.   Historical Information   Past Medical History:   Diagnosis Date    Asthma     Chronic anemia     Diabetes mellitus (HCC)     Hypertension     Hyperthyroidism     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HCC)     TB lung, latent      Past Surgical History:   Procedure Laterality Date     SECTION      IR BIOPSY LIVER MASS  07/15/2020    LUNG BIOPSY      THYROIDECTOMY       Social History     Substance and Sexual Activity   Alcohol Use Never    Alcohol/week: 0.0 standard drinks of alcohol     Social History     Substance and Sexual Activity   Drug Use Never     Social History     Tobacco Use   Smoking Status Never   Smokeless Tobacco Never     Family history   No family history of aortitis.     Meds/Allergies   Hospital Medications:   Current Facility-Administered Medications   Medication Dose Route Frequency    albuterol (PROVENTIL HFA,VENTOLIN HFA) inhaler 2 puff  2 puff Inhalation BID    aspirin (ECOTRIN LOW STRENGTH) EC tablet 81 mg  81 mg Oral Daily    atorvastatin (LIPITOR) tablet 40 mg  40 mg Oral Daily With Dinner    carvedilol (COREG) tablet 6.25 mg  6.25 mg Oral BID With Meals    chlorhexidine (PERIDEX) 0.12 % oral rinse 15 mL  15 mL Mouth/Throat Q12H [facility]    escitalopram (LEXAPRO) tablet 10 mg  10 mg Oral Daily    fluticasone-salmeterol (ADVAIR HFA) 230-21 MCG/ACT inhaler 2 puff  2  "puff Inhalation Q12H    heparin (porcine) subcutaneous injection 5,000 Units  5,000 Units Subcutaneous Q8H [facility]    insulin lispro (HumaLOG) 100 units/mL subcutaneous injection 2-12 Units  2-12 Units Subcutaneous Q6H [facility]    labetalol (NORMODYNE) injection 10 mg  10 mg Intravenous Q4H PRN    levothyroxine tablet 100 mcg  100 mcg Oral Early Morning    losartan (COZAAR) tablet 100 mg  100 mg Oral Daily    magnesium sulfate 2 g/50 mL IVPB (premix) 2 g  2 g Intravenous Once    [START ON 2/14/2024] methylPREDNISolone sodium succinate (Solu-MEDROL) injection 110 mg  110 mg Intravenous Daily    niCARdipine (CARDENE) 25 mg (STANDARD CONCENTRATION) in sodium chloride 0.9% 250 mL  1-15 mg/hr Intravenous Titrated    pantoprazole (PROTONIX) EC tablet 40 mg  40 mg Oral Early Morning     Home Medications:   Medications Prior to Admission   Medication    acetaminophen (TYLENOL) 325 mg tablet    Advair -21 MCG/ACT inhaler    albuterol (PROVENTIL HFA,VENTOLIN HFA) 90 mcg/act inhaler    aspirin (ECOTRIN LOW STRENGTH) 81 mg EC tablet    atorvastatin (LIPITOR) 40 mg tablet    carvedilol (COREG) 6.25 mg tablet    cholecalciferol (VITAMIN D3) 1,000 units tablet    diclofenac sodium (VOLTAREN) 50 mg EC tablet    escitalopram (LEXAPRO) 10 mg tablet    folic acid (FOLVITE) 1 mg tablet    furosemide (LASIX) 40 mg tablet    hydrochlorothiazide (MICROZIDE) 12.5 mg capsule    Iron-Vitamin C (Vitron-C)  MG TABS    levothyroxine 100 mcg tablet    losartan (COZAAR) 100 MG tablet    metFORMIN (GLUCOPHAGE) 500 mg tablet    metoprolol succinate (TOPROL-XL) 50 mg 24 hr tablet    montelukast (SINGULAIR) 10 mg tablet    omeprazole (PriLOSEC) 20 mg delayed release capsule       Allergies   Allergen Reactions    Other Allergic Rhinitis     pollen       Objective   Vitals: Blood pressure 116/55, pulse 102, temperature 98.1 °F (36.7 °C), resp. rate 20, height 5' 3\" (1.6 m), weight 103 kg (226 lb 13.7 oz), last menstrual period 04/04/2020, SpO2 " 95%.  Orthostatic Blood Pressures      Flowsheet Row Most Recent Value   Blood Pressure 116/55 filed at 02/13/2024 0820   Patient Position - Orthostatic VS Lying filed at 02/13/2024 0805              Intake/Output Summary (Last 24 hours) at 2/13/2024 0948  Last data filed at 2/13/2024 0818  Gross per 24 hour   Intake 270 ml   Output 700 ml   Net -430 ml       Invasive Devices       Peripheral Intravenous Line  Duration             Peripheral IV 02/12/24 Dorsal (posterior);Right Hand <1 day    Peripheral IV 02/12/24 Left Antecubital <1 day    Peripheral IV 02/13/24 Right;Ventral (anterior) Forearm <1 day                    Review of Systems:  ROS  ROS as noted above, otherwise 12 point review of systems was performed and is negative.     Physical Exam:   Physical Exam  Constitutional:       Appearance: Normal appearance.   HENT:      Head: Normocephalic and atraumatic.   Neck:      Vascular: No JVD   Cardiovascular:      Rate and Rhythm: RRR, no murmurs  Pulmonary:      Effort: CTA-b  Abdominal:      General: Abdomen is flat. Bowel sounds are normal.      Palpations: Abdomen is soft.   Musculoskeletal:     No edema  Skin:     General: Skin is warm.   Neurological:      Mental Status: He is alert and oriented to person, place, and time.   Psychiatric:         Behavior: Behavior normal.       Lab Results: I have personally reviewed pertinent lab results.    Results from last 7 days   Lab Units 02/13/24  0554 02/12/24  1736   WBC Thousand/uL 10.30* 11.35*   HEMOGLOBIN g/dL 8.4* 8.4*   HEMATOCRIT % 27.4* 28.9*   PLATELETS Thousands/uL 447* 462*     Results from last 7 days   Lab Units 02/13/24  0554 02/12/24  1736   POTASSIUM mmol/L 3.5 4.4   CHLORIDE mmol/L 106 104   CO2 mmol/L 21 20*   BUN mg/dL 22 28*   CREATININE mg/dL 1.29 1.48*   CALCIUM mg/dL 8.1* 9.2     Results from last 7 days   Lab Units 02/12/24  1736   INR  1.05   PTT seconds 24     Results from last 7 days   Lab Units 02/13/24  0554 02/12/24  1736    MAGNESIUM mg/dL 1.9 1.5*

## 2024-02-13 NOTE — ASSESSMENT & PLAN NOTE
"Lab Results   Component Value Date    HGBA1C 6.5 (H) 08/29/2023       No results for input(s): \"POCGLU\" in the last 72 hours.    Blood Sugar Average: Last 72 hrs:    Hold home metformin  Initiate SSI coverage with QID accuchecks   Goal blood glucose 140-180  Obtain HbA1c  At risk for hyperglycemia with initiation of steroids   "

## 2024-02-13 NOTE — CONSULTS
"Cardiology   Tatiana Balbuena 48 y.o. female MRN: [id number]  [facility] 520-01 Encounter: [id number]      This note was linked to the full consult note in the chart authored by Dr. Jose Roberto Antony.  I saw and examined the patient myself and supervised Dr. Antony.    Assessment/Plan:    >> Aortitis/celiac artery inflammation: Syphilis screen negative , CRP extremely elevated, SALEEM negative  >> Chest pain: Differentials include pain due to aortitis, pericarditis, anginal chest pain (less likely)  >> Pericardial effusion: Small, no evidence of tamponade  >> Subclavian artery stenosis, status post stent  >> Ascending aorta aneurysm: 4.5 cm  >> Hypertension  >> Dyslipidemia  >> Type 2 diabetes  >> Morbid obesity    Plan:    - Autoimmune workup underway for aortitis  - Wean off nicardipine  -Continue dual antiplatelet therapy for her subclavian stent  -Will continue to evaluate progress of chest pain-if she has pleuritic or positional features will start colchicine for pericarditis  -Continue losartan 100 mg  -Begin amlodipine 5 mg from tomorrow, can add a dose later today if BP continues to increase  -Agree with the Coreg 12.5 mg twice daily  -Continue high intensity statin therapy        2/13/2024: Upon my evaluation the patient has no complaints.  Denies any chest pain.  Denies any shortness of breath.    Please see detailed history and in the  linked consult note    Physical exam  Objective   Vitals: Blood pressure 115/56, pulse 77, temperature 98 °F (36.7 °C), resp. rate 20, height 5' 3\" (1.6 m), weight 103 kg (227 lb 1.2 oz), last menstrual period 04/04/2020, SpO2 96%.  Orthostatic Blood Pressures      Flowsheet Row Most Recent Value   Blood Pressure 115/56 filed at 02/13/2024 1530   Patient Position - Orthostatic VS Sitting filed at 02/13/2024 1030          General:  AO x3, no acute distress  Cardiac:  S1-S2 normal,  No murmurs. No rubs or gallops, JVP: normal  Lungs:  Clear to auscultation bilaterally, no wheezing or " crackles.  Abdomen:  Soft, nontender, nondistended.  Extremities:  Warm, well perfused. Edema:absent  Neuro: Grossly nonfocal        ======================================================  TREADMILL STRESS  No results found for this or any previous visit.     ----------------------------------------------------------------------------------------------  NUCLEAR STRESS TEST: No results found for this or any previous visit.    Results for orders placed during the hospital encounter of 20    NM myocardial perfusion spect (rx stress and/or rest)    [place] [address]  [phone]    Regadenoson    Patient: RAÚL EDDY  MR number: [id number]  Account number: [id number]  : 1975  Age: 44 years  Gender: Female  Status: Outpatient  Location: Stress lab  Height: 63 in  Weight: 210 lb  BP: 144/ 78 mmHg    Allergies: OTHER    Diagnosis: 401.9 - HYPERTENSION NOS, 786.50 - CHEST PAIN NOS    RN:  Elena Sosa RN  Primary Physician:  JENNIE AMADOR  Technician:  Merly Oneill  Referring Physician:  Parmjit Weller MD  Interpreting Physician:  Tomas Boucher MD  Group:  [facility]    HISTORY: The patient is a 44 year old  female. Chest pain status: chest pain. Coronary artery disease risk factors: dyslipidemia, hypertension, and diabetes mellitus.    REST ECG: Normal sinus rhythm with anteroseptal Q waves.    PROCEDURE: The study was performed in the the Stress lab. A regadenoson infusion pharmacologic stress test was performed. Systolic blood pressure was 144 mmHg, at the start of the study. Diastolic blood pressure was 78 mmHg, at the start  of the study. The heart rate was 90 bpm, at the start of the study.  Regadenoson protocol:  HR bpm SBP mmHg DBP mmHg  Baseline 90 144 78  Immediate 120 166 74  2 min 100 160 78  5 min 96 154 76    STRESS SUMMARY: Duration of pharmacologic stress was Functional  capacity could not be adequately assessed. Maximal heart rate during stress was 120 bpm. The heart rate response to stress was normal. There was resting hypertension with an  appropriate blood pressure response to stress. The rate-pressure product for the peak heart rate and blood pressure was 01725. There was no chest pain during stress. The stress test was terminated due to protocol completion. The stress ECG  was negative for ischemia and normal. There were no stress arrhythmias or conduction abnormalities.    ISOTOPE ADMINISTRATION:  Resting isotope administration Stress isotope administration  Agent Tetrofosmin Tetrofosmin  Dose 10.8 mCi 32.2 mCi  Date 07/30/2020 07/30/2020  Injection time 07:50 09:15  Imaging time 08:20 --  Injection-image interval 30 min --    MYOCARDIAL PERFUSION IMAGING:  The image quality was good. Rotating projection images reveal moderate breast attenuation. Left ventricular size was normal. The TID ratio was 1.1.    PERFUSION DEFECTS:  -  There was a large, moderately severe, fixed myocardial perfusion defect of the anterior wall likely due to attenuation from breast tissue. Wall motion and thickening appeared normal although significantly decreased counts limit the  assessment.    GATED SPECT:  The calculated left ventricular ejection fraction was 58 %. There was no diagnostic evidence for left ventricular regional abnormality.    SUMMARY:  -  Stress results: There was resting hypertension with an appropriate blood pressure response to stress. There was no chest pain during stress.  -  ECG conclusions: The stress ECG was negative for ischemia and normal.  -  Perfusion imaging: There was a large, moderately severe, fixed myocardial perfusion defect of the anterior wall likely due to attenuation from breast tissue. Wall motion and thickening appeared normal although significantly decreased  counts limit the assessment.  -  Gated SPECT: The calculated left ventricular ejection fraction  was 58 %. There was no diagnostic evidence for left ventricular regional abnormality.    IMPRESSIONS: No evidence of ischemia after pharmacologic vasodilation without reproduction of symptoms.  A large fixed anterior perfusion defect was present, which is likely from breast attenuation.  Clinical correlation is advised.    Prepared and signed by    Tomas Boucher MD  Signed 2020 11:50:03      --------------------------------------------------------------------------------  CATH:  No results found for this or any previous visit.    --------------------------------------------------------------------------------  ECHO:   Results for orders placed during the hospital encounter of 20    Echo complete with contrast if indicated    [place] [address]  [phone]    Transthoracic Echocardiogram  2D, M-mode, Doppler, and Color Doppler    Study date:  2020    Patient: RAÚL EDDY  MR number: [id number]  Account number: [id number]  : 1975  Age: 44 years  Gender: Female  Status: Outpatient  Location: Bedside  Height: 63 in  Weight: 227.5 lb  BP: 137/ 80 mmHg    Indications: chest pain, retrosternal chest pain    Diagnoses: 786.50 - CHEST PAIN NOS    Sonographer:  Linsey Tapia RDCS  Referring Physician:  JOHNSON Quevedo  Group:  [facility]  Interpreting Physician:  Parmjit Weller MD    SUMMARY    LEFT VENTRICLE:  Size was normal.  Systolic function was normal. Ejection fraction was estimated in the range of 60 % to 65 %.  There were no regional wall motion abnormalities.  Wall thickness was mildly increased.  There was mild concentric hypertrophy.    LEFT ATRIUM:  The atrium was mildly dilated.    MITRAL VALVE:  There was trace regurgitation.    AORTIC VALVE:  The valve was trileaflet. Leaflets exhibited sclerosis.  There was mild regurgitation.    TRICUSPID VALVE:  There was mild  regurgitation.  Estimated peak PA pressure was 35 mmHg.    HISTORY: PRIOR HISTORY: diabetes mellitus, asthma, hypertension, iron deficiency anemia, obese body habitus    PROCEDURE: The procedure was performed at the bedside. This was a routine study. The transthoracic approach was used. The study included complete 2D imaging, M-mode, complete spectral Doppler, and color Doppler. Images were obtained from  the parasternal, apical, subcostal, and suprasternal notch acoustic windows. Image quality was adequate.    LEFT VENTRICLE: Size was normal. Systolic function was normal. Ejection fraction was estimated in the range of 60 % to 65 %. There were no regional wall motion abnormalities. Wall thickness was mildly increased. There was mild concentric  hypertrophy.    RIGHT VENTRICLE: The size was normal. Systolic function was normal. Wall thickness was normal.    LEFT ATRIUM: The atrium was mildly dilated.    RIGHT ATRIUM: Size was normal.    MITRAL VALVE: Valve structure was normal. There was normal leaflet separation. DOPPLER: The transmitral velocity was within the normal range. There was no evidence for stenosis. There was trace regurgitation.    AORTIC VALVE: The valve was trileaflet. Leaflets exhibited sclerosis. DOPPLER: There was mild regurgitation.    TRICUSPID VALVE: The valve structure was normal. There was normal leaflet separation. DOPPLER: The transtricuspid velocity was within the normal range. There was no evidence for stenosis. There was mild regurgitation. Estimated peak PA  pressure was 35 mmHg.    PULMONIC VALVE: Not well visualized.    PERICARDIUM: There was no pericardial effusion. The pericardium was normal in appearance.    AORTA: The root exhibited normal size.    SYSTEM MEASUREMENT TABLES    2D  %FS: 50.25 %  Ao Diam: 2.83 cm  EDV(Teich): 92.87 ml  EF(Teich): 81.68 %  ESV(Teich): 17.02 ml  IVSd: 1.33 cm  LA Area: 10.6 cm2  LA Diam: 4.26 cm  LVEDV MOD A4C: 127.94 ml  LVEF MOD A4C: 86.21 %  LVESV  MOD A4C: 17.65 ml  LVIDd: 4.51 cm  LVIDs: 2.24 cm  LVLd A4C: 8.2 cm  LVLs A4C: 6.37 cm  LVOT Diam: 1.88 cm  LVPWd: 1.18 cm  RA Area: 10.39 cm2  RVIDd: 3.53 cm  RWT: 0.52  SV MOD A4C: 110.3 ml  SV(Teich): 75.86 ml    CW  AR Dec Nuckolls: 4.05 m/s2  AR Dec Time: 1249.65 ms  AR PHT: 362.4 ms  AR Vmax: 4.97 m/s  AR maxP.87 mmHg  AV Env.Ti: 258.8 ms  AV VTI: 39.39 cm  AV Vmax: 2.31 m/s  AV Vmean: 1.52 m/s  AV maxP.3 mmHg  AV meanPG: 10.32 mmHg  PV Vmax: 1.18 m/s  PV maxP.55 mmHg  TR Vmax: 2.87 m/s  TR maxP.05 mmHg    MM  TAPSE: 2.52 cm    PW  ARASH (VTI): 1.65 cm2  ARASH Vmax: 1.54 cm2  ARASH Vmax, Pt: 1.58 cm2  AVAI (VTI): 0 cm2/m2  AVAI Vmax: 0 cm2/m2  AVAI Vmax, Pt: 0 cm2/m2  E' Lat: 0.06 m/s  E' Sept: 0.06 m/s  E/E' Lat: 19.36  E/E' Sept: 22.3  LVOT Env.Ti: 265.14 ms  LVOT VTI: 23.58 cm  LVOT Vmax: 1.32 m/s  LVOT Vmean: 0.89 m/s  LVOT maxP.96 mmHg  LVOT meanPG: 3.6 mmHg  LVSI Dopp: 31.93 ml/m2  LVSV Dopp: 65.14 ml  MV A Kole: 1.46 m/s  MV Dec Nuckolls: 4.77 m/s2  MV DecT: 263.37 ms  MV E Kole: 1.26 m/s  MV E/A Ratio: 0.86  RVOT Vmax: 1.05 m/s  RVOT maxP.38 mmHg    [facility]    Prepared and electronically signed by    Parmjit Weller MD  Signed 2020 09:44:23    No results found for this or any previous visit.    --------------------------------------------------------------------------------  HOLTER  No results found for this or any previous visit.    --------------------------------------------------------------------------------  CAROTIDS  No results found for this or any previous visit.       [unfilled]   ======================================================          Review of Systems  ROS as noted above, otherwise 12 point review of systems was performed and is negative.     Historical Information   Past Medical History:   Diagnosis Date    Asthma     Chronic anemia     Diabetes mellitus (HCC)     Hypertension     Hyperthyroidism      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HCC)     TB lung, latent      Past Surgical History:   Procedure Laterality Date     SECTION      IR BIOPSY LIVER MASS  07/15/2020    LUNG BIOPSY      THYROIDECTOMY       Social History     Substance and Sexual Activity   Alcohol Use Never    Alcohol/week: 0.0 standard drinks of alcohol     Social History     Substance and Sexual Activity   Drug Use Never     Social History     Tobacco Use   Smoking Status Never   Smokeless Tobacco Never     Family History   Problem Relation Age of Onset    Diabetes unspecified Mother        Meds/Allergies   Hospital Medications:   Current Facility-Administered Medications   Medication Dose Route Frequency    albuterol (PROVENTIL HFA,VENTOLIN HFA) inhaler 2 puff  2 puff Inhalation BID    aspirin (ECOTRIN LOW STRENGTH) EC tablet 81 mg  81 mg Oral Daily    atorvastatin (LIPITOR) tablet 40 mg  40 mg Oral Daily With Dinner    carvedilol (COREG) tablet 12.5 mg  12.5 mg Oral BID With Meals    chlorhexidine (PERIDEX) 0.12 % oral rinse 15 mL  15 mL Mouth/Throat Q12H LAKEISHA    clopidogrel (PLAVIX) tablet 75 mg  75 mg Oral Daily    escitalopram (LEXAPRO) tablet 10 mg  10 mg Oral Daily    fluticasone-salmeterol (ADVAIR HFA) 230-21 MCG/ACT inhaler 2 puff  2 puff Inhalation Q12H    heparin (porcine) subcutaneous injection 5,000 Units  5,000 Units Subcutaneous Q8H [facility]    insulin lispro (HumaLOG) 100 units/mL subcutaneous injection 2-12 Units  2-12 Units Subcutaneous Q6H LAKEISHA    labetalol (NORMODYNE) injection 10 mg  10 mg Intravenous Q4H PRN    levothyroxine tablet 100 mcg  100 mcg Oral Early Morning    losartan (COZAAR) tablet 100 mg  100 mg Oral Daily    [START ON 2024] methylPREDNISolone sodium succinate (Solu-MEDROL) injection 110 mg  110 mg Intravenous Daily    niCARdipine (CARDENE) 25 mg (STANDARD CONCENTRATION) in sodium chloride 0.9% 250 mL  1-15 mg/hr Intravenous Titrated    pantoprazole (PROTONIX) EC tablet 40 mg  40 mg Oral Early Morning     Home  "Medications:   Medications Prior to Admission   Medication    acetaminophen (TYLENOL) 325 mg tablet    Advair -21 MCG/ACT inhaler    albuterol (PROVENTIL HFA,VENTOLIN HFA) 90 mcg/act inhaler    aspirin (ECOTRIN LOW STRENGTH) 81 mg EC tablet    atorvastatin (LIPITOR) 40 mg tablet    carvedilol (COREG) 6.25 mg tablet    cholecalciferol (VITAMIN D3) 1,000 units tablet    diclofenac sodium (VOLTAREN) 50 mg EC tablet    escitalopram (LEXAPRO) 10 mg tablet    folic acid (FOLVITE) 1 mg tablet    furosemide (LASIX) 40 mg tablet    hydrochlorothiazide (MICROZIDE) 12.5 mg capsule    Iron-Vitamin C (Vitron-C)  MG TABS    levothyroxine 100 mcg tablet    losartan (COZAAR) 100 MG tablet    metFORMIN (GLUCOPHAGE) 500 mg tablet    metoprolol succinate (TOPROL-XL) 50 mg 24 hr tablet    montelukast (SINGULAIR) 10 mg tablet    omeprazole (PriLOSEC) 20 mg delayed release capsule       Allergies   Allergen Reactions    Other Allergic Rhinitis     pollen         Portions of the record may have been created with voice recognition software.  Occasional wrong words or \"sound a like\" substitutions may have occurred due to the inherent limitations of voice recognition software.  Read the chart carefully and recognize, using context, where substitutions have occurred.                "

## 2024-02-13 NOTE — H&P
"[facility]  H&P  Name: Tatiana Balbuena 48 y.o. female I MRN: [id number]  [facility] Date of Admission: 2/13/2024   Date of Service: 2/13/2024 I Hospital Day: 0      Assessment/Plan   * Aortitis   Assessment & Plan  2/12 CTA PE study: Findings of acute on chronic aortitis with increased soft tissue thickening of the thoracic aorta as well as new inflammation involving the celiac axis. Associated new moderate pericardial effusion.   Hx of aortitis 3 years ago treated with steroids  Troponins and lactic negative    Plan:  Consult vascular surgery  Check blood cultures  Continue steroids  Goal SBP < 130 - cardene drip, restart home meds  Monitor abdominal exam    Pericardial effusion  Assessment & Plan  New moderate pericardial effusion noted on CT scan    Plan:  Obtain echocardiogram     Subclavian artery stenosis, left (HCC)  Assessment & Plan  Underwent left subclavian artery stent placement via cutdown approach on 1/5    Plan:  Continue aspirin 81 mg and plavix 75 mg daily  Continue atorvastatin     Essential hypertension  Assessment & Plan  Home regimen: coreg 6.25 mg BID, losartan 100 mg daily, HCTZ 12.5 mg daily, lasix 40 mg daily    Plan:  Currently on cardene 15 mg/hr  Resume coreg and losartan  Add PRN labetalol and hydralazine    Diabetes mellitus type 2  Assessment & Plan  Lab Results   Component Value Date    HGBA1C 6.5 (H) 08/29/2023       No results for input(s): \"POCGLU\" in the last 72 hours.    Blood Sugar Average: Last 72 hrs:    Hold home metformin  Initiate SSI coverage with QID accuchecks   Goal blood glucose 140-180  Obtain HbA1c  At risk for hyperglycemia with initiation of steroids     Mild persistent asthma without complication  Assessment & Plan  Continue home advair  PRN albuterol     CKD (chronic kidney disease) stage 3, GFR 30-59 ml/min ([facility])  Assessment & Plan  Lab Results   Component Value Date    EGFR 40 02/12/2024    EGFR 40 (L) 02/01/2024 "    EGFR 38 (L) 2023    CREATININE 1.48 (H) 2024    CREATININE 1.6 (H) 2024    CREATININE 1.6 (H) 2023     Baseline Cr 1.4-1.7  Avoid nephrotoxins  Trend renal function     Post-surgical hypothyroidism  Assessment & Plan  Continue synthroid     Depression  Assessment & Plan  Continue home lexapro           History of Present Illness     HPI: Tatiana Balbuena is a 48 y.o. with PMH significant for HTN, DM type 2, left subclavian stenosis s/p stent placement 24 who presented to [facility] ED with complaints of left sided chest pain. She states this started  evening. The pain is left parasternal with associated back pain and shortness of breath with exertion. Work-up in the ED revealed acute on chronic aortitis with celiac axis involvement as well as new moderate pericardial effusion. She was initiated on a cardene infusion and was given IV steroids. She was transferred to [facility] for vascular surgery evaluation. She arrives on cardene 15 mg/hr.    History obtained from chart review and the patient.  Review of Systems   Constitutional:  Negative for chills and fever.   HENT:  Negative for sore throat.    Respiratory:  Positive for shortness of breath. Negative for cough.    Cardiovascular:  Positive for chest pain. Negative for palpitations and leg swelling.   Gastrointestinal:  Negative for abdominal pain, constipation, diarrhea and nausea.   Genitourinary:  Negative for difficulty urinating, dysuria and frequency.   Musculoskeletal:  Positive for back pain.   Skin:  Negative for rash.   Neurological:  Negative for dizziness, syncope and headaches.     Disposition: Stepdown Level 1  Historical Information   Past Medical History:  No date: Asthma  No date: Chronic anemia  No date: Diabetes mellitus (HCC)  No date: Hypertension  No date: Hyperthyroidism  No date: 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HCC)  No date: TB lung, latent Past Surgical History:  No date:  SECTION  07/15/2020: IR BIOPSY  LIVER MASS  No date: LUNG BIOPSY  No date: THYROIDECTOMY   Current Outpatient Medications   Medication Instructions    acetaminophen (TYLENOL) 650 mg, Oral, Every 6 hours PRN    Advair -21 MCG/ACT inhaler No dose, route, or frequency recorded.    albuterol (PROVENTIL HFA,VENTOLIN HFA) 90 mcg/act inhaler 2 puffs, Inhalation, 2 times daily    aspirin (ECOTRIN LOW STRENGTH) 81 mg, Oral, Daily    atorvastatin (LIPITOR) 40 mg tablet No dose, route, or frequency recorded.    carvedilol (COREG) 6.25 mg tablet     cholecalciferol (VITAMIN D3) 1,000 Units, Oral, Daily    diclofenac sodium (VOLTAREN) 50 mg, Oral, 2 times daily    escitalopram (LEXAPRO) 10 mg, Oral, Daily    folic acid (FOLVITE) 1 mg tablet     furosemide (LASIX) 40 mg, Oral, Daily    hydrochlorothiazide (MICROZIDE) 12.5 mg capsule     Iron-Vitamin C (Vitron-C)  MG TABS 1 tablet, Oral, Daily    levothyroxine 100 mcg tablet No dose, route, or frequency recorded.    losartan (COZAAR) 100 mg, Oral, Daily    metFORMIN (GLUCOPHAGE) 1,000 mg, Oral, 2 times daily with meals    metoprolol succinate (TOPROL-XL) 50 mg 24 hr tablet TAKE (1) TABLET DAILY.    montelukast (SINGULAIR) 10 mg, Oral, Daily at bedtime    omeprazole (PriLOSEC) 20 mg delayed release capsule No dose, route, or frequency recorded.    Allergies   Allergen Reactions    Other Allergic Rhinitis     pollen      Social History     Tobacco Use    Smoking status: Never    Smokeless tobacco: Never   Vaping Use    Vaping status: Never Used   Substance Use Topics    Alcohol use: Never     Alcohol/week: 0.0 standard drinks of alcohol    Drug use: Never    Family History   Problem Relation Age of Onset    Diabetes unspecified Mother           Objective                            Vitals I/O      Most Recent Min/Max in 24hrs   Temp 97.5 °F (36.4 °C) Temp  Min: 97.5 °F (36.4 °C)  Max: 98 °F (36.7 °C)   Pulse 85 Pulse  Min: 69  Max: 87   Resp 21 Resp  Min: 18  Max: 23   /68 BP  Min: 147/68  Max:  221/93   O2 Sat 96 % SpO2  Min: 91 %  Max: 97 %    No intake or output data in the 24 hours ending 02/13/24 0357    Diet NPO    Invasive Monitoring           Physical Exam   Physical Exam  Skin:     General: Skin is warm and dry.      Capillary Refill: Capillary refill takes less than 2 seconds.   HENT:      Head: Atraumatic.      Mouth/Throat:      Mouth: Mucous membranes are moist.   Cardiovascular:      Rate and Rhythm: Normal rate and regular rhythm.      Pulses:           Radial pulses are 2+ on the right side and 2+ on the left side.        Dorsalis pedis pulses are 2+ on the right side and 2+ on the left side.   Musculoskeletal:      Right lower leg: No edema.      Left lower leg: No edema.   Abdominal: General: Bowel sounds are normal. There is distension.     Palpations: Abdomen is soft.      Tenderness: There is abdominal tenderness in the epigastric area and left upper quadrant.   Constitutional:       General: She is not in acute distress.     Appearance: She is well-developed and well-nourished. She is obese.   Pulmonary:      Effort: Pulmonary effort is normal.      Breath sounds: No wheezing, rhonchi or rales.   Neurological:      General: No focal deficit present.      Mental Status: She is alert and oriented to person, place and time.      Motor: Strength full and intact in all extremities.            Diagnostic Studies      EKG: NSR  Imaging: CT PE study I have personally reviewed pertinent reports.   and I have personally reviewed pertinent films in PACS     Medications:  Scheduled PRN   albuterol, 2 puff, BID  aspirin, 81 mg, Daily  atorvastatin, 40 mg, Daily With Dinner  carvedilol, 6.25 mg, BID With Meals  chlorhexidine, 15 mL, Q12H LAKEISHA  escitalopram, 10 mg, Daily  fluticasone-salmeterol, 2 puff, Q12H  heparin (porcine), 5,000 Units, Q8H LAKEISHA  insulin lispro, 2-12 Units, Q6H LAKEISHA  levothyroxine, 100 mcg, Early Morning  losartan, 100 mg, Daily  [START ON 2/14/2024] methylPREDNISolone sodium  succinate, 110 mg, Daily  pantoprazole, 40 mg, Early Morning      labetalol, 10 mg, Q4H PRN       Continuous    niCARdipine, 1-15 mg/hr         Labs:    CBC    Recent Labs     02/12/24  1736   WBC 11.35*   HGB 8.4*   HCT 28.9*   *     BMP    Recent Labs     02/12/24  1736   SODIUM 136   K 4.4      CO2 20*   AGAP 12   BUN 28*   CREATININE 1.48*   CALCIUM 9.2       Coags    Recent Labs     02/12/24  1736   INR 1.05   PTT 24        Additional Electrolytes  Recent Labs     02/12/24  1736   MG 1.5*          Blood Gas    No recent results  No recent results LFTs  Recent Labs     02/12/24  1736   ALT 9   AST 13   ALKPHOS 85   ALB 3.5   TBILI 0.20       Infectious  Recent Labs     02/12/24  1736   PROCALCITONI 0.06     Glucose  Recent Labs     02/12/24  1736   GLUC 89             Non-Critical Care Time Statement: I have spent a total time of 30 minutes in caring for this patient including Diagnostic results, Impressions, Documenting in the medical record, Reviewing / ordering tests, medicine, procedures  , and Communicating with other healthcare professionals .   Anticipated Length of Stay is > 2 midnights  Kaylee Griffiths PA-C

## 2024-02-13 NOTE — ASSESSMENT & PLAN NOTE
Underwent left subclavian artery stent placement via cutdown approach on 1/5    Plan:  Continue aspirin 81 mg and plavix 75 mg daily  Continue atorvastatin

## 2024-02-13 NOTE — ASSESSMENT & PLAN NOTE
Lab Results   Component Value Date    EGFR 40 02/12/2024    EGFR 40 (L) 02/01/2024    EGFR 38 (L) 12/29/2023    CREATININE 1.48 (H) 02/12/2024    CREATININE 1.6 (H) 02/01/2024    CREATININE 1.6 (H) 12/29/2023     Baseline Cr 1.4-1.7  Avoid nephrotoxins  Trend renal function

## 2024-02-13 NOTE — EMTALA/ACUTE CARE TRANSFER
[facility] EMERGENCY DEPARTMENT  [address] [facility] [address]  Dept: [phone]      EMTALA TRANSFER CONSENT    NAME Tatiana Balbuena                                         1975                              MRN [id number]    I have been informed of my rights regarding examination, treatment, and transfer   by Dr. Lynda Hsu, *    Benefits: Specialized equipment and/or services available at the receiving facility (Include comment)________________________    Risks: Potential for delay in receiving treatment      Consent for Transfer:  I acknowledge that my medical condition has been evaluated and explained to me by the emergency department physician or other qualified medical person and/or my attending physician, who has recommended that I be transferred to the service of  Accepting Physician: Dr August at Accepting Facility Name, City & State : [facility] [address] [place]. The above potential benefits of such transfer, the potential risks associated with such transfer, and the probable risks of not being transferred have been explained to me, and I fully understand them.  The doctor has explained that, in my case, the benefits of transfer outweigh the risks.  I agree to be transferred.    I authorize the performance of emergency medical procedures and treatments upon me in both transit and upon arrival at the receiving facility.  Additionally, I authorize the release of any and all medical records to the receiving facility and request they be transported with me, if possible.  I understand that the safest mode of transportation during a medical emergency is an ambulance and that the Hospital advocates the use of this mode of transport. Risks of traveling to the receiving facility by car, including absence of medical control, life sustaining equipment, such as oxygen, and medical personnel has been explained to me and I fully understand them.    (TRACIE CORRECT BOX BELOW)  [ x]  I  consent to the stated transfer and to be transported by ambulance/helicopter.  [  ]  I consent to the stated transfer, but refuse transportation by ambulance and accept full responsibility for my transportation by car.  I understand the risks of non-ambulance transfers and I exonerate the Hospital and its staff from any deterioration in my condition that results from this refusal.    X___________________________________________    DATE  24  TIME________  Signature of patient or legally responsible individual signing on patient behalf           RELATIONSHIP TO PATIENT_________________________          Provider Certification    NAME Tatiana Balbuena                                         1975                              MRN [id number]    A medical screening exam was performed on the above named patient.  Based on the examination:    Condition Necessitating Transfer The primary encounter diagnosis was Aortitis (HCC). Diagnoses of Pericardial effusion, Low magnesium level, and Anemia were also pertinent to this visit.    Patient Condition: The patient has been stabilized such that within reasonable medical probability, no material deterioration of the patient condition or the condition of the unborn child(gladys) is likely to result from the transfer    Reason for Transfer: Level of Care needed not available at this facility    Transfer Requirements: Facility [facility]   Space available and qualified personnel available for treatment as acknowledged by PACS  Agreed to accept transfer and to provide appropriate medical treatment as acknowledged by       Dr August  Appropriate medical records of the examination and treatment of the patient are provided at the time of transfer   STAFF INITIAL WHEN COMPLETED _______  Transfer will be performed by qualified personnel from    and appropriate transfer equipment as required, including the use of necessary and appropriate life support measures.    Provider  Certification: I have examined the patient and explained the following risks and benefits of being transferred/refusing transfer to the patient/family:  General risk, such as traffic hazards, adverse weather conditions, rough terrain or turbulence, possible failure of equipment (including vehicle or aircraft), or consequences of actions of persons outside the control of the transport personnel      Based on these reasonable risks and benefits to the patient and/or the unborn child(gladys), and based upon the information available at the time of the patient’s examination, I certify that the medical benefits reasonably to be expected from the provision of appropriate medical treatments at another medical facility outweigh the increasing risks, if any, to the individual’s medical condition, and in the case of labor to the unborn child, from effecting the transfer.    X____________________________________________ DATE 02/13/24        TIME_______      ORIGINAL - SEND TO MEDICAL RECORDS   COPY - SEND WITH PATIENT DURING TRANSFER

## 2024-02-13 NOTE — ASSESSMENT & PLAN NOTE
2/12 CTA PE study: Findings of acute on chronic aortitis with increased soft tissue thickening of the thoracic aorta as well as new inflammation involving the celiac axis. Associated new moderate pericardial effusion.   Hx of aortitis 3 years ago treated with steroids  Troponins and lactic negative    Plan:  Consult vascular surgery  Check blood cultures  Continue steroids  Goal SBP < 130 - cardene drip, restart home meds  Monitor abdominal exam

## 2024-02-13 NOTE — ASSESSMENT & PLAN NOTE
Home regimen: coreg 6.25 mg BID, losartan 100 mg daily, HCTZ 12.5 mg daily, lasix 40 mg daily    Plan:  Currently on cardene 15 mg/hr  Resume coreg and losartan  Add PRN labetalol and hydralazine

## 2024-02-13 NOTE — CONSULTS
Consult Note - Vascular Surgery   Tatiana Balbuena 48 y.o. female MRN: [id number]    Unit/Bed#: [facility] 520-01 Encounter: [id number]    Assessment:  48 year old female with h/o aortitis treated with aortitis, pericardial effusion, subclavian artery stenosis, elevated sed rate and CRP on admission, stage 3B CKD    Plan:  Continue impulse control with goal SBP less than 130, continue steroids, blood cultures.  Discussed with critical care-defer to ICU for management of pericardial effusion and consideration thoracic consultation, remainder of care per primary team    Consulting Service: Critical Care    Chief Complaint: abdominal pain    HPI: Tatiana Balbuena is a 48 y.o. female who presents with severe abdominal pain starting late in the evening of 2/10/2024.  She reports the pain has been cyclical, getting worse at night and waking her from her sleep but then improved on 2/11 throughout the day.  Again during the night of 2/11/24, she woke from her sleep with abdominal pain which slightly improved in the morning of 2/12/24, but then again worsened throughout the morning, prompting her to present for evaluation.  She describes the pain as sharp and localized to the left subcostal region.  She has a previous history of aortitis approximately 3-4 years ago.  She states this pain is worse than the original pain.  She reports she is comfortable in bed at this time.  Of note, she recently underwent subclavian artery stent placement on January 5, 2024 at [facility].    Review of Systems:  General: negative for - chills, fatigue, or fever  Cardiovascular: positive for - chest pain  Respiratory: no cough, shortness of breath, or wheezing  Gastrointestinal: positive for - abdominal pain  Genitourinary ROS: no dysuria, trouble voiding, or hematuria  Musculoskeletal ROS: negative  Neurological ROS: no TIA or stroke symptoms  Hematological and Lymphatic ROS: negative  Dermatological ROS: negative  Psychological ROS: negative  Ophthalmic ROS:  negative  ENT ROS: negative    Past Medical History:  Past Medical History:   Diagnosis Date    Asthma     Chronic anemia     Diabetes mellitus (HCC)     Hypertension     Hyperthyroidism     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HCC)     TB lung, latent        Past Surgical History:  Past Surgical History:   Procedure Laterality Date     SECTION      IR BIOPSY LIVER MASS  07/15/2020    LUNG BIOPSY      THYROIDECTOMY         Social History:  Social History     Substance and Sexual Activity   Alcohol Use Never    Alcohol/week: 0.0 standard drinks of alcohol     Social History     Substance and Sexual Activity   Drug Use Never     Social History     Tobacco Use   Smoking Status Never   Smokeless Tobacco Never       Family History:  Family History   Problem Relation Age of Onset    Diabetes unspecified Mother        Allergies:  Allergies   Allergen Reactions    Other Allergic Rhinitis     pollen       Medications:  Current Facility-Administered Medications   Medication Dose Route Frequency    albuterol (PROVENTIL HFA,VENTOLIN HFA) inhaler 2 puff  2 puff Inhalation BID    aspirin (ECOTRIN LOW STRENGTH) EC tablet 81 mg  81 mg Oral Daily    atorvastatin (LIPITOR) tablet 40 mg  40 mg Oral Daily With Dinner    carvedilol (COREG) tablet 6.25 mg  6.25 mg Oral BID With Meals    chlorhexidine (PERIDEX) 0.12 % oral rinse 15 mL  15 mL Mouth/Throat Q12H [facility]    escitalopram (LEXAPRO) tablet 10 mg  10 mg Oral Daily    fluticasone-salmeterol (ADVAIR HFA) 230-21 MCG/ACT inhaler 2 puff  2 puff Inhalation Q12H    heparin (porcine) subcutaneous injection 5,000 Units  5,000 Units Subcutaneous Q8H [facility]    insulin lispro (HumaLOG) 100 units/mL subcutaneous injection 2-12 Units  2-12 Units Subcutaneous Q6H [facility]    labetalol (NORMODYNE) injection 10 mg  10 mg Intravenous Q4H PRN    levothyroxine tablet 100 mcg  100 mcg Oral Early Morning    losartan (COZAAR) tablet 100 mg  100 mg Oral Daily    [START ON 2024] methylPREDNISolone sodium succinate  "(Solu-MEDROL) injection 110 mg  110 mg Intravenous Daily    niCARdipine (CARDENE) 25 mg (STANDARD CONCENTRATION) in sodium chloride 0.9% 250 mL  1-15 mg/hr Intravenous Titrated    pantoprazole (PROTONIX) EC tablet 40 mg  40 mg Oral Early Morning       Vitals:  /57   Pulse 88   Temp 97.5 °F (36.4 °C) (Oral)   Resp 21   Ht 5' 3\" (1.6 m)   Wt 103 kg (226 lb 13.7 oz)   LMP 04/04/2020 (Exact Date)   SpO2 96%   BMI 40.19 kg/m²     I/Os:  No intake/output data recorded.    Lab Results and Cultures:   Lab Results   Component Value Date    WBC 11.35 (H) 02/12/2024    HGB 8.4 (L) 02/12/2024    HCT 28.9 (L) 02/12/2024    MCV 85 02/12/2024     (H) 02/12/2024     Lab Results   Component Value Date    CALCIUM 9.2 02/12/2024    K 4.4 02/12/2024    CO2 20 (L) 02/12/2024     02/12/2024    BUN 28 (H) 02/12/2024    CREATININE 1.48 (H) 02/12/2024     Lab Results   Component Value Date    INR 1.05 02/12/2024    INR 0.97 10/29/2023    INR 1.02 02/20/2022    PROTIME 13.6 02/12/2024    PROTIME 12.8 10/29/2023    PROTIME 12.9 02/20/2022       Lipid Panel: No results found for: \"CHOL\",     Blood Culture:   Lab Results   Component Value Date    BLOODCX No Growth After 5 Days. 05/13/2020   ,   Urinalysis:   Lab Results   Component Value Date    COLORU Yellow 02/12/2024    CLARITYU Clear 02/12/2024    SPECGRAV 1.020 02/12/2024    PHUR 6.0 02/12/2024    LEUKOCYTESUR Negative 02/12/2024    NITRITE Negative 02/12/2024    GLUCOSEU Negative 02/12/2024    KETONESU Negative 02/12/2024    BILIRUBINUR Negative 02/12/2024    BLOODU Trace-lysed (A) 02/12/2024   ,   Urine Culture: No results found for: \"URINECX\",   Wound Culure: No results found for: \"WOUNDCULT\"    Imaging:      Physical Exam:    General appearance: alert and oriented, in no acute distress  Skin: Skin color, texture, turgor normal. No rashes or lesions  Neurologic: Grossly normal  Head: Normocephalic, without obvious abnormality, atraumatic  Eyes: EOMI  Lungs: No " resp distress  Chest wall: no tenderness  Heart: reg rate  Abdomen: soft, Mild TTP epigastric region  Extremities: extremities normal, warm and well-perfused; no cyanosis, clubbing, or edema    Wound/Incision:  2/12/24 PE study with CT abdomen and pelvis with contrast:  IMPRESSION:     No pulmonary embolism.     Findings of acute on chronic aortitis with increased soft tissue thickening of the thoracic aorta as well as new inflammation involving the celiac axis. Associated new moderate pericardial effusion.     The ascending thoracic aorta measures up to 4.0 cm in caliber, unchanged.     Unchanged dilation of the main pulmonary artery suggestive of pulmonary arterial hypertension.     The study was marked in EPIC for immediate notification.       Pulse exam:  Radial: Right: 2+ Left:: 2+  DP: Right: 2+ Left: 2+  PT: Right: 2+ Left: 2+      Katiana Larson PA-C  2/13/2024

## 2024-02-14 LAB
ANION GAP SERPL CALCULATED.3IONS-SCNC: 12 MMOL/L
AORTIC ROOT: 3 CM
AORTIC VALVE MEAN VELOCITY: 17.7 M/S
APICAL FOUR CHAMBER EJECTION FRACTION: 66 %
ASCENDING AORTA: 2.9 CM
AV AREA BY CONTINUOUS VTI: 2.3 CM2
AV AREA PEAK VELOCITY: 2 CM2
AV LVOT MEAN GRADIENT: 7 MMHG
AV LVOT PEAK GRADIENT: 12 MMHG
AV MEAN GRADIENT: 15 MMHG
AV PEAK GRADIENT: 29 MMHG
AV REGURGITATION PRESSURE HALF TIME: 461 MS
AV VALVE AREA: 2.34 CM2
AV VELOCITY RATIO: 0.63
BASOPHILS # BLD AUTO: 0.07 THOUSANDS/ÂΜL (ref 0–0.1)
BASOPHILS NFR BLD AUTO: 0 % (ref 0–1)
BSA FOR ECHO PROCEDURE: 2.04 M2
BUN SERPL-MCNC: 33 MG/DL (ref 5–25)
CALCIUM SERPL-MCNC: 9 MG/DL (ref 8.4–10.2)
CHLORIDE SERPL-SCNC: 106 MMOL/L (ref 96–108)
CO2 SERPL-SCNC: 21 MMOL/L (ref 21–32)
CREAT SERPL-MCNC: 1.61 MG/DL (ref 0.6–1.3)
CRP SERPL QL: 55.2 MG/L
DOP CALC AO PEAK VEL: 2.7 M/S
DOP CALC AO VTI: 51.47 CM
DOP CALC LVOT AREA: 3.14 CM2
DOP CALC LVOT CARDIAC INDEX: 4.7 L/MIN/M2
DOP CALC LVOT CARDIAC OUTPUT: 9.59 L/MIN
DOP CALC LVOT DIAMETER: 2 CM
DOP CALC LVOT PEAK VEL VTI: 38.28 CM
DOP CALC LVOT PEAK VEL: 1.71 M/S
DOP CALC LVOT STROKE INDEX: 59.8 ML/M2
DOP CALC LVOT STROKE VOLUME: 120.2
E WAVE DECELERATION TIME: 193 MS
E/A RATIO: 0.77
EOSINOPHIL # BLD AUTO: 0.01 THOUSAND/ÂΜL (ref 0–0.61)
EOSINOPHIL NFR BLD AUTO: 0 % (ref 0–6)
ERYTHROCYTE [DISTWIDTH] IN BLOOD BY AUTOMATED COUNT: 18.5 % (ref 11.6–15.1)
FRACTIONAL SHORTENING: 36 (ref 28–44)
GFR SERPL CREATININE-BSD FRML MDRD: 37 ML/MIN/1.73SQ M
GLUCOSE SERPL-MCNC: 143 MG/DL (ref 65–140)
GLUCOSE SERPL-MCNC: 151 MG/DL (ref 65–140)
GLUCOSE SERPL-MCNC: 155 MG/DL (ref 65–140)
GLUCOSE SERPL-MCNC: 193 MG/DL (ref 65–140)
GLUCOSE SERPL-MCNC: 197 MG/DL (ref 65–140)
HCT VFR BLD AUTO: 29.1 % (ref 34.8–46.1)
HGB BLD-MCNC: 8.8 G/DL (ref 11.5–15.4)
IMM GRANULOCYTES # BLD AUTO: 0.18 THOUSAND/UL (ref 0–0.2)
IMM GRANULOCYTES NFR BLD AUTO: 1 % (ref 0–2)
INTERVENTRICULAR SEPTUM IN DIASTOLE (PARASTERNAL SHORT AXIS VIEW): 1.4 CM
INTERVENTRICULAR SEPTUM: 1.4 CM (ref 0.6–1.1)
LAAS-AP2: 22.5 CM2
LAAS-AP4: 26.5 CM2
LEFT ATRIUM SIZE: 4 CM
LEFT ATRIUM VOLUME (MOD BIPLANE): 90 ML
LEFT ATRIUM VOLUME INDEX (MOD BIPLANE): 44.1 ML/M2
LEFT INTERNAL DIMENSION IN SYSTOLE: 3 CM (ref 2.1–4)
LEFT VENTRICULAR INTERNAL DIMENSION IN DIASTOLE: 4.7 CM (ref 3.5–6)
LEFT VENTRICULAR POSTERIOR WALL IN END DIASTOLE: 1.4 CM
LEFT VENTRICULAR STROKE VOLUME: 68 ML
LVSV (TEICH): 68 ML
LYMPHOCYTES # BLD AUTO: 2.85 THOUSANDS/ÂΜL (ref 0.6–4.47)
LYMPHOCYTES NFR BLD AUTO: 16 % (ref 14–44)
MAGNESIUM SERPL-MCNC: 2.7 MG/DL (ref 1.9–2.7)
MCH RBC QN AUTO: 24.3 PG (ref 26.8–34.3)
MCHC RBC AUTO-ENTMCNC: 30.2 G/DL (ref 31.4–37.4)
MCV RBC AUTO: 80 FL (ref 82–98)
MITRAL REGURGITATION PEAK VELOCITY: 5.04 M/S
MITRAL VALVE MEAN INFLOW VELOCITY: 2.56 M/S
MITRAL VALVE REGURGITANT PEAK GRADIENT: 102 MMHG
MONOCYTES # BLD AUTO: 1.47 THOUSAND/ÂΜL (ref 0.17–1.22)
MONOCYTES NFR BLD AUTO: 8 % (ref 4–12)
MV E'TISSUE VEL-LAT: 8 CM/S
MV E'TISSUE VEL-SEP: 7 CM/S
MV PEAK A VEL: 1.41 M/S
MV PEAK E VEL: 109 CM/S
MV STENOSIS PRESSURE HALF TIME: 56 MS
MV VALVE AREA P 1/2 METHOD: 3.93
NEUTROPHILS # BLD AUTO: 13.45 THOUSANDS/ÂΜL (ref 1.85–7.62)
NEUTS SEG NFR BLD AUTO: 75 % (ref 43–75)
NRBC BLD AUTO-RTO: 0 /100 WBCS
PHOSPHATE SERPL-MCNC: 4.2 MG/DL (ref 2.7–4.5)
PLATELET # BLD AUTO: 533 THOUSANDS/UL (ref 149–390)
PMV BLD AUTO: 9.8 FL (ref 8.9–12.7)
POTASSIUM SERPL-SCNC: 4.6 MMOL/L (ref 3.5–5.3)
RBC # BLD AUTO: 3.62 MILLION/UL (ref 3.81–5.12)
RHEUMATOID FACT SER QL LA: NEGATIVE
RIGHT ATRIUM AREA SYSTOLE A4C: 15 CM2
RIGHT VENTRICLE ID DIMENSION: 3.5 CM
SL CV AV DECELERATION TIME RETROGRADE: 1588 MS
SL CV AV PEAK GRADIENT RETROGRADE: 80 MMHG
SL CV DOP CALC MV VTI RETROGRADE: 89.6 CM
SL CV LEFT ATRIUM LENGTH A2C: 5.4 CM
SL CV LV EF: 65
SL CV MV MEAN GRADIENT RETROGRADE: 35 MMHG
SL CV PED ECHO LEFT VENTRICLE DIASTOLIC VOLUME (MOD BIPLANE) 2D: 103 ML
SL CV PED ECHO LEFT VENTRICLE SYSTOLIC VOLUME (MOD BIPLANE) 2D: 35 ML
SODIUM SERPL-SCNC: 139 MMOL/L (ref 135–147)
TR MAX PG: 17 MMHG
TR PEAK VELOCITY: 2.1 M/S
TRICUSPID ANNULAR PLANE SYSTOLIC EXCURSION: 3.3 CM
TRICUSPID VALVE PEAK REGURGITATION VELOCITY: 2.08 M/S
WBC # BLD AUTO: 18.03 THOUSAND/UL (ref 4.31–10.16)

## 2024-02-14 PROCEDURE — 85025 COMPLETE CBC W/AUTO DIFF WBC: CPT | Performed by: NURSE PRACTITIONER

## 2024-02-14 PROCEDURE — 82948 REAGENT STRIP/BLOOD GLUCOSE: CPT

## 2024-02-14 PROCEDURE — 99232 SBSQ HOSP IP/OBS MODERATE 35: CPT | Performed by: INTERNAL MEDICINE

## 2024-02-14 PROCEDURE — NC001 PR NO CHARGE

## 2024-02-14 PROCEDURE — NC001 PR NO CHARGE: Performed by: SURGERY

## 2024-02-14 PROCEDURE — 80048 BASIC METABOLIC PNL TOTAL CA: CPT | Performed by: NURSE PRACTITIONER

## 2024-02-14 PROCEDURE — 99233 SBSQ HOSP IP/OBS HIGH 50: CPT | Performed by: ANESTHESIOLOGY

## 2024-02-14 PROCEDURE — NC001 PR NO CHARGE: Performed by: INTERNAL MEDICINE

## 2024-02-14 PROCEDURE — 84100 ASSAY OF PHOSPHORUS: CPT | Performed by: NURSE PRACTITIONER

## 2024-02-14 PROCEDURE — 83735 ASSAY OF MAGNESIUM: CPT | Performed by: NURSE PRACTITIONER

## 2024-02-14 PROCEDURE — 86140 C-REACTIVE PROTEIN: CPT | Performed by: NURSE PRACTITIONER

## 2024-02-14 RX ORDER — LABETALOL HYDROCHLORIDE 5 MG/ML
20 INJECTION, SOLUTION INTRAVENOUS EVERY 4 HOURS PRN
Status: DISCONTINUED | OUTPATIENT
Start: 2024-02-14 | End: 2024-02-15 | Stop reason: HOSPADM

## 2024-02-14 RX ORDER — MONTELUKAST SODIUM 10 MG/1
10 TABLET ORAL
Status: DISCONTINUED | OUTPATIENT
Start: 2024-02-14 | End: 2024-02-15 | Stop reason: HOSPADM

## 2024-02-14 RX ORDER — HYDRALAZINE HYDROCHLORIDE 20 MG/ML
5 INJECTION INTRAMUSCULAR; INTRAVENOUS EVERY 4 HOURS PRN
Status: DISCONTINUED | OUTPATIENT
Start: 2024-02-14 | End: 2024-02-14

## 2024-02-14 RX ORDER — CARVEDILOL 25 MG/1
25 TABLET ORAL 2 TIMES DAILY WITH MEALS
Status: DISCONTINUED | OUTPATIENT
Start: 2024-02-14 | End: 2024-02-15 | Stop reason: HOSPADM

## 2024-02-14 RX ORDER — FUROSEMIDE 40 MG/1
40 TABLET ORAL DAILY
Status: DISCONTINUED | OUTPATIENT
Start: 2024-02-14 | End: 2024-02-15 | Stop reason: HOSPADM

## 2024-02-14 RX ORDER — AMLODIPINE BESYLATE 5 MG/1
5 TABLET ORAL ONCE
Status: COMPLETED | OUTPATIENT
Start: 2024-02-14 | End: 2024-02-14

## 2024-02-14 RX ORDER — HYDRALAZINE HYDROCHLORIDE 20 MG/ML
10 INJECTION INTRAMUSCULAR; INTRAVENOUS EVERY 4 HOURS PRN
Status: DISCONTINUED | OUTPATIENT
Start: 2024-02-14 | End: 2024-02-15 | Stop reason: HOSPADM

## 2024-02-14 RX ORDER — AMLODIPINE BESYLATE 10 MG/1
10 TABLET ORAL DAILY
Status: DISCONTINUED | OUTPATIENT
Start: 2024-02-15 | End: 2024-02-15 | Stop reason: HOSPADM

## 2024-02-14 RX ORDER — LABETALOL HYDROCHLORIDE 5 MG/ML
10 INJECTION, SOLUTION INTRAVENOUS EVERY 4 HOURS PRN
Status: DISCONTINUED | OUTPATIENT
Start: 2024-02-14 | End: 2024-02-14

## 2024-02-14 RX ORDER — SPIRONOLACTONE 25 MG/1
12.5 TABLET ORAL DAILY
Status: DISCONTINUED | OUTPATIENT
Start: 2024-02-14 | End: 2024-02-15

## 2024-02-14 RX ORDER — CARVEDILOL 12.5 MG/1
12.5 TABLET ORAL ONCE
Status: COMPLETED | OUTPATIENT
Start: 2024-02-14 | End: 2024-02-14

## 2024-02-14 RX ORDER — METHYLPREDNISOLONE SODIUM SUCCINATE 125 MG/2ML
110 INJECTION, POWDER, LYOPHILIZED, FOR SOLUTION INTRAMUSCULAR; INTRAVENOUS DAILY
Status: DISCONTINUED | OUTPATIENT
Start: 2024-02-15 | End: 2024-02-15 | Stop reason: HOSPADM

## 2024-02-14 RX ADMIN — HEPARIN SODIUM 5000 UNITS: 5000 INJECTION INTRAVENOUS; SUBCUTANEOUS at 21:13

## 2024-02-14 RX ADMIN — PANTOPRAZOLE SODIUM 40 MG: 40 TABLET, DELAYED RELEASE ORAL at 05:31

## 2024-02-14 RX ADMIN — ATORVASTATIN CALCIUM 40 MG: 40 TABLET, FILM COATED ORAL at 16:15

## 2024-02-14 RX ADMIN — CARVEDILOL 25 MG: 25 TABLET, FILM COATED ORAL at 16:15

## 2024-02-14 RX ADMIN — LOSARTAN POTASSIUM 100 MG: 50 TABLET, FILM COATED ORAL at 08:00

## 2024-02-14 RX ADMIN — ALBUTEROL SULFATE 2 PUFF: 90 AEROSOL, METERED RESPIRATORY (INHALATION) at 17:18

## 2024-02-14 RX ADMIN — INSULIN LISPRO 2 UNITS: 100 INJECTION, SOLUTION INTRAVENOUS; SUBCUTANEOUS at 21:15

## 2024-02-14 RX ADMIN — Medication 10 MG: at 05:31

## 2024-02-14 RX ADMIN — CARVEDILOL 12.5 MG: 12.5 TABLET, FILM COATED ORAL at 10:50

## 2024-02-14 RX ADMIN — FUROSEMIDE 40 MG: 40 TABLET ORAL at 15:11

## 2024-02-14 RX ADMIN — HYDRALAZINE HYDROCHLORIDE 10 MG: 20 INJECTION INTRAMUSCULAR; INTRAVENOUS at 16:15

## 2024-02-14 RX ADMIN — ALBUTEROL SULFATE 2 PUFF: 90 AEROSOL, METERED RESPIRATORY (INHALATION) at 08:17

## 2024-02-14 RX ADMIN — LEVOTHYROXINE SODIUM 100 MCG: 100 TABLET ORAL at 05:31

## 2024-02-14 RX ADMIN — Medication 20 MG: at 15:05

## 2024-02-14 RX ADMIN — HYDRALAZINE HYDROCHLORIDE 5 MG: 20 INJECTION, SOLUTION INTRAMUSCULAR; INTRAVENOUS at 12:10

## 2024-02-14 RX ADMIN — CARVEDILOL 12.5 MG: 12.5 TABLET, FILM COATED ORAL at 07:59

## 2024-02-14 RX ADMIN — INSULIN LISPRO 2 UNITS: 100 INJECTION, SOLUTION INTRAVENOUS; SUBCUTANEOUS at 07:58

## 2024-02-14 RX ADMIN — AMLODIPINE BESYLATE 5 MG: 5 TABLET ORAL at 08:00

## 2024-02-14 RX ADMIN — Medication 10 MG: at 10:44

## 2024-02-14 RX ADMIN — INSULIN LISPRO 2 UNITS: 100 INJECTION, SOLUTION INTRAVENOUS; SUBCUTANEOUS at 11:39

## 2024-02-14 RX ADMIN — ESCITALOPRAM OXALATE 10 MG: 10 TABLET ORAL at 08:00

## 2024-02-14 RX ADMIN — MONTELUKAST 10 MG: 10 TABLET, FILM COATED ORAL at 21:13

## 2024-02-14 RX ADMIN — SPIRONOLACTONE 12.5 MG: 25 TABLET ORAL at 17:15

## 2024-02-14 RX ADMIN — METHYLPREDNISOLONE SODIUM SUCCINATE 110 MG: 125 INJECTION, POWDER, FOR SOLUTION INTRAMUSCULAR; INTRAVENOUS at 05:31

## 2024-02-14 RX ADMIN — ASPIRIN 81 MG: 81 TABLET, COATED ORAL at 08:00

## 2024-02-14 RX ADMIN — INSULIN LISPRO 2 UNITS: 100 INJECTION, SOLUTION INTRAVENOUS; SUBCUTANEOUS at 17:15

## 2024-02-14 RX ADMIN — CLOPIDOGREL BISULFATE 75 MG: 75 TABLET ORAL at 08:00

## 2024-02-14 RX ADMIN — Medication 10 MG: at 01:17

## 2024-02-14 RX ADMIN — HEPARIN SODIUM 5000 UNITS: 5000 INJECTION INTRAVENOUS; SUBCUTANEOUS at 05:31

## 2024-02-14 RX ADMIN — AMLODIPINE BESYLATE 5 MG: 5 TABLET ORAL at 13:41

## 2024-02-14 RX ADMIN — HEPARIN SODIUM 5000 UNITS: 5000 INJECTION INTRAVENOUS; SUBCUTANEOUS at 13:41

## 2024-02-14 NOTE — ASSESSMENT & PLAN NOTE
Lab Results   Component Value Date    EGFR 48 02/13/2024    EGFR 40 02/12/2024    EGFR 40 (L) 02/01/2024    CREATININE 1.29 02/13/2024    CREATININE 1.48 (H) 02/12/2024    CREATININE 1.6 (H) 02/01/2024     Baseline Cr 1.4-1.7  Avoid nephrotoxins  Trend renal function

## 2024-02-14 NOTE — ASSESSMENT & PLAN NOTE
Lab Results   Component Value Date    HGBA1C 7.3 (H) 02/13/2024       Recent Labs     02/13/24  1151 02/13/24  1825 02/13/24 2057 02/14/24  0624   POCGLU 171* 222* 191* 151*       Blood Sugar Average: Last 72 hrs:  (P) 183  Hold home metformin  SSI coverage with QID accuchecks   Goal blood glucose 140-180  At risk for hyperglycemia with initiation of steroids

## 2024-02-14 NOTE — ASSESSMENT & PLAN NOTE
2/12 CTA PE study: Findings of acute on chronic aortitis with increased soft tissue thickening of the thoracic aorta as well as new inflammation involving the celiac axis. Associated new moderate pericardial effusion.   Hx of aortitis 3 years ago treated with steroids  Troponins and lactic negative  Negative RPR, SALEEM  Ddx likely Takayasu vs sarcoid vs other    Plan:  Vascular surgery consulted, no surgical interventions  Cardiology consulted, consider colchicine   Blood cultures pending  Continue steroids  Goal SBP < 130   Monitor abdominal exam  Consult rheumatology

## 2024-02-14 NOTE — ASSESSMENT & PLAN NOTE
Home regimen: coreg 6.25 mg BID, losartan 100 mg daily, HCTZ 12.5 mg daily, lasix 40 mg daily  Current regimen: coreg 12.5 mg BID, losartan 100 mg daily, amlodipine 5 mg daily    Plan:  Consider holding amlodipine and uptitrating coreg  Goal SBP < 130  Restart home diuretics

## 2024-02-14 NOTE — RESTORATIVE TECHNICIAN NOTE
Restorative Technician Note      Patient Name: Tatiana Balbuena     Note Type: Mobility  Patient Position Upon Consult: Supine  Activity Performed: Ambulated  Patient Position at End of Consult: Bedside chair; All needs within reach

## 2024-02-14 NOTE — ASSESSMENT & PLAN NOTE
2/12 CTA PE study: Findings of acute on chronic aortitis with increased soft tissue thickening of the thoracic aorta as well as new inflammation involving the celiac axis. Associated new moderate pericardial effusion.   Hx of aortitis 3 years ago treated with steroids  Troponins and lactic negative    Plan:  Vascular surgery consulted  Cardiology consulted  Blood cultures pending  Continue steroids  Goal SBP < 130   Monitor abdominal exam  Consult rheumatology

## 2024-02-14 NOTE — ASSESSMENT & PLAN NOTE
Home regimen: coreg 6.25 mg BID, losartan 100 mg daily, HCTZ 12.5 mg daily, lasix 40 mg daily  Current regimen: coreg 12.5 mg BID, losartan 100 mg daily, amlodipine 5 mg daily    Plan:  Consider holding amlodipine and uptitrating coreg  Goal SBP < 130  Cardene drip  Restart home diuretics

## 2024-02-14 NOTE — UTILIZATION REVIEW
NOTIFICATION OF INPATIENT ADMISSION      AUTHORIZATION REQUEST   SERVICING FACILITY:   [facility]  Address: [address] [place] [address]  Tax ID: [id number]  NPI: [id number] ATTENDING PROVIDER:  Attending Name and NPI#: Alfonso Thornton Md [[id number]]  Address: [address] [place] [address]  Phone: [phone]   ADMISSION INFORMATION:  Place of Service: Inpatient [facility]  Place of Service Code: 21  Inpatient Admission Date/Time: 2/13/24  3:21 AM  Discharge Date/Time: No discharge date for patient encounter.  Admitting Diagnosis Code/Description:  Aortitis (HCC) [I77.6]     UTILIZATION REVIEW CONTACT:  Gladys Esquivel, Utilization   Network Utilization Review Department  Phone: [phone]  Fax: [phone]  Email: [email or web address]  Contact for approvals/pending authorizations, clinical reviews, and discharge.     PHYSICIAN ADVISORY SERVICES:  Medical Necessity Denial & Kdkr-gl-[id number] Review  Phone: [phone]  Fax: [phone]  Email: [email or web address]     DISCHARGE SUPPORT TEAM:  For Patients Discharge Needs & Updates  Phone: [phone] opt. 2 Fax: [phone]  Email: [email or web address]

## 2024-02-14 NOTE — UTILIZATION REVIEW
Initial Clinical Review    Admission: Date/Time/Statement:   Admission Orders (From admission, onward)       Ordered        02/13/24 0330  Inpatient Admission  Once                          Orders Placed This Encounter   Procedures    Inpatient Admission     Standing Status:   Standing     Number of Occurrences:   1     Order Specific Question:   Level of Care     Answer:   Level 1 Stepdown [13]     Order Specific Question:   Estimated length of stay     Answer:   More than 2 Midnights     Order Specific Question:   Certification     Answer:   I certify that inpatient services are medically necessary for this patient for a duration of greater than two midnights. See H&P and MD Progress Notes for additional information about the patient's course of treatment.     2/12/2024 - 2/13/2024 (9 hours)  [facility] Emergency Department  Pericardial effusion and aortitis.   Received iv labetaolo x2, iv fentanyl x2, iv solumedrol 110 mg, iv nicardipine gtt, iv dilaudid sx1  Discharge to [facility] for higher level of care.          Initial Presentation: 48 y.o. female received from [facility] for inpatient step down admission to further evaluate and treat chest pain and aortitis.  PMHX: HTN, DM2, L SUBCLAVIAN STENT 1-5-24.   Arrives on iv nicardipine gtt.  Plan for vascular surgery consult. Follow up blood cultures and continue iv steroids.      Vascular surgery consult: inflammatory markers are concerning for large vessel vasculitis possibly takayasu's.   CRP 70.  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  No surgery recommended at this time.  Recommend rheumatology consult and continue iv steroids.     Cardiology consult:  recommend cholchicine if further pericarditis pain. Continue autoimune work up. Wean off nicardipine.      Date: 2-14-24    Day 2: inpatient step down    Continue iv solumedrol 110 daily.  Vascular signing off.  Rheumatology consult pending. TTE  "completed with no signs of tamponade.  Wean nicardipine gtt.  Follow up labs    02/13/24 1114  Anti-neutrophilic cytoplasmic antibody  Once        Status: In process      02/13/24 1113  dsDNA Antibody by IFA, Arthur lynn, with Reflex to Titer  Once        Status: In process              arrival [02/13/24 0330]   Temperature Pulse Respirations Blood Pressure SpO2   97.5 °F (36.4 °C) 85 21 147/68 96 %      Oral Monitor         Pain Score       3          02/14/24 102 kg (225 lb 1.4 oz)     Additional Vital Signs:       Patient Vitals for the past 24 hrs:   BP Temp Pulse Resp SpO2 Height Weight   02/14/24 1230 138/59 -- -- -- -- -- --   02/14/24 1210 148/68 -- -- -- -- -- --   02/14/24 1136 113/56 -- -- -- -- -- --   02/14/24 1050 121/56 -- 66 -- -- -- --   02/14/24 1033 142/66 (!) 97.3 °F (36.3 °C) 80 18 97 % -- --   02/14/24 0807 -- -- 74 -- -- -- --   02/14/24 0800 168/72 -- -- -- -- -- --   02/14/24 0657 137/65 97.7 °F (36.5 °C) 65 16 97 % -- --   02/14/24 0600 145/63 -- -- -- -- -- --   02/14/24 0552 -- -- -- -- -- -- 102 kg (225 lb 1.4 oz)   02/14/24 0500 129/55 -- 76 -- 97 % -- --   02/14/24 0353 140/63 -- -- -- -- -- --   02/14/24 0257 126/89 97.5 °F (36.4 °C) 75 18 92 % -- --   02/14/24 0200 126/58 -- 70 -- -- -- --   02/14/24 0151 135/61 -- 71 -- -- -- --   02/14/24 0000 120/56 -- 70 -- -- -- --   02/13/24 2315 120/60 97.5 °F (36.4 °C) 73 16 94 % -- --   02/13/24 2050 122/60 -- 69 -- -- -- --   02/13/24 2006 111/51 -- -- -- -- -- --   02/13/24 1928 112/52 98.2 °F (36.8 °C) 83 17 97 % -- --   02/13/24 1830 129/65 -- -- -- -- -- --   02/13/24 1815 137/64 -- 78 -- -- -- --   02/13/24 1745 166/83 -- -- -- -- -- --   02/13/24 1645 119/58 -- 85 -- -- -- --   02/13/24 1630 122/59 -- 81 -- -- -- --   02/13/24 1615 123/55 -- 86 -- -- -- --   02/13/24 1547 126/61 98 °F (36.7 °C) 79 16 94 % -- --   02/13/24 1530 115/56 -- 77 -- -- -- --   02/13/24 1417 131/61 -- 74 -- -- 5' 3\" (1.6 m) 103 kg (227 lb 1.2 oz) "   02/13/24 1400 154/71 -- 83 -- -- -- --   02/13/24 1349 170/80 -- 88 -- -- -- --   02/13/24 1330 165/72 -- 78 -- -- -- --   02/13/24 1316 128/61 -- 73 -- -- -- --            Pertinent Labs/Diagnostic Test Results:     Results from last 7 days   Lab Units 02/12/24  1736   SARS-COV-2  Negative     Results from last 7 days   Lab Units 02/14/24  0554 02/13/24  0554 02/12/24  1736   WBC Thousand/uL 18.03* 10.30* 11.35*   HEMOGLOBIN g/dL 8.8* 8.4* 8.4*   HEMATOCRIT % 29.1* 27.4* 28.9*   PLATELETS Thousands/uL 533* 447* 462*   NEUTROS ABS Thousands/µL 13.45* 8.91* 6.24         Results from last 7 days   Lab Units 02/14/24  0554 02/13/24  0554 02/12/24  1736   SODIUM mmol/L 139 137 136   POTASSIUM mmol/L 4.6 3.5 4.4   CHLORIDE mmol/L 106 106 104   CO2 mmol/L 21 21 20*   ANION GAP mmol/L 12 10 12   BUN mg/dL 33* 22 28*   CREATININE mg/dL 1.61* 1.29 1.48*   EGFR ml/min/1.73sq m 37 48 40   CALCIUM mg/dL 9.0 8.1* 9.2   CALCIUM, IONIZED mmol/L  --  1.09*  --    MAGNESIUM mg/dL 2.7 1.9 1.5*   PHOSPHORUS mg/dL 4.2 3.3  --      Results from last 7 days   Lab Units 02/13/24  0554 02/12/24  1736   AST U/L 7* 13   ALT U/L 9 9   ALK PHOS U/L 89 85   TOTAL PROTEIN g/dL 7.7 7.8   ALBUMIN g/dL 3.3* 3.5   TOTAL BILIRUBIN mg/dL 0.18* 0.20   BILIRUBIN DIRECT mg/dL  --  0.05     Results from last 7 days   Lab Units 02/14/24  1032 02/14/24  0624 02/13/24  2057 02/13/24  1825 02/13/24  1151 02/13/24  0615   POC GLUCOSE mg/dl 197* 151* 191* 222* 171* 180*     Results from last 7 days   Lab Units 02/14/24  0554 02/13/24  0554 02/12/24  1736   GLUCOSE RANDOM mg/dL 143* 160* 89         Results from last 7 days   Lab Units 02/13/24  0554   HEMOGLOBIN A1C % 7.3*   EAG mg/dl 163       Results from last 7 days   Lab Units 02/12/24  2148 02/12/24  1954 02/12/24  1809   HS TNI 0HR ng/L  --   --  9   HS TNI 2HR ng/L  --  10  --    HSTNI D2 ng/L  --  1  --    HS TNI 4HR ng/L 10  --   --    HSTNI D4 ng/L 1  --   --      Results from last 7 days   Lab Units  02/12/24  1736   D-DIMER QUANTITATIVE ug/ml FEU 1.15*     Results from last 7 days   Lab Units 02/12/24  1736   PROTIME seconds 13.6   INR  1.05   PTT seconds 24     Results from last 7 days   Lab Units 02/12/24  1736   TSH 3RD GENERATON uIU/mL 3.040     Results from last 7 days   Lab Units 02/12/24  1736   PROCALCITONIN ng/ml 0.06     Results from last 7 days   Lab Units 02/12/24  2141   LACTIC ACID mmol/L 1.1             Results from last 7 days   Lab Units 02/12/24  1736   BNP pg/mL 73     Results from last 7 days   Lab Units 02/12/24  1736   LIPASE u/L 29     Results from last 7 days   Lab Units 02/14/24  0554 02/12/24  1736   CRP mg/L 55.2* 70.6*   SED RATE mm/hour  --  >130*     Results from last 7 days   Lab Units 02/12/24  1838   CLARITY UA  Clear   COLOR UA  Yellow   SPEC GRAV UA  1.020   PH UA  6.0   GLUCOSE UA mg/dl Negative   KETONES UA mg/dl Negative   BLOOD UA  Trace-lysed*   PROTEIN UA mg/dl >=300*   NITRITE UA  Negative   BILIRUBIN UA  Negative   UROBILINOGEN UA E.U./dl 0.2   LEUKOCYTES UA  Negative   WBC UA /hpf 0-1   RBC UA /hpf 0-1   BACTERIA UA /hpf Occasional   EPITHELIAL CELLS WET PREP /hpf Occasional     Results from last 7 days   Lab Units 02/12/24  1736   INFLUENZA A PCR  Negative   INFLUENZA B PCR  Negative   RSV PCR  Negative       Results from last 7 days   Lab Units 02/13/24  0616 02/12/24  2148 02/12/24  2141   BLOOD CULTURE  No Growth at 24 hrs.  No Growth at 24 hrs. No Growth at 24 hrs. No Growth at 24 hrs.       ED Treatment:   Medication Administration - No Administrations Displayed (No Start Event Found)       None          Past Medical History:   Diagnosis Date    Asthma     Chronic anemia     Diabetes mellitus (HCC)     Hypertension     Hyperthyroidism     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HCC)     TB lung, latent      Present on Admission:   Aortitis    Pericardial effusion   Diabetes mellitus type 2   Essential hypertension   Subclavian artery stenosis, left (HCC)   Depression   CKD  (chronic kidney disease) stage 3, GFR 30-59 ml/min ([facility])   Mild persistent asthma without complication   Post-surgical hypothyroidism   Pulmonary hypertension ([facility])      Admitting Diagnosis: Aortitis ([facility]) [I77.6]    Age/Sex: 48 y.o. female    Scheduled Medications:    albuterol, 2 puff, Inhalation, BID  amLODIPine, 5 mg, Oral, Daily  aspirin, 81 mg, Oral, Daily  atorvastatin, 40 mg, Oral, Daily With Dinner  carvedilol, 25 mg, Oral, BID With Meals  chlorhexidine, 15 mL, Mouth/Throat, Q12H [place]  clopidogrel, 75 mg, Oral, Daily  escitalopram, 10 mg, Oral, Daily  fluticasone-salmeterol, 2 puff, Inhalation, Q12H  heparin (porcine), 5,000 Units, Subcutaneous, Q8H [place]  insulin lispro, 2-12 Units, Subcutaneous, TID AC  insulin lispro, 2-12 Units, Subcutaneous, HS  levothyroxine, 100 mcg, Oral, Early Morning  losartan, 100 mg, Oral, Daily  [START ON 2/15/2024] methylPREDNISolone sodium succinate, 110 mg, Intravenous, Daily  pantoprazole, 40 mg, Oral, Early Morning      Continuous IV Infusions:  niCARdipine, 1-15 mg/hr, Intravenous, Titrated      PRN Meds:  hydrALAZINE, 5 mg, Intravenous, Q4H PRN  labetalol, 10 mg, Intravenous, Q4H PRN        IP CONSULT TO CASE MANAGEMENT  IP CONSULT TO VASCULAR SURGERY  IP CONSULT TO CARDIOLOGY  IP CONSULT TO RHEUMATOLOGY    Network Utilization Review Department  ATTENTION: Please call with any questions or concerns to [phone] and carefully listen to the prompts so that you are directed to the right person. All voicemails are confidential.   For Discharge needs, contact Care Management DC Support Team at [phone] opt. [phone]  Send all requests for admission clinical reviews, approved or denied determinations and any other requests to dedicated fax number below belonging to the campus where the patient is receiving treatment. List of dedicated fax numbers for the Facilities:  FACILITY NAME UR FAX NUMBER   ADMISSION DENIALS (Administrative/Medical Necessity) [phone]   DISCHARGE  SUPPORT TEAM (NETWORK) [phone]   PARENT CHILD HEALTH (Maternity/NICU/Pediatrics) [phone]   [facility] [phone]   [facility] [phone]   [facility] [phone]   [facility] [phone]   [facility] [phone]   [facility] [phone]   [facility] [phone]   [facility] [phone]   [facility] [phone]   [facility] [phone]   [facility] [phone]   [facility] [phone]

## 2024-02-14 NOTE — PROGRESS NOTES
Critical Care Interval Transfer Note:    Please refer to progress note from earlier today for full details.     Barriers to discharge:   Optimization of oral antihypertensives  Transition from IV to PO steroid regimen  Rheumatology consult  Further plans per vascular surgery or rheumatology  PT/OT evaluation and treatment recommendations  CM for dispo     Consults: IP CONSULT TO CASE MANAGEMENT  IP CONSULT TO VASCULAR SURGERY  IP CONSULT TO CARDIOLOGY  IP CONSULT TO RHEUMATOLOGY    Discharge Plan: Anticipate discharge in 48-72 hrs to discharge location to be determined pending rehab evaluations.    Patient seen and evaluated by Critical Care today and deemed to be appropriate for transfer to Med Surg with Telemetry. Spoke to Dr. Kate from [facility] to accept transfer. Critical care can be contacted via [employer] with any questions or concerns.

## 2024-02-14 NOTE — PROGRESS NOTES
Progress Note - vascular Surgery  : vascular Surgery Resident on [facility]     Tatiana Balbuena 48 y.o. female MRN: [id number]  Unit/Bed#: [facility] 520-01 Encounter: [id number]    Assessment:  48 y.o. female with likely her second episode of aortitis, clinically improving     Vitals signs within normal limits and stable     WBC 18.3 from 10.3  Hemoglobin 8.8 from 8.4  Creatinine 1.61 from 1.3    Plan:  No acute surgical intervention   Recommend continued steroids   Rheumatology consult/ follow up   Follow up with vascular for monitoring for potential aortic degeneration   Continue Plavix  Feeding Plan - CCD2  Anticoagulation Plan- SQH  Infection Plan - none  Disposition Plan - per primary team   Vascular surgery will sign off at this time, please reach out with any additional questions     Subjective/Objective     Subjective: patient seen and examined at bedside. No events overnight. Patient denies abdominal pain today. Patient is tolerating a dist and having bowel function.       Objective:     Physical Exam:  GEN: NAD, sitting comfortably at bedside  HEENT: MMM  CV: Well perfused RR  Lung: Normal effort  Ab: Soft, ND/NT   Extrem: No lower extremity edema bilaterally  Neuro: A+Ox3     Vitals: Temp:  [97.5 °F (36.4 °C)-98.2 °F (36.8 °C)] 97.7 °F (36.5 °C)  HR:  [65-88] 74  Resp:  [16-20] 16  BP: (104-170)/(51-89) 168/72  Body mass index is 39.87 kg/m².    I/O         02/12 0701  02/13 0700 02/13 0701  02/14 0700 02/14 0701  02/15 0700    P.O.  720 420    I.V. (mL/kg) 270 (2.6) 1253.2 (12.3)     Total Intake(mL/kg) 270 (2.6) 1973.2 (19.3) 420 (4.1)    Urine (mL/kg/hr) 700 3050 (1.2)     Total Output 700 3050     Net -430 -1076.8 +420                     Lab, Imaging and other studies: I have personally reviewed pertinent reports.  , CBC with diff:   Lab Results   Component Value Date    WBC 18.03 (H) 02/14/2024    HGB 8.8 (L) 02/14/2024    HCT 29.1 (L) 02/14/2024    MCV 80 (L) 02/14/2024     (H)  02/14/2024    RBC 3.62 (L) 02/14/2024    MCH 24.3 (L) 02/14/2024    MCHC 30.2 (L) 02/14/2024    RDW 18.5 (H) 02/14/2024    MPV 9.8 02/14/2024    NRBC 0 02/14/2024   , BMP/CMP:   Lab Results   Component Value Date    SODIUM 139 02/14/2024    K 4.6 02/14/2024     02/14/2024    CO2 21 02/14/2024    BUN 33 (H) 02/14/2024    CREATININE 1.61 (H) 02/14/2024    CALCIUM 9.0 02/14/2024    EGFR 37 02/14/2024     VTE Pharmacologic Prophylaxis: Heparin  VTE Mechanical Prophylaxis: sequential compression device    Bc Banks MD  2/14/2024 8:53 AM

## 2024-02-14 NOTE — ASSESSMENT & PLAN NOTE
Lab Results   Component Value Date    EGFR 37 02/14/2024    EGFR 48 02/13/2024    EGFR 40 02/12/2024    CREATININE 1.61 (H) 02/14/2024    CREATININE 1.29 02/13/2024    CREATININE 1.48 (H) 02/12/2024     Baseline Cr 1.4-1.7  Avoid nephrotoxins  Trend renal function

## 2024-02-14 NOTE — PROGRESS NOTES
Progress Note - Tatiana Balbuena 48 y.o. female MRN: [id number]    Unit/Bed#: [facility] 520-01 Encounter: [id number]      Assessment/Plan   Tatiana Balbuena is a 48 y.o. 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    #aortitis with subclavian artery stenosis s/p stenting, now celiac axis vasculitis.   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 Prior work up for aortitis included negative infectious work up except for + TB Gold.  Her autoimmune work up revealed negative SALEEM, negative Mitochoncrial AB, Negative anti-smooth muscle AB IGG.  GI symptoms likely due to celiac involvement of arteritis. She was started on IV solumedrol.   -IV solumedrol  -on aspirin/plavix for subclavian artery stent, on atorvastatin.  -recommend rheumatology consult for further steroid sparring agents.      #small pericardial effusion seen on CT and TTE.  Mild chest pain which is now resolved but it was pleuritic and worse with laying flat so possible pericarditis.  CRP elevated but could also be from vasculitis. She is on steroids for aortitis.  If any further pericarditis like pain should start colchicine.    #HTN: home regimen losartan 100mg daily, carvedilol 6.25mg BID, hctz 12.5mg, lasix 40mg.   Was started on cardene 15/mg now back on orals.   -CURRENT REGIMEN: amlodipine 10mg, carvedilol 25mg BID, lasix 40mg daily, losartan 100mg, START spironolactone 12.5mg daily.      #latent TB: tried treatment but could not tolerate. TB not thought to be the cause of aortitis since it typically presents with miliary TB.     #CKD: Cr 1.6, close to recent baseline.     Subjective:   No complaints from patient.  She denies chest pain,  "shortness of breath.  Walking the halls without any symptoms.     Objective:     Vitals: Blood pressure 107/65, pulse 80, temperature 98.1 °F (36.7 °C), temperature source Oral, resp. rate 17, height 5' 3\" (1.6 m), weight 102 kg (225 lb 1.4 oz), last menstrual period 04/04/2020, SpO2 94%.,Body mass index is 39.87 kg/m².      Intake/Output Summary (Last 24 hours) at 2/14/2024 1836  Last data filed at 2/14/2024 1620  Gross per 24 hour   Intake 1086.92 ml   Output 2700 ml   Net -1613.08 ml       Physical Exam:   Constitutional:       Appearance: Normal appearance.   HENT:      Head: Normocephalic and atraumatic.   Neck:      Vascular: No JVD   Cardiovascular:      Rate and Rhythm: RRR, no murmurs  Pulmonary:      Effort: CTA-b  Abdominal:      General: Abdomen is flat. Bowel sounds are normal.      Palpations: Abdomen is soft.   Musculoskeletal:     No edema  Skin:     General: Skin is warm.   Neurological:      Mental Status: she is alert and oriented to person, place, and time.   Psychiatric:         Behavior: Behavior normal.     "

## 2024-02-14 NOTE — ASSESSMENT & PLAN NOTE
New moderate pericardial effusion noted on CT scan    Plan:  Echocardiogram completed 2/13, official read pending but no evidence of tamponade  Initiate colchicine  Appreciate cardiology's recommendations

## 2024-02-14 NOTE — PROGRESS NOTES
Expand All Collapse All    Cardiology   Tatiana Balbuena 48 y.o. female MRN: [id number]  [facility] 520-01 Encounter: [id number]        This note was linked to the full progress note in the chart authored by Dr. Jose Roberto Antony.  I saw and examined the patient myself and supervised Dr. Antony.       On detailed questioning the patient states that she never really had chest pain, she had some upper abdominal discomfort which has since resolved she denies any pleuritic or positional chest pain.    Assessment/Plan:     >> Aortitis/celiac artery inflammation: Syphilis screen negative , CRP extremely elevated, SALEEM negative  >> Pericardial effusion: Small, no evidence of tamponade  >> Subclavian artery stenosis, status post stent  >> Ascending aorta aneurysm: 4.5 cm  >> Hypertension  >> Dyslipidemia  >> Type 2 diabetes  >> Morbid obesity  >> CKD 3B     Plan:     - Autoimmune workup underway for aortitis, recommend rheumatology follow-up  - Wean off nicardipine  -Continue dual antiplatelet therapy for her subclavian stent  -She has no chest pain-no need for colchicine or any pericarditis treatment at this time.  -Continue losartan 100 mg  -Uptitrate amlodipine as needed or switch to nifedipine extended release for better blood pressure control  -Agree with the Coreg  -Continue high intensity statin therapy  -Repeat limited echo in 1 to 2 weeks to ensure stability of pericardial effusion

## 2024-02-14 NOTE — ASSESSMENT & PLAN NOTE
New moderate pericardial effusion noted on CT scan  Improved on echo    Left Ventricle: Left ventricular cavity size is normal. Wall thickness is moderately increased. The left ventricular ejection fraction is 65%. Systolic function is vigorous. Wall motion is normal. Diastolic function is mildly abnormal, consistent with grade I (abnormal) relaxation.    Left Atrium: The atrium is mildly dilated.    Aortic Valve: There is mild regurgitation. The aortic valve velocity is increased due to increased flow.    Mitral Valve: There is mild regurgitation.    Tricuspid Valve: There is mild regurgitation.    Pericardium: There is a small pericardial effusion    Plan:  Echocardiogram completed 2/13, official read pending but no evidence of tamponade  Initiate colchicine  Appreciate cardiology's recommendations

## 2024-02-14 NOTE — PLAN OF CARE
Problem: PAIN - ADULT  Goal: Verbalizes/displays adequate comfort level or baseline comfort level  Description: Interventions:  - Encourage patient to monitor pain and request assistance  - Assess pain using appropriate pain scale  - Administer analgesics based on type and severity of pain and evaluate response  - Implement non-pharmacological measures as appropriate and evaluate response  - Consider cultural and social influences on pain and pain management  - Notify physician/advanced practitioner if interventions unsuccessful or patient reports new pain  Outcome: Progressing     Problem: INFECTION - ADULT  Goal: Absence or prevention of progression during hospitalization  Description: INTERVENTIONS:  - Assess and monitor for signs and symptoms of infection  - Monitor lab/diagnostic results  - Monitor all insertion sites, i.e. indwelling lines, tubes, and drains  - Monitor endotracheal if appropriate and nasal secretions for changes in amount and color  - [place] appropriate cooling/warming therapies per order  - Administer medications as ordered  - Instruct and encourage patient and family to use good hand hygiene technique  - Identify and instruct in appropriate isolation precautions for identified infection/condition  Outcome: Progressing  Goal: Absence of fever/infection during neutropenic period  Description: INTERVENTIONS:  - Monitor WBC    Outcome: Progressing     Problem: SAFETY ADULT  Goal: Patient will remain free of falls  Description: INTERVENTIONS:  - Educate patient/family on patient safety including physical limitations  - Instruct patient to call for assistance with activity   - Consult OT/PT to assist with strengthening/mobility   - Keep Call bell within reach  - Keep bed low and locked with side rails adjusted as appropriate  - Keep care items and personal belongings within reach  - Initiate and maintain comfort rounds  - Make Fall Risk Sign visible to staff  - Offer Toileting every  Hours,  in advance of need  - Initiate/Maintain alarm  - Obtain necessary fall risk management equipment:   - Apply yellow socks and bracelet for high fall risk patients  - Consider moving patient to room near nurses station  Outcome: Progressing  Goal: Maintain or return to baseline ADL function  Description: INTERVENTIONS:  -  Assess patient's ability to carry out ADLs; assess patient's baseline for ADL function and identify physical deficits which impact ability to perform ADLs (bathing, care of mouth/teeth, toileting, grooming, dressing, etc.)  - Assess/evaluate cause of self-care deficits   - Assess range of motion  - Assess patient's mobility; develop plan if impaired  - Assess patient's need for assistive devices and provide as appropriate  - Encourage maximum independence but intervene and supervise when necessary  - Involve family in performance of ADLs  - Assess for home care needs following discharge   - Consider OT consult to assist with ADL evaluation and planning for discharge  - Provide patient education as appropriate  Outcome: Progressing  Goal: Maintains/Returns to pre admission functional level  Description: INTERVENTIONS:  - Perform AM-PAC 6 Click Basic Mobility/ Daily Activity assessment daily.  - Set and communicate daily mobility goal to care team and patient/family/caregiver.   - Collaborate with rehabilitation services on mobility goals if consulted  - Perform Range of Motion  times a day.  - Reposition patient every  hours.  - Dangle patient  times a day  - Stand patient  times a day  - Ambulate patient  times a day  - Out of bed to chair  times a day   - Out of bed for meals  times a day  - Out of bed for toileting  - Record patient progress and toleration of activity level   Outcome: Progressing     Problem: DISCHARGE PLANNING  Goal: Discharge to home or other facility with appropriate resources  Description: INTERVENTIONS:  - Identify barriers to discharge w/patient and caregiver  - Arrange for  needed discharge resources and transportation as appropriate  - Identify discharge learning needs (meds, wound care, etc.)  - Arrange for interpretive services to assist at discharge as needed  - Refer to Case Management Department for coordinating discharge planning if the patient needs post-hospital services based on physician/advanced practitioner order or complex needs related to functional status, cognitive ability, or social support system  Outcome: Progressing     Problem: Knowledge Deficit  Goal: Patient/family/caregiver demonstrates understanding of disease process, treatment plan, medications, and discharge instructions  Description: Complete learning assessment and assess knowledge base.  Interventions:  - Provide teaching at level of understanding  - Provide teaching via preferred learning methods  Outcome: Progressing     Problem: NEUROSENSORY - ADULT  Goal: Achieves stable or improved neurological status  Description: INTERVENTIONS  - Monitor and report changes in neurological status  - Monitor vital signs such as temperature, blood pressure, glucose, and any other labs ordered   - Initiate measures to prevent increased intracranial pressure  - Monitor for seizure activity and implement precautions if appropriate      Outcome: Progressing  Goal: Remains free of injury related to seizures activity  Description: INTERVENTIONS  - Maintain airway, patient safety  and administer oxygen as ordered  - Monitor patient for seizure activity, document and report duration and description of seizure to physician/advanced practitioner  - If seizure occurs,  ensure patient safety during seizure  - Reorient patient post seizure  - Seizure pads on all 4 side rails  - Instruct patient/family to notify RN of any seizure activity including if an aura is experienced  - Instruct patient/family to call for assistance with activity based on nursing assessment  - Administer anti-seizure medications if ordered    Outcome:  Progressing  Goal: Achieves maximal functionality and self care  Description: INTERVENTIONS  - Monitor swallowing and airway patency with patient fatigue and changes in neurological status  - Encourage and assist patient to increase activity and self care.   - Encourage visually impaired, hearing impaired and aphasic patients to use assistive/communication devices  Outcome: Progressing     Problem: CARDIOVASCULAR - ADULT  Goal: Maintains optimal cardiac output and hemodynamic stability  Description: INTERVENTIONS:  - Monitor I/O, vital signs and rhythm  - Monitor for S/S and trends of decreased cardiac output  - Administer and titrate ordered vasoactive medications to optimize hemodynamic stability  - Assess quality of pulses, skin color and temperature  - Assess for signs of decreased coronary artery perfusion  - Instruct patient to report change in severity of symptoms  Outcome: Progressing  Goal: Absence of cardiac dysrhythmias or at baseline rhythm  Description: INTERVENTIONS:  - Continuous cardiac monitoring, vital signs, obtain 12 lead EKG if ordered  - Administer antiarrhythmic and heart rate control medications as ordered  - Monitor electrolytes and administer replacement therapy as ordered  Outcome: Progressing     Problem: RESPIRATORY - ADULT  Goal: Achieves optimal ventilation and oxygenation  Description: INTERVENTIONS:  - Assess for changes in respiratory status  - Assess for changes in mentation and behavior  - Position to facilitate oxygenation and minimize respiratory effort  - Oxygen administered by appropriate delivery if ordered  - Initiate smoking cessation education as indicated  - Encourage broncho-pulmonary hygiene including cough, deep breathe, Incentive Spirometry  - Assess the need for suctioning and aspirate as needed  - Assess and instruct to report SOB or any respiratory difficulty  - Respiratory Therapy support as indicated  Outcome: Progressing     Problem: GASTROINTESTINAL -  ADULT  Goal: Minimal or absence of nausea and/or vomiting  Description: INTERVENTIONS:  - Administer IV fluids if ordered to ensure adequate hydration  - Maintain NPO status until nausea and vomiting are resolved  - Nasogastric tube if ordered  - Administer ordered antiemetic medications as needed  - Provide nonpharmacologic comfort measures as appropriate  - Advance diet as tolerated, if ordered  - Consider nutrition services referral to assist patient with adequate nutrition and appropriate food choices  Outcome: Progressing  Goal: Maintains or returns to baseline bowel function  Description: INTERVENTIONS:  - Assess bowel function  - Encourage oral fluids to ensure adequate hydration  - Administer IV fluids if ordered to ensure adequate hydration  - Administer ordered medications as needed  - Encourage mobilization and activity  - Consider nutritional services referral to assist patient with adequate nutrition and appropriate food choices  Outcome: Progressing  Goal: Maintains adequate nutritional intake  Description: INTERVENTIONS:  - Monitor percentage of each meal consumed  - Identify factors contributing to decreased intake, treat as appropriate  - Assist with meals as needed  - Monitor I&O, weight, and lab values if indicated  - Obtain nutrition services referral as needed  Outcome: Progressing  Goal: Oral mucous membranes remain intact  Description: INTERVENTIONS  - Assess oral mucosa and hygiene practices  - Implement preventative oral hygiene regimen  - Implement oral medicated treatments as ordered  - Initiate Nutrition services referral as needed  Outcome: Progressing     Problem: GENITOURINARY - ADULT  Goal: Maintains or returns to baseline urinary function  Description: INTERVENTIONS:  - Assess urinary function  - Encourage oral fluids to ensure adequate hydration if ordered  - Administer IV fluids as ordered to ensure adequate hydration  - Administer ordered medications as needed  - Offer frequent  toileting  - Follow urinary retention protocol if ordered  Outcome: Progressing  Goal: Absence of urinary retention  Description: INTERVENTIONS:  - Assess patient’s ability to void and empty bladder  - Monitor I/O  - Bladder scan as needed  - Discuss with physician/AP medications to alleviate retention as needed  - Discuss catheterization for long term situations as appropriate  Outcome: Progressing     Problem: METABOLIC, FLUID AND ELECTROLYTES - ADULT  Goal: Electrolytes maintained within normal limits  Description: INTERVENTIONS:  - Monitor labs and assess patient for signs and symptoms of electrolyte imbalances  - Administer electrolyte replacement as ordered  - Monitor response to electrolyte replacements, including repeat lab results as appropriate  - Instruct patient on fluid and nutrition as appropriate  Outcome: Progressing  Goal: Fluid balance maintained  Description: INTERVENTIONS:  - Monitor labs   - Monitor I/O and WT  - Instruct patient on fluid and nutrition as appropriate  - Assess for signs & symptoms of volume excess or deficit  Outcome: Progressing  Goal: Glucose maintained within target range  Description: INTERVENTIONS:  - Monitor Blood Glucose as ordered  - Assess for signs and symptoms of hyperglycemia and hypoglycemia  - Administer ordered medications to maintain glucose within target range  - Assess nutritional intake and initiate nutrition service referral as needed  Outcome: Progressing     Problem: SKIN/TISSUE INTEGRITY - ADULT  Goal: Skin Integrity remains intact(Skin Breakdown Prevention)  Description: Assess:  -Perform P[place] assessment every   -Clean and moisturize skin every   -Inspect skin when repositioning, toileting, and assisting with ADLS  -Assess under medical devices such as  every   -Assess extremities for adequate circulation and sensation     Bed Management:  -Have minimal linens on bed & keep smooth, unwrinkled  -Change linens as needed when moist or perspiring  -Avoid  sitting or lying in one position for more than  hours while in bed  -Keep HOB at degrees     Toileting:  -Offer bedside commode  -Assess for incontinence every   -Use incontinent care products after each incontinent episode such as     Activity:  -Mobilize patient  times a day  -Encourage activity and walks on unit  -Encourage or provide ROM exercises   -Turn and reposition patient every  Hours  -Use appropriate equipment to lift or move patient in bed  -Instruct/ Assist with weight shifting every  when out of bed in chair  -Consider limitation of chair time  hour intervals    Skin Care:  -Avoid use of baby powder, tape, friction and shearing, hot water or constrictive clothing  -Relieve pressure over bony prominences using   -Do not massage red bony areas    Next Steps:  -Teach patient strategies to minimize risks such as    -Consider consults to  interdisciplinary teams such as   Outcome: Progressing     Problem: HEMATOLOGIC - ADULT  Goal: Maintains hematologic stability  Description: INTERVENTIONS  - Assess for signs and symptoms of bleeding or hemorrhage  - Monitor labs  - Administer supportive blood products/factors as ordered and appropriate  Outcome: Progressing     Problem: MUSCULOSKELETAL - ADULT  Goal: Maintain or return mobility to safest level of function  Description: INTERVENTIONS:  - Assess patient's ability to carry out ADLs; assess patient's baseline for ADL function and identify physical deficits which impact ability to perform ADLs (bathing, care of mouth/teeth, toileting, grooming, dressing, etc.)  - Assess/evaluate cause of self-care deficits   - Assess range of motion  - Assess patient's mobility  - Assess patient's need for assistive devices and provide as appropriate  - Encourage maximum independence but intervene and supervise when necessary  - Involve family in performance of ADLs  - Assess for home care needs following discharge   - Consider OT consult to assist with ADL evaluation and  planning for discharge  - Provide patient education as appropriate  Outcome: Progressing

## 2024-02-14 NOTE — ASSESSMENT & PLAN NOTE
Lab Results   Component Value Date    HGBA1C 7.3 (H) 02/13/2024       Recent Labs     02/13/24  0615 02/13/24  1151 02/13/24  1825 02/13/24 2057   POCGLU 180* 171* 222* 191*       Blood Sugar Average: Last 72 hrs:  (P) 191  Hold home metformin  SSI coverage with QID accuchecks   Goal blood glucose 140-180  At risk for hyperglycemia with initiation of steroids

## 2024-02-14 NOTE — PROGRESS NOTES
[facility]  Progress Note  Name: Tatiana Balbuena I  MRN: [id number]  Unit/Bed#: [facility] 520-01 I Date of Admission: 2/13/2024   Date of Service: 2/14/2024 I Hospital Day: 1    Assessment/Plan   * Aortitis   Assessment & Plan  2/12 CTA PE study: Findings of acute on chronic aortitis with increased soft tissue thickening of the thoracic aorta as well as new inflammation involving the celiac axis. Associated new moderate pericardial effusion.   Hx of aortitis 3 years ago treated with steroids  Troponins and lactic negative    Plan:  Vascular surgery consulted  Cardiology consulted  Blood cultures pending  Continue steroids  Goal SBP < 130   Monitor abdominal exam  Consult rheumatology     Pericardial effusion  Assessment & Plan  New moderate pericardial effusion noted on CT scan    Plan:  Echocardiogram completed 2/13, official read pending but no evidence of tamponade  Initiate colchicine  Appreciate cardiology's recommendations     Essential hypertension  Assessment & Plan  Home regimen: coreg 6.25 mg BID, losartan 100 mg daily, HCTZ 12.5 mg daily, lasix 40 mg daily  Current regimen: coreg 12.5 mg BID, losartan 100 mg daily, amlodipine 5 mg daily    Plan:  Consider holding amlodipine and uptitrating coreg  Goal SBP < 130  Restart home diuretics    Subclavian artery stenosis, left (HCC)  Assessment & Plan  Underwent left subclavian artery stent placement via cutdown approach on 1/5    Plan:  Continue aspirin 81 mg and plavix 75 mg daily  Continue atorvastatin     Diabetes mellitus type 2  Assessment & Plan  Lab Results   Component Value Date    HGBA1C 7.3 (H) 02/13/2024       Recent Labs     02/13/24  0615 02/13/24  1151 02/13/24  1825 02/13/24  2057   POCGLU 180* 171* 222* 191*       Blood Sugar Average: Last 72 hrs:  (P) 191  Hold home metformin  SSI coverage with QID accuchecks   Goal blood glucose 140-180  At risk for hyperglycemia with initiation of steroids     Mild persistent  asthma without complication  Assessment & Plan  Continue home advair  PRN albuterol     CKD (chronic kidney disease) stage 3, GFR 30-59 ml/min ([facility])  Assessment & Plan  Lab Results   Component Value Date    EGFR 48 02/13/2024    EGFR 40 02/12/2024    EGFR 40 (L) 02/01/2024    CREATININE 1.29 02/13/2024    CREATININE 1.48 (H) 02/12/2024    CREATININE 1.6 (H) 02/01/2024     Baseline Cr 1.4-1.7  Avoid nephrotoxins  Trend renal function     Post-surgical hypothyroidism  Assessment & Plan  Continue synthroid     Depression  Assessment & Plan  Continue home lexapro             Disposition: Stepdown Level 1    ICU Core Measures     A: Assess, Prevent, and Manage Pain Has pain been assessed? Yes  Need for changes to pain regimen? No   B: Both SAT/SAT  N/A   C: Choice of Sedation RASS Goal: N/A patient not on sedation  Need for changes to sedation or analgesia regimen? No   D: Delirium CAM-ICU: Negative   E: Early Mobility  Plan for early mobility? Yes   F: Family Engagement Plan for family engagement today? Yes         Prophylaxis:  VTE VTE covered by:  heparin (porcine), Subcutaneous, 5,000 Units at 02/13/24 2152       Stress Ulcer  covered byomeprazole (PriLOSEC) 20 mg delayed release capsule [[id number]] (Long-Term Med), pantoprazole (PROTONIX) EC tablet 40 mg [[id number]]         Significant 24hr Events     24hr events: Remains on low dose cardene with increase in PO regimen. Chest pain has resolved.      Subjective   Review of Systems   Respiratory:  Negative for shortness of breath.    Cardiovascular:  Negative for chest pain, palpitations and leg swelling.   Gastrointestinal:  Negative for abdominal pain and nausea.   Genitourinary:  Negative for difficulty urinating.      Objective                            Vitals I/O      Most Recent Min/Max in 24hrs   Temp 97.5 °F (36.4 °C) Temp  Min: 97.5 °F (36.4 °C)  Max: 98.2 °F (36.8 °C)   Pulse 75 Pulse  Min: 69  Max: 102   Resp 18 Resp  Min: 16  Max: 20   /63 BP  Min:  104/54  Max: 170/80   O2 Sat 92 % SpO2  Min: 91 %  Max: 97 %      Intake/Output Summary (Last 24 hours) at 2/14/2024 0405  Last data filed at 2/14/2024 0101  Gross per 24 hour   Intake 2215.25 ml   Output 2750 ml   Net -534.75 ml       Diet Pablo/CHO Controlled; Consistent Carbohydrate Diet Level 2 (5 carb servings/75 grams CHO/meal)    Invasive Monitoring           Physical Exam   Physical Exam  Skin:     General: Skin is warm and dry.      Capillary Refill: Capillary refill takes less than 2 seconds.   HENT:      Head: Atraumatic.      Mouth/Throat:      Mouth: Mucous membranes are moist.   Cardiovascular:      Rate and Rhythm: Normal rate and regular rhythm.      Pulses: Normal pulses.   Musculoskeletal:      Right lower leg: No edema.      Left lower leg: No edema.   Abdominal: General: There is no distension.      Palpations: Abdomen is soft.      Tenderness: There is no abdominal tenderness.   Constitutional:       General: She is not in acute distress.  Pulmonary:      Effort: Pulmonary effort is normal.      Breath sounds: No wheezing, rhonchi or rales.   Neurological:      General: No focal deficit present.      Mental Status: She is alert and oriented to person, place and time.            Diagnostic Studies      EKG: NSR  Imaging: Echo I have personally reviewed pertinent films in PACS     Medications:  Scheduled PRN   albuterol, 2 puff, BID  amLODIPine, 5 mg, Daily  aspirin, 81 mg, Daily  atorvastatin, 40 mg, Daily With Dinner  carvedilol, 12.5 mg, BID With Meals  chlorhexidine, 15 mL, Q12H [place]  clopidogrel, 75 mg, Daily  escitalopram, 10 mg, Daily  fluticasone-salmeterol, 2 puff, Q12H  heparin (porcine), 5,000 Units, Q8H [place]  insulin lispro, 2-12 Units, TID AC  insulin lispro, 2-12 Units, HS  levothyroxine, 100 mcg, Early Morning  losartan, 100 mg, Daily  methylPREDNISolone sodium succinate, 110 mg, Daily  pantoprazole, 40 mg, Early Morning      labetalol, 10 mg, Q4H PRN       Continuous    niCARdipine,  1-15 mg/hr, Last Rate: 2.5 mg/hr (02/14/24 0353)         Labs:    CBC    Recent Labs     02/12/24  1736 02/13/24  0554   WBC 11.35* 10.30*   HGB 8.4* 8.4*   HCT 28.9* 27.4*   * 447*     BMP    Recent Labs     02/12/24  1736 02/13/24  0554   SODIUM 136 137   K 4.4 3.5    106   CO2 20* 21   AGAP 12 10   BUN 28* 22   CREATININE 1.48* 1.29   CALCIUM 9.2 8.1*       Coags    Recent Labs     02/12/24  1736   INR 1.05   PTT 24        Additional Electrolytes  Recent Labs     02/12/24 1736 02/13/24  0554   MG 1.5* 1.9   PHOS  --  3.3   CAIONIZED  --  1.09*          Blood Gas    No recent results  No recent results LFTs  Recent Labs     02/12/24  1736 02/13/24  0554   ALT 9 9   AST 13 7*   ALKPHOS 85 89   ALB 3.5 3.3*   TBILI 0.20 0.18*       Infectious  Recent Labs     02/12/24  1736   PROCALCITONI 0.06     Glucose  Recent Labs     02/12/24  1736 02/13/24  0554   GLUC 89 160*                   Kaylee Griffiths PA-C

## 2024-02-15 VITALS
DIASTOLIC BLOOD PRESSURE: 74 MMHG | WEIGHT: 224.43 LBS | BODY MASS INDEX: 39.77 KG/M2 | HEART RATE: 70 BPM | TEMPERATURE: 97.8 F | HEIGHT: 63 IN | SYSTOLIC BLOOD PRESSURE: 131 MMHG | RESPIRATION RATE: 20 BRPM | OXYGEN SATURATION: 97 %

## 2024-02-15 LAB
ANION GAP SERPL CALCULATED.3IONS-SCNC: 12 MMOL/L
BASOPHILS # BLD AUTO: 0.03 THOUSANDS/ÂΜL (ref 0–0.1)
BASOPHILS NFR BLD AUTO: 0 % (ref 0–1)
BUN SERPL-MCNC: 44 MG/DL (ref 5–25)
C-ANCA TITR SER IF: NORMAL TITER
CA-I BLD-SCNC: 1.16 MMOL/L (ref 1.12–1.32)
CALCIUM SERPL-MCNC: 8.9 MG/DL (ref 8.4–10.2)
CHLORIDE SERPL-SCNC: 101 MMOL/L (ref 96–108)
CO2 SERPL-SCNC: 23 MMOL/L (ref 21–32)
CREAT SERPL-MCNC: 1.67 MG/DL (ref 0.6–1.3)
CRP SERPL QL: 23.6 MG/L
EOSINOPHIL # BLD AUTO: 0 THOUSAND/ÂΜL (ref 0–0.61)
EOSINOPHIL NFR BLD AUTO: 0 % (ref 0–6)
ERYTHROCYTE [DISTWIDTH] IN BLOOD BY AUTOMATED COUNT: 18.5 % (ref 11.6–15.1)
ERYTHROCYTE [SEDIMENTATION RATE] IN BLOOD: 118 MM/HOUR (ref 0–29)
GFR SERPL CREATININE-BSD FRML MDRD: 35 ML/MIN/1.73SQ M
GLUCOSE SERPL-MCNC: 120 MG/DL (ref 65–140)
GLUCOSE SERPL-MCNC: 125 MG/DL (ref 65–140)
GLUCOSE SERPL-MCNC: 161 MG/DL (ref 65–140)
GLUCOSE SERPL-MCNC: 215 MG/DL (ref 65–140)
HCT VFR BLD AUTO: 27.8 % (ref 34.8–46.1)
HGB BLD-MCNC: 8.7 G/DL (ref 11.5–15.4)
IMM GRANULOCYTES # BLD AUTO: 0.23 THOUSAND/UL (ref 0–0.2)
IMM GRANULOCYTES NFR BLD AUTO: 1 % (ref 0–2)
LYMPHOCYTES # BLD AUTO: 2.1 THOUSANDS/ÂΜL (ref 0.6–4.47)
LYMPHOCYTES NFR BLD AUTO: 13 % (ref 14–44)
MAGNESIUM SERPL-MCNC: 2.4 MG/DL (ref 1.9–2.7)
MCH RBC QN AUTO: 24.6 PG (ref 26.8–34.3)
MCHC RBC AUTO-ENTMCNC: 31.3 G/DL (ref 31.4–37.4)
MCV RBC AUTO: 79 FL (ref 82–98)
MONOCYTES # BLD AUTO: 1.05 THOUSAND/ÂΜL (ref 0.17–1.22)
MONOCYTES NFR BLD AUTO: 7 % (ref 4–12)
MYELOPEROXIDASE AB SER IA-ACNC: <0.2 UNITS (ref 0–0.9)
NEUTROPHILS # BLD AUTO: 12.61 THOUSANDS/ÂΜL (ref 1.85–7.62)
NEUTS SEG NFR BLD AUTO: 79 % (ref 43–75)
NRBC BLD AUTO-RTO: 0 /100 WBCS
P-ANCA ATYPICAL TITR SER IF: NORMAL TITER
P-ANCA TITR SER IF: NORMAL TITER
PHOSPHATE SERPL-MCNC: 4.2 MG/DL (ref 2.7–4.5)
PLATELET # BLD AUTO: 522 THOUSANDS/UL (ref 149–390)
PMV BLD AUTO: 9.8 FL (ref 8.9–12.7)
POTASSIUM SERPL-SCNC: 4.4 MMOL/L (ref 3.5–5.3)
PROTEINASE3 AB SER IA-ACNC: <0.2 UNITS (ref 0–0.9)
RBC # BLD AUTO: 3.53 MILLION/UL (ref 3.81–5.12)
SODIUM SERPL-SCNC: 136 MMOL/L (ref 135–147)
WBC # BLD AUTO: 16.02 THOUSAND/UL (ref 4.31–10.16)

## 2024-02-15 PROCEDURE — 85025 COMPLETE CBC W/AUTO DIFF WBC: CPT

## 2024-02-15 PROCEDURE — 82330 ASSAY OF CALCIUM: CPT

## 2024-02-15 PROCEDURE — 85652 RBC SED RATE AUTOMATED: CPT | Performed by: INTERNAL MEDICINE

## 2024-02-15 PROCEDURE — 99446 NTRPROF PH1/NTRNET/EHR 5-10: CPT | Performed by: INTERNAL MEDICINE

## 2024-02-15 PROCEDURE — 84100 ASSAY OF PHOSPHORUS: CPT

## 2024-02-15 PROCEDURE — 86140 C-REACTIVE PROTEIN: CPT | Performed by: INTERNAL MEDICINE

## 2024-02-15 PROCEDURE — 99232 SBSQ HOSP IP/OBS MODERATE 35: CPT | Performed by: INTERNAL MEDICINE

## 2024-02-15 PROCEDURE — 99238 HOSP IP/OBS DSCHRG MGMT 30/<: CPT | Performed by: STUDENT IN AN ORGANIZED HEALTH CARE EDUCATION/TRAINING PROGRAM

## 2024-02-15 PROCEDURE — 82948 REAGENT STRIP/BLOOD GLUCOSE: CPT

## 2024-02-15 PROCEDURE — 83735 ASSAY OF MAGNESIUM: CPT

## 2024-02-15 PROCEDURE — 80048 BASIC METABOLIC PNL TOTAL CA: CPT

## 2024-02-15 RX ORDER — CARVEDILOL 25 MG/1
25 TABLET ORAL 2 TIMES DAILY WITH MEALS
Qty: 60 TABLET | Refills: 0 | Status: SHIPPED | OUTPATIENT
Start: 2024-02-15 | End: 2024-03-16

## 2024-02-15 RX ORDER — CLOPIDOGREL BISULFATE 75 MG/1
75 TABLET ORAL DAILY
Qty: 30 TABLET | Refills: 0 | Status: CANCELLED | OUTPATIENT
Start: 2024-02-16

## 2024-02-15 RX ORDER — OMEPRAZOLE 20 MG/1
20 CAPSULE, DELAYED RELEASE ORAL DAILY
Qty: 30 CAPSULE | Refills: 0 | Status: SHIPPED | OUTPATIENT
Start: 2024-02-15

## 2024-02-15 RX ORDER — AMLODIPINE BESYLATE 10 MG/1
10 TABLET ORAL DAILY
Qty: 30 TABLET | Refills: 0 | Status: SHIPPED | OUTPATIENT
Start: 2024-02-16

## 2024-02-15 RX ORDER — CLOPIDOGREL BISULFATE 75 MG/1
75 TABLET ORAL DAILY
COMMUNITY
Start: 2024-02-01

## 2024-02-15 RX ORDER — PREDNISONE 20 MG/1
80 TABLET ORAL DAILY
Qty: 120 TABLET | Refills: 0 | Status: SHIPPED | OUTPATIENT
Start: 2024-02-15 | End: 2024-03-16

## 2024-02-15 RX ORDER — FUROSEMIDE 40 MG/1
40 TABLET ORAL DAILY
Qty: 30 TABLET | Refills: 0 | Status: SHIPPED | OUTPATIENT
Start: 2024-02-15

## 2024-02-15 RX ADMIN — HEPARIN SODIUM 5000 UNITS: 5000 INJECTION INTRAVENOUS; SUBCUTANEOUS at 05:09

## 2024-02-15 RX ADMIN — CARVEDILOL 25 MG: 25 TABLET, FILM COATED ORAL at 06:47

## 2024-02-15 RX ADMIN — ESCITALOPRAM OXALATE 10 MG: 10 TABLET ORAL at 08:17

## 2024-02-15 RX ADMIN — CLOPIDOGREL BISULFATE 75 MG: 75 TABLET ORAL at 08:17

## 2024-02-15 RX ADMIN — INSULIN LISPRO 4 UNITS: 100 INJECTION, SOLUTION INTRAVENOUS; SUBCUTANEOUS at 11:35

## 2024-02-15 RX ADMIN — ALBUTEROL SULFATE 2 PUFF: 90 AEROSOL, METERED RESPIRATORY (INHALATION) at 08:19

## 2024-02-15 RX ADMIN — INSULIN LISPRO 2 UNITS: 100 INJECTION, SOLUTION INTRAVENOUS; SUBCUTANEOUS at 16:48

## 2024-02-15 RX ADMIN — LEVOTHYROXINE SODIUM 100 MCG: 100 TABLET ORAL at 05:09

## 2024-02-15 RX ADMIN — FUROSEMIDE 40 MG: 40 TABLET ORAL at 08:17

## 2024-02-15 RX ADMIN — HYDRALAZINE HYDROCHLORIDE 10 MG: 20 INJECTION INTRAMUSCULAR; INTRAVENOUS at 05:19

## 2024-02-15 RX ADMIN — ASPIRIN 81 MG: 81 TABLET, COATED ORAL at 08:16

## 2024-02-15 RX ADMIN — PANTOPRAZOLE SODIUM 40 MG: 40 TABLET, DELAYED RELEASE ORAL at 05:09

## 2024-02-15 RX ADMIN — Medication 20 MG: at 03:39

## 2024-02-15 RX ADMIN — ATORVASTATIN CALCIUM 40 MG: 40 TABLET, FILM COATED ORAL at 16:47

## 2024-02-15 RX ADMIN — CARVEDILOL 25 MG: 25 TABLET, FILM COATED ORAL at 16:47

## 2024-02-15 RX ADMIN — AMLODIPINE BESYLATE 10 MG: 10 TABLET ORAL at 08:16

## 2024-02-15 RX ADMIN — METHYLPREDNISOLONE SODIUM SUCCINATE 110 MG: 125 INJECTION, POWDER, FOR SOLUTION INTRAMUSCULAR; INTRAVENOUS at 08:16

## 2024-02-15 RX ADMIN — LOSARTAN POTASSIUM 100 MG: 50 TABLET, FILM COATED ORAL at 08:17

## 2024-02-15 NOTE — ASSESSMENT & PLAN NOTE
Without evidence of tamponade.  Repeat limited echo in 1 to 2 weeks.  Follow-up with cardiology as outpatient.

## 2024-02-15 NOTE — DISCHARGE SUMMARY
[facility]  Discharge- Tatiana Sree 1975, 48 y.o. female MRN: [id number]  Unit/Bed#: [facility] 520-01 Encounter: [id number]  Primary Care Provider: Darian Rollins MD   Date and time admitted to hospital: 2/13/2024  3:21 AM    * Aortitis   Assessment & Plan  Patient was admitted with CT findings of acute on chronic aortitis with increased soft tissue thickening of the thoracic aorta and new inflammation involving the celiac axis associated with new moderate pericardial effusion.  Inflammatory markers elevated. Autoimmune workup thus far unremarkable.  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  Previously treated with methotrexate and steroids.  She was started on IV Solu-Medrol with improvement of inflammatory markers.  Transition to oral prednisone 80 mg daily.  Follow-up with rheumatology as outpatient.  Discussed the importance of close follow-up to wean steroids and follow inflammatory markers.  Patient prefers to follow-up with her rheumatologist at [facility].    Post-surgical hypothyroidism  Assessment & Plan  Continue home levothyroxine.    CKD (chronic kidney disease) stage 3, GFR 30-59 ml/min ([facility])  Assessment & Plan  Lab Results   Component Value Date    EGFR 35 02/15/2024    EGFR 37 02/14/2024    EGFR 48 02/13/2024    CREATININE 1.67 (H) 02/15/2024    CREATININE 1.61 (H) 02/14/2024    CREATININE 1.29 02/13/2024     At baseline, continue outpatient follow-up.    Subclavian artery stenosis, left (HCC)  Assessment & Plan  Status post recent stent 1/2024.  Continue aspirin, Plavix, statin.  Follow-up with vascular as outpatient.    Pericardial effusion  Assessment & Plan  Without evidence of tamponade.  Repeat limited echo in 1 to 2 weeks.  Follow-up with cardiology as outpatient.    Essential hypertension  Assessment & Plan  Prior to admission, she was on losartan 100 mg daily Coreg 6.25 mg twice daily  and HCTZ 12.5 mg daily.  Continue losartan at discharge.  Increase Coreg to 25 mg twice daily at discharge.  Stop HCTZ.  Start Lasix 40 mg daily.    Mild persistent asthma without complication  Assessment & Plan  Continue home albuterol, Advair and Singulair.    Depression  Assessment & Plan  Continue home Lexapro.    Diabetes mellitus type 2  Assessment & Plan  Lab Results   Component Value Date    HGBA1C 7.3 (H) 02/13/2024       Recent Labs     02/14/24  1538 02/14/24  2035 02/15/24  0637 02/15/24  1047   POCGLU 193* 155* 125 215*       Blood Sugar Average: Last 72 hrs:    Resume home metformin at discharge.  Anticipate hyperglycemia with steroid use, patient to follow-up with her PCP.          Medical Problems       Resolved Problems  Date Reviewed: 2/15/2024   None       Discharging Physician / Practitioner: Yousif Goff MD  PCP: Darian Rollins MD  Admission Date:   Admission Orders (From admission, onward)       Ordered        02/13/24 0330  Inpatient Admission  Once                          Discharge Date: 02/15/24    Consultations During Hospital Stay:  Cardiology  Vascular surgery  Rheumatology    Procedures Performed:   None    Significant Findings / Test Results:   CTA C/A/P  No pulmonary embolism.  Findings of acute on chronic aortitis with increased soft tissue thickening of the thoracic aorta as well as new inflammation involving the celiac axis. Associated new moderate pericardial effusion.  The ascending thoracic aorta measures up to 4.0 cm in caliber, unchanged.  Unchanged dilation of the main pulmonary artery suggestive of pulmonary arterial hypertension.    Echo    Left Ventricle: Left ventricular cavity size is normal. Wall thickness is moderately increased. The left ventricular ejection fraction is 65%. Systolic function is vigorous. Wall motion is normal. Diastolic function is mildly abnormal, consistent with grade I (abnormal) relaxation.    Left Atrium: The atrium is mildly dilated.    Aortic  "Valve: There is mild regurgitation. The aortic valve velocity is increased due to increased flow.    Mitral Valve: There is mild regurgitation.    Tricuspid Valve: There is mild regurgitation.    Pericardium: There is a small pericardial effusion.    Incidental Findings:   none     Test Results Pending at Discharge (will require follow up):   none     Outpatient Tests Requested:  BMP, mag   Limited echo    Complications: None    Reason for Admission: Abdominal pain    Hospital Course:   Tatiana aBlbuena is a 48 y.o. female patient who originally presented to the hospital on 2/13/2024 due to abdominal and chest pain.  She has past medical history of hypertension, diabetes, Takayasu arteritis with left subclavian artery stenosis status post recent stent 1/5/2024, latent TB, hyperlipidemia and CKD.  She was found to have acute on chronic aortitis with increased soft tissue thickening of the thoracic aorta and celiac axis and a pericardial effusion.  She was transferred to [facility] and admitted to the ICU.  Was seen by vascular surgery, who recommended no surgical intervention.  Rheumatology was consulted and patient was started on Solu-Medrol with improvement of inflammatory markers.  Also seen by cardiology for pericardial effusion, had echo without tamponade physiology.  Antihypertensive regimen was adjusted as above.  Symptoms resolved.  She was discharged with instructions to follow-up with cardiology, rheumatology and vascular surgery.    Please see above list of diagnoses and related plan for additional information.     Condition at Discharge: good    Discharge Day Visit / Exam:   Subjective: Patient was seen and examined at bedside.  Doing well.  No chest pain.  No other complaints.  Vitals: Blood Pressure: 131/74 (02/15/24 1050)  Pulse: 70 (02/15/24 1050)  Temperature: 97.8 °F (36.6 °C) (02/15/24 0205)  Temp Source: Oral (02/14/24 1947)  Respirations: 20 (02/14/24 2150)  Height: 5' 3\" (160 cm) (02/13/24 " 1417)  Weight - Scale: 102 kg (224 lb 6.9 oz) (02/15/24 0532)  SpO2: 97 % (02/15/24 1050)  Exam:   Physical Exam  Vitals and nursing note reviewed.   Constitutional:       General: She is not in acute distress.     Appearance: Normal appearance.   HENT:      Head: Normocephalic and atraumatic.   Cardiovascular:      Rate and Rhythm: Normal rate.      Heart sounds: Normal heart sounds. No murmur heard.  Pulmonary:      Breath sounds: Normal breath sounds. No wheezing or rales.   Abdominal:      Palpations: Abdomen is soft.      Tenderness: There is no abdominal tenderness.   Skin:     General: Skin is warm.   Neurological:      Mental Status: She is alert.          Discussion with Family:  Discussed with patient.     Discharge instructions/Information to patient and family:   See after visit summary for information provided to patient and family.      Provisions for Follow-Up Care:  See after visit summary for information related to follow-up care and any pertinent home health orders.      Mobility at time of Discharge:   Basic Mobility Inpatient Raw Score: 24  JH-HLM Goal: 8: Walk 250 feet or more  JH-HLM Achieved: 7: Walk 25 feet or more  HLM Goal NOT achieved. Continue to encourage mobility in post discharge setting.     Disposition:   Home    Planned Readmission: no     Discharge Statement:  I spent 30 minutes discharging the patient. This time was spent on the day of discharge. I had direct contact with the patient on the day of discharge. Greater than 50% of the total time was spent examining patient, answering all patient questions, arranging and discussing plan of care with patient as well as directly providing post-discharge instructions.  Additional time then spent on discharge activities.    Discharge Medications:  See after visit summary for reconciled discharge medications provided to patient and/or family.      **Please Note: This note may have been constructed using a voice recognition system**

## 2024-02-15 NOTE — PROGRESS NOTES
Expand All Collapse All    Cardiology   Tatiana Balbuena 48 y.o. female MRN: [id number]  [facility] 520-01 Encounter: [id number]        This note was linked to the full progress note in the chart authored by Dr. Jose Roberto Antony.  I saw and examined the patient myself and supervised Dr. Antony.       On detailed questioning the patient states that she never really had chest pain, she had some upper abdominal discomfort which has since resolved she denies any pleuritic or positional chest pain.    Assessment/Plan:     >> Aortitis/celiac artery inflammation: Syphilis screen negative , CRP extremely elevated, SALEEM negative  >> Pericardial effusion: Small, no evidence of tamponade  >> Subclavian artery stenosis, status post stent  >> Ascending aorta aneurysm: 4.5 cm  >> Hypertension  >> Dyslipidemia  >> Type 2 diabetes  >> Morbid obesity  >> CKD 3B     Plan:     - Autoimmune workup underway for aortitis, recommend rheumatology follow-up  -Continue dual antiplatelet therapy for her subclavian stent  -She has no chest pain-no need for colchicine or any pericarditis treatment at this time.  -Continue losartan 100 mg  -Continue lasix 40 mg PO, can discontinue home HCTZ  - Continue amlodipine 10 mg daily  -Agree with the Coreg 25 mg bid  -Continue high intensity statin therapy  -Repeat limited echo in 1 to 2 weeks to ensure stability of pericardial effusion  - Check BMP and Mg in 1 week to ensure stable electrolytes- this and the need for echo and f/u was communicated to the patient and hospitalist.

## 2024-02-15 NOTE — ASSESSMENT & PLAN NOTE
Patient was admitted with CT findings of acute on chronic aortitis with increased soft tissue thickening of the thoracic aorta and new inflammation involving the celiac axis associated with new moderate pericardial effusion.  Inflammatory markers elevated. Autoimmune workup thus far unremarkable.  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  Previously treated with methotrexate and steroids.  She was started on IV Solu-Medrol with improvement of inflammatory markers.  Transition to oral prednisone 80 mg daily.  Follow-up with rheumatology as outpatient.  Discussed the importance of close follow-up to wean steroids and follow inflammatory markers.  Patient prefers to follow-up with her rheumatologist at [facility].

## 2024-02-15 NOTE — ASSESSMENT & PLAN NOTE
Lab Results   Component Value Date    HGBA1C 7.3 (H) 02/13/2024       Recent Labs     02/14/24  1538 02/14/24  2035 02/15/24  0637 02/15/24  1047   POCGLU 193* 155* 125 215*       Blood Sugar Average: Last 72 hrs:    Resume home metformin at discharge.  Anticipate hyperglycemia with steroid use, patient to follow-up with her PCP.

## 2024-02-15 NOTE — ASSESSMENT & PLAN NOTE
Prior to admission, she was on losartan 100 mg daily Coreg 6.25 mg twice daily and HCTZ 12.5 mg daily.  Continue losartan at discharge.  Increase Coreg to 25 mg twice daily at discharge.  Stop HCTZ.  Start Lasix 40 mg daily.

## 2024-02-15 NOTE — ASSESSMENT & PLAN NOTE
Lab Results   Component Value Date    EGFR 35 02/15/2024    EGFR 37 02/14/2024    EGFR 48 02/13/2024    CREATININE 1.67 (H) 02/15/2024    CREATININE 1.61 (H) 02/14/2024    CREATININE 1.29 02/13/2024     At baseline, continue outpatient follow-up.

## 2024-02-15 NOTE — CONSULTS
e-Consult ([facility])   Tatiana Balbuena 48 y.o. female MRN: [id number]  Unit/Bed#: [facility] 520-01 Encounter: [id number]      Reason for Consult / Principal Problem: Aortitis  Inpatient consult to Rheumatology  Consult performed by: Isac Andrade MD  Consult ordered by: Destiny Salazar MD        02/15/24      ASSESSMENT:  48-year-old female presents with CT findings of acute on chronic aortitis with increased soft tissue thickening of the thoracic aorta as well as new inflammation involving the celiac axis, associated new moderate pericardial effusion.  Serology unremarkable except for elevated ESR/CRP.  She has seen a rheumatologist Dr. Gary with [facility] on 9/28/20, it does not seem like she has been following with rheumatology since then.  Was on methotrexate and steroids in the past.  Has been receiving IV Solu-Medrol for the past few days with improving inflammatory markers.      RECOMMENDATIONS:  Can transition patient to oral prednisone 80 mg daily, with ASAP follow-up with rheumatology outpatient.  She can follow-up with her outside rheumatologist, or we can make an urgent appointment with one of our rheumatologists.  Can reach out to me on [employer] text if patient needs appointment with our rheumatology department outpatient.    5-10 minutes, >50% of the total time devoted to medical consultative verbal/EMR discussion between providers. Written report will be generated in the EMR.    Isac Andrade MD

## 2024-02-15 NOTE — DISCHARGE INSTR - AVS FIRST PAGE
For the aortitis/inflammation:   Start prednisone 80 mg daily.  Take omeprazole while on prednisone.  Make appointment with your rheumatologist as soon as possible to wean prednisone and follow-up.    For the fluid around the heart, you need a repeat echocardiogram in 1 to 2 weeks to follow-up on pericardial effusion.  Follow-up with cardiology.    For your blood pressure, please stop hydrochlorothiazide and start Lasix 40 mg daily.  Carvedilol was increased from 6.25 mg daily to 25 mg daily.  Continue losartan at current dose of 100 mg daily.  Amlodipine 10 mg daily was added.  Please obtain blood work in 1 week, this was ordered in the system.  Follow-up with primary care physician.    Continue follow-up with vascular surgery.

## 2024-02-15 NOTE — ASSESSMENT & PLAN NOTE
Status post recent stent 1/2024.  Continue aspirin, Plavix, statin.  Follow-up with vascular as outpatient.

## 2024-02-16 LAB — DSDNA AB SER QL CLIF: NEGATIVE

## 2024-02-16 NOTE — UTILIZATION REVIEW
NOTIFICATION OF ADMISSION DISCHARGE   This is a Notification of Discharge from [facility]. Please be advised that this patient has been discharge from our facility. Below you will find the admission and discharge date and time including the patient’s disposition.   UTILIZATION REVIEW CONTACT:  Gladys Esquivel  Utilization   Network Utilization Review Department  Phone: [phone] x carefully listen to the prompts. All voicemails are confidential.  Email: [email or web address]     ADMISSION INFORMATION  PRESENTATION DATE: 2/13/2024  3:21 AM  OBERVATION ADMISSION DATE:   INPATIENT ADMISSION DATE: 2/13/24  3:21 AM   DISCHARGE DATE: 2/15/2024  5:36 PM   DISPOSITION:Home/Self Care    Network Utilization Review Department  ATTENTION: Please call with any questions or concerns to [phone] and carefully listen to the prompts so that you are directed to the right person. All voicemails are confidential.   For Discharge needs, contact Care Management DC Support Team at [phone] opt. 2  Send all requests for admission clinical reviews, approved or denied determinations and any other requests to dedicated fax number below belonging to the campus where the patient is receiving treatment. List of dedicated fax numbers for the Facilities:  FACILITY NAME UR FAX NUMBER   ADMISSION DENIALS (Administrative/Medical Necessity) [phone]   DISCHARGE SUPPORT TEAM ([facility]) [phone]   PARENT CHILD HEALTH (Maternity/NICU/Pediatrics) [phone]   [facility] [phone]   [facility] [phone]   [facility] [phone]   [facility] [phone]   [facility] [phone]   [facility] [phone]   [facility] [phone]   [facility] [phone]    [facility] [phone]   [facility] [phone]   [facility] [phone]   [facility] [phone]

## 2024-02-18 LAB
BACTERIA BLD CULT: NORMAL

## 2024-02-24 ENCOUNTER — APPOINTMENT (OUTPATIENT)
Dept: LAB | Facility: MEDICAL CENTER | Age: 49
End: 2024-02-24
Payer: COMMERCIAL

## 2024-02-24 DIAGNOSIS — I77.6 AORTITIS (HCC): ICD-10-CM

## 2024-02-24 LAB
ANION GAP SERPL CALCULATED.3IONS-SCNC: 10 MMOL/L
BUN SERPL-MCNC: 47 MG/DL (ref 5–25)
CALCIUM SERPL-MCNC: 8.9 MG/DL (ref 8.4–10.2)
CHLORIDE SERPL-SCNC: 104 MMOL/L (ref 96–108)
CO2 SERPL-SCNC: 28 MMOL/L (ref 21–32)
CREAT SERPL-MCNC: 1.57 MG/DL (ref 0.6–1.3)
GFR SERPL CREATININE-BSD FRML MDRD: 38 ML/MIN/1.73SQ M
GLUCOSE P FAST SERPL-MCNC: 93 MG/DL (ref 65–99)
MAGNESIUM SERPL-MCNC: 1.9 MG/DL (ref 1.9–2.7)
POTASSIUM SERPL-SCNC: 4.1 MMOL/L (ref 3.5–5.3)
SODIUM SERPL-SCNC: 142 MMOL/L (ref 135–147)

## 2024-02-24 PROCEDURE — 80048 BASIC METABOLIC PNL TOTAL CA: CPT

## 2024-02-24 PROCEDURE — 83735 ASSAY OF MAGNESIUM: CPT

## 2024-02-24 PROCEDURE — 36415 COLL VENOUS BLD VENIPUNCTURE: CPT

## 2024-02-28 NOTE — PROGRESS NOTES
Cardiology Follow Up    Tatiana Balbuena  1975  [id number]  [facility] [address]  [place] [id number] [phone]    1. Aortitis (HCC)        2. Diabetes mellitus type 2        3. Essential hypertension        4. Pericardial effusion        5. Dyslipidemia, goal LDL below 100        6. Stenosis of left subclavian artery (HCC)        7. Aneurysm of ascending aorta without rupture (HCC)          This was performed as a virtual visit; ROS questioning was performed but no physical exam    Verification of patient location:     Patient is located at Home in the following state in which I hold an active license [place]      The patient was identified by name and date of birth. Jean M Beharry was informed that this is a telemedicine visit and that the visit is being conducted through telephone platform. She agrees to proceed.. My office door was closed. No one else was in the room. She acknowledged consent and understanding of privacy and security of the video platform. The patient has agreed to participate and understands they can discontinue the visit at any time.     Patient is aware this is a billable service.     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 as below  Hx of left subclavian artery stenosis s/p stent on 1-5-2024--no DAPT  Syphilis/SALEEM/RF during recent admit  Seen by Rheumatology team in [facility] on 2- (I cannot see documentation but do see plan) that notes to start prednisone taper and consider Humira   Today she states she feels well with no pain    Pericardial effusion  Small pericardial effusion noted on echo  Repeat Limited echo ordered--she will have this completed once she returns from [facility]  in April     Ascending aortic aneurysm  4.0 cm by CT--has follow up with Vascular surgery team in August     Essential HTN  BP elevated during recent admit and carvedilol increase at discharge   Now Maintained on amlodipine 10 mg QD, Carvedilol 25 mg BID, lasix 40 mg QD and losartan 100 mg QD  BP this AM was 142/100 with HR 98--this was less than 30 min after taking meds  She does follow low sodium diet  She will be leaving for [facility] in 2 days but will take her blood pressure cuff and monitor her blood pressure twice daily and contact us through the [employer] portal with blood pressure readings----unfortunately I will not be able to prescribe medications to [place] therefore we will have to wait to adjust any medications as needed when she returns on April 21  I have encouraged her to seek treatment in [place] if her blood pressure remains persistently elevated    Hyperlipidemia  Lipid panel from August 2023  Triglycerides 201  HDL 47    She is on Lipitor 40 QD  We will recheck lipid panel in a few months    DM  Recent HgbA1c 7.3  Defer to PCP    She will return to the office in 6 months to follow with Dr. Weller    Interval History:   Tatiana Balbuena is a 48 y.o. female PMH of Takayasu aortitis with left subclavian artery stenosis with recent stenting (1/5/24), latent TB, HLD, HTN,chronic anemia, hypothyroidism, DM2 and virtual visit is performed today per patient request due to transportation issues.    Patient presented to [facility] ED noting chest/back pain and THRASHER (notes from Cardiology later in the admission document that patient stated to them she never had chest pain but rather upper abdominal pain). She states she had been ambulating up a hill to her vascular surgeon's office at [facility] the day prior and noted onset of THRASHER. She then began to have pain under left breast with radiation to the back that was worse with lying down and deep inspiration. Lab work revealed elevated  CRP and elevated Ddimer with Hgb of 8.4 in the setting of known chronic anemia, BNP/troponin WNL. CT scan of chest with no evidence of PE but acute on chronic aortitis noted with moderate pericardial effusion and celiac axis vasculitis. EKG was NSR with non-specific ST/T wave changes but no evidence of acute ischemia. She was subsequently transferred to [facility] for more interdisciplinary care.  Her blood pressure was elevated on arrival and she was placed on IV anti-hypertensive meds for a period of time. Syphilis/SALEEM/RF found to be negative. Her blood pressure was ultimately controlled. She was seen by Vascular surgery team who recommended medical management and Rheumatology consult. Rheumatology saw patient and placed on prednisone. She was deemed stable for discharge on 2-. She was seen by outpatient Rheumatology on 2- and prednisone taper was recommended as well as Humira.  Today, I am speaking to the patient via phone as we are performing a virtual visit at her request secondary to transportation issues.  The patient has been seen by rheumatology in the outpatient setting and prednisone taper was continued with the recommendation of starting Humira for treatment.  However, the patient does have a diagnosis of latent TB and her infectious disease doctor has recommended that she first be evaluated by them prior to starting Humira and the patient plans to have this office visit scheduled in the near future.  Rheumatology sends message to patient noting that inflammatory markers have improved since she had been on the steroids.  Today, she states that she is feeling better since starting the steroids and has not having any chest pain.  The patient does have some dyspnea with exertion that she states is currently at her baseline and is now improved since her hospitalization.  She occasionally gets palpitations in the evening when lying down but states this is rare.  She has no lower extremity  edema orthopnea and has no dizziness/lightheadedness or near syncope/syncope.  During her recent hospitalization she did have issues with hypertension requiring IV antihypertensive medications and her carvedilol was increased upon discharge.  Her current medication regimen now includes losartan 100 mg once daily, carvedilol 25 mg twice daily, Lasix 40 mg once daily and amlodipine 10 mg once daily.  She claims she follows a low-sodium diet.  She states her blood pressure today was 142/100 but this was less than 1/2-hour after taking her medications.  She is leaving for [place] in 2 days and will not return until April 21.  At this time she is currently on max dose of all these medications with the exception of Lasix which I would hesitate to increase given her CKD.  I did inform her that I am unable to prescribe medications while she is in [place] but I have asked her to monitor her blood pressure and contact our office in a few weeks via the portal with blood pressure readings.  If her blood pressure remains persistently elevated I have asked her to seek medical treatment within [place].      Medical Problems       Problem List       Intramural aortic hematoma (HCC)    Retrosternal chest pain    Left flank pain    Thrombocytosis     Multiple thyroid nodules    Overview Signed 4/15/2020 11:54 PM by Martin Vo, DO     3.5cm in size         Diabetes mellitus type 2      Lab Results   Component Value Date    HGBA1C 7.3 (H) 02/13/2024         Iron deficiency anemia    Pulmonary hypertension (HCC)    Depression    Dyslipidemia, goal LDL below 100    Mild persistent asthma without complication    Essential hypertension    Aortitis     Acute on chronic right-sided low back pain without sciatica    Latent tuberculosis    Abnormal thyroid function test    SIRS (systemic inflammatory response syndrome)    Hyperphosphatemia    Fatigue    Abnormal LFTs    Low back pain    Hypercalcemia    Hyperthyroidism    Low iron  stores    Pericardial effusion    Subclavian artery stenosis, left (HCC)    CKD (chronic kidney disease) stage 3, GFR 30-59 ml/min ([facility])    Lab Results   Component Value Date    EGFR 37 (L) 02/27/2024    EGFR 38 02/24/2024    EGFR 35 02/15/2024    CREATININE 1.7 (H) 02/27/2024    CREATININE 1.57 (H) 02/24/2024    CREATININE 1.67 (H) 02/15/2024         Post-surgical hypothyroidism        Past Medical History:   Diagnosis Date    Asthma     Chronic anemia     Diabetes mellitus (HCC)     Hypertension     Hyperthyroidism     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HCC)     TB lung, latent      Social History     Socioeconomic History    Marital status: /Civil Union     Spouse name: Not on file    Number of children: Not on file    Years of education: Not on file    Highest education level: Not on file   Occupational History    Occupation: not working   Tobacco Use    Smoking status: Never    Smokeless tobacco: Never   Vaping Use    Vaping status: Never Used   Substance and Sexual Activity    Alcohol use: Never     Alcohol/week: 0.0 standard drinks of alcohol    Drug use: Never    Sexual activity: Yes     Partners: Male   Other Topics Concern    Not on file   Social History Narrative    Not on file     Social Determinants of Health     Financial Resource Strain: Not on file   Food Insecurity: No Food Insecurity (2/15/2024)    Hunger Vital Sign     Worried About Running Out of Food in the Last Year: Never true     Ran Out of Food in the Last Year: Never true   Transportation Needs: No Transportation Needs (2/15/2024)    PRAPARE - Transportation     Lack of Transportation (Medical): No     Lack of Transportation (Non-Medical): No   Physical Activity: Not on file   Stress: Not on file   Social Connections: Not on file   Intimate Partner Violence: Not on file   Housing Stability: Low Risk  (2/15/2024)    Housing Stability Vital Sign     Unable to Pay for Housing in the Last Year: No     Number of Places Lived in the Last Year: 1      Unstable Housing in the Last Year: No      Family History   Problem Relation Age of Onset    Diabetes unspecified Mother      Past Surgical History:   Procedure Laterality Date     SECTION      IR BIOPSY LIVER MASS  07/15/2020    LUNG BIOPSY      THYROIDECTOMY         Current Outpatient Medications:     acetaminophen (TYLENOL) 325 mg tablet, Take 650 mg by mouth every 6 (six) hours as needed for mild pain, Disp: , Rfl:     Advair -21 MCG/ACT inhaler, , Disp: , Rfl:     albuterol (PROVENTIL HFA,VENTOLIN HFA) 90 mcg/act inhaler, Inhale 2 puffs 2 (two) times a day, Disp: , Rfl:     amLODIPine (NORVASC) 10 mg tablet, Take 1 tablet (10 mg total) by mouth daily, Disp: 30 tablet, Rfl: 0    aspirin (ECOTRIN LOW STRENGTH) 81 mg EC tablet, Take 81 mg by mouth daily, Disp: , Rfl:     atorvastatin (LIPITOR) 40 mg tablet, , Disp: , Rfl:     carvedilol (COREG) 25 mg tablet, Take 1 tablet (25 mg total) by mouth 2 (two) times a day with meals, Disp: 60 tablet, Rfl: 0    cholecalciferol (VITAMIN D3) 1,000 units tablet, Take 1 tablet (1,000 Units total) by mouth daily, Disp: 90 tablet, Rfl: 3    clopidogrel (PLAVIX) 75 mg tablet, Take 75 mg by mouth daily, Disp: , Rfl:     diclofenac sodium (VOLTAREN) 50 mg EC tablet, Take 1 tablet (50 mg total) by mouth 2 (two) times a day, Disp: 30 tablet, Rfl: 0    escitalopram (LEXAPRO) 10 mg tablet, Take 10 mg by mouth daily, Disp: , Rfl:     folic acid (FOLVITE) 1 mg tablet, , Disp: , Rfl:     furosemide (LASIX) 40 mg tablet, Take 1 tablet (40 mg total) by mouth daily, Disp: 30 tablet, Rfl: 0    levothyroxine 100 mcg tablet, , Disp: , Rfl:     losartan (COZAAR) 100 MG tablet, Take 100 mg by mouth daily , Disp: , Rfl:     metFORMIN (GLUCOPHAGE) 500 mg tablet, Take 1,000 mg by mouth 2 (two) times a day with meals, Disp: , Rfl:     montelukast (SINGULAIR) 10 mg tablet, Take 10 mg by mouth daily at bedtime, Disp: , Rfl:     omeprazole (PriLOSEC) 20 mg delayed release capsule, Take 1  "capsule (20 mg total) by mouth daily, Disp: 30 capsule, Rfl: 0    predniSONE 20 mg tablet, Take 4 tablets (80 mg total) by mouth daily (Patient taking differently: Take 60 mg by mouth daily), Disp: 120 tablet, Rfl: 0    Iron-Vitamin C (Vitron-C)  MG TABS, Take 1 tablet by mouth in the morning (Patient not taking: Reported on 3/7/2024), Disp: 90 tablet, Rfl: 3  Allergies   Allergen Reactions    Other Allergic Rhinitis     pollen       Labs:     Chemistry        Component Value Date/Time    K 5.0 02/27/2024 1238     02/27/2024 1238    CO2 21 (L) 02/27/2024 1238    BUN 48 (H) 02/27/2024 1238    BUN 35 (H) 04/09/2020 1245    CREATININE 1.7 (H) 02/27/2024 1238        Component Value Date/Time    CALCIUM 9.3 02/27/2024 1238    ALKPHOS 75 02/27/2024 1238    AST 12 02/27/2024 1238    ALT 19 02/27/2024 1238            No results found for: \"CHOL\"  Lab Results   Component Value Date    HDL 34 (L) 04/07/2020     Lab Results   Component Value Date    LDLCALC 78 04/07/2020     Lab Results   Component Value Date    TRIG 114 04/07/2020     No results found for: \"CHOLHDL\"    Imaging: Echo complete w/ contrast if indicated    Result Date: 2/14/2024  Narrative:   Left Ventricle: Left ventricular cavity size is normal. Wall thickness is moderately increased. The left ventricular ejection fraction is 65%. Systolic function is vigorous. Wall motion is normal. Diastolic function is mildly abnormal, consistent with grade I (abnormal) relaxation.   Left Atrium: The atrium is mildly dilated.   Aortic Valve: There is mild regurgitation. The aortic valve velocity is increased due to increased flow.   Mitral Valve: There is mild regurgitation.   Tricuspid Valve: There is mild regurgitation.   Pericardium: There is a small pericardial effusion.     XR chest 1 view portable    Result Date: 2/13/2024  Narrative: XR CHEST PORTABLE INDICATION: pain under breast. COMPARISON: Chest radiograph October 29, 2023 FINDINGS: Clear lungs. No " pneumothorax or pleural effusion. Enlarged cardiomediastinal silhouette. Bones are unremarkable for age. Normal upper abdomen. Surgical clips project over the neck.     Impression: Enlarged cardiomediastinal silhouette. Pericardial effusion is better evident on subsequent CT. Workstation performed: [id number]      VAS VENOUS DUPLEX - LOWER LIMB BILATERAL    Result Date: 2/12/2024  Narrative:  [facility] REPORT CLINICAL: Indications: Patient presents to the emergency department with abdominal pain, and provider would like to rule out DVT of the bilateral lower extremities secondary to elevated d-dimer and reported posterior left thigh pain. Operative History: No cardiovascular surgeries noted. Risk Factors The patient has history of HTN, Diabetes, and Hyperlipidemia.   CONCLUSION: Impression: RIGHT LOWER LIMB: No evidence of acute or chronic deep vein thrombosis. No evidence of superficial thrombophlebitis noted. Doppler evaluation shows a normal response to augmentation maneuvers.. Popliteal, posterior tibial and anterior tibial arterial Doppler waveforms are triphasic.  LEFT LOWER LIMB: No evidence of acute or chronic deep vein thrombosis. No evidence of superficial thrombophlebitis noted. Doppler evaluation shows a normal response to augmentation maneuvers. Popliteal, posterior tibial and anterior tibial arterial Doppler waveforms are triphasic.  Technical findings were given to Dr. Hsu in the ED.  SIGNATURE: Electronically Signed by: LOUANN SINGH DO on 2024-02-12 08:48:08 PM    PE Study with CT Abdomen and Pelvis with contrast    Result Date: 2/12/2024  Narrative: CT PULMONARY ANGIOGRAM OF THE CHEST AND CT ABDOMEN AND PELVIS WITH INTRAVENOUS CONTRAST INDICATION: cp sob pleurisy elevated D-dimer h/o aortitis recent (1/5/24) stening of left subclavian for critical stenosis. History of aortitis. COMPARISON: CT chest/abdomen/pelvis 10/29/2023 TECHNIQUE: CT examination of the chest, abdomen and pelvis  was performed. Thin section CT angiographic technique was used in the chest in order to evaluate for pulmonary embolus and coronal 3D MIP postprocessing was performed on the acquisition scanner. Multiplanar 2D reformatted images were created from the source data. This examination, like all CT scans performed in the [facility], was performed utilizing techniques to minimize radiation dose exposure, including the use of iterative reconstruction and automated exposure control. Radiation dose length product (DLP) for this visit: 1617.65 mGy-cm IV Contrast: 100 mL of iohexol (OMNIPAQUE) Enteric Contrast: Not administered. FINDINGS: CHEST PULMONARY ARTERIAL TREE: No pulmonary embolus is seen. Dilated main pulmonary artery measuring up to 3.9 cm suggestive of pulmonary arterial hypertension, similar to prior study. LUNGS: Mild dependent atelectasis. No tracheal or endobronchial lesion. PLEURA: Unremarkable. HEART/AORTA: The heart is normal in size. New moderate pericardial effusion. The ascending thoracic aorta measures up to 4.0 cm in caliber, unchanged. Redemonstrated diffuse soft tissue thickening of the thoracic aorta in keeping with history of aortitis. Compared to CT 10/29/2023, interval increase in degree of thickening suggestive of acute inflammation. For example, measuring up to 8 mm in thickness  along the distal aortic arch (2/66), previously 3 mm when measured similarly. Interval postsurgical changes of left subclavian stenting. The stent appears patent. MEDIASTINUM AND АЛЕКСАНДР: Prominent mediastinal lymph nodes, favored to be reactive. CHEST WALL AND LOWER NECK: Postsurgical changes of thyroidectomy. ABDOMEN LIVER/BILIARY TREE: The liver is normal in size and configuration. Right hepatic lobe cysts. No biliary ductal dilation. Patent portal and hepatic veins. GALLBLADDER: No calcified gallstones. No pericholecystic inflammatory change. SPLEEN: Unremarkable. PANCREAS: Unremarkable. ADRENAL GLANDS:  Unremarkable. KIDNEYS/URETERS: Unchanged atrophy of the left kidney. No hydronephrosis. No suspicious renal mass. STOMACH AND BOWEL: Unremarkable. APPENDIX: No findings to suggest appendicitis. ABDOMINOPELVIC CAVITY: No ascites. No pneumoperitoneum. No lymphadenopathy. VESSELS: Fat stranding surrounding the celiac axis and trifurcation with associated wall thickening (3/55), new from prior study and likely secondary to vasculitis. PELVIS REPRODUCTIVE ORGANS: Unremarkable for patient's age. URINARY BLADDER: Unremarkable. ABDOMINAL WALL/INGUINAL REGIONS: Small fat-containing umbilical hernia. BONES: No acute fracture or suspicious osseous lesion.     Impression: No pulmonary embolism. Findings of acute on chronic aortitis with increased soft tissue thickening of the thoracic aorta as well as new inflammation involving the celiac axis. Associated new moderate pericardial effusion. The ascending thoracic aorta measures up to 4.0 cm in caliber, unchanged. Unchanged dilation of the main pulmonary artery suggestive of pulmonary arterial hypertension. The study was marked in EPIC for immediate notification. Workstation performed: [id number]     VASC ANKLE BRACHIAL INDICES WITHOUT PPG (PAD)    Result Date: 2/9/2024  Narrative: VASCULAR LAB RESULTS DATE OF EXAMINATION: 2/8/24 INDICATION: S/p L subclavian stent placement   DOPPLER EVALUATION OF THE UPPER EXTREMITIES Immediately before proceeding with the vascular lab procedure reported below, the identity of the patient, the correct exam and the correct procedural site were identified. Continuous wave doppler and appropriate size pressure cuffs were utilized during the examination. Findings: The right brachial artery blood pressure is  160 mmHg. The right radial artery waveform is triphasic with excellent amplitude. The right radial artery blood pressure is  147 mmHg. The right ulnar waveform is triphasic with excellentamplitude. The right ulnar artery blood pressure is  166  mmHg. The left brachial artery blood pressure is  134 mmHg. The left radial artery waveform is triphasic with excellent  amplitude. The left radial artery blood pressure is  144 mmHg. The left ulnar waveform is triphasic with excellent amplitude. The left ulnar artery blood pressure is  149 mmHg.    Impression: : The right wrist brachial index (WBI) is 1.04. For the right upper extremity: Waveform analysis and wrist brachial index (WBI) are normal at rest for the upper extremity with no evidence of significant arterial occlusive disease. The left wrist brachial index (WBI) is 0.93. For the left upper extremity: Waveform analysis and wrist brachial index (WBI) are normal at rest for the upper extremity with no evidence of significant arterial occlusive disease.    [facility] DUPLEX ART UP EXT LMT    Result Date: 2/9/2024  Narrative: VASCULAR LAB RESULTS DATE OF EXAM: 2/8/24 PRESENTING CONDITIONS: s/p L subclavian artery stent on 1/5/24   Immediately before proceeding with the vascular lab procedure reported below, the identity of the patient, the correct exam and the correct procedural site were verified. Gray scale, color flow and spectral doppler were performed for this examination. PHYSICIAN REPORT: UPPER EXTREMITY ARTERIAL DUPLEX For the left upper extremity: The subclavian artery proximal to stent has a velocity of 220 cm/sec with a biphasic waveform. The subclavian artery within the stent has a velocity of 108-170 cm/sec with a biphasic waveform. The subclavian artery distal to stent has a velocity of 111 cm/sec with a biphasic waveform. CONCLUSIONS: For the left upper extremity: Normal flow is noted in the left subclavian artery.    CT renal stone study abdomen pelvis wo contrast    Result Date: 2/2/2024  Narrative: PROCEDURE INFORMATION: Exam: CT Abdomen And Pelvis Without Contrast Exam date and time: 2/2/2024 5:14 PM Age: 48 years old Clinical indication: Left upper quadrant pain; Additional info: Luq pain  TECHNIQUE: Imaging protocol: Computed tomography of the abdomen and pelvis without contrast. Radiation optimization: All CT scans at this facility use at least one of these dose optimization techniques: automated exposure control; mA and/or kV adjustment per patient size (includes targeted exams where dose is matched to clinical indication); or iterative reconstruction. COMPARISON: 1.   CT Vascular^GATED CHEST HR UNDER 75 (Adult) 10/20/2023 3:26 PM 2.   XA Abdomen 1/5/2024 11:12 AM FINDINGS: Lungs: 3 mm left lower lobe pulmonary nodule, image 12 series 4, stable from 10/20/2023 exam. Heart: Partially visualized small pericardial effusion. Liver: 1 cm fat density lesion in the inferior right hepatic lobe, image 66 series 4. Otherwise normal noncontrast appearance of the liver. Gallbladder and bile ducts: Contracted gallbladder. Pancreas: The pancreas is unremarkable. Spleen: The spleen is unremarkable. Adrenal glands: The adrenal glands are normal. Kidneys and ureters: Lobular renal cortical outlines bilaterally. No stones or hydronephrosis. No ureteral stones or hydroureter. Stomach and bowel: Unremarkable bowel and mesentery. No evidence of obstruction or significant diverticular disease. Unremarkable visualized loops of small bowel and stomach. Appendix: Normal appendix. Intraperitoneal space: No significant ascites. No intra-abdominal free air. Vasculature: Unremarkable noncontrast appearance of the abdominal aorta and major vascular structures. Lymph nodes: No pathologic lymphadenopathy. Urinary bladder: The urinary bladder is unremarkable. No significant wall thickening or stones. Reproductive: 4 cm focal bulging of the right posterior uterine fundus concerning for underlying fibroid. No pathologic adnexal masses.   Bones/joints: Good vertebral alignment. No evidence of fracture. Several sclerotic foci in the bony pelvis and proximal femurs consistent with incidental bone islands. Soft tissues: Soft tissues are  "unremarkable as visualized. Ventral bulging at   the umbilicus with prominent fat but no distinct hernia.     Impression: IMPRESSION: 1.   No acute finding in the left upper quadrant. 2.   Lobular renal cortical outlines. No stones or hydronephrosis. 3.   1 cm fat density lesion in the right hepatic lobe consistent with a small lipoma, not clinically significant. 4.   Contracted gallbladder may reflect incomplete fasting on today's afternoon exam. If there is clinical concern for biliary tract disease consider right upper quadrant ultrasound. 5.   Probable roughly 4 cm fundal fibroid. Consider pelvic ultrasound to confirm. 6.   Partially visualized small pericardial effusion. THIS DOCUMENT HAS BEEN ELECTRONICALLY SIGNED BY RAISSA LEYVA MD    Physical exam deferred as this was a virtual visit    Review of Systems   Constitutional: Negative for chills, fever and malaise/fatigue.   HENT:  Negative for congestion.    Cardiovascular:  Positive for dyspnea on exertion (chronic and at baseline) and palpitations (rare). Negative for chest pain, leg swelling and orthopnea.   Respiratory:  Negative for cough, shortness of breath (no SOB at rest) and wheezing.    Endocrine: Negative.    Hematologic/Lymphatic: Negative.    Skin: Negative.    Musculoskeletal: Negative.    Gastrointestinal:  Negative for bloating, abdominal pain, nausea and vomiting.   Genitourinary: Negative.    Neurological:  Negative for dizziness and light-headedness.   Psychiatric/Behavioral: Negative.     All other systems reviewed and are negative.      Vitals:    03/07/24 0900   BP: 142/100   Pulse: 98     Vitals:    03/07/24 0900   Weight: 99.8 kg (220 lb)     Height: 5' 3\" (160 cm)   Body mass index is 38.97 kg/m².     Physical Exam:  Physical Exam    "

## 2024-03-01 ENCOUNTER — TELEPHONE (OUTPATIENT)
Dept: VASCULAR SURGERY | Facility: CLINIC | Age: 49
End: 2024-03-01

## 2024-03-04 ENCOUNTER — TELEPHONE (OUTPATIENT)
Dept: INFECTIOUS DISEASES | Facility: CLINIC | Age: 49
End: 2024-03-04

## 2024-03-04 NOTE — TELEPHONE ENCOUNTER
Contacted patient per Dr. Ly as updated CXR, Referral received. Patient leaving this week on vacation and will be out through end of March. Patient scheduled April 1st in [facility]

## 2024-03-07 ENCOUNTER — TELEMEDICINE (OUTPATIENT)
Dept: CARDIOLOGY CLINIC | Facility: HOSPITAL | Age: 49
End: 2024-03-07
Payer: COMMERCIAL

## 2024-03-07 VITALS
SYSTOLIC BLOOD PRESSURE: 142 MMHG | WEIGHT: 220 LBS | DIASTOLIC BLOOD PRESSURE: 100 MMHG | HEIGHT: 63 IN | HEART RATE: 98 BPM | BODY MASS INDEX: 38.98 KG/M2

## 2024-03-07 DIAGNOSIS — I77.6 AORTITIS (HCC): Primary | ICD-10-CM

## 2024-03-07 DIAGNOSIS — E11.69 DIABETES MELLITUS TYPE 2 IN OBESE: ICD-10-CM

## 2024-03-07 DIAGNOSIS — I71.21 ANEURYSM OF ASCENDING AORTA WITHOUT RUPTURE (HCC): ICD-10-CM

## 2024-03-07 DIAGNOSIS — I10 ESSENTIAL HYPERTENSION: ICD-10-CM

## 2024-03-07 DIAGNOSIS — I77.1 STENOSIS OF LEFT SUBCLAVIAN ARTERY (HCC): ICD-10-CM

## 2024-03-07 DIAGNOSIS — I31.39 PERICARDIAL EFFUSION: ICD-10-CM

## 2024-03-07 DIAGNOSIS — E66.9 DIABETES MELLITUS TYPE 2 IN OBESE: ICD-10-CM

## 2024-03-07 DIAGNOSIS — E78.5 DYSLIPIDEMIA, GOAL LDL BELOW 100: ICD-10-CM

## 2024-03-07 PROCEDURE — 99213 OFFICE O/P EST LOW 20 MIN: CPT | Performed by: NURSE PRACTITIONER

## 2024-04-23 ENCOUNTER — APPOINTMENT (EMERGENCY)
Dept: CT IMAGING | Facility: HOSPITAL | Age: 49
DRG: 469 | End: 2024-04-23
Payer: COMMERCIAL

## 2024-04-23 ENCOUNTER — HOSPITAL ENCOUNTER (INPATIENT)
Facility: HOSPITAL | Age: 49
LOS: 3 days | Discharge: HOME | DRG: 469 | End: 2024-04-26
Attending: EMERGENCY MEDICINE | Admitting: FAMILY MEDICINE
Payer: COMMERCIAL

## 2024-04-23 ENCOUNTER — APPOINTMENT (EMERGENCY)
Dept: RADIOLOGY | Facility: HOSPITAL | Age: 49
DRG: 469 | End: 2024-04-23
Payer: COMMERCIAL

## 2024-04-23 DIAGNOSIS — E11.69 TYPE 2 DIABETES MELLITUS WITH OBESITY  (HCC): ICD-10-CM

## 2024-04-23 DIAGNOSIS — E87.20 METABOLIC ACIDOSIS: ICD-10-CM

## 2024-04-23 DIAGNOSIS — N17.9 AKI (ACUTE KIDNEY INJURY) (HCC): Primary | ICD-10-CM

## 2024-04-23 DIAGNOSIS — I77.6 AORTITIS (HCC): ICD-10-CM

## 2024-04-23 DIAGNOSIS — E66.9 TYPE 2 DIABETES MELLITUS WITH OBESITY  (HCC): ICD-10-CM

## 2024-04-23 PROBLEM — I51.7 LVH (LEFT VENTRICULAR HYPERTROPHY): Status: ACTIVE | Noted: 2022-04-13

## 2024-04-23 PROBLEM — Z85.850 HISTORY OF THYROID CANCER: Status: ACTIVE | Noted: 2022-11-02

## 2024-04-23 PROBLEM — F33.9 RECURRENT MAJOR DEPRESSIVE DISORDER (HCC): Chronic | Status: ACTIVE | Noted: 2020-05-01

## 2024-04-23 PROBLEM — Z98.890 S/P TOTAL THYROIDECTOMY: Status: ACTIVE | Noted: 2022-09-26

## 2024-04-23 PROBLEM — I71.21 ANEURYSM OF ASCENDING AORTA WITHOUT RUPTURE (HCC): Status: ACTIVE | Noted: 2024-04-23

## 2024-04-23 PROBLEM — F41.1 ANXIETY STATE: Status: ACTIVE | Noted: 2021-04-26

## 2024-04-23 PROBLEM — N18.9 ACUTE KIDNEY INJURY SUPERIMPOSED ON CHRONIC KIDNEY DISEASE  (HCC): Status: ACTIVE | Noted: 2024-04-23

## 2024-04-23 PROBLEM — E89.0 S/P TOTAL THYROIDECTOMY: Status: ACTIVE | Noted: 2022-09-26

## 2024-04-23 PROBLEM — K21.9 GASTROESOPHAGEAL REFLUX DISEASE WITHOUT ESOPHAGITIS: Chronic | Status: ACTIVE | Noted: 2020-07-27

## 2024-04-23 PROBLEM — E55.9 VITAMIN D DEFICIENCY: Status: ACTIVE | Noted: 2018-03-05

## 2024-04-23 PROBLEM — Z90.89 S/P TOTAL THYROIDECTOMY: Status: ACTIVE | Noted: 2022-09-26

## 2024-04-23 LAB
ANION GAP SERPL CALCULATED.3IONS-SCNC: 17 MMOL/L (ref 4–13)
ANISOCYTOSIS BLD QL SMEAR: PRESENT
BASOPHILS # BLD MANUAL: 0 THOUSAND/UL (ref 0–0.1)
BASOPHILS NFR MAR MANUAL: 0 % (ref 0–1)
BUN SERPL-MCNC: 126 MG/DL (ref 5–25)
CALCIUM SERPL-MCNC: 8.8 MG/DL (ref 8.4–10.2)
CHLORIDE SERPL-SCNC: 105 MMOL/L (ref 96–108)
CO2 SERPL-SCNC: 11 MMOL/L (ref 21–32)
CREAT SERPL-MCNC: 5.01 MG/DL (ref 0.6–1.3)
CRP SERPL QL: 28.2 MG/L
EOSINOPHIL # BLD MANUAL: 0 THOUSAND/UL (ref 0–0.4)
EOSINOPHIL NFR BLD MANUAL: 0 % (ref 0–6)
ERYTHROCYTE [DISTWIDTH] IN BLOOD BY AUTOMATED COUNT: 21.8 % (ref 11.6–15.1)
GFR SERPL CREATININE-BSD FRML MDRD: 9 ML/MIN/1.73SQ M
GLUCOSE SERPL-MCNC: 281 MG/DL (ref 65–140)
HCT VFR BLD AUTO: 34.8 % (ref 34.8–46.1)
HGB BLD-MCNC: 10.8 G/DL (ref 11.5–15.4)
LYMPHOCYTES # BLD AUTO: 1.55 THOUSAND/UL (ref 0.6–4.47)
LYMPHOCYTES # BLD AUTO: 16 % (ref 14–44)
MCH RBC QN AUTO: 26.7 PG (ref 26.8–34.3)
MCHC RBC AUTO-ENTMCNC: 31 G/DL (ref 31.4–37.4)
MCV RBC AUTO: 86 FL (ref 82–98)
MONOCYTES # BLD AUTO: 0.39 THOUSAND/UL (ref 0–1.22)
MONOCYTES NFR BLD: 4 % (ref 4–12)
NEUTROPHILS # BLD MANUAL: 7.75 THOUSAND/UL (ref 1.85–7.62)
NEUTS BAND NFR BLD MANUAL: 2 % (ref 0–8)
NEUTS SEG NFR BLD AUTO: 78 % (ref 43–75)
PLATELET # BLD AUTO: 259 THOUSANDS/UL (ref 149–390)
PLATELET BLD QL SMEAR: ADEQUATE
PMV BLD AUTO: 11 FL (ref 8.9–12.7)
POTASSIUM SERPL-SCNC: 4.3 MMOL/L (ref 3.5–5.3)
RBC # BLD AUTO: 4.04 MILLION/UL (ref 3.81–5.12)
SODIUM SERPL-SCNC: 133 MMOL/L (ref 135–147)
WBC # BLD AUTO: 9.69 THOUSAND/UL (ref 4.31–10.16)

## 2024-04-23 PROCEDURE — 93005 ELECTROCARDIOGRAM TRACING: CPT

## 2024-04-23 PROCEDURE — 80048 BASIC METABOLIC PNL TOTAL CA: CPT | Performed by: EMERGENCY MEDICINE

## 2024-04-23 PROCEDURE — 71045 X-RAY EXAM CHEST 1 VIEW: CPT

## 2024-04-23 PROCEDURE — 99285 EMERGENCY DEPT VISIT HI MDM: CPT | Performed by: EMERGENCY MEDICINE

## 2024-04-23 PROCEDURE — 85007 BL SMEAR W/DIFF WBC COUNT: CPT | Performed by: EMERGENCY MEDICINE

## 2024-04-23 PROCEDURE — 99284 EMERGENCY DEPT VISIT MOD MDM: CPT

## 2024-04-23 PROCEDURE — 86140 C-REACTIVE PROTEIN: CPT | Performed by: EMERGENCY MEDICINE

## 2024-04-23 PROCEDURE — 85027 COMPLETE CBC AUTOMATED: CPT | Performed by: EMERGENCY MEDICINE

## 2024-04-23 PROCEDURE — 99223 1ST HOSP IP/OBS HIGH 75: CPT | Performed by: FAMILY MEDICINE

## 2024-04-23 PROCEDURE — 36415 COLL VENOUS BLD VENIPUNCTURE: CPT | Performed by: EMERGENCY MEDICINE

## 2024-04-23 PROCEDURE — 74176 CT ABD & PELVIS W/O CONTRAST: CPT

## 2024-04-23 RX ORDER — CHOLECALCIFEROL (VITAMIN D3) 125 MCG
500 CAPSULE ORAL DAILY
COMMUNITY
Start: 2024-03-06

## 2024-04-23 RX ORDER — HEPARIN SODIUM 5000 [USP'U]/ML
5000 INJECTION, SOLUTION INTRAVENOUS; SUBCUTANEOUS EVERY 8 HOURS SCHEDULED
Status: DISCONTINUED | OUTPATIENT
Start: 2024-04-23 | End: 2024-04-24

## 2024-04-23 RX ORDER — PREDNISONE 20 MG/1
20 TABLET ORAL DAILY
Status: DISCONTINUED | OUTPATIENT
Start: 2024-04-24 | End: 2024-04-26 | Stop reason: HOSPADM

## 2024-04-23 RX ORDER — CARVEDILOL 12.5 MG/1
25 TABLET ORAL 2 TIMES DAILY WITH MEALS
Status: DISCONTINUED | OUTPATIENT
Start: 2024-04-24 | End: 2024-04-26 | Stop reason: HOSPADM

## 2024-04-23 RX ORDER — PANTOPRAZOLE SODIUM 20 MG/1
20 TABLET, DELAYED RELEASE ORAL DAILY
Status: DISCONTINUED | OUTPATIENT
Start: 2024-04-24 | End: 2024-04-26 | Stop reason: HOSPADM

## 2024-04-23 RX ORDER — MONTELUKAST SODIUM 10 MG/1
10 TABLET ORAL
Status: DISCONTINUED | OUTPATIENT
Start: 2024-04-23 | End: 2024-04-26 | Stop reason: HOSPADM

## 2024-04-23 RX ORDER — LEVOTHYROXINE SODIUM 0.1 MG/1
100 TABLET ORAL
Status: DISCONTINUED | OUTPATIENT
Start: 2024-04-24 | End: 2024-04-26 | Stop reason: HOSPADM

## 2024-04-23 RX ORDER — INSULIN LISPRO 100 [IU]/ML
1-6 INJECTION, SOLUTION INTRAVENOUS; SUBCUTANEOUS
Status: DISCONTINUED | OUTPATIENT
Start: 2024-04-24 | End: 2024-04-26 | Stop reason: HOSPADM

## 2024-04-23 RX ORDER — FOLIC ACID 1 MG/1
1 TABLET ORAL DAILY
Status: DISCONTINUED | OUTPATIENT
Start: 2024-04-24 | End: 2024-04-26 | Stop reason: HOSPADM

## 2024-04-23 RX ORDER — AMLODIPINE BESYLATE 10 MG/1
10 TABLET ORAL DAILY
Status: DISCONTINUED | OUTPATIENT
Start: 2024-04-24 | End: 2024-04-26 | Stop reason: HOSPADM

## 2024-04-23 RX ORDER — ESCITALOPRAM OXALATE 10 MG/1
10 TABLET ORAL DAILY
Status: DISCONTINUED | OUTPATIENT
Start: 2024-04-24 | End: 2024-04-26 | Stop reason: HOSPADM

## 2024-04-23 RX ORDER — CLOPIDOGREL BISULFATE 75 MG/1
75 TABLET ORAL DAILY
Status: DISCONTINUED | OUTPATIENT
Start: 2024-04-24 | End: 2024-04-26 | Stop reason: HOSPADM

## 2024-04-23 RX ORDER — ACETAMINOPHEN 325 MG/1
650 TABLET ORAL EVERY 6 HOURS PRN
Status: DISCONTINUED | OUTPATIENT
Start: 2024-04-23 | End: 2024-04-26 | Stop reason: HOSPADM

## 2024-04-23 RX ORDER — PREDNISONE 10 MG/1
TABLET ORAL DAILY
COMMUNITY
Start: 2024-02-27

## 2024-04-23 RX ORDER — MONTELUKAST SODIUM 10 MG/1
TABLET ORAL
Status: DISPENSED
Start: 2024-04-23 | End: 2024-04-24

## 2024-04-23 RX ORDER — ATORVASTATIN CALCIUM 40 MG/1
40 TABLET, FILM COATED ORAL
Status: DISCONTINUED | OUTPATIENT
Start: 2024-04-24 | End: 2024-04-26 | Stop reason: HOSPADM

## 2024-04-23 NOTE — ED PROVIDER NOTES
History  Chief Complaint   Patient presents with    Back Pain     Lower back pain and burning with urination for the past 2 weeks. Denies any history of kidney stones      48 -year-old female presents with plaints of low back pain in the middle is relieved if she is lying flat with standing or sitting for prolonged periods she notices she is also having burning with urination for the last 2 weeks.  She has she states she pees 10-20 times during the day and 3 times at night.  She has noticed no hematuria feels she is emptying completely.  She underwent laboratory evaluation at [facility] today and got a call back saying that her kidney numbers were elevated.  She has followed previously with nephrology in [place] for proteinuria.  She has a history of the aortitis.  She is currently tapered down from 80 mg to 20 mg daily there was some mention of her starting Humira this is not yet started yet her only NSAID is 8 aspirin 81 mg daily she does not take any Naprosyn or ibuprofen.  She just returned from a trip to [place] she denies any fever or chills she had no chest pain shortness of breath no trauma or falls no headache she had some intermittent nausea and her appetite was diminished denies any abdominal pain except for light bilateral lower quadrant abdominal pain but none currently she is she had some lower extremity edema earlier but none currently she denies any diarrhea no headaches at times she does feel weak and lightheaded   Patient is postmenopausal  Past medical history asthma hypothyroidism type 2 diabetes hyperlipidemia latent TB chronic anemia recently discharged from the heme-onc service is improved on oral iron supplementation 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 Taking?   Advair -21 MCG/ACT inhaler   Yes No   Iron-Vitamin C (Vitron-C)  MG TABS   No No   Sig: Take 1 tablet by mouth in the morning   Patient not taking: Reported on 3/7/2024   acetaminophen (TYLENOL) 325 mg tablet  Self Yes No   Sig: Take 650 mg by mouth every 6 (six) hours as needed for mild pain   albuterol (PROVENTIL HFA,VENTOLIN HFA) 90 mcg/act inhaler  Self Yes No   Sig: Inhale 2 puffs 2 (two) times a day   amLODIPine (NORVASC) 10 mg tablet   No No   Sig: Take 1 tablet (10 mg total) by mouth daily   aspirin (ECOTRIN LOW STRENGTH) 81 mg EC tablet  Self Yes No   Sig: Take 81 mg by mouth daily   atorvastatin (LIPITOR) 40 mg tablet   Yes No   carvedilol (COREG) 25 mg tablet   No No   Sig: Take 1 tablet (25 mg total) by mouth 2 (two) times a day with meals   cholecalciferol (VITAMIN D3) 1,000 units tablet   No No   Sig: Take 1 tablet (1,000 Units total) by mouth daily   clopidogrel (PLAVIX) 75 mg tablet   Yes No   Sig: Take 75 mg by mouth daily   diclofenac sodium (VOLTAREN) 50 mg EC tablet  Self No No   Sig: Take 1 tablet (50 mg total) by mouth 2 (two) times a day   escitalopram (LEXAPRO) 10 mg tablet  Self Yes No   Sig: Take 10 mg by mouth daily   folic acid (FOLVITE) 1 mg tablet   Yes No   furosemide (LASIX) 40 mg tablet   No No   Sig: Take 1 tablet (40 mg total) by mouth daily   levothyroxine 100 mcg tablet   Yes No   losartan (COZAAR) 100 MG tablet  Self Yes No   Sig: Take 100 mg by mouth daily    metFORMIN (GLUCOPHAGE) 500 mg tablet  Self Yes No   Sig: Take 1,000 mg by mouth 2 (two) times a day with meals   montelukast (SINGULAIR) 10 mg tablet  Self Yes No   Sig: Take 10 mg by mouth daily at bedtime   omeprazole (PriLOSEC) 20 mg delayed release capsule   No No   Sig: Take 1 capsule (20 mg total) by mouth daily   predniSONE 10 mg tablet   Yes Yes    Sig: Take 10 mg by mouth daily   vitamin B-12 (VITAMIN B-12) 500 mcg tablet   Yes Yes   Sig: Take 500 mcg by mouth daily      Facility-Administered Medications: None       Past Medical History:   Diagnosis Date    Asthma     Chronic anemia     Diabetes mellitus (HCC)     Hypertension     Hyperthyroidism     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HCC)     TB lung, latent        Past Surgical History:   Procedure Laterality Date     SECTION      IR BIOPSY LIVER MASS  07/15/2020    LUNG BIOPSY      THYROIDECTOMY         Family History   Problem Relation Age of Onset    Diabetes unspecified Mother      I have reviewed and agree with the history as documented.    E-Cigarette/Vaping    E-Cigarette Use Never User      E-Cigarette/Vaping Substances    Nicotine No     THC No     CBD No     Flavoring No     Other No     Unknown No      Social History     Tobacco Use    Smoking status: Never    Smokeless tobacco: Never   Vaping Use    Vaping status: Never Used   Substance Use Topics    Alcohol use: Never     Alcohol/week: 0.0 standard drinks of alcohol    Drug use: Never       Review of Systems   Constitutional:  Negative for activity change, appetite change, chills, diaphoresis and fever.   HENT:  Negative for congestion, ear pain, rhinorrhea, sneezing and sore throat.    Eyes:  Negative for discharge.   Respiratory:  Negative for cough and shortness of breath.    Cardiovascular:  Negative for chest pain and leg swelling.   Gastrointestinal:  Positive for abdominal pain (bialteral lower quadrant). Negative for blood in stool, diarrhea, nausea and vomiting.   Endocrine: Negative for polyuria.   Genitourinary:  Negative for decreased urine volume, difficulty urinating, dysuria, flank pain, frequency, hematuria, urgency, vaginal bleeding and vaginal discharge.   Musculoskeletal:  Negative for back pain, gait problem, myalgias and neck pain.   Skin:  Negative for rash.   Neurological:  Positive for light-headedness. Negative for  dizziness, weakness, numbness and headaches.   Hematological:  Negative for adenopathy.   Psychiatric/Behavioral:  Negative for confusion.    All other systems reviewed and are negative.      Physical Exam  Physical Exam  Vitals and nursing note reviewed.   Constitutional:       General: She is not in acute distress.     Appearance: She is not ill-appearing or toxic-appearing.   HENT:      Head: Normocephalic.      Right Ear: Tympanic membrane and external ear normal. There is no impacted cerumen.      Left Ear: Tympanic membrane and external ear normal. There is no impacted cerumen.      Nose: Nose normal. No congestion or rhinorrhea.      Mouth/Throat:      Mouth: Mucous membranes are moist.      Pharynx: No oropharyngeal exudate or posterior oropharyngeal erythema.   Eyes:      General:         Right eye: No discharge.         Left eye: No discharge.      Extraocular Movements: Extraocular movements intact.      Conjunctiva/sclera: Conjunctivae normal.      Pupils: Pupils are equal, round, and reactive to light.   Cardiovascular:      Rate and Rhythm: Normal rate and regular rhythm.      Pulses: Normal pulses.   Pulmonary:      Effort: Pulmonary effort is normal. No respiratory distress.      Breath sounds: Normal breath sounds. No stridor. No wheezing, rhonchi or rales.   Abdominal:      General: Bowel sounds are normal. There is no distension.      Palpations: Abdomen is soft.      Tenderness: There is no abdominal tenderness. There is no right CVA tenderness, left CVA tenderness or guarding.   Musculoskeletal:         General: Normal range of motion.      Cervical back: Normal range of motion and neck supple. No rigidity or tenderness.      Right lower leg: No edema.      Left lower leg: No edema.   Lymphadenopathy:      Cervical: No cervical adenopathy.   Skin:     General: Skin is warm and dry.      Capillary Refill: Capillary refill takes less than 2 seconds.      Findings: No rash.   Neurological:       General: No focal deficit present.      Mental Status: She is alert.      Cranial Nerves: No cranial nerve deficit.      Sensory: No sensory deficit.      Motor: No weakness.      Gait: Gait normal.   Psychiatric:         Mood and Affect: Mood normal.         Vital Signs  ED Triage Vitals [04/23/24 1717]   Temperature Pulse Respirations Blood Pressure SpO2   98.6 °F (37 °C) 77 18 111/54 98 %      Temp src Heart Rate Source Patient Position - Orthostatic VS BP Location FiO2 (%)   -- Monitor Sitting Right arm --      Pain Score       6           Vitals:    04/23/24 1717 04/23/24 1730 04/23/24 1815 04/23/24 1900   BP: 111/54 126/63 108/57 112/61   Pulse: 77 72 76 72   Patient Position - Orthostatic VS: Sitting Lying Lying          Visual Acuity      ED Medications  Medications   sodium bicarbonate 150 mEq in dextrose 5 % 1,000 mL infusion (125 mL/hr Intravenous New Bag 4/24/24 0008)   acetaminophen (TYLENOL) tablet 650 mg (has no administration in time range)   folic acid (FOLVITE) tablet 1 mg (has no administration in time range)   atorvastatin (LIPITOR) tablet 40 mg (has no administration in time range)   cyanocobalamin (VITAMIN B-12) tablet 500 mcg (has no administration in time range)   levothyroxine tablet 100 mcg (has no administration in time range)   escitalopram (LEXAPRO) tablet 10 mg (has no administration in time range)   clopidogrel (PLAVIX) tablet 75 mg (has no administration in time range)   Cholecalciferol (VITAMIN D3) tablet 1,000 Units (has no administration in time range)   carvedilol (COREG) tablet 25 mg (has no administration in time range)   montelukast (SINGULAIR) tablet 10 mg (10 mg Oral Given 4/24/24 0007)   pantoprazole (PROTONIX) EC tablet 20 mg (has no administration in time range)   predniSONE tablet 20 mg (has no administration in time range)   heparin (porcine) subcutaneous injection 5,000 Units (5,000 Units Subcutaneous Given 4/24/24 0007)   insulin lispro (HumALOG/ADMELOG) 100 units/mL  subcutaneous injection 1-6 Units (has no administration in time range)   amLODIPine (NORVASC) tablet 10 mg (has no administration in time range)   aspirin (ECOTRIN LOW STRENGTH) EC tablet 81 mg (has no administration in time range)   sodium bicarbonate 8.4 % injection **ADS Override Pull** (has no administration in time range)   sodium bicarbonate 8.4 % injection **ADS Override Pull** (150 mEq  Given 4/24/24 0007)       Diagnostic Studies  Results Reviewed       Procedure Component Value Units Date/Time    Sodium, urine, random [[id number]] Collected: 04/24/24 0055    Lab Status: Final result Specimen: Urine, Other Updated: 04/24/24 0129     Sodium, Ur 12    Creatinine, urine, random [[id number]] Collected: 04/24/24 0055    Lab Status: Final result Specimen: Urine, Other Updated: 04/24/24 0129     Creatinine, Ur 88.9 mg/dL     RBC Morphology Reflex Test [[id number]] Collected: 04/23/24 1812    Lab Status: Final result Specimen: Blood from Arm, Right Updated: 04/23/24 1901    CBC and differential [[id number]]  (Abnormal) Collected: 04/23/24 1812    Lab Status: Final result Specimen: Blood from Arm, Right Updated: 04/23/24 1849     WBC 9.69 Thousand/uL      RBC 4.04 Million/uL      Hemoglobin 10.8 g/dL      Hematocrit 34.8 %      MCV 86 fL      MCH 26.7 pg      MCHC 31.0 g/dL      RDW 21.8 %      MPV 11.0 fL      Platelets 259 Thousands/uL     Narrative:      This is an appended report.  These results have been appended to a previously verified report.    Manual Differential(PHLEBS Do Not Order) [[id number]]  (Abnormal) Collected: 04/23/24 1812    Lab Status: Final result Specimen: Blood from Arm, Right Updated: 04/23/24 1849     Segmented % 78 %      Bands % 2 %      Lymphocytes % 16 %      Monocytes % 4 %      Eosinophils % 0 %      Basophils % 0 %      Absolute Neutrophils 7.75 Thousand/uL      Absolute Lymphocytes 1.55 Thousand/uL      Absolute Monocytes 0.39 Thousand/uL      Absolute Eosinophils 0.00 Thousand/uL       Absolute Basophils 0.00 Thousand/uL      Total Counted --     Platelet Estimate Adequate     Anisocytosis Present    Basic metabolic panel [[id number]]  (Abnormal) Collected: 04/23/24 1812    Lab Status: Final result Specimen: Blood from Arm, Right Updated: 04/23/24 1832     Sodium 133 mmol/L      Potassium 4.3 mmol/L      Chloride 105 mmol/L      CO2 11 mmol/L      ANION GAP 17 mmol/L       mg/dL      Creatinine 5.01 mg/dL      Glucose 281 mg/dL      Calcium 8.8 mg/dL      eGFR 9 ml/min/1.73sq m     Narrative:      National Kid[employer] Disease Foundation guidelines for Chronic Kidney Disease (CKD):     Stage 1 with normal or high GFR (GFR > 90 mL/min/1.73 square meters)    Stage 2 Mild CKD (GFR = 60-89 mL/min/1.73 square meters)    Stage 3A Moderate CKD (GFR = 45-59 mL/min/1.73 square meters)    Stage 3B Moderate CKD (GFR = 30-44 mL/min/1.73 square meters)    Stage 4 Severe CKD (GFR = 15-29 mL/min/1.73 square meters)    Stage 5 End Stage CKD (GFR <15 mL/min/1.73 square meters)  Note: GFR calculation is accurate only with a steady state creatinine    C-reactive protein [[id number]]  (Abnormal) Collected: 04/23/24 1812    Lab Status: Final result Specimen: Blood from Arm, Right Updated: 04/23/24 1832     CRP 28.2 mg/L                    CT abdomen pelvis wo contrast   Final Result by Teresa Rivera MD (04/23 2019)      Kidney showing bilateral symmetric mild cortical irregularity suggesting some scarring. There is no hydronephrosis or calculi. No significant alteration in size or contour.      Liver now demonstrating nodular and patchy ill-defined areas of decreased attenuation within the central aspect of the medial segment. This appearance and location is often seen in focal fatty deposition but is new compared to unenhanced imaging from    October and not apparent on the postcontrast imaging in February. Would be uncommon for rapid change related to focal fat. Given the recent history of reported  vasculitis, potentially representing sequela of perfusion abnormality.      Workstation performed: [id number]         XR chest 1 view portable   ED Interpretation by Lynda Hsu MD (04/23 1851)   Per my independent interpretation. Radiologist to provide formal read. Cardiomegally no increased pulm vasc                 Procedures  ECG 12 Lead Documentation Only    Date/Time: 4/23/2024 6:33 PM    Performed by: Lynda Hsu MD  Authorized by: Lynda Hsu MD    Indications / Diagnosis:  Isma  ECG reviewed by me, the ED Provider: yes    Patient location:  ED  Previous ECG:     Previous ECG:  Compared to current    Comparison ECG info:  2/13/24 0608    Similarity:  Changes noted  Rate:     ECG rate:  70    ECG rate assessment: normal    Rhythm:     Rhythm: sinus rhythm    QRS:     QRS axis:  Normal  Comments:      ; nonspecific twave chagnes; QTC has now normalized           ED Course  ED Course as of 04/24/24 0130   Tue Apr 23, 2024 1842 CRP improved from 70's most recently 23.6 two monthago   1842 HB improved from 2 months ago   2047 TC with Dr. Linder   2105 Dr. Linder recommends urine sodium does not need repeat U/A; start D5W witth 150meq/L sodium bicarb at 125ml/L no nephrotoxic agents will contact [facility] for hospitalization update patient OK with admission   2109 Contacting [facility] for hospitalization   2126 Dr. Powers recommends inpatient to his service                               SBIRT 22yo+      Flowsheet Row Most Recent Value   Initial Alcohol Screen: US AUDIT-C     1. How often do you have a drink containing alcohol? 0 Filed at: 04/23/2024 1718   2. How many drinks containing alcohol do you have on a typical day you are drinking?  0 Filed at: 04/23/2024 1718   3b. FEMALE Any Age, or MALE 65+: How often do you have 4 or more drinks on one occassion? 0 Filed at: 04/23/2024 1718   Audit-C Score 0 Filed at: 04/23/2024 1718   VALERIY: How many times in the past year have  you...    Used an illegal drug or used a prescription medication for non-medical reasons? Never Filed at: 04/23/2024 1718                      Medical Decision Making  Mdm: Discharge summary from 2/13/2024 to 2/15/2024 at [facility] was reviewed.  And also followed up with a telemedicine visit with cardiology on 3/7/2024 and she had a nephrology visit on 3/6/2024 with Dr. Fady Hays/Kaylee Willson     Review of care everywhere labs indicate BUN/creatinine is currently 113/4.8 with a potassium of 4.4 most recent BUN/creatinine was 48/1.7 on 2/27/2024 her phosphorus is elevated at 6.6 the UA is completely normal demonstrating no evidence of infection except she does have proteinuria  Creatinine has run from 1.29-1.6 in the past.    We discussed where she wants to be hospitalized she states if she needs hospitalization she wants to be hospital to [facility] not a [facility]    Plan on rechecking BMP LFTs were checked and found to be normal earlier today will do a CT abdomen pelvis noncontrast to assess for obstruction neurolpathy     Amount and/or Complexity of Data Reviewed  Labs: ordered.  Radiology: ordered and independent interpretation performed.    Risk  Decision regarding hospitalization.             Disposition  Final diagnoses:   GENE (acute kidney injury) (HCC)   Metabolic acidosis   Aortitis (HCC) - history of      Time reflects when diagnosis was documented in both MDM as applicable and the Disposition within this note       Time User Action Codes Description Comment    4/23/2024  9:07 PM Lynda Hsu Add [N17.9] GENE (acute kidney injury) (HCC)     4/23/2024  9:07 PM Lynda Hsu Add [E87.20] Metabolic acidosis     4/23/2024  9:08 PM Lynda Hsu Add [I77.6] Aortitis (HCC)     4/23/2024  9:08 PM Lynda Hsu Modify [I77.6] Aortitis (HCC) history of           ED Disposition       ED Disposition   Admit    Condition   Stable    Date/Time   Tue Apr  23, 2024 2108    Comment   Case was discussed with Dr. Powers and the patient's admission status was agreed to be Admission Status: inpatient status to the service of Dr. Powers .               Follow-up Information    None         Patient's Medications   Discharge Prescriptions    No medications on file       No discharge procedures on file.    PDMP Review         Value Time User    PDMP Reviewed  Yes 4/16/2020  1:21 AM Martin Vo DO            ED Provider  Electronically Signed by             Lynda Hsu MD  04/24/24 0131

## 2024-04-24 LAB
ANION GAP SERPL CALCULATED.3IONS-SCNC: 13 MMOL/L (ref 4–13)
BUN SERPL-MCNC: 118 MG/DL (ref 5–25)
CALCIUM SERPL-MCNC: 7.8 MG/DL (ref 8.4–10.2)
CHLORIDE SERPL-SCNC: 101 MMOL/L (ref 96–108)
CO2 SERPL-SCNC: 19 MMOL/L (ref 21–32)
CREAT SERPL-MCNC: 4.05 MG/DL (ref 0.6–1.3)
CREAT UR-MCNC: 88.9 MG/DL
ERYTHROCYTE [DISTWIDTH] IN BLOOD BY AUTOMATED COUNT: 21.2 % (ref 11.6–15.1)
GFR SERPL CREATININE-BSD FRML MDRD: 12 ML/MIN/1.73SQ M
GLUCOSE SERPL-MCNC: 170 MG/DL (ref 65–140)
GLUCOSE SERPL-MCNC: 235 MG/DL (ref 65–140)
GLUCOSE SERPL-MCNC: 304 MG/DL (ref 65–140)
GLUCOSE SERPL-MCNC: 432 MG/DL (ref 65–140)
HCT VFR BLD AUTO: 29.7 % (ref 34.8–46.1)
HGB BLD-MCNC: 9.5 G/DL (ref 11.5–15.4)
MAGNESIUM SERPL-MCNC: 1.7 MG/DL (ref 1.9–2.7)
MCH RBC QN AUTO: 26.8 PG (ref 26.8–34.3)
MCHC RBC AUTO-ENTMCNC: 32 G/DL (ref 31.4–37.4)
MCV RBC AUTO: 84 FL (ref 82–98)
PLATELET # BLD AUTO: 230 THOUSANDS/UL (ref 149–390)
PMV BLD AUTO: 10.9 FL (ref 8.9–12.7)
POTASSIUM SERPL-SCNC: 3.5 MMOL/L (ref 3.5–5.3)
RBC # BLD AUTO: 3.55 MILLION/UL (ref 3.81–5.12)
SODIUM 24H UR-SCNC: 12 MOL/L
SODIUM SERPL-SCNC: 133 MMOL/L (ref 135–147)
WBC # BLD AUTO: 7.46 THOUSAND/UL (ref 4.31–10.16)

## 2024-04-24 PROCEDURE — 82948 REAGENT STRIP/BLOOD GLUCOSE: CPT

## 2024-04-24 PROCEDURE — 83735 ASSAY OF MAGNESIUM: CPT | Performed by: FAMILY MEDICINE

## 2024-04-24 PROCEDURE — 99232 SBSQ HOSP IP/OBS MODERATE 35: CPT | Performed by: INTERNAL MEDICINE

## 2024-04-24 PROCEDURE — 80048 BASIC METABOLIC PNL TOTAL CA: CPT | Performed by: FAMILY MEDICINE

## 2024-04-24 PROCEDURE — 85027 COMPLETE CBC AUTOMATED: CPT | Performed by: FAMILY MEDICINE

## 2024-04-24 PROCEDURE — 99223 1ST HOSP IP/OBS HIGH 75: CPT | Performed by: INTERNAL MEDICINE

## 2024-04-24 PROCEDURE — 82570 ASSAY OF URINE CREATININE: CPT | Performed by: EMERGENCY MEDICINE

## 2024-04-24 PROCEDURE — 84300 ASSAY OF URINE SODIUM: CPT | Performed by: EMERGENCY MEDICINE

## 2024-04-24 RX ORDER — LEVOTHYROXINE SODIUM 0.1 MG/1
TABLET ORAL
Status: DISPENSED
Start: 2024-04-24 | End: 2024-04-24

## 2024-04-24 RX ADMIN — HEPARIN SODIUM 5000 UNITS: 5000 INJECTION, SOLUTION INTRAVENOUS; SUBCUTANEOUS at 05:30

## 2024-04-24 RX ADMIN — PANTOPRAZOLE SODIUM 20 MG: 20 TABLET, DELAYED RELEASE ORAL at 08:09

## 2024-04-24 RX ADMIN — MONTELUKAST 10 MG: 10 TABLET, FILM COATED ORAL at 00:07

## 2024-04-24 RX ADMIN — Medication 1000 UNITS: at 08:09

## 2024-04-24 RX ADMIN — FOLIC ACID 1 MG: 1 TABLET ORAL at 08:09

## 2024-04-24 RX ADMIN — ATORVASTATIN CALCIUM 40 MG: 40 TABLET, FILM COATED ORAL at 17:32

## 2024-04-24 RX ADMIN — CLOPIDOGREL 75 MG: 75 TABLET ORAL at 08:09

## 2024-04-24 RX ADMIN — MONTELUKAST 10 MG: 10 TABLET, FILM COATED ORAL at 21:58

## 2024-04-24 RX ADMIN — CARVEDILOL 25 MG: 12.5 TABLET, FILM COATED ORAL at 17:32

## 2024-04-24 RX ADMIN — LEVOTHYROXINE SODIUM 100 MCG: 100 TABLET ORAL at 05:29

## 2024-04-24 RX ADMIN — PREDNISONE 20 MG: 20 TABLET ORAL at 08:09

## 2024-04-24 RX ADMIN — ESCITALOPRAM OXALATE 10 MG: 10 TABLET ORAL at 08:09

## 2024-04-24 RX ADMIN — ASPIRIN 81 MG: 81 TABLET, COATED ORAL at 08:10

## 2024-04-24 RX ADMIN — INSULIN LISPRO 4 UNITS: 100 INJECTION, SOLUTION INTRAVENOUS; SUBCUTANEOUS at 17:32

## 2024-04-24 RX ADMIN — CARVEDILOL 25 MG: 12.5 TABLET, FILM COATED ORAL at 08:09

## 2024-04-24 RX ADMIN — SODIUM BICARBONATE 150 MEQ: 84 INJECTION, SOLUTION INTRAVENOUS at 00:07

## 2024-04-24 RX ADMIN — HEPARIN SODIUM 5000 UNITS: 5000 INJECTION, SOLUTION INTRAVENOUS; SUBCUTANEOUS at 00:07

## 2024-04-24 RX ADMIN — CYANOCOBALAMIN TAB 500 MCG 500 MCG: 500 TAB at 08:09

## 2024-04-24 RX ADMIN — AMLODIPINE BESYLATE 10 MG: 10 TABLET ORAL at 08:09

## 2024-04-24 RX ADMIN — SODIUM BICARBONATE 125 ML/HR: 84 INJECTION, SOLUTION INTRAVENOUS at 00:08

## 2024-04-24 RX ADMIN — SODIUM BICARBONATE 125 ML/HR: 84 INJECTION, SOLUTION INTRAVENOUS at 10:29

## 2024-04-24 NOTE — ASSESSMENT & PLAN NOTE
Lab Results   Component Value Date    HGBA1C 7.3 (H) 02/13/2024     Results from last 7 days   Lab Units 04/24/24  0729   POC GLUCOSE mg/dl 170*     Holding metformin.  Continue sliding scale insulin.    Does have hyperglycemia due to dextrose/bicarbonate infusion

## 2024-04-24 NOTE — ASSESSMENT & PLAN NOTE
Left subclavian a stenosis status post left subclavian artery stent January 5, 2024 via left brachial a cutdown.  Patient follows with vascular surgery, last appointment February 12, 2024 with follow-up in 6 months.  Continue aspirin and Plavix

## 2024-04-24 NOTE — PLAN OF CARE
Problem: PAIN - ADULT  Goal: Verbalizes/displays adequate comfort level or baseline comfort level  Description: Interventions:  - Encourage patient to monitor pain and request assistance  - Assess pain using appropriate pain scale  - Administer analgesics based on type and severity of pain and evaluate response  - Implement non-pharmacological measures as appropriate and evaluate response  - Consider cultural and social influences on pain and pain management  - Notify physician/advanced practitioner if interventions unsuccessful or patient reports new pain  Outcome: Progressing     Problem: INFECTION - ADULT  Goal: Absence or prevention of progression during hospitalization  Description: INTERVENTIONS:  - Assess and monitor for signs and symptoms of infection  - Monitor lab/diagnostic results  - Monitor all insertion sites, i.e. indwelling lines, tubes, and drains  - Monitor endotracheal if appropriate and nasal secretions for changes in amount and color  - Hickman appropriate cooling/warming therapies per order  - Administer medications as ordered  - Instruct and encourage patient and family to use good hand hygiene technique  - Identify and instruct in appropriate isolation precautions for identified infection/condition  Outcome: Progressing  Goal: Absence of fever/infection during neutropenic period  Description: INTERVENTIONS:  - Monitor WBC    Outcome: Progressing     Problem: SAFETY ADULT  Goal: Patient will remain free of falls  Description: INTERVENTIONS:  - Educate patient/family on patient safety including physical limitations  - Instruct patient to call for assistance with activity   - Consult OT/PT to assist with strengthening/mobility   - Keep Call bell within reach  - Keep bed low and locked with side rails adjusted as appropriate  - Keep care items and personal belongings within reach  - Initiate and maintain comfort rounds  - Make Fall Risk Sign visible to staff  - Offer Toileting every 2 Hours,  in advance of need  - Initiate/Maintain fall alarm  - Obtain necessary fall risk management equipment: non-skid footwear  - Apply yellow socks and bracelet for high fall risk patients  - Consider moving patient to room near nurses station  Outcome: Progressing  Goal: Maintain or return to baseline ADL function  Description: INTERVENTIONS:  -  Assess patient's ability to carry out ADLs; assess patient's baseline for ADL function and identify physical deficits which impact ability to perform ADLs (bathing, care of mouth/teeth, toileting, grooming, dressing, etc.)  - Assess/evaluate cause of self-care deficits   - Assess range of motion  - Assess patient's mobility; develop plan if impaired  - Assess patient's need for assistive devices and provide as appropriate  - Encourage maximum independence but intervene and supervise when necessary  - Involve family in performance of ADLs  - Assess for home care needs following discharge   - Consider OT consult to assist with ADL evaluation and planning for discharge  - Provide patient education as appropriate  Outcome: Progressing  Goal: Maintains/Returns to pre admission functional level  Description: INTERVENTIONS:  - Perform AM-PAC 6 Click Basic Mobility/ Daily Activity assessment daily.  - Set and communicate daily mobility goal to care team and patient/family/caregiver.   - Collaborate with rehabilitation services on mobility goals if consulted  - Perform Range of Motion 3 times a day.  - Reposition patient every 2 hours.  - Dangle patient 3 times a day  - Stand patient 3 times a day  - Ambulate patient 3 times a day  - Out of bed to chair 3 times a day   - Out of bed for meals 3 times a day  - Out of bed for toileting  - Record patient progress and toleration of activity level   Outcome: Progressing     Problem: DISCHARGE PLANNING  Goal: Discharge to home or other facility with appropriate resources  Description: INTERVENTIONS:  - Identify barriers to discharge w/patient  and caregiver  - Arrange for needed discharge resources and transportation as appropriate  - Identify discharge learning needs (meds, wound care, etc.)  - Arrange for interpretive services to assist at discharge as needed  - Refer to Case Management Department for coordinating discharge planning if the patient needs post-hospital services based on physician/advanced practitioner order or complex needs related to functional status, cognitive ability, or social support system  Outcome: Progressing     Problem: Knowledge Deficit  Goal: Patient/family/caregiver demonstrates understanding of disease process, treatment plan, medications, and discharge instructions  Description: Complete learning assessment and assess knowledge base.  Interventions:  - Provide teaching at level of understanding  - Provide teaching via preferred learning methods  Outcome: Progressing     Problem: RESPIRATORY - ADULT  Goal: Achieves optimal ventilation and oxygenation  Description: INTERVENTIONS:  - Assess for changes in respiratory status  - Assess for changes in mentation and behavior  - Position to facilitate oxygenation and minimize respiratory effort  - Oxygen administered by appropriate delivery if ordered  - Initiate smoking cessation education as indicated  - Encourage broncho-pulmonary hygiene including cough, deep breathe, Incentive Spirometry  - Assess the need for suctioning and aspirate as needed  - Assess and instruct to report SOB or any respiratory difficulty  - Respiratory Therapy support as indicated  Outcome: Progressing     Problem: GENITOURINARY - ADULT  Goal: Maintains or returns to baseline urinary function  Description: INTERVENTIONS:  - Assess urinary function  - Encourage oral fluids to ensure adequate hydration if ordered  - Administer IV fluids as ordered to ensure adequate hydration  - Administer ordered medications as needed  - Offer frequent toileting  - Follow urinary retention protocol if ordered  Outcome:  Progressing  Goal: Absence of urinary retention  Description: INTERVENTIONS:  - Assess patient’s ability to void and empty bladder  - Monitor I/O  - Bladder scan as needed  - Discuss with physician/AP medications to alleviate retention as needed  - Discuss catheterization for long term situations as appropriate  Outcome: Progressing  Goal: Urinary catheter remains patent  Description: INTERVENTIONS:  - Assess patency of urinary catheter  - If patient has a chronic singh, consider changing catheter if non-functioning  - Follow guidelines for intermittent irrigation of non-functioning urinary catheter  Outcome: Progressing     Problem: METABOLIC, FLUID AND ELECTROLYTES - ADULT  Goal: Electrolytes maintained within normal limits  Description: INTERVENTIONS:  - Monitor labs and assess patient for signs and symptoms of electrolyte imbalances  - Administer electrolyte replacement as ordered  - Monitor response to electrolyte replacements, including repeat lab results as appropriate  - Instruct patient on fluid and nutrition as appropriate  Outcome: Progressing  Goal: Fluid balance maintained  Description: INTERVENTIONS:  - Monitor labs   - Monitor I/O and WT  - Instruct patient on fluid and nutrition as appropriate  - Assess for signs & symptoms of volume excess or deficit  Outcome: Progressing  Goal: Glucose maintained within target range  Description: INTERVENTIONS:  - Monitor Blood Glucose as ordered  - Assess for signs and symptoms of hyperglycemia and hypoglycemia  - Administer ordered medications to maintain glucose within target range  - Assess nutritional intake and initiate nutrition service referral as needed  Outcome: Progressing     Problem: HEMATOLOGIC - ADULT  Goal: Maintains hematologic stability  Description: INTERVENTIONS  - Assess for signs and symptoms of bleeding or hemorrhage  - Monitor labs  - Administer supportive blood products/factors as ordered and appropriate  Outcome: Progressing     Problem:  MUSCULOSKELETAL - ADULT  Goal: Maintain or return mobility to safest level of function  Description: INTERVENTIONS:  - Assess patient's ability to carry out ADLs; assess patient's baseline for ADL function and identify physical deficits which impact ability to perform ADLs (bathing, care of mouth/teeth, toileting, grooming, dressing, etc.)  - Assess/evaluate cause of self-care deficits   - Assess range of motion  - Assess patient's mobility  - Assess patient's need for assistive devices and provide as appropriate  - Encourage maximum independence but intervene and supervise when necessary  - Involve family in performance of ADLs  - Assess for home care needs following discharge   - Consider OT consult to assist with ADL evaluation and planning for discharge  - Provide patient education as appropriate  Outcome: Progressing  Goal: Maintain proper alignment of affected body part  Description: INTERVENTIONS:  - Support, maintain and protect limb and body alignment  - Provide patient/ family with appropriate education  Outcome: Progressing

## 2024-04-24 NOTE — UTILIZATION REVIEW
NOTIFICATION OF INPATIENT ADMISSION   AUTHORIZATION REQUEST   SERVICING FACILITY:   [place] [address]  Tax ID:  [id number]  NPI: [id number] ATTENDING PROVIDER:  Attending Name and NPI#: Yandel Powers Md [[id number]]  Address: [address] [place] [address]  Phone: [phone]     ADMISSION INFORMATION:  Place of Service: Inpatient [facility]  Place of Service Code: 21  Inpatient Admission Date/Time: 4/23/24  9:27 PM  Discharge Date/Time: No discharge date for patient encounter.  Admitting Diagnosis Code/Description:  Kidney pain [N23]     UTILIZATION REVIEW CONTACT:  [place] [facility] Utilization   Network Utilization Review Department  Phone: [phone]  Fax [phone]  Email: [email or web address]  Contact for approvals/pending authorizations, clinical reviews, and discharge.     PHYSICIAN ADVISORY SERVICES:  Medical Necessity Denial & Yslq-by-Mmmz Review  Phone: [phone]  Fax: [phone]  Email: [email or web address]     DISCHARGE SUPPORT TEAM:  For Patients Discharge Needs & Updates  Phone: [phone] opt. 2 Fax: [phone]  Email: [email or web address]

## 2024-04-24 NOTE — ASSESSMENT & PLAN NOTE
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  Previously treated with methotrexate and steroids.  Currently on prednisone 20 mg daily.  She is on a taper and she will be on 20 mg for 2 weeks and then down to 10  Followed up with rheumatology on February 27, 2024.  Follow-up on April 27, 2024  Recommending Humira but due to latent TB patient will need to be evaluated by ID first.

## 2024-04-24 NOTE — ED NOTES
Pt provided warm breakfast tray. Pt sitting up in hospital bed.      Monique Rodriguez RN  04/24/24 0999

## 2024-04-24 NOTE — ASSESSMENT & PLAN NOTE
Unclear etiology.  Will discontinue nephrotoxic agents including Voltaren, Lasix, losartan and metformin.  Bicarb drip  Consult nephrology  CBC BMP and magnesium in the morning  Baseline creatinine: 1.4-1.6    Lab Results   Component Value Date    EGFR 9 04/23/2024    EGFR 11 (L) 04/23/2024    EGFR 37 (L) 02/27/2024    CREATININE 5.01 (H) 04/23/2024    CREATININE 4.8 (H) 04/23/2024    CREATININE 1.7 (H) 02/27/2024

## 2024-04-24 NOTE — ED NOTES
New lunch tray order for pt as pt is a vegetarian     Monique Rodriguez, ERNESTINA  04/24/24 7278     intact

## 2024-04-24 NOTE — ASSESSMENT & PLAN NOTE
Home regimen: Metformin, which has been discontinued due to GENE  Hospital regimen: Insulin sliding scale    Lab Results   Component Value Date    HGBA1C 7.3 (H) 02/13/2024

## 2024-04-24 NOTE — ED NOTES
Reached out to provider via secure text to inform that pt is refusing insulin due to it containing pork products. Requested provider restart pt metformin. Provider reported that pt kidney function too poor for metformin and provider will look into human insulin     Monique Rodriguez RN  04/24/24 6597

## 2024-04-24 NOTE — UTILIZATION REVIEW
Initial Clinical Review    Admission: Date/Time/Statement:   Admission Orders (From admission, onward)       Ordered        04/23/24 2127  INPATIENT ADMISSION  Once                          Orders Placed This Encounter   Procedures    INPATIENT ADMISSION     Standing Status:   Standing     Number of Occurrences:   1     Order Specific Question:   Level of Care     Answer:   Med Surg [16]     Order Specific Question:   Estimated length of stay     Answer:   More than 2 Midnights     Order Specific Question:   Certification     Answer:   I certify that inpatient services are medically necessary for this patient for a duration of greater than two midnights. See H&P and MD Progress Notes for additional information about the patient's course of treatment.     ED Arrival Information       Expected   -    Arrival   4/23/2024 16:48    Acuity   Urgent              Means of arrival   Walk-In    Escorted by   Family Member    Service   Hospitalist    Admission type   Emergency              Arrival complaint   kidney pain             Chief Complaint   Patient presents with    Back Pain     Lower back pain and burning with urination for the past 2 weeks. Denies any history of kidney stones        Initial Presentation: 48 y.o. female presents to ed from home  on 4-23-24 for evaluation and treatment of low back pain and burning on urination with frequency 10-20 x/day and 3x nightly.  PMHX: DM, , LATENT TB, ANEMIA, LARGE VESSEL  VASCULITIS, THYROIDECTOMY, HTN, LIVER MASS BIOPSY.   Clinical assessment significant for , CO2 11, AN GAP 17, BUN 1266, CR 5.01, GLUCOSE 281, HB 10.8, CRP 28.2. Imaging shows progression of liver nodular area.  Kidney w/o acute finding. EKG :nsr.  Initially treated with iv na bicarbonate.  Admit to inpatient med surg for GENE on chronic kidney disease.  Unclear etiology Baseline creatinine 1.4-1.6.  Plan to consult nephrology, hold nephrotoxic agents including voltaren, lasix, losartan and  metformin.  Start Bicarb drip, trend CBC / BMP/ MAG.      Anticipated Length of Stay/Certification Statement: Patient will be admitted on an Inpatient basis with an anticipated length of stay of greater than 2 midnights.     Date: 4-24-24    Day 2: inpatient med surg   Bun improved to 118, creatinine inproving to 4.05.  Nephrology consult completed.  Etiology of GENE on CKD is volume depletion with fractional secretion of sodium less than 1%.  Recommend continue with isotonic saline in form of bicarbonate drip. Creatinine trending down 5>4.  Baseline creatinine 1.2 -1.4  Metabolic acidosis improved. Contiue Na Bicarbonate. Metformin remains on hold due to GENE.      ED Triage Vitals [04/23/24 1717]   98.6 °F (37 °C) 77 18 111/54 98 %                Pain Score       6          03/07/24 99.8 kg (220 lb)     Additional Vital Signs:   Date/Time Temp Pulse Resp BP MAP (mmHg) SpO2 O2 Device   04/24/24 0806 -- 80 -- 137/66 95 98 % --   04/23/24 1900 -- 72 13 112/61 -- 97 % --   04/23/24 1839 -- -- -- -- -- -- None (Room air)   04/23/24 1815 -- 76 18 108/57 79 97 % None (Room air)   04/23/24 1730 -- 72 18 126/63 87 97 % None (Room air)   04/23/24 1717 98.6 °F (37 °C) 77 18 111/54 76 98 % None (Room air)       Pertinent Labs/Diagnostic Test Results:       CT abdomen pelvis wo contrast   Final (04/23 2019)      Kidney showing bilateral symmetric mild cortical irregularity suggesting some scarring. There is no hydronephrosis or calculi. No significant alteration in size or contour.      Liver now demonstrating nodular and patchy ill-defined areas of decreased attenuation within the central aspect of the medial segment. This appearance and location is often seen in focal fatty deposition but is new compared to unenhanced imaging from October and not apparent on the postcontrast imaging in February. Would be uncommon for rapid change related to focal fat. Given the recent history of reported vasculitis, potentially representing  sequela of perfusion abnormality.         XR chest 1 view portable      Final 04/24 1014)      Mild prominence of the cardiomediastinal silhouette although less so than on the prior exam.            Results from last 7 days   Lab Units 04/24/24  0630 04/23/24  1812   WBC Thousand/uL 7.46 9.69   HEMOGLOBIN g/dL 9.5* 10.8*   HEMATOCRIT % 29.7* 34.8   PLATELETS Thousands/uL 230 259   BANDS PCT %  --  2         Results from last 7 days   Lab Units 04/24/24  0625 04/23/24  1812 04/23/24  0853   SODIUM mmol/L 133* 133* 142   POTASSIUM mmol/L 3.5 4.3 4.4   CHLORIDE mmol/L 101 105 106   CO2 mmol/L 19* 11* 13*   ANION GAP mmol/L 13 17* 23*   BUN mg/dL 118* 126* 113*   CREATININE mg/dL 4.05* 5.01* 4.8*   EGFR ml/min/1.73sq m 12 9 11*   CALCIUM mg/dL 7.8* 8.8 9.4   MAGNESIUM mg/dL 1.7*  --   --    XPHOSPHORUS mg/dL  --   --  6.6*     Results from last 7 days   Lab Units 04/23/24  0853   AST U/L 5*   ALT U/L 20   ALK PHOS U/L 88   TOTAL PROTEIN g/dL 6.5   ALBUMIN g/dL 4.0   TOTAL BILIRUBIN mg/dL 0.2     Results from last 7 days   Lab Units 04/24/24  0729   POC GLUCOSE mg/dl 170*     Results from last 7 days   Lab Units 04/24/24  0625 04/23/24  1812 04/23/24  0853   GLUCOSE RANDOM mg/dL 432* 281* 131*       Results from last 7 days   Lab Units 04/23/24  1812   CRP mg/L 28.2*             Results from last 7 days   Lab Units 04/24/24  0055 04/23/24  0853   SODIUM UR  12  --    CREATININE UR mg/dL 88.9 68       ED Treatment:   Medication Administration from 04/23/2024 1648 to 04/24/2024 1049         Date/Time Order Dose Route Action     04/24/2024 1029 EDT sodium bicarbonate 150 mEq in dextrose 5 % 1,000 mL infusion 125 mL/hr Intravenous New Bag     04/24/2024 0008 EDT sodium bicarbonate 150 mEq in dextrose 5 % 1,000 mL infusion 125 mL/hr Intravenous New Bag     04/24/2024 0809 EDT folic acid (FOLVITE) tablet 1 mg 1 mg Oral Given     04/24/2024 0809 EDT cyanocobalamin (VITAMIN B-12) tablet 500 mcg 500 mcg Oral Given     04/24/2024  0529 EDT levothyroxine tablet 100 mcg 100 mcg Oral Given     04/24/2024 0809 EDT escitalopram (LEXAPRO) tablet 10 mg 10 mg Oral Given     04/24/2024 0809 EDT clopidogrel (PLAVIX) tablet 75 mg 75 mg Oral Given     04/24/2024 0809 EDT Cholecalciferol (VITAMIN D3) tablet 1,000 Units 1,000 Units Oral Given     04/24/2024 0809 EDT carvedilol (COREG) tablet 25 mg 25 mg Oral Given     04/24/2024 0007 EDT montelukast (SINGULAIR) tablet 10 mg 10 mg Oral Given     04/24/2024 0809 EDT pantoprazole (PROTONIX) EC tablet 20 mg 20 mg Oral Given     04/24/2024 0809 EDT predniSONE tablet 20 mg 20 mg Oral Given     04/24/2024 0530 EDT heparin (porcine) subcutaneous injection 5,000 Units 5,000 Units Subcutaneous Given     04/24/2024 0007 EDT heparin (porcine) subcutaneous injection 5,000 Units 5,000 Units Subcutaneous Given     04/24/2024 0809 EDT amLODIPine (NORVASC) tablet 10 mg 10 mg Oral Given     04/24/2024 0810 EDT aspirin (ECOTRIN LOW STRENGTH) EC tablet 81 mg 81 mg Oral Given     04/24/2024 0007 EDT sodium bicarbonate 8.4 % injection **ADS Override Pull** 150 mEq  Given          Past Medical History:   Diagnosis Date    Asthma     Chronic anemia     Diabetes mellitus (HCC)     Hypertension     Hyperthyroidism     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HCC)     TB lung, latent      Present on Admission:   Type 2 diabetes mellitus with obesity  (HCC)   Depression   Essential hypertension   Aortitis    Acute kidney injury superimposed on chronic kidney disease  (HCC)   Mild persistent asthma without complication   Subclavian artery stenosis, left (HCC)   Dyslipidemia, goal LDL below 100   Aneurysm of ascending aorta without rupture (HCC)   Gastroesophageal reflux disease without esophagitis   Vitamin D deficiency   Post-surgical hypothyroidism   Iron deficiency anemia      Admitting Diagnosis: Kidney pain [N23]    Age/Sex: 48 y.o. female    Scheduled Medications:    amLODIPine, 10 mg, Oral, Daily  aspirin, 81 mg, Oral, Daily  atorvastatin, 40  mg, Oral, Daily With Dinner  carvedilol, 25 mg, Oral, BID With Meals  Cholecalciferol, 1,000 Units, Oral, Daily  clopidogrel, 75 mg, Oral, Daily  vitamin B-12, 500 mcg, Oral, Daily  escitalopram, 10 mg, Oral, Daily  folic acid, 1 mg, Oral, Daily  heparin (porcine), 5,000 Units, Subcutaneous, Q8H [place]  insulin lispro, 1-6 Units, Subcutaneous, TID AC  levothyroxine, , ,   levothyroxine, 100 mcg, Oral, Early Morning  montelukast, , ,   montelukast, 10 mg, Oral, HS  pantoprazole, 20 mg, Oral, Daily  predniSONE, 20 mg, Oral, Daily  sodium bicarbonate, , ,       Continuous IV Infusions:  sodium bicarbonate 150 mEq in dextrose 5 % 1,000 mL infusion, 125 mL/hr, Intravenous, Continuous      PRN Meds:  acetaminophen, 650 mg, Oral, Q6H PRN  levothyroxine, , ,   montelukast, , ,   sodium bicarbonate, , ,         IP CONSULT TO NEPHROLOGY    Network Utilization Review Department  ATTENTION: Please call with any questions or concerns to [phone] and carefully listen to the prompts so that you are directed to the right person. All voicemails are confidential.   For Discharge needs, contact Care Management DC Support Team at [phone] opt. 2  Send all requests for admission clinical reviews, approved or denied determinations and any other requests to dedicated fax number below belonging to the campus where the patient is receiving treatment. List of dedicated fax numbers for the Facilities:  FACILITY NAME UR FAX NUMBER   ADMISSION DENIALS (Administrative/Medical Necessity) [phone]   DISCHARGE SUPPORT TEAM (NETWORK) [phone]   PARENT CHILD HEALTH (Maternity/NICU/Pediatrics) [phone]   [facility] [phone]   [facility] [phone]   [facility] [phone]   [facility] [phone]   [facility] [phone]   [facility] [phone]   [facility] [phone]   [facility] [phone]   [facility] [phone]   [facility] [phone]   [facility] [phone]   [facility] [phone]

## 2024-04-24 NOTE — ASSESSMENT & PLAN NOTE
History of aortitis with recent subclavian artery stenosis/stenting  Follows with rheumatology as an outpatient.  Currently on prednisone 20 mg daily.  Due to latent TB has not been able to start biologics

## 2024-04-24 NOTE — ASSESSMENT & PLAN NOTE
History of aortitis thyroidectomy diabetes mellitus who presents to the hospital for back pain found to have kidney injury  Holding losartan and lisinopril.  Baseline creatinine 1.4-1.6  Metabolic acidosis: Continue bicarbonate infusion until seen by nephrology    Results from last 7 days   Lab Units 04/24/24  0625 04/23/24  1812 04/23/24  0853   BUN mg/dL 118* 126* 113*   CREATININE mg/dL 4.05* 5.01* 4.8*   EGFR ml/min/1.73sq m 12 9 11*

## 2024-04-24 NOTE — PROGRESS NOTES
[facility]  Progress Note  Name: Tatiana Balbuena I  MRN: [id number]  Unit/Bed#: RM17 I Date of Admission: 4/23/2024   Date of Service: 4/24/2024 I Hospital Day: 1    Assessment/Plan   * Acute kidney injury superimposed on chronic kidney disease  (HCC)  Assessment & Plan  History of aortitis thyroidectomy diabetes mellitus who presents to the hospital for back pain found to have kidney injury  Holding losartan and lisinopril.  Baseline creatinine 1.4-1.6  Metabolic acidosis: Continue bicarbonate infusion until seen by nephrology    Results from last 7 days   Lab Units 04/24/24  0625 04/23/24  1812 04/23/24  0853   BUN mg/dL 118* 126* 113*   CREATININE mg/dL 4.05* 5.01* 4.8*   EGFR ml/min/1.73sq m 12 9 11*       Post-surgical hypothyroidism  Assessment & Plan  History of papillary microcarcinoma status post thyroidectomy 2022.    Continue levothyroxine.    Aortitis   Assessment & Plan  History of aortitis with recent subclavian artery stenosis/stenting  Follows with rheumatology as an outpatient.  Currently on prednisone 20 mg daily.  Due to latent TB has not been able to start biologics    Essential hypertension  Assessment & Plan  Continue carvedilol and amlodipine.  Holding losartan and furosemide due to kidney injury    Mild persistent asthma without complication  Assessment & Plan  No exacerbation continue montelukast    Depression  Assessment & Plan  Mood stable.  Continue escitalopram    Type 2 diabetes mellitus with obesity  (HCC)  Assessment & Plan  Lab Results   Component Value Date    HGBA1C 7.3 (H) 02/13/2024     Results from last 7 days   Lab Units 04/24/24  0729   POC GLUCOSE mg/dl 170*     Holding metformin.  Continue sliding scale insulin.    Does have hyperglycemia due to dextrose/bicarbonate infusion    VTE Pharmacologic Prophylaxis:   Moderate Risk (Score 3-4) - Pharmacological DVT Prophylaxis Ordered: heparin.    Mobility:     -[facility] Goal achieved. Continue to encourage  appropriate mobility.    Patient Centered Rounds: I have performed bedside rounds with nursing staff today.  Discussions with Specialists or Other Care Team Provider: Case management    Education and Discussions with Family / Patient:     Time Spent for Care:   This time was spent on one or more of the following: performing physical exam; counseling and coordination of care; obtaining or reviewing history; documenting in the medical record; reviewing/ordering tests, medications or procedures; communicating with other healthcare professionals and discussing with patient's family/caregivers.    Current Length of Stay: 1 day(s)  Current Patient Status: Inpatient   Certification Statement: The patient will continue to require additional inpatient hospital stay due to renal failure  Discharge Plan: Anticipate discharge in 48-72 hrs to home.    Code Status: Level 1 - Full Code      Subjective:   Patient seen and examined.  Feeling better today.  No other complaints    Objective:   Vitals: Blood pressure 137/66, pulse 80, temperature 98.6 °F (37 °C), resp. rate 13, last menstrual period 04/04/2020, SpO2 98%.  No intake or output data in the 24 hours ending 04/24/24 0920    Physical Exam  Vitals reviewed.   Constitutional:       General: She is not in acute distress.     Appearance: Normal appearance. She is obese.   HENT:      Head: Atraumatic.   Eyes:      General: No scleral icterus.  Cardiovascular:      Rate and Rhythm: Regular rhythm.      Heart sounds: Normal heart sounds.   Pulmonary:      Breath sounds: Decreased breath sounds present. No wheezing.   Abdominal:      General: Bowel sounds are normal.      Tenderness: There is no guarding or rebound.   Musculoskeletal:         General: No swelling.   Skin:     General: Skin is warm.   Neurological:      General: No focal deficit present.      Mental Status: She is alert.   Psychiatric:         Mood and Affect: Mood normal.       Additional Data:   Labs:  Results from  last 7 days   Lab Units 04/24/24  0630 04/23/24  1812   WBC Thousand/uL 7.46 9.69   HEMOGLOBIN g/dL 9.5* 10.8*   PLATELETS Thousands/uL 230 259   MCV fL 84 86   TOTAL NEUT ABS Thousand/uL  --  7.75*   BANDS PCT %  --  2     Results from last 7 days   Lab Units 04/24/24  0625 04/23/24  1812 04/23/24  0853   SODIUM mmol/L 133* 133* 142   POTASSIUM mmol/L 3.5 4.3 4.4   CHLORIDE mmol/L 101 105 106   CO2 mmol/L 19* 11* 13*   ANION GAP mmol/L 13 17* 23*   BUN mg/dL 118* 126* 113*   CREATININE mg/dL 4.05* 5.01* 4.8*   CALCIUM mg/dL 7.8* 8.8 9.4   ALBUMIN g/dL  --   --  4.0   TOTAL BILIRUBIN mg/dL  --   --  0.2   ALK PHOS U/L  --   --  88   ALT U/L  --   --  20   AST U/L  --   --  5*   EGFR ml/min/1.73sq m 12 9 11*   GLUCOSE RANDOM mg/dL 432* 281* 131*     Results from last 7 days   Lab Units 04/24/24  0625 04/23/24  0853   MAGNESIUM mg/dL 1.7*  --    XPHOSPHORUS mg/dL  --  6.6*                      Results from last 7 days   Lab Units 04/24/24  0729   POC GLUCOSE mg/dl 170*             * I Have Reviewed All Lab Data Listed Above.    Recent cultures:                   Lines/Drains:  Invasive Devices       Peripheral Intravenous Line  Duration             Peripheral IV 02/12/24 Dorsal (posterior);Right Hand 71 days    Peripheral IV 02/13/24 Right;Ventral (anterior) Forearm 71 days    Peripheral IV 04/23/24 Right;Ventral (anterior) Forearm <1 day                          Telemetry:      Imaging:  Imaging Reports Reviewed Today Include:   CT abdomen pelvis wo contrast    Result Date: 4/23/2024  Impression: Kidney showing bilateral symmetric mild cortical irregularity suggesting some scarring. There is no hydronephrosis or calculi. No significant alteration in size or contour. Liver now demonstrating nodular and patchy ill-defined areas of decreased attenuation within the central aspect of the medial segment. This appearance and location is often seen in focal fatty deposition but is new compared to unenhanced imaging from  October and not apparent on the postcontrast imaging in February. Would be uncommon for rapid change related to focal fat. Given the recent history of reported vasculitis, potentially representing sequela of perfusion abnormality. Workstation performed: [id number]       Scheduled Meds:  Current Facility-Administered Medications   Medication Dose Route Frequency Provider Last Rate    acetaminophen  650 mg Oral Q6H PRN Yandel Powers MD      amLODIPine  10 mg Oral Daily Yandel Powers MD      aspirin  81 mg Oral Daily Yandel Powers MD      atorvastatin  40 mg Oral Daily With Dinner Yandel Powers MD      carvedilol  25 mg Oral BID With Meals Yandel Powers MD      Cholecalciferol  1,000 Units Oral Daily Yandel Powers MD      clopidogrel  75 mg Oral Daily Yandel Powers MD      vitamin B-12  500 mcg Oral Daily Yandel Powers MD      escitalopram  10 mg Oral Daily Yandel Powers MD      folic acid  1 mg Oral Daily Yandel Powers MD      heparin (porcine)  5,000 Units Subcutaneous Q8H LAKEISHA Yandel Powers MD      insulin lispro  1-6 Units Subcutaneous TID AC Yandel Powers MD      levothyroxine           levothyroxine  100 mcg Oral Early Morning Yandel Powers MD      montelukast           montelukast  10 mg Oral HS Yandel Powers MD      pantoprazole  20 mg Oral Daily Yandel Powers MD      predniSONE  20 mg Oral Daily Yandel Powers MD      sodium bicarbonate 150 mEq in dextrose 5 % 1,000 mL infusion  125 mL/hr Intravenous Continuous Lynda Hsu  mL/hr (04/24/24 0008)    sodium bicarbonate              Today, Patient Was Seen By: Kristopher Steinberg DO    ** Please Note: Dictation voice to text software may have been used in the creation of this document. **

## 2024-04-24 NOTE — H&P
[facility]  H&P  Name: Tatiana Balbuena 48 y.o. female I MRN: [id number]  Unit/Bed#: RM07 I Date of Admission: 4/23/2024   Date of Service: 4/23/2024 I Hospital Day: 0      Assessment/Plan   * Acute kidney injury superimposed on chronic kidney disease  (HCC)  Assessment & Plan  Unclear etiology.  Will discontinue nephrotoxic agents including Voltaren, Lasix, losartan and metformin.  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  Previously treated with methotrexate and steroids.  Currently on prednisone 20 mg daily.  She is on a taper and she will be on 20 mg for 2 weeks and then down to 10  Followed up with rheumatology on February 27, 2024.  Follow-up on April 27, 2024  Recommending Humira but due to latent TB patient will need to be evaluated by ID first.      Aneurysm of ascending aorta without rupture ([facility])  Assessment & Plan  4 cm in caliber.  Followed by vascular surgery    Subclavian artery stenosis, left ([facility])  Assessment & Plan  Left subclavian a stenosis status post left subclavian artery stent January 5, 2024 via left brachial a cutdown.  Patient follows with vascular surgery, last appointment February 12, 2024 with follow-up in 6 months.  Continue aspirin and Plavix    Essential hypertension  Assessment & Plan  Continue Coreg and Norvasc  Losartan and Lasix have been discontinued due to GENE    Type 2 diabetes mellitus with obesity  ([facility])  Assessment & Plan  Home regimen: Metformin, which has been discontinued due to GENE  Hospital regimen: Insulin sliding scale    Lab Results    Component Value Date    HGBA1C 7.3 (H) 02/13/2024       Dyslipidemia, goal LDL below 100  Assessment & Plan  Continue statin    Depression  Assessment & Plan  Continue Lexapro    Mild persistent asthma without complication  Assessment & Plan  At home she takes Advair, while she is hospitalized no DuoNebs available    Gastroesophageal reflux disease without esophagitis  Assessment & Plan  Continue PPI    Vitamin D deficiency  Assessment & Plan  Continue vitamin D supplementation    Post-surgical hypothyroidism  Assessment & Plan  Papillary microcarcinoma status post thyroidectomy back in 2022  Continue levothyroxine    Iron deficiency anemia  Assessment & Plan  Continue iron supplementation         Disposition  #1 bicarb drip  #2 nephrology consultation  #3 CBC BMP mag in the morning urine studies  #4 losartan, Voltaren, Lasix and metformin all discontinued        VTE Prophylaxis: Heparin  / sequential compression device   Code Status: Level 1 - Full Code       Anticipated Length of Stay:  Patient will be admitted on an Inpatient basis with an anticipated length of stay of greater than 2 midnights.   Justification for Hospital Stay: Please see detailed plans noted above.    Chief Complaint:     Abnormal labs, back pain  History of Present Illness:  Tatiana Balbuena is a 48 y.o. female who has past medical history significant for [facility] and got [facility] call back saying that her kidney numbers were elevated and to come to the hospital.  Patient has some back pain and she has noticed that she pees 10-20 times during the day and 3 times at night.  She has a history of aortitis and is followed by rheumatology and is currently on a prednisone taper and and 20 mg at this time.  Patient does have his start Humira, but will need ID consult due to latent TB.  Patient just returned from [place] where she was there for 5 weeks.  She did complain of edema over her whole body which has resolved  now.  Patient has been taking her medication including Lasix, losartan, metformin and Voltaren twice daily.      Review of Systems:    Constitutional:  Denies fever or chills   Eyes:  Denies change in visual acuity   HENT:  Denies nasal congestion or sore throat   Respiratory:  Denies cough or shortness of breath   Cardiovascular:  Denies chest pain or edema   GI:  Denies abdominal pain or bloody stools  :  Denies dysuria   Musculoskeletal: Back pain  Integument:  Denies rash   Neurologic:  Denies headache or sensory changes   Endocrine:  Denies polyuria or polydipsia   Lymphatic:  Denies swollen glands   Psychiatric:  Denies depression or anxiety     Past Medical and Surgical History:   Past Medical History:   Diagnosis Date    Asthma     Chronic anemia     Diabetes mellitus (HCC)     Hypertension     Hyperthyroidism     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HCC)     TB lung, latent      Past Surgical History:   Procedure Laterality Date     SECTION      IR BIOPSY LIVER MASS  07/15/2020    LUNG BIOPSY      THYROIDECTOMY         Meds/Allergies:  No current facility-administered medications on file prior to encounter.     Current Outpatient Medications on File Prior to Encounter   Medication Sig Dispense Refill    predniSONE 10 mg tablet Take 10 mg by mouth daily      vitamin B-12 (VITAMIN B-12) 500 mcg tablet Take 500 mcg by mouth daily      acetaminophen (TYLENOL) 325 mg tablet Take 650 mg by mouth every 6 (six) hours as needed for mild pain      Advair -21 MCG/ACT inhaler       albuterol (PROVENTIL HFA,VENTOLIN HFA) 90 mcg/act inhaler Inhale 2 puffs 2 (two) times a day      amLODIPine (NORVASC) 10 mg tablet Take 1 tablet (10 mg total) by mouth daily 30 tablet 0    aspirin (ECOTRIN LOW STRENGTH) 81 mg EC tablet Take 81 mg by mouth daily      atorvastatin (LIPITOR) 40 mg tablet       carvedilol (COREG) 25 mg tablet Take 1 tablet (25 mg total) by mouth 2 (two) times a day with meals 60 tablet 0    cholecalciferol  (VITAMIN D3) 1,000 units tablet Take 1 tablet (1,000 Units total) by mouth daily 90 tablet 3    clopidogrel (PLAVIX) 75 mg tablet Take 75 mg by mouth daily      diclofenac sodium (VOLTAREN) 50 mg EC tablet Take 1 tablet (50 mg total) by mouth 2 (two) times a day 30 tablet 0    escitalopram (LEXAPRO) 10 mg tablet Take 10 mg by mouth daily      folic acid (FOLVITE) 1 mg tablet       furosemide (LASIX) 40 mg tablet Take 1 tablet (40 mg total) by mouth daily 30 tablet 0    Iron-Vitamin C (Vitron-C)  MG TABS Take 1 tablet by mouth in the morning (Patient not taking: Reported on 3/7/2024) 90 tablet 3    levothyroxine 100 mcg tablet       losartan (COZAAR) 100 MG tablet Take 100 mg by mouth daily       metFORMIN (GLUCOPHAGE) 500 mg tablet Take 1,000 mg by mouth 2 (two) times a day with meals      montelukast (SINGULAIR) 10 mg tablet Take 10 mg by mouth daily at bedtime      omeprazole (PriLOSEC) 20 mg delayed release capsule Take 1 capsule (20 mg total) by mouth daily 30 capsule 0           Allergies:   Allergies   Allergen Reactions    Other Allergic Rhinitis     pollen       History:  Marital Status: /Civil Union     Substance Use History:   Social History     Substance and Sexual Activity   Alcohol Use Never    Alcohol/week: 0.0 standard drinks of alcohol     Social History     Tobacco Use   Smoking Status Never   Smokeless Tobacco Never     Social History     Substance and Sexual Activity   Drug Use Never       Family History:  Family History   Problem Relation Age of Onset    Diabetes unspecified Mother        Physical Exam:     Vitals:   Blood Pressure: 112/61 (04/23/24 1900)  Pulse: 72 (04/23/24 1900)  Temperature: 98.6 °F (37 °C) (04/23/24 1717)  Respirations: 13 (04/23/24 1900)  SpO2: 97 % (04/23/24 1900)    Constitutional:  Non-toxic appearance  Eyes:  EOMI, No scleral icterus   HENT:   oropharynx moist, external ears normal, external nose normal   Respiratory:  No respiratory distress, no wheezing    Cardiovascular:  Normal rate, no murmurs   GI:  Soft, nondistended, no guarding   :  No costovertebral angle tenderness   Musculoskeletal:  no tenderness, no deformities. Back- no tenderness  Integument:  no jaundice, no rash   Neurologic:  Alert &awake, communicative, CN 2-12 normal,  no focal deficits noted   Psychiatric:  Speech and behavior appropriate       Lab Results: I have personally reviewed pertinent reports.   and I have personally reviewed pertinent films in PACS    Results from last 7 days   Lab Units 04/23/24  1812   WBC Thousand/uL 9.69   HEMOGLOBIN g/dL 10.8*   HEMATOCRIT % 34.8   PLATELETS Thousands/uL 259   LYMPHO PCT % 16   MONO PCT % 4   EOS PCT % 0     Results from last 7 days   Lab Units 04/23/24 1812 04/23/24  0853   POTASSIUM mmol/L 4.3 4.4   CHLORIDE mmol/L 105 106   CO2 mmol/L 11* 13*   BUN mg/dL 126* 113*   CREATININE mg/dL 5.01* 4.8*   CALCIUM mg/dL 8.8 9.4   ALK PHOS U/L  --  88   ALT U/L  --  20   AST U/L  --  5*             Imaging: I have personally reviewed pertinent reports.   and I have personally reviewed pertinent films in PACS    CT abdomen pelvis wo contrast    Result Date: 4/23/2024  Narrative: CT ABDOMEN AND PELVIS WITHOUT IV CONTRAST INDICATION: acute kidney injury. Low back pain. Burning on urination. COMPARISON: February 12, 2024 TECHNIQUE: CT examination of the abdomen and pelvis was performed without intravenous contrast. Multiplanar 2D reformatted images were created from the source data. This examination, like all CT scans performed in the [facility], was performed utilizing techniques to minimize radiation dose exposure, including the use of iterative reconstruction and automated exposure control. Radiation dose length product (DLP) for this visit: 1257.41 mGy-cm Enteric Contrast: Not administered. FINDINGS: ABDOMEN LOWER CHEST: No clinically significant abnormality in the visualized lower chest. LIVER/BILIARY TREE: Stable small lipoma. Newly  demonstrated within the central aspect of the medial segment are ill-defined areas of hypoattenuation which are new compared to October and February exams. Although the appearance suggests focal fatty deposition, this seems less likely given the short interval time. Given recent history of findings suggesting vasculitis, potentially representing sequela of avascular etiology such as localized infarct. Cannot better assessed on this unenhanced exam. GALLBLADDER: No calcified gallstones. No pericholecystic inflammatory change. SPLEEN: Unremarkable. PANCREAS: Unremarkable. ADRENAL GLANDS: Unremarkable. KIDNEYS/URETERS: No hydronephrosis. No nephrolithiasis. Renal size and contour appearing stable. Lobulated renal surface contour suggesting underlying scarring. No perinephric collections. Single subcentimeter hyperdense focus in the left mid upper cortex most commonly associated with hemorrhagic cyst. STOMACH AND BOWEL: Unremarkable. APPENDIX: No findings to suggest appendicitis. ABDOMINOPELVIC CAVITY: No ascites. No pneumoperitoneum. No lymphadenopathy. VESSELS: Unremarkable for patient's age. PELVIS REPRODUCTIVE ORGANS: Lobulated uterus, stable appearance, presumably underlying myoma. No adnexal mass. URINARY BLADDER: Unremarkable. ABDOMINAL WALL/INGUINAL REGIONS: Unremarkable. BONES: No acute fracture or suspicious osseous lesion.     Impression: Kidney showing bilateral symmetric mild cortical irregularity suggesting some scarring. There is no hydronephrosis or calculi. No significant alteration in size or contour. Liver now demonstrating nodular and patchy ill-defined areas of decreased attenuation within the central aspect of the medial segment. This appearance and location is often seen in focal fatty deposition but is new compared to unenhanced imaging from October and not apparent on the postcontrast imaging in February. Would be uncommon for rapid change related to focal fat. Given the recent history of  reported vasculitis, potentially representing sequela of perfusion abnormality. Workstation performed: [id number]     MDM: High  Patient requires hospitalization due to abnormal kidney function labs requiring IV bicarb drip and nephrology consultation  Reviewed CBC, CMP, C-reactive protein, CT scan  Reviewed external notes from ENT, rheumatology and vascular surgery.  Also reviewed previous admission to the hospital notes  Prescription drug management IV bicarb drip  CBC BMP mag in the morning, urine studies  Nephrology consultation    Epic Records Reviewed as well as Records in Care Everywhere    ** Please Note: Dragon 360 Dictation voice to text software was used in the creation of this document. **

## 2024-04-24 NOTE — CONSULTS
CONSULTATION-NEPHROLOGY   Tatiana Babluena 48 y.o. female MRN: [id number]  Unit/Bed#: 423-01 Encounter: [id number]        Assessment and Plan:    Acute kidney injury on chronic kidney disease, baseline creatinine appears to be 1.2- 1.6 mg/dL dating to 2022  Creatinine 4.05 mg/dL, today, GFR 12  Creatinine 5.01 mg/dL, peak, 4/23, GFR 9  Denies uremic symptoms including confusion, nausea, vomiting, decreased appetite, fatigue, decreased urine amount or inability to urinate, or metallic taste in mouth.  No urgent indication for hemodialysis.  Patient is alert and oriented, conversing appropriately.  Volume status examines euvolemic  Patient notes she is experiencing burning sensation at beginning of urination.  UA at [facility] with negative results 4/23/2024.  Continue to monitor daily labs.    Chronic kidney disease  Recommend follow with nephrology outpatient    Metabolic acidosis  Bicarb 19 mmol/L, 4/24  Bicarb edilma 11 mmol/L on 4/23  Continue sodium carbonate 150 mEq in dextrose 5% 125 ml/hour  Continue to monitor daily labs    Essential hypertension  Blood pressure labile, -170s past 24 hours.  Most recent blood pressure 125/70 mmHg  Continue amlodipine 10 mg daily, carvedilol 25 mg twice daily,    Type 2 diabetes mellitus  HG A1c 7.3%, 2/13/2024  Continue to hold metformin due to GENE      HPI:    Tatiana Balbuena is a 48 y.o. female who presented to [facility] with back pain and was found to be in acute kidney injury.  PMH includes asthma, DM, hypertension, hyperthyroidism s/p thyroidectomy with postsurgical hypothyroidism, and latent TB lung disease.    Patient examined in emergency department, resting in bed with eyes closed.  Easily aroused to voice.  Alert and oriented, conversing appropriately.  Patient states she was experiencing severe back pain and noted to be in renal failure when she went to the emergency department.  Patient denies nausea, vomiting, diarrhea, states appetite has been good.   States she has good urine output.  Denies recent urinary tract history, states she has had burning on urination x 1 month.  As noted above, UA checked at [facility]  with negative results.    Care was discussed with hospitalist team with agreement for treatment plan.    Reason for Consult: GENE on CKD    Review of Systems:    Review of Systems   Constitutional:  Positive for fatigue. Negative for chills and fever.   HENT:  Negative for ear pain and sore throat.    Eyes:  Negative for pain and visual disturbance.   Respiratory:  Negative for cough, chest tightness and shortness of breath.    Cardiovascular:  Negative for chest pain, palpitations and leg swelling.   Gastrointestinal:  Negative for abdominal pain, constipation, diarrhea, nausea and vomiting.   Endocrine: Negative.    Genitourinary:  Negative for decreased urine volume, difficulty urinating, dysuria, flank pain and hematuria.   Musculoskeletal:  Positive for back pain. Negative for arthralgias.   Skin:  Negative for color change and rash.   Neurological:  Negative for dizziness, seizures, syncope, weakness and headaches.   Hematological: Negative.    Psychiatric/Behavioral: Negative.     All other systems reviewed and are negative.      Historical Information   Past Medical History:   Diagnosis Date    Asthma     Chronic anemia     Diabetes mellitus (HCC)     Hypertension     Hyperthyroidism     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HCC)     TB lung, latent      Past Surgical History:   Procedure Laterality Date     SECTION      IR BIOPSY LIVER MASS  07/15/2020    LUNG BIOPSY      THYROIDECTOMY       Social History   Social History     Substance and Sexual Activity   Alcohol Use Never    Alcohol/week: 0.0 standard drinks of alcohol     Social History     Substance and Sexual Activity   Drug Use Never     Social History     Tobacco Use   Smoking Status Never   Smokeless Tobacco Never       Family History:   Family History   Problem Relation Age of Onset     "Diabetes unspecified Mother        Medications:  Pertinent medications were reviewed  Current Facility-Administered Medications   Medication Dose Route Frequency Provider Last Rate    acetaminophen  650 mg Oral Q6H PRN Yandel Powers MD      amLODIPine  10 mg Oral Daily Yandel Powers MD      aspirin  81 mg Oral Daily Yandel Powers MD      atorvastatin  40 mg Oral Daily With Dinner Yandel Powers MD      carvedilol  25 mg Oral BID With Meals Yandel Powers MD      Cholecalciferol  1,000 Units Oral Daily Yandel Powers MD      clopidogrel  75 mg Oral Daily Yandel Powers MD      vitamin B-12  500 mcg Oral Daily Yandel Powers MD      escitalopram  10 mg Oral Daily Yandel Powers MD      folic acid  1 mg Oral Daily Yandel Powers MD      insulin lispro  1-6 Units Subcutaneous TID AC Yandel Powers MD      levothyroxine  100 mcg Oral Early Morning Yandel Powers MD      montelukast  10 mg Oral HS Yandel Powers MD      pantoprazole  20 mg Oral Daily Yandel Powers MD      predniSONE  20 mg Oral Daily Yandel Powers MD      sodium bicarbonate 150 mEq in dextrose 5 % 1,000 mL infusion  125 mL/hr Intravenous Continuous Lynda Hsu  mL/hr (04/24/24 1029)         Allergies   Allergen Reactions    Other Allergic Rhinitis     pollen         Vitals:   /70   Pulse 83   Temp 98.1 °F (36.7 °C)   Resp 18   Ht 5' 3\" (1.6 m)   Wt 101 kg (222 lb 10.6 oz)   LMP 04/04/2020 (Exact Date)   SpO2 96%   BMI 39.44 kg/m²   Body mass index is 39.44 kg/m².  SpO2: 96 %,   SpO2 Activity: At Rest,   O2 Device: None (Room air)      Intake/Output Summary (Last 24 hours) at 4/24/2024 0018  Last data filed at 4/24/2024 1500  Gross per 24 hour   Intake --   Output 750 ml   Net -750 ml     Invasive Devices       Peripheral Intravenous Line  Duration             Peripheral IV 02/12/24 Dorsal (posterior);Right Hand 71 days    " Peripheral IV 02/13/24 Right;Ventral (anterior) Forearm 71 days    Peripheral IV 04/23/24 Right;Ventral (anterior) Forearm <1 day                    Physical Exam:    Physical Exam  Constitutional:       Appearance: She is obese. She is ill-appearing.   HENT:      Head: Normocephalic and atraumatic.      Nose: Nose normal.      Mouth/Throat:      Mouth: Mucous membranes are moist.      Pharynx: Oropharynx is clear.   Eyes:      Extraocular Movements: Extraocular movements intact.      Conjunctiva/sclera: Conjunctivae normal.      Pupils: Pupils are equal, round, and reactive to light.   Cardiovascular:      Rate and Rhythm: Normal rate and regular rhythm.      Pulses: Normal pulses.      Heart sounds: Normal heart sounds.   Pulmonary:      Effort: Pulmonary effort is normal.      Breath sounds: Normal breath sounds. No wheezing or rhonchi.   Abdominal:      General: Abdomen is flat. Bowel sounds are normal.      Palpations: Abdomen is soft.      Tenderness: There is no right CVA tenderness or left CVA tenderness.   Musculoskeletal:         General: Normal range of motion.      Cervical back: Normal range of motion and neck supple.      Right lower leg: No edema.      Left lower leg: No edema.   Skin:     General: Skin is warm and dry.   Neurological:      General: No focal deficit present.      Mental Status: She is alert and oriented to person, place, and time.   Psychiatric:         Mood and Affect: Mood normal.         Behavior: Behavior normal.         Diagnostic Data:  Lab: I have personally reviewed pertinent lab results.,   CBC:  Results from last 7 days   Lab Units 04/24/24  0630   WBC Thousand/uL 7.46   HEMOGLOBIN g/dL 9.5*   HEMATOCRIT % 29.7*   PLATELETS Thousands/uL 230      CMP:   Lab Results   Component Value Date    SODIUM 133 (L) 04/24/2024    K 3.5 04/24/2024     04/24/2024    CO2 19 (L) 04/24/2024     (H) 04/24/2024    CREATININE 4.05 (H) 04/24/2024    CALCIUM 7.8 (L) 04/24/2024     "EGFR 12 04/24/2024   ,   PT/INR: No results found for: \"PT\", \"INR\",   Magnesium: No components found for: \"MAG\",  Phosphorous: No results found for: \"PHOS\"    Microbiology:  @[facility],(urinecx:7)@        JOHNSON Gaines    Portions of the record may have been created with voice recognition software. Occasional wrong word or \"sound a like\" substitutions may have occurred due to the inherent limitations of voice recognition software. Read the chart carefully and recognize, using context, where substitutions have occurred.       "

## 2024-04-25 LAB
ANION GAP SERPL CALCULATED.3IONS-SCNC: 11 MMOL/L (ref 4–13)
BUN SERPL-MCNC: 88 MG/DL (ref 5–25)
CALCIUM SERPL-MCNC: 8.6 MG/DL (ref 8.4–10.2)
CHLORIDE SERPL-SCNC: 102 MMOL/L (ref 96–108)
CO2 SERPL-SCNC: 26 MMOL/L (ref 21–32)
CREAT SERPL-MCNC: 2.93 MG/DL (ref 0.6–1.3)
ERYTHROCYTE [DISTWIDTH] IN BLOOD BY AUTOMATED COUNT: 20.7 % (ref 11.6–15.1)
GFR SERPL CREATININE-BSD FRML MDRD: 17 ML/MIN/1.73SQ M
GLUCOSE SERPL-MCNC: 159 MG/DL (ref 65–140)
GLUCOSE SERPL-MCNC: 160 MG/DL (ref 65–140)
GLUCOSE SERPL-MCNC: 215 MG/DL (ref 65–140)
GLUCOSE SERPL-MCNC: 308 MG/DL (ref 65–140)
HCT VFR BLD AUTO: 31.3 % (ref 34.8–46.1)
HGB BLD-MCNC: 10.4 G/DL (ref 11.5–15.4)
MCH RBC QN AUTO: 27.2 PG (ref 26.8–34.3)
MCHC RBC AUTO-ENTMCNC: 33.2 G/DL (ref 31.4–37.4)
MCV RBC AUTO: 82 FL (ref 82–98)
PLATELET # BLD AUTO: 261 THOUSANDS/UL (ref 149–390)
PMV BLD AUTO: 11.1 FL (ref 8.9–12.7)
POTASSIUM SERPL-SCNC: 3.1 MMOL/L (ref 3.5–5.3)
RBC # BLD AUTO: 3.83 MILLION/UL (ref 3.81–5.12)
SODIUM SERPL-SCNC: 139 MMOL/L (ref 135–147)
WBC # BLD AUTO: 7.56 THOUSAND/UL (ref 4.31–10.16)

## 2024-04-25 PROCEDURE — 99232 SBSQ HOSP IP/OBS MODERATE 35: CPT | Performed by: INTERNAL MEDICINE

## 2024-04-25 PROCEDURE — 85027 COMPLETE CBC AUTOMATED: CPT | Performed by: INTERNAL MEDICINE

## 2024-04-25 PROCEDURE — 80048 BASIC METABOLIC PNL TOTAL CA: CPT | Performed by: INTERNAL MEDICINE

## 2024-04-25 PROCEDURE — 82948 REAGENT STRIP/BLOOD GLUCOSE: CPT

## 2024-04-25 PROCEDURE — 99232 SBSQ HOSP IP/OBS MODERATE 35: CPT

## 2024-04-25 RX ORDER — SODIUM CHLORIDE, SODIUM GLUCONATE, SODIUM ACETATE, POTASSIUM CHLORIDE, MAGNESIUM CHLORIDE, SODIUM PHOSPHATE, DIBASIC, AND POTASSIUM PHOSPHATE .53; .5; .37; .037; .03; .012; .00082 G/100ML; G/100ML; G/100ML; G/100ML; G/100ML; G/100ML; G/100ML
75 INJECTION, SOLUTION INTRAVENOUS CONTINUOUS
Status: DISCONTINUED | OUTPATIENT
Start: 2024-04-25 | End: 2024-04-26

## 2024-04-25 RX ORDER — POTASSIUM CHLORIDE 20 MEQ/1
20 TABLET, EXTENDED RELEASE ORAL
Status: COMPLETED | OUTPATIENT
Start: 2024-04-25 | End: 2024-04-25

## 2024-04-25 RX ADMIN — PANTOPRAZOLE SODIUM 20 MG: 20 TABLET, DELAYED RELEASE ORAL at 09:35

## 2024-04-25 RX ADMIN — AMLODIPINE BESYLATE 10 MG: 10 TABLET ORAL at 09:36

## 2024-04-25 RX ADMIN — SODIUM CHLORIDE, SODIUM GLUCONATE, SODIUM ACETATE, POTASSIUM CHLORIDE, MAGNESIUM CHLORIDE, SODIUM PHOSPHATE, DIBASIC, AND POTASSIUM PHOSPHATE 75 ML/HR: .53; .5; .37; .037; .03; .012; .00082 INJECTION, SOLUTION INTRAVENOUS at 07:48

## 2024-04-25 RX ADMIN — LEVOTHYROXINE SODIUM 100 MCG: 100 TABLET ORAL at 05:04

## 2024-04-25 RX ADMIN — ESCITALOPRAM OXALATE 10 MG: 10 TABLET ORAL at 09:36

## 2024-04-25 RX ADMIN — FOLIC ACID 1 MG: 1 TABLET ORAL at 09:35

## 2024-04-25 RX ADMIN — SODIUM BICARBONATE: 84 INJECTION, SOLUTION INTRAVENOUS at 03:03

## 2024-04-25 RX ADMIN — Medication 1000 UNITS: at 09:36

## 2024-04-25 RX ADMIN — POTASSIUM CHLORIDE 20 MEQ: 1500 TABLET, EXTENDED RELEASE ORAL at 09:37

## 2024-04-25 RX ADMIN — CLOPIDOGREL 75 MG: 75 TABLET ORAL at 09:35

## 2024-04-25 RX ADMIN — SODIUM BICARBONATE 125 ML/HR: 84 INJECTION, SOLUTION INTRAVENOUS at 03:02

## 2024-04-25 RX ADMIN — CARVEDILOL 25 MG: 12.5 TABLET, FILM COATED ORAL at 16:37

## 2024-04-25 RX ADMIN — INSULIN LISPRO 1 UNITS: 100 INJECTION, SOLUTION INTRAVENOUS; SUBCUTANEOUS at 07:52

## 2024-04-25 RX ADMIN — INSULIN LISPRO 4 UNITS: 100 INJECTION, SOLUTION INTRAVENOUS; SUBCUTANEOUS at 16:37

## 2024-04-25 RX ADMIN — POTASSIUM CHLORIDE 20 MEQ: 1500 TABLET, EXTENDED RELEASE ORAL at 13:58

## 2024-04-25 RX ADMIN — MONTELUKAST 10 MG: 10 TABLET, FILM COATED ORAL at 21:31

## 2024-04-25 RX ADMIN — ASPIRIN 81 MG: 81 TABLET, COATED ORAL at 09:35

## 2024-04-25 RX ADMIN — INSULIN LISPRO 2 UNITS: 100 INJECTION, SOLUTION INTRAVENOUS; SUBCUTANEOUS at 12:14

## 2024-04-25 RX ADMIN — POTASSIUM CHLORIDE 20 MEQ: 1500 TABLET, EXTENDED RELEASE ORAL at 07:48

## 2024-04-25 RX ADMIN — PREDNISONE 20 MG: 20 TABLET ORAL at 09:36

## 2024-04-25 RX ADMIN — CYANOCOBALAMIN TAB 500 MCG 500 MCG: 500 TAB at 09:36

## 2024-04-25 RX ADMIN — CARVEDILOL 25 MG: 12.5 TABLET, FILM COATED ORAL at 07:48

## 2024-04-25 RX ADMIN — ATORVASTATIN CALCIUM 40 MG: 40 TABLET, FILM COATED ORAL at 16:37

## 2024-04-25 NOTE — PLAN OF CARE
Problem: PAIN - ADULT  Goal: Verbalizes/displays adequate comfort level or baseline comfort level  Description: Interventions:  - Encourage patient to monitor pain and request assistance  - Assess pain using appropriate pain scale  - Administer analgesics based on type and severity of pain and evaluate response  - Implement non-pharmacological measures as appropriate and evaluate response  - Consider cultural and social influences on pain and pain management  - Notify physician/advanced practitioner if interventions unsuccessful or patient reports new pain  Outcome: Progressing     Problem: INFECTION - ADULT  Goal: Absence or prevention of progression during hospitalization  Description: INTERVENTIONS:  - Assess and monitor for signs and symptoms of infection  - Monitor lab/diagnostic results  - Monitor all insertion sites, i.e. indwelling lines, tubes, and drains  - Monitor endotracheal if appropriate and nasal secretions for changes in amount and color  - [place] appropriate cooling/warming therapies per order  - Administer medications as ordered  - Instruct and encourage patient and family to use good hand hygiene technique  - Identify and instruct in appropriate isolation precautions for identified infection/condition  Outcome: Progressing     Problem: SAFETY ADULT  Goal: Patient will remain free of falls  Description: INTERVENTIONS:  - Educate patient/family on patient safety including physical limitations  - Instruct patient to call for assistance with activity   - Consult OT/PT to assist with strengthening/mobility   - Keep Call bell within reach  - Keep bed low and locked with side rails adjusted as appropriate  - Keep care items and personal belongings within reach  - Initiate and maintain comfort rounds  - Make Fall Risk Sign visible to staff  - Offer Toileting every 2 Hours, in advance of need  - Initiate/Maintain fall alarm  - Obtain necessary fall risk management equipment: non-skid footwear  -  Apply yellow socks and bracelet for high fall risk patients  - Consider moving patient to room near nurses station  Outcome: Progressing

## 2024-04-25 NOTE — ASSESSMENT & PLAN NOTE
History of aortitis thyroidectomy diabetes mellitus who presents to the hospital for back pain found to have kidney injury  Holding losartan and furosemide.  Baseline creatinine 1.4-1.6  Metabolic acidosis: Resolved with bicarbonate infusion; now on isolyte per nephrology    Results from last 7 days   Lab Units 04/25/24  0503 04/24/24  0625 04/23/24  1812 04/23/24  0853   BUN mg/dL 88* 118* 126* 113*   CREATININE mg/dL 2.93* 4.05* 5.01* 4.8*   EGFR ml/min/1.73sq m 17 12 9 11*

## 2024-04-25 NOTE — PROGRESS NOTES
Outpatient Consultation - Infectious Disease   Tatiana Balbuena 48 y.o. female MRN: [id number]  Unit/Bed#:  Encounter: [id number]      IMPRESSION & RECOMMENDATIONS:   1. Latent TB. Serum quantiferon gold positive on 4/16/2020 during work up for #2. CXR on 4/23/24 negative for active disease. Pt has no current signs or symptoms of active TB. Discussed treatment options with patient who is agreeable to initiate the safest, most effective option. Unfortunately, did not tolerate Rifampin in 2020. Per prior ID recommendations, regimens to consider include INH/B6 v. Linezolid/ethambutol. She is agreeable to trial 9 month course of INH/B6. Baseline labs from last weeks admission reviewed.  -Recommend starting p.o. INH/B6 x 9 months   -Patient would like to first discuss with PCP/Rheum/Neph prior to starting   -Recommend obtaining LFTs and CBC prior to next visit  -Discussed administration instructions and side effects with patient and    -Return to clinic in 4-5 weeks to monitor medication tolerance and review labs, OK for virtual visit     2.  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 Follows Rheum Dr. Gary at [facility] with plan to start Humira.  -OK to start Humira once on treatment for at least 1 month     3. Recent GENE on CKD. Cr up to 5.01.   -Recommend repeat labs prior to next appt     4. DM2, Obesity. A1C 7.3%, BMI 39.44.     HISTORY OF PRESENT ILLNESS:  Reason for Consult: Latent TB     HPI: Tatiana Balbuena is a 48 y.o. year old female with numerous co morbidities including diabetes, hypertension, hypothyroidism, recently diagnosed large vessel vasculitis, asthma, chronic kidney disease, patient who presents to outpatient ID office for latent TB evaluation and consultation. Patient had a screening quantiferon gold test done on 4/16/2020 which was positive. She had several CXR which have been negative for active TB disease.     Patient is known to the infectious disease service from admission in April/May 2020.  She is  originally from [place] and moved here about 20 years ago.  She travels back and forth to visit family and was recently overseas this past month.  States she lived in an urban area in [facility] and following moved to the [place] she lived in [place], [place] and [place], [place].  States she has had an abnormal PPD test within the last 10 years.  He denies history of IV drug use, prior incarceration or homelessness.  She is currently  and reports 1 sexual partner that is her .  She was admitted in April 2020 and seen by infectious disease due to SIRS.  She initially presented at that time due to severe back pain and imagings showed aortitis and rheumatologic workup was initiated.  She was started on prolonged course of steroids.  She was found to have positive QuantiFERON gold on 4/16/2020 and she was started on p.o. rifampin with plan to treat for 4 months.  Unfortunately patient did not tolerate medication and was discontinued due to GI distress.  She denies cough, SOB, hemoptysis, fever, chills, night sweats, unexplained weight loss.     Patient was subsequently lost to follow-up but was seen by infectious disease again in 2021. She was off all immunosuppressive therapy after 6 months of tx and declined TB treatment. She reportedly had outpatient rheum work up Dr. Gary with [facility] in 9/28/20, that was negative.     In 2/2024, she was readmitted with abdominal pain, nausea, poor appetite and fatigue for 10 days c/w similar episodes of aortitis in the past. She was admitted to the ICU due to HTN emergency and imaging showed aortitis of thoracic aorta with new celiac inflammation.she was treated with IV steroids and rheum was consulted. Rheum E consult on 2/15/24 reviewed and she was resumed on prednisone 80mg daily with plan to f/u rheum asap in the outpatient setting. She was though to have Takayasu arteritis based on imaging findings and planned to begin Humira in the outpatient  setting however requires tx for latent TB.     Discussed the probability of latent TB is high given positive quant gold and the above risk factors. Fortunately active TB has been ruled out. We discussed that there is a 1 in 10 chance of developing active TB if we do nothing and that treatment will lower his risk of active TB by 60-90%. We reviewed treatment options in detail including need for frequent office follow up with monthly labs.  Thorough medication review performed without major drug drug interaction noted with INH/B6. She states she would like to first discuss treatment with her own doctors first, including PCP, nephr, and Rheum.    REVIEW OF SYSTEMS:  A complete review of systems are negative other than that noted in the HPI     PAST MEDICAL HISTORY:  Past Medical History:   Diagnosis Date    Asthma     Chronic anemia     Diabetes mellitus (HCC)     Hypertension     Hyperthyroidism     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HCC)     TB lung, latent      Past Surgical History:   Procedure Laterality Date     SECTION      IR BIOPSY LIVER MASS  07/15/2020    LUNG BIOPSY      THYROIDECTOMY         FAMILY HISTORY:  Non-contributory    SOCIAL HISTORY:  Social History   Social History     Substance and Sexual Activity   Alcohol Use Never    Alcohol/week: 0.0 standard drinks of alcohol     Social History     Substance and Sexual Activity   Drug Use Never     Social History     Tobacco Use   Smoking Status Never   Smokeless Tobacco Never       ALLERGIES:  Allergies   Allergen Reactions    Other Allergic Rhinitis     pollen       MEDICATIONS:  All current active medications have been reviewed.  Current Facility-Administered Medications on File Prior to Visit   Medication Dose Route Frequency Provider Last Rate Last Admin    acetaminophen (TYLENOL) tablet 650 mg  650 mg Oral Q6H PRN Yandel Powers MD        amLODIPine (NORVASC) tablet 10 mg  10 mg Oral Daily Yandel Powers MD   10 mg at 24 0954     aspirin (ECOTRIN LOW STRENGTH) EC tablet 81 mg  81 mg Oral Daily Yandel Powers MD   81 mg at 04/25/24 0935    atorvastatin (LIPITOR) tablet 40 mg  40 mg Oral Daily With Dinner Yandel Powers MD   40 mg at 04/24/24 1732    carvedilol (COREG) tablet 25 mg  25 mg Oral BID With Meals Yandel Powers MD   25 mg at 04/25/24 0748    Cholecalciferol (VITAMIN D3) tablet 1,000 Units  1,000 Units Oral Daily Yandel Powers MD   1,000 Units at 04/25/24 0936    clopidogrel (PLAVIX) tablet 75 mg  75 mg Oral Daily Yandel Powers MD   75 mg at 04/25/24 0935    cyanocobalamin (VITAMIN B-12) tablet 500 mcg  500 mcg Oral Daily Yandel Powers MD   500 mcg at 04/25/24 0936    escitalopram (LEXAPRO) tablet 10 mg  10 mg Oral Daily Yandel Powers MD   10 mg at 04/25/24 0936    folic acid (FOLVITE) tablet 1 mg  1 mg Oral Daily Yandel Powers MD   1 mg at 04/25/24 0935    insulin lispro (HumALOG/ADMELOG) 100 units/mL subcutaneous injection 1-6 Units  1-6 Units Subcutaneous TID AC Yandel Powers MD   2 Units at 04/25/24 1214    levothyroxine 100 mcg tablet **ADS Override Pull**             levothyroxine tablet 100 mcg  100 mcg Oral Early Morning Yandel Powers MD   100 mcg at 04/25/24 0504    montelukast (SINGULAIR) tablet 10 mg  10 mg Oral HS Yandel Powers MD   10 mg at 04/24/24 2158    multi-electrolyte (PLASMALYTE-A/ISOLYTE-S PH 7.4) IV solution  75 mL/hr Intravenous Continuous Williams Ulloa DO 75 mL/hr at 04/25/24 0748 75 mL/hr at 04/25/24 0748    pantoprazole (PROTONIX) EC tablet 20 mg  20 mg Oral Daily Yandel Powers MD   20 mg at 04/25/24 0935    potassium chloride (Klor-Con M20) CR tablet 20 mEq  20 mEq Oral Q3H Williams Ulloa DO   20 mEq at 04/25/24 0937    predniSONE tablet 20 mg  20 mg Oral Daily Yandel Powers MD   20 mg at 04/25/24 0936    [COMPLETED] sodium bicarbonate 8.4 % injection **ADS Override Pull**        Given at 04/25/24 0832     [DISCONTINUED] sodium bicarbonate 150 mEq in dextrose 5 % 1,000 mL infusion  125 mL/hr Intravenous Continuous Lynda Hsu MD   Stopped at 04/25/24 0742     Current Outpatient Medications on File Prior to Visit   Medication Sig Dispense Refill    acetaminophen (TYLENOL) 325 mg tablet Take 650 mg by mouth every 6 (six) hours as needed for mild pain      Advair -21 MCG/ACT inhaler       albuterol (PROVENTIL HFA,VENTOLIN HFA) 90 mcg/act inhaler Inhale 2 puffs 2 (two) times a day      amLODIPine (NORVASC) 10 mg tablet Take 1 tablet (10 mg total) by mouth daily 30 tablet 0    aspirin (ECOTRIN LOW STRENGTH) 81 mg EC tablet Take 81 mg by mouth daily      atorvastatin (LIPITOR) 40 mg tablet       carvedilol (COREG) 25 mg tablet Take 1 tablet (25 mg total) by mouth 2 (two) times a day with meals 60 tablet 0    cholecalciferol (VITAMIN D3) 1,000 units tablet Take 1 tablet (1,000 Units total) by mouth daily 90 tablet 3    clopidogrel (PLAVIX) 75 mg tablet Take 75 mg by mouth daily      diclofenac sodium (VOLTAREN) 50 mg EC tablet Take 1 tablet (50 mg total) by mouth 2 (two) times a day 30 tablet 0    escitalopram (LEXAPRO) 10 mg tablet Take 10 mg by mouth daily      folic acid (FOLVITE) 1 mg tablet       furosemide (LASIX) 40 mg tablet Take 1 tablet (40 mg total) by mouth daily 30 tablet 0    Iron-Vitamin C (Vitron-C)  MG TABS Take 1 tablet by mouth in the morning (Patient not taking: Reported on 3/7/2024) 90 tablet 3    levothyroxine 100 mcg tablet       losartan (COZAAR) 100 MG tablet Take 100 mg by mouth daily       metFORMIN (GLUCOPHAGE) 500 mg tablet Take 1,000 mg by mouth 2 (two) times a day with meals      montelukast (SINGULAIR) 10 mg tablet Take 10 mg by mouth daily at bedtime      omeprazole (PriLOSEC) 20 mg delayed release capsule Take 1 capsule (20 mg total) by mouth daily 30 capsule 0    predniSONE 10 mg tablet Take 10 mg by mouth daily      vitamin B-12 (VITAMIN B-12) 500 mcg tablet Take  "500 mcg by mouth daily          PHYSICAL EXAM:  There were no vitals filed for this visit.    General Appearance:  Appearing well, nontoxic, and in no distress   Head:  Normocephalic, without obvious abnormality, atraumatic   Eyes:  Conjunctiva pink and sclera anicteric, both eyes   Nose: Nares normal, mucosa normal, no drainage   Throat: Oropharynx moist without lesions   Neck: Supple, symmetrical, no adenopathy, no tenderness/mass/nodules   Back:   Symmetric, no curvature, ROM normal, no CVA tenderness   Lungs:   Clear to auscultation bilaterally, respirations unlabored   Chest Wall:  No tenderness or deformity   Heart:  RRR; no murmur, rub or gallop   Abdomen:   Soft, non-tender, non-distended, positive bowel sounds,    Extremities: No cyanosis, clubbing or edema   Skin: No rashes or lesions. No draining wounds noted.   Lymph nodes: Right >L supraclavicular LN noted.   Neurologic: Alert and oriented times 3, extremity strength 5/5 and symmetric     LABS, IMAGING, & OTHER STUDIES:  Lab Results:  I have personally reviewed pertinent labs.  Lab Results   Component Value Date    K 3.1 (L) 04/25/2024     04/25/2024    CO2 26 04/25/2024    BUN 88 (H) 04/25/2024    CREATININE 2.93 (H) 04/25/2024    GLUF 93 02/24/2024    CALCIUM 8.6 04/25/2024    CORRECTEDCA 8.7 02/13/2024    AST 5 (L) 04/23/2024    ALT 20 04/23/2024    ALKPHOS 88 04/23/2024    EGFR 17 04/25/2024     Lab Results   Component Value Date    WBC 7.56 04/25/2024    HGB 10.4 (L) 04/25/2024    HCT 31.3 (L) 04/25/2024    MCV 82 04/25/2024     04/25/2024   No results found for: \"SEDRATE\"      Imaging Studies:   I have personally reviewed pertinent imaging study reports and images in PACS, including recent CXR.    Other Studies:   I have personally reviewed pertinent reports.    Bean Garcia PA-C  [facility]    "

## 2024-04-25 NOTE — PLAN OF CARE
Problem: PAIN - ADULT  Goal: Verbalizes/displays adequate comfort level or baseline comfort level  Description: Interventions:  - Encourage patient to monitor pain and request assistance  - Assess pain using appropriate pain scale  - Administer analgesics based on type and severity of pain and evaluate response  - Implement non-pharmacological measures as appropriate and evaluate response  - Consider cultural and social influences on pain and pain management  - Notify physician/advanced practitioner if interventions unsuccessful or patient reports new pain  Outcome: Progressing     Problem: INFECTION - ADULT  Goal: Absence or prevention of progression during hospitalization  Description: INTERVENTIONS:  - Assess and monitor for signs and symptoms of infection  - Monitor lab/diagnostic results  - Monitor all insertion sites, i.e. indwelling lines, tubes, and drains  - Monitor endotracheal if appropriate and nasal secretions for changes in amount and color  - [place] appropriate cooling/warming therapies per order  - Administer medications as ordered  - Instruct and encourage patient and family to use good hand hygiene technique  - Identify and instruct in appropriate isolation precautions for identified infection/condition  Outcome: Progressing  Goal: Absence of fever/infection during neutropenic period  Description: INTERVENTIONS:  - Monitor WBC    Outcome: Progressing     Problem: SAFETY ADULT  Goal: Patient will remain free of falls  Description: INTERVENTIONS:  - Educate patient/family on patient safety including physical limitations  - Instruct patient to call for assistance with activity   - Consult OT/PT to assist with strengthening/mobility   - Keep Call bell within reach  - Keep bed low and locked with side rails adjusted as appropriate  - Keep care items and personal belongings within reach  - Initiate and maintain comfort rounds  - Make Fall Risk Sign visible to staff  - Offer Toileting every 2 Hours,  in advance of need  - Initiate/Maintain fall alarm  - Obtain necessary fall risk management equipment: non-skid footwear  - Apply yellow socks and bracelet for high fall risk patients  - Consider moving patient to room near nurses station  Outcome: Progressing  Goal: Maintain or return to baseline ADL function  Description: INTERVENTIONS:  -  Assess patient's ability to carry out ADLs; assess patient's baseline for ADL function and identify physical deficits which impact ability to perform ADLs (bathing, care of mouth/teeth, toileting, grooming, dressing, etc.)  - Assess/evaluate cause of self-care deficits   - Assess range of motion  - Assess patient's mobility; develop plan if impaired  - Assess patient's need for assistive devices and provide as appropriate  - Encourage maximum independence but intervene and supervise when necessary  - Involve family in performance of ADLs  - Assess for home care needs following discharge   - Consider OT consult to assist with ADL evaluation and planning for discharge  - Provide patient education as appropriate  Outcome: Progressing  Goal: Maintains/Returns to pre admission functional level  Description: INTERVENTIONS:  - Perform AM-PAC 6 Click Basic Mobility/ Daily Activity assessment daily.  - Set and communicate daily mobility goal to care team and patient/family/caregiver.   - Collaborate with rehabilitation services on mobility goals if consulted  - Perform Range of Motion 3 times a day.  - Reposition patient every 2 hours.  - Dangle patient 3 times a day  - Stand patient 3 times a day  - Ambulate patient 3 times a day  - Out of bed to chair 3 times a day   - Out of bed for meals 3 times a day  - Out of bed for toileting  - Record patient progress and toleration of activity level   Outcome: Progressing     Problem: DISCHARGE PLANNING  Goal: Discharge to home or other facility with appropriate resources  Description: INTERVENTIONS:  - Identify barriers to discharge w/patient  and caregiver  - Arrange for needed discharge resources and transportation as appropriate  - Identify discharge learning needs (meds, wound care, etc.)  - Arrange for interpretive services to assist at discharge as needed  - Refer to Case Management Department for coordinating discharge planning if the patient needs post-hospital services based on physician/advanced practitioner order or complex needs related to functional status, cognitive ability, or social support system  Outcome: Progressing     Problem: Knowledge Deficit  Goal: Patient/family/caregiver demonstrates understanding of disease process, treatment plan, medications, and discharge instructions  Description: Complete learning assessment and assess knowledge base.  Interventions:  - Provide teaching at level of understanding  - Provide teaching via preferred learning methods  Outcome: Progressing     Problem: RESPIRATORY - ADULT  Goal: Achieves optimal ventilation and oxygenation  Description: INTERVENTIONS:  - Assess for changes in respiratory status  - Assess for changes in mentation and behavior  - Position to facilitate oxygenation and minimize respiratory effort  - Oxygen administered by appropriate delivery if ordered  - Initiate smoking cessation education as indicated  - Encourage broncho-pulmonary hygiene including cough, deep breathe, Incentive Spirometry  - Assess the need for suctioning and aspirate as needed  - Assess and instruct to report SOB or any respiratory difficulty  - Respiratory Therapy support as indicated  Outcome: Progressing     Problem: GENITOURINARY - ADULT  Goal: Maintains or returns to baseline urinary function  Description: INTERVENTIONS:  - Assess urinary function  - Encourage oral fluids to ensure adequate hydration if ordered  - Administer IV fluids as ordered to ensure adequate hydration  - Administer ordered medications as needed  - Offer frequent toileting  - Follow urinary retention protocol if ordered  Outcome:  Progressing  Goal: Absence of urinary retention  Description: INTERVENTIONS:  - Assess patient’s ability to void and empty bladder  - Monitor I/O  - Bladder scan as needed  - Discuss with physician/AP medications to alleviate retention as needed  - Discuss catheterization for long term situations as appropriate  Outcome: Progressing  Goal: Urinary catheter remains patent  Description: INTERVENTIONS:  - Assess patency of urinary catheter  - If patient has a chronic singh, consider changing catheter if non-functioning  - Follow guidelines for intermittent irrigation of non-functioning urinary catheter  Outcome: Progressing     Problem: METABOLIC, FLUID AND ELECTROLYTES - ADULT  Goal: Electrolytes maintained within normal limits  Description: INTERVENTIONS:  - Monitor labs and assess patient for signs and symptoms of electrolyte imbalances  - Administer electrolyte replacement as ordered  - Monitor response to electrolyte replacements, including repeat lab results as appropriate  - Instruct patient on fluid and nutrition as appropriate  Outcome: Progressing  Goal: Fluid balance maintained  Description: INTERVENTIONS:  - Monitor labs   - Monitor I/O and WT  - Instruct patient on fluid and nutrition as appropriate  - Assess for signs & symptoms of volume excess or deficit  Outcome: Progressing  Goal: Glucose maintained within target range  Description: INTERVENTIONS:  - Monitor Blood Glucose as ordered  - Assess for signs and symptoms of hyperglycemia and hypoglycemia  - Administer ordered medications to maintain glucose within target range  - Assess nutritional intake and initiate nutrition service referral as needed  Outcome: Progressing     Problem: HEMATOLOGIC - ADULT  Goal: Maintains hematologic stability  Description: INTERVENTIONS  - Assess for signs and symptoms of bleeding or hemorrhage  - Monitor labs  - Administer supportive blood products/factors as ordered and appropriate  Outcome: Progressing     Problem:  MUSCULOSKELETAL - ADULT  Goal: Maintain or return mobility to safest level of function  Description: INTERVENTIONS:  - Assess patient's ability to carry out ADLs; assess patient's baseline for ADL function and identify physical deficits which impact ability to perform ADLs (bathing, care of mouth/teeth, toileting, grooming, dressing, etc.)  - Assess/evaluate cause of self-care deficits   - Assess range of motion  - Assess patient's mobility  - Assess patient's need for assistive devices and provide as appropriate  - Encourage maximum independence but intervene and supervise when necessary  - Involve family in performance of ADLs  - Assess for home care needs following discharge   - Consider OT consult to assist with ADL evaluation and planning for discharge  - Provide patient education as appropriate  Outcome: Progressing  Goal: Maintain proper alignment of affected body part  Description: INTERVENTIONS:  - Support, maintain and protect limb and body alignment  - Provide patient/ family with appropriate education  Outcome: Progressing

## 2024-04-25 NOTE — PROGRESS NOTES
NEPHROLOGY PROGRESS NOTE   Tatiana Balbuena 48 y.o. female MRN: [id number]  Unit/Bed#: 423-01 Encounter: [id number]      HPI/24hr EVENTS:    -48-year-old female history of type 2 diabetes, latent TB, aortitis, hypertension, CKD 3B baseline creatinine 1.5 to 1.7 mg/dL presenting with acute kidney injury.    -Patient examined laying in bed, patient has no complaints at this time    ASSESSMENT/PLAN:    GENE (POA)  -Creatinine on admission 4.8 mg/dL, most recent creatinine 2.9 mg/dL  -Etiology: Possibly prerenal due to volume depletion in the setting of diarrhea, decreased oral intake  -Renal imaging: No hydronephrosis, no nephrolithiasis.  -Kidney function has been improving with volume expansion.  Continue with Isolyte infusion.  Monitor response tomorrow  -Avoid hypotension, avoid NSAIDs. Continue to hold nephrotoxins.   -Trend BMP    CKD stage IIIb  -Baseline creatinine 1.5 to 1.7 mg/dL  -Etiology: Likely secondary to hypertension and diabetes. Patient was in the process of discussing renal biopsy with her nephrologist as an outpatient to rule out vasculitis as a cause of worsening kidney function.   -Follows with Dr Shrestha as OP    Aortitis  Currently on prednisone taper, follows with rheumatology as an outpatient    Metabolic acidosis  Resolved status post bicarb drip    Vasculitis  Patient undergoing ongoing discussions with outpatient nephrologist regarding need for renal biopsy.    Hypokalemia  In the setting of bicarbonate infusion.  Status post KCl 20 M EQ's x 3.     ROS:  Review of Systems   Constitutional:  Positive for chills. Negative for fever.   Respiratory:  Negative for cough and shortness of breath.    Cardiovascular:  Positive for leg swelling. Negative for chest pain.   Gastrointestinal:  Negative for abdominal distention and abdominal pain.   Genitourinary:  Negative for dysuria and hematuria.   Neurological:  Positive for dizziness. Negative for headaches.   All other systems reviewed and are  negative.     A complete 10 point review of systems was performed and found to be negative unless otherwise noted above or in the HPI.    OBJECTIVE:  Current Weight: Weight - Scale: 101 kg (222 lb 10.6 oz)  Vitals:    04/24/24 2247 04/25/24 0743 04/25/24 0936 04/25/24 1425   BP: 111/58  112/58 107/58   BP Location:   Right arm    Pulse: 73  71 78   Resp: 18   17   Temp: 97.8 °F (36.6 °C)   100.1 °F (37.8 °C)   SpO2: 95% 96%  96%   Weight:       Height:           Intake/Output Summary (Last 24 hours) at 4/25/2024 1628  Last data filed at 4/25/2024 0901  Gross per 24 hour   Intake 240 ml   Output 800 ml   Net -560 ml     Physical Exam  Vitals and nursing note reviewed.   Constitutional:       General: She is not in acute distress.     Appearance: She is well-developed.   HENT:      Head: Normocephalic and atraumatic.   Cardiovascular:      Rate and Rhythm: Normal rate and regular rhythm.      Heart sounds: No murmur heard.  Pulmonary:      Effort: Pulmonary effort is normal. No respiratory distress.      Breath sounds: Normal breath sounds.   Abdominal:      Palpations: Abdomen is soft.      Tenderness: There is no abdominal tenderness.   Musculoskeletal:      Right lower leg: Edema present.      Left lower leg: Edema present.      Comments: Mild edema present on lower legs   Skin:     General: Skin is warm and dry.      Capillary Refill: Capillary refill takes less than 2 seconds.   Neurological:      Mental Status: She is alert.   Psychiatric:         Mood and Affect: Mood normal.         Behavior: Behavior normal.          Medications:    Current Facility-Administered Medications:     acetaminophen (TYLENOL) tablet 650 mg, 650 mg, Oral, Q6H PRN, Yandel Powers MD    amLODIPine (NORVASC) tablet 10 mg, 10 mg, Oral, Daily, Yandel Powers MD, 10 mg at 04/25/24 0936    aspirin (ECOTRIN LOW STRENGTH) EC tablet 81 mg, 81 mg, Oral, Daily, Yandel Powers MD, 81 mg at 04/25/24 0935    atorvastatin (LIPITOR)  tablet 40 mg, 40 mg, Oral, Daily With Dinner, Yandel Powers MD, 40 mg at 04/24/24 1732    carvedilol (COREG) tablet 25 mg, 25 mg, Oral, BID With Meals, Yandel Powers MD, 25 mg at 04/25/24 0748    Cholecalciferol (VITAMIN D3) tablet 1,000 Units, 1,000 Units, Oral, Daily, Yandel Powers MD, 1,000 Units at 04/25/24 0936    clopidogrel (PLAVIX) tablet 75 mg, 75 mg, Oral, Daily, Yandel Powers MD, 75 mg at 04/25/24 0935    cyanocobalamin (VITAMIN B-12) tablet 500 mcg, 500 mcg, Oral, Daily, Yandel Powers MD, 500 mcg at 04/25/24 0936    escitalopram (LEXAPRO) tablet 10 mg, 10 mg, Oral, Daily, Yandel Powers MD, 10 mg at 04/25/24 0936    folic acid (FOLVITE) tablet 1 mg, 1 mg, Oral, Daily, Yandel Powers MD, 1 mg at 04/25/24 0935    insulin lispro (HumALOG/ADMELOG) 100 units/mL subcutaneous injection 1-6 Units, 1-6 Units, Subcutaneous, TID AC, 2 Units at 04/25/24 1214 **AND** Fingerstick Glucose (POCT), , , TID AC, Yandel Powers MD    levothyroxine tablet 100 mcg, 100 mcg, Oral, Early Morning, Yandel Powers MD, 100 mcg at 04/25/24 0504    montelukast (SINGULAIR) tablet 10 mg, 10 mg, Oral, HS, Yandel Powers MD, 10 mg at 04/24/24 2158    multi-electrolyte (PLASMALYTE-A/ISOLYTE-S PH 7.4) IV solution, 75 mL/hr, Intravenous, Continuous, Williams Ulloa DO, Last Rate: 75 mL/hr at 04/25/24 0748, 75 mL/hr at 04/25/24 0748    pantoprazole (PROTONIX) EC tablet 20 mg, 20 mg, Oral, Daily, Yandel Powers MD, 20 mg at 04/25/24 0935    predniSONE tablet 20 mg, 20 mg, Oral, Daily, Yandel Stefan Powers MD, 20 mg at 04/25/24 0936    Laboratory Results:  Results from last 7 days   Lab Units 04/25/24  0503 04/24/24  0630 04/24/24  0625 04/23/24  1812 04/23/24  0853   WBC Thousand/uL 7.56 7.46  --  9.69  --    HEMOGLOBIN g/dL 10.4* 9.5*  --  10.8*  --    HEMATOCRIT % 31.3* 29.7*  --  34.8  --    PLATELETS Thousands/uL 261 230  --  259  --    POTASSIUM mmol/L 3.1*  --   3.5 4.3 4.4   CHLORIDE mmol/L 102  --  101 105 106   CO2 mmol/L 26  --  19* 11* 13*   BUN mg/dL 88*  --  118* 126* 113*   CREATININE mg/dL 2.93*  --  4.05* 5.01* 4.8*   CALCIUM mg/dL 8.6  --  7.8* 8.8 9.4   MAGNESIUM mg/dL  --   --  1.7*  --   --    XPHOSPHORUS mg/dL  --   --   --   --  6.6*       I have personally reviewed the blood work as stated above and in my note.  I have personally reviewed CTAP imaging studies.  I have personally reviewed internal medicine note.

## 2024-04-25 NOTE — INCIDENTAL FINDINGS
The following findings require follow up:  Radiographic finding   Finding: Liver now demonstrating nodular and patchy ill-defined areas of decreased attenuation within the central aspect of the medial segment. This appearance and location is often seen in focal fatty deposition but is new compared to unenhanced imaging from October and not apparent on the postcontrast imaging in February    Follow up required: GI evaluation   Follow up should be done after discharge from hospital    Please notify the following clinician to assist with the follow up:   Dr. Darian Rollins MD

## 2024-04-25 NOTE — CASE MANAGEMENT
Case Management Progress Note    Patient name Tatiana Cohen /[id number] MRN [id number]  : 1975 Date 2024       LOS (days): 2  Geometric Mean LOS (GMLOS) (days):   Days to GMLOS:        OBJECTIVE:        Current admission status: Inpatient  Preferred Pharmacy:   [facility] [place] [place] - [address] [place], [place] [id number] [address]  Phone: [phone] Fax: [phone]    Primary Care Provider: Darian Rollins MD    Primary Insurance: TASHIA SOTELO  Secondary Insurance:     PROGRESS NOTE:      Chart review completed.    CM to follow for any discharge needs.

## 2024-04-25 NOTE — PROGRESS NOTES
[facility]  Progress Note  Name: Tatiana Balbuena I  MRN: [id number]  Unit/Bed#: 423-01 I Date of Admission: 4/23/2024   Date of Service: 4/25/2024 I Hospital Day: 2    Assessment/Plan   * Acute kidney injury superimposed on chronic kidney disease  (HCC)  Assessment & Plan  History of aortitis thyroidectomy diabetes mellitus who presents to the hospital for back pain found to have kidney injury  Holding losartan and furosemide.  Baseline creatinine 1.4-1.6  Metabolic acidosis: Resolved with bicarbonate infusion; now on isolyte per nephrology    Results from last 7 days   Lab Units 04/25/24  0503 04/24/24  0625 04/23/24  1812 04/23/24  0853   BUN mg/dL 88* 118* 126* 113*   CREATININE mg/dL 2.93* 4.05* 5.01* 4.8*   EGFR ml/min/1.73sq m 17 12 9 11*       Type 2 diabetes mellitus with obesity  (HCC)  Assessment & Plan  Lab Results   Component Value Date    HGBA1C 7.3 (H) 02/13/2024     Results from last 7 days   Lab Units 04/25/24  1124 04/25/24  0708 04/24/24  1637 04/24/24  1059 04/24/24  0729   POC GLUCOSE mg/dl 215* 160* 304* 235* 170*     Holding metformin due to kidney injury.  Continue sliding scale insulin.    Patient inquiring about injectable.  Unfortunately Trulicity is contraindicated with history of thyroid malignancy.  Anticipate discharging with SGLT2 inhibitor.    Post-surgical hypothyroidism  Assessment & Plan  History of papillary microcarcinoma status post thyroidectomy 2022.    Continue levothyroxine.    Aortitis   Assessment & Plan  History of aortitis with recent subclavian artery stenosis/stenting  Follows with rheumatology as an outpatient.  Currently on prednisone 20 mg daily.  Due to latent TB has not been able to start biologics    Essential hypertension  Assessment & Plan  Continue carvedilol and amlodipine.  Holding losartan and furosemide due to kidney injury    Mild persistent asthma without complication  Assessment & Plan  No exacerbation continue  "montelukast    Depression  Assessment & Plan  Mood stable.  Continue escitalopram    VTE Pharmacologic Prophylaxis:   No heparin products due to containing port.  Unable to use Arixtra due to renal dysfunction.    Mobility:  Basic Mobility Inpatient Raw Score: 24  JH-HLM Goal: 8: Walk 250 feet or more  JH-HLM Achieved: 7: Walk 25 feet or more  JH-HLM Goal NOT achieved. Continue with multidisciplinary rounding and encourage appropriate mobility to improve upon JH-HLM goals.    Patient Centered Rounds: I have performed bedside rounds with nursing staff today.  Discussions with Specialists or Other Care Team Provider: Case management nephrology    Education and Discussions with Family / Patient: Attempted to update  (son) via phone. Left voicemail.     Time Spent for Care:   This time was spent on one or more of the following: performing physical exam; counseling and coordination of care; obtaining or reviewing history; documenting in the medical record; reviewing/ordering tests, medications or procedures; communicating with other healthcare professionals and discussing with patient's family/caregivers.    Current Length of Stay: 2 day(s)  Current Patient Status: Inpatient   Certification Statement: The patient will continue to require additional inpatient hospital stay due to kidney injury  Discharge Plan: Anticipate discharge in 24-48 hrs to home.    Code Status: Level 1 - Full Code      Subjective:   Patient seen and examined.  No new complaints.  Continues to feel pretty good.    Objective:   Vitals: Blood pressure 107/58, pulse 78, temperature 100.1 °F (37.8 °C), resp. rate 17, height 5' 3\" (1.6 m), weight 101 kg (222 lb 10.6 oz), last menstrual period 04/04/2020, SpO2 96%.    Intake/Output Summary (Last 24 hours) at 4/25/2024 1531  Last data filed at 4/25/2024 0901  Gross per 24 hour   Intake 240 ml   Output 800 ml   Net -560 ml       Physical Exam  Vitals reviewed.   Constitutional:       General: " She is not in acute distress.     Appearance: Normal appearance. She is obese.   HENT:      Head: Atraumatic.   Cardiovascular:      Rate and Rhythm: Regular rhythm.      Heart sounds: Normal heart sounds.   Pulmonary:      Breath sounds: No wheezing.   Abdominal:      General: Bowel sounds are normal.      Palpations: Abdomen is soft.      Tenderness: There is no guarding or rebound.   Musculoskeletal:         General: No swelling or tenderness.   Skin:     General: Skin is warm.   Neurological:      Mental Status: She is alert.   Psychiatric:         Mood and Affect: Mood normal.       Additional Data:   Labs:  Results from last 7 days   Lab Units 04/25/24  0503 04/24/24  0630 04/23/24  1812   WBC Thousand/uL 7.56 7.46 9.69   HEMOGLOBIN g/dL 10.4* 9.5* 10.8*   PLATELETS Thousands/uL 261 230 259   MCV fL 82 84 86   TOTAL NEUT ABS Thousand/uL  --   --  7.75*   BANDS PCT %  --   --  2     Results from last 7 days   Lab Units 04/25/24  0503 04/24/24  0625 04/23/24  1812 04/23/24  0853   SODIUM mmol/L 139 133* 133* 142   POTASSIUM mmol/L 3.1* 3.5 4.3 4.4   CHLORIDE mmol/L 102 101 105 106   CO2 mmol/L 26 19* 11* 13*   ANION GAP mmol/L 11 13 17* 23*   BUN mg/dL 88* 118* 126* 113*   CREATININE mg/dL 2.93* 4.05* 5.01* 4.8*   CALCIUM mg/dL 8.6 7.8* 8.8 9.4   ALBUMIN g/dL  --   --   --  4.0   TOTAL BILIRUBIN mg/dL  --   --   --  0.2   ALK PHOS U/L  --   --   --  88   ALT U/L  --   --   --  20   AST U/L  --   --   --  5*   EGFR ml/min/1.73sq m 17 12 9 11*   GLUCOSE RANDOM mg/dL 159* 432* 281* 131*     Results from last 7 days   Lab Units 04/24/24  0625 04/23/24  0853   MAGNESIUM mg/dL 1.7*  --    XPHOSPHORUS mg/dL  --  6.6*                      Results from last 7 days   Lab Units 04/25/24  1124 04/25/24  0708 04/24/24  1637 04/24/24  1059 04/24/24  0729   POC GLUCOSE mg/dl 215* 160* 304* 235* 170*             * I Have Reviewed All Lab Data Listed Above.    Recent cultures:                   Lines/Drains:  Invasive Devices        Peripheral Intravenous Line  Duration             Peripheral IV 02/12/24 Dorsal (posterior);Right Hand 72 days    Peripheral IV 02/13/24 Right;Ventral (anterior) Forearm 72 days    Peripheral IV 04/23/24 Right;Ventral (anterior) Forearm 1 day                          Telemetry:      Imaging:  Imaging Reports Reviewed Today Include:   XR chest 1 view portable    Result Date: 4/24/2024  Impression: Mild prominence of the cardiomediastinal silhouette although less so than on the prior exam. Workstation performed: [id number]     CT abdomen pelvis wo contrast    Result Date: 4/23/2024  Impression: Kidney showing bilateral symmetric mild cortical irregularity suggesting some scarring. There is no hydronephrosis or calculi. No significant alteration in size or contour. Liver now demonstrating nodular and patchy ill-defined areas of decreased attenuation within the central aspect of the medial segment. This appearance and location is often seen in focal fatty deposition but is new compared to unenhanced imaging from October and not apparent on the postcontrast imaging in February. Would be uncommon for rapid change related to focal fat. Given the recent history of reported vasculitis, potentially representing sequela of perfusion abnormality. Workstation performed: [id number]       Scheduled Meds:  Current Facility-Administered Medications   Medication Dose Route Frequency Provider Last Rate    acetaminophen  650 mg Oral Q6H PRN Yandel Powers MD      amLODIPine  10 mg Oral Daily Yandel Powers MD      aspirin  81 mg Oral Daily Yandel Powers MD      atorvastatin  40 mg Oral Daily With Dinner Yandel Powers MD      carvedilol  25 mg Oral BID With Meals Yandel Powers MD      Cholecalciferol  1,000 Units Oral Daily Yandel Powers MD      clopidogrel  75 mg Oral Daily Yandel Powers MD      vitamin B-12  500 mcg Oral Daily Yandel Powers MD      escitalopram  10 mg Oral  Daily Yandel Powers MD      folic acid  1 mg Oral Daily Yandel Powers MD      insulin lispro  1-6 Units Subcutaneous TID AC Yandel Powers MD      levothyroxine  100 mcg Oral Early Morning Yandel Powers MD      montelukast  10 mg Oral HS Yandel Powers MD      multi-electrolyte  75 mL/hr Intravenous Continuous Williams Ulloa DO 75 mL/hr (04/25/24 0748)    pantoprazole  20 mg Oral Daily Yandel Powers MD      predniSONE  20 mg Oral Daily Yandel Powers MD         Today, Patient Was Seen By: Kristopher Steinberg DO    ** Please Note: Dictation voice to text software may have been used in the creation of this document. **

## 2024-04-25 NOTE — PATIENT INSTRUCTIONS
Isoniazid side effects include (but not limited to) numbness and tingling, nausea/vomiting/abdominal discomfort, rash, abnormal liver tests and blood counts.     Will do monthly labs (CBCd and CMP) to monitor liver numbers and blood counts.     Come back to office in 4-5 weeks, OK for virtual visit     Take INH (isoniazid) 300mg daily WITH vitamin B6 50mg daily for total of 9 months     Call office if you are started on any new medications     Avoid tylenol and alcohol

## 2024-04-25 NOTE — ASSESSMENT & PLAN NOTE
Lab Results   Component Value Date    HGBA1C 7.3 (H) 02/13/2024     Results from last 7 days   Lab Units 04/25/24  1124 04/25/24  0708 04/24/24  1637 04/24/24  1059 04/24/24  0729   POC GLUCOSE mg/dl 215* 160* 304* 235* 170*     Holding metformin due to kidney injury.  Continue sliding scale insulin.    Patient inquiring about injectable.  Unfortunately Trulicity is contraindicated with history of thyroid malignancy.  Anticipate discharging with SGLT2 inhibitor.

## 2024-04-26 VITALS
RESPIRATION RATE: 18 BRPM | SYSTOLIC BLOOD PRESSURE: 113 MMHG | DIASTOLIC BLOOD PRESSURE: 62 MMHG | HEART RATE: 68 BPM | TEMPERATURE: 97.7 F | BODY MASS INDEX: 39.45 KG/M2 | OXYGEN SATURATION: 98 % | HEIGHT: 63 IN | WEIGHT: 222.66 LBS

## 2024-04-26 LAB
ANION GAP SERPL CALCULATED.3IONS-SCNC: 10 MMOL/L (ref 4–13)
ATRIAL RATE: 70 BPM
BUN SERPL-MCNC: 59 MG/DL (ref 5–25)
CALCIUM SERPL-MCNC: 8.2 MG/DL (ref 8.4–10.2)
CHLORIDE SERPL-SCNC: 106 MMOL/L (ref 96–108)
CO2 SERPL-SCNC: 26 MMOL/L (ref 21–32)
CREAT SERPL-MCNC: 2.16 MG/DL (ref 0.6–1.3)
ERYTHROCYTE [DISTWIDTH] IN BLOOD BY AUTOMATED COUNT: 21.1 % (ref 11.6–15.1)
GFR SERPL CREATININE-BSD FRML MDRD: 25 ML/MIN/1.73SQ M
GLUCOSE SERPL-MCNC: 108 MG/DL (ref 65–140)
GLUCOSE SERPL-MCNC: 114 MG/DL (ref 65–140)
GLUCOSE SERPL-MCNC: 181 MG/DL (ref 65–140)
HCT VFR BLD AUTO: 28.2 % (ref 34.8–46.1)
HGB BLD-MCNC: 9 G/DL (ref 11.5–15.4)
MCH RBC QN AUTO: 26.3 PG (ref 26.8–34.3)
MCHC RBC AUTO-ENTMCNC: 31.9 G/DL (ref 31.4–37.4)
MCV RBC AUTO: 83 FL (ref 82–98)
P AXIS: 53 DEGREES
PLATELET # BLD AUTO: 228 THOUSANDS/UL (ref 149–390)
PMV BLD AUTO: 11 FL (ref 8.9–12.7)
POTASSIUM SERPL-SCNC: 4.2 MMOL/L (ref 3.5–5.3)
PR INTERVAL: 178 MS
QRS AXIS: 55 DEGREES
QRSD INTERVAL: 102 MS
QT INTERVAL: 394 MS
QTC INTERVAL: 425 MS
RBC # BLD AUTO: 3.42 MILLION/UL (ref 3.81–5.12)
SODIUM SERPL-SCNC: 142 MMOL/L (ref 135–147)
T WAVE AXIS: 59 DEGREES
VENTRICULAR RATE: 70 BPM
WBC # BLD AUTO: 8.16 THOUSAND/UL (ref 4.31–10.16)

## 2024-04-26 PROCEDURE — 99232 SBSQ HOSP IP/OBS MODERATE 35: CPT | Performed by: INTERNAL MEDICINE

## 2024-04-26 PROCEDURE — 85027 COMPLETE CBC AUTOMATED: CPT | Performed by: INTERNAL MEDICINE

## 2024-04-26 PROCEDURE — 99239 HOSP IP/OBS DSCHRG MGMT >30: CPT | Performed by: INTERNAL MEDICINE

## 2024-04-26 PROCEDURE — 82948 REAGENT STRIP/BLOOD GLUCOSE: CPT

## 2024-04-26 PROCEDURE — 93010 ELECTROCARDIOGRAM REPORT: CPT | Performed by: INTERNAL MEDICINE

## 2024-04-26 PROCEDURE — 80048 BASIC METABOLIC PNL TOTAL CA: CPT | Performed by: INTERNAL MEDICINE

## 2024-04-26 RX ADMIN — ASPIRIN 81 MG: 81 TABLET, COATED ORAL at 08:04

## 2024-04-26 RX ADMIN — PREDNISONE 20 MG: 20 TABLET ORAL at 08:04

## 2024-04-26 RX ADMIN — LEVOTHYROXINE SODIUM 100 MCG: 100 TABLET ORAL at 05:23

## 2024-04-26 RX ADMIN — CARVEDILOL 25 MG: 12.5 TABLET, FILM COATED ORAL at 08:04

## 2024-04-26 RX ADMIN — CLOPIDOGREL 75 MG: 75 TABLET ORAL at 08:04

## 2024-04-26 RX ADMIN — CYANOCOBALAMIN TAB 500 MCG 500 MCG: 500 TAB at 08:04

## 2024-04-26 RX ADMIN — Medication 1000 UNITS: at 08:04

## 2024-04-26 RX ADMIN — AMLODIPINE BESYLATE 10 MG: 10 TABLET ORAL at 08:04

## 2024-04-26 RX ADMIN — INSULIN LISPRO 1 UNITS: 100 INJECTION, SOLUTION INTRAVENOUS; SUBCUTANEOUS at 12:09

## 2024-04-26 RX ADMIN — FOLIC ACID 1 MG: 1 TABLET ORAL at 08:04

## 2024-04-26 RX ADMIN — PANTOPRAZOLE SODIUM 20 MG: 20 TABLET, DELAYED RELEASE ORAL at 08:04

## 2024-04-26 RX ADMIN — ESCITALOPRAM OXALATE 10 MG: 10 TABLET ORAL at 08:04

## 2024-04-26 NOTE — PROGRESS NOTES
Progress Note - Nephrology   Tatiana Balbuena 48 y.o. female MRN: [id number]  Unit/Bed#: 423-01 Encounter: [id number]    A/P:  1.  Acute kidney injury on top of chronic kidney disease              Creatinine continues to improve, will discontinue IV fluids at this time as the patient is eating and drinking normally.  Creatinine is now down over the last 24 hours to 2.16 mg/dL from 2.93 mg/dL.  2.  Chronic kidney disease stage III with baseline creatinine between 1.2-1.4 mg/dL  3.  Metabolic acidosis              Resolved status post improvement in kidney function and bicarbonate drip.  4.  Large vessel vasculitis              Continue prednisone, patient appears to be stable at this time.  5.  Hypertension in setting of chronic kidney disease stage III              Blood pressure is well-controlled at this time, continue to monitor for now.  6.  Diabetes mellitus type 2              Patient was on metformin the outpatient setting, consider transitioning to Tradjenta to assist with better blood sugar control management.  Metformin likely not a good choice going forward given propensity toward reduction in kidney function.    Case discussed with hospitalist.  Given the patient's history of thyroid cancer, a GLP-1 agonist may not be the best choice, although the patient would otherwise benefit from it.  Will consider starting Tradjenta, Dr. Steinberg will check to see if this is covered by her insurance and we will proceed from there.    Follow up reason for today's visit: Acute kidney injury/chronic kidney disease/acidosis    Acute kidney injury superimposed on chronic kidney disease  (HCC)    Patient Active Problem List   Diagnosis    Intramural aortic hematoma (HCC)    Retrosternal chest pain    Left flank pain    Thrombocytosis     Multiple thyroid nodules    Type 2 diabetes mellitus with obesity  (HCC)    Iron deficiency anemia    Pulmonary hypertension (HCC)    Depression    Dyslipidemia, goal LDL below 100    Mild  "persistent asthma without complication    Essential hypertension    Aortitis     Acute on chronic right-sided low back pain without sciatica    Latent tuberculosis    Abnormal thyroid function test    SIRS (systemic inflammatory response syndrome)    Hyperphosphatemia    Fatigue    Abnormal LFTs    Low back pain    Hypercalcemia    Hyperthyroidism    Low iron stores    Pericardial effusion    Subclavian artery stenosis, left (HCC)    CKD (chronic kidney disease) stage 3, GFR 30-59 ml/min ([facility])    Post-surgical hypothyroidism    Acute kidney injury superimposed on chronic kidney disease  ([facility])    Anxiety state    Vitamin D deficiency    Gastroesophageal reflux disease without esophagitis    History of thyroid cancer    LVH (left ventricular hypertrophy)    Recurrent major depressive disorder ([facility])    S/P total thyroidectomy    Aneurysm of ascending aorta without rupture ([facility])         Subjective:   No acute complaints, patient is eating and drinking better with no nausea or vomiting.    Objective:     Vitals: Blood pressure 113/62, pulse 68, temperature 97.7 °F (36.5 °C), resp. rate 18, height 5' 3\" (1.6 m), weight 101 kg (222 lb 10.6 oz), last menstrual period 04/04/2020, SpO2 98%.,Body mass index is 39.44 kg/m².    Weight (last 2 days)       Date/Time Weight    04/24/24 1330 101 (222.66)              Intake/Output Summary (Last 24 hours) at 4/26/2024 1006  Last data filed at 4/26/2024 0900  Gross per 24 hour   Intake 360 ml   Output --   Net 360 ml     I/O last 3 completed shifts:  In: 480 [P.O.:480]  Out: 800 [Urine:800]         Physical Exam: /62   Pulse 68   Temp 97.7 °F (36.5 °C)   Resp 18   Ht 5' 3\" (1.6 m)   Wt 101 kg (222 lb 10.6 oz)   LMP 04/04/2020 (Exact Date)   SpO2 98%   BMI 39.44 kg/m²     General Appearance:    Alert, cooperative, no distress, appears stated age   Head:    Normocephalic, without obvious abnormality, atraumatic   Eyes:    Conjunctiva/corneas clear   Ears:    Normal " "external ears   Nose:   Nares normal, septum midline, mucosa normal, no drainage    or sinus tenderness   Throat:   Lips, mucosa, and tongue normal; teeth and gums normal   Neck:   Supple   Back:     Symmetric, no curvature, ROM normal, no CVA tenderness   Lungs:     Clear to auscultation bilaterally, respirations unlabored   Chest wall:    No tenderness or deformity   Heart:    Regular rate and rhythm, S1 and S2 normal, no murmur, rub   or gallop   Abdomen:     Soft, non-tender, bowel sounds active   Extremities:   Extremities normal, atraumatic, no cyanosis or edema   Skin:   Skin color, texture, turgor normal, no rashes or lesions   Lymph nodes:   Cervical normal   Neurologic:   CNII-XII intact            Lab, Imaging and other studies: I have personally reviewed pertinent labs.  CBC:   Lab Results   Component Value Date    WBC 8.16 04/26/2024    HGB 9.0 (L) 04/26/2024    HCT 28.2 (L) 04/26/2024    MCV 83 04/26/2024     04/26/2024    RBC 3.42 (L) 04/26/2024    MCH 26.3 (L) 04/26/2024    MCHC 31.9 04/26/2024    RDW 21.1 (H) 04/26/2024    MPV 11.0 04/26/2024     CMP:   Lab Results   Component Value Date    K 4.2 04/26/2024     04/26/2024    CO2 26 04/26/2024    BUN 59 (H) 04/26/2024    CREATININE 2.16 (H) 04/26/2024    CALCIUM 8.2 (L) 04/26/2024    EGFR 25 04/26/2024       .  Results from last 7 days   Lab Units 04/26/24  0522 04/25/24  0503 04/24/24  0625 04/23/24  1812 04/23/24  0853   POTASSIUM mmol/L 4.2 3.1* 3.5   < > 4.4   CHLORIDE mmol/L 106 102 101   < > 106   CO2 mmol/L 26 26 19*   < > 13*   BUN mg/dL 59* 88* 118*   < > 113*   CREATININE mg/dL 2.16* 2.93* 4.05*   < > 4.8*   CALCIUM mg/dL 8.2* 8.6 7.8*   < > 9.4   ALK PHOS U/L  --   --   --   --  88   ALT U/L  --   --   --   --  20   AST U/L  --   --   --   --  5*    < > = values in this interval not displayed.         Phosphorus: No results found for: \"PHOS\"  Magnesium: No results found for: \"MG\"  Urinalysis: No results found for: \"COLORU\", " "\"CLARITYU\", \"SPECGRAV\", \"PHUR\", \"LEUKOCYTESUR\", \"NITRITE\", \"PROTEINUA\", \"GLUCOSEU\", \"KETONESU\", \"BILIRUBINUR\", \"BLOODU\"  Ionized Calcium: No results found for: \"CAION\"  Coagulation: No results found for: \"PT\", \"INR\", \"APTT\"  Troponin: No results found for: \"TROPONINI\"  ABG: No results found for: \"PHART\", \"TMN1TFE\", \"PO2ART\", \"QFM2UXT\", \"G4EGHIGF\", \"BEART\", \"SOURCE\"  Radiology review:     IMAGING  Procedure: XR chest 1 view portable    Result Date: 4/24/2024  Narrative: XR CHEST PORTABLE INDICATION: guy. COMPARISON: 2/12/2024 FINDINGS: Clear lungs. No pneumothorax or pleural effusion. Prominence of the cardiomediastinal silhouette again noted. Perhaps slightly less globular appearing. Previously, CT scan had shown a pericardial effusion. That may have resolved. Bones are unremarkable for age. Normal upper abdomen.     Impression: Mild prominence of the cardiomediastinal silhouette although less so than on the prior exam. Workstation performed: [id number]     Procedure: CT abdomen pelvis wo contrast    Result Date: 4/23/2024  Narrative: CT ABDOMEN AND PELVIS WITHOUT IV CONTRAST INDICATION: acute kidney injury. Low back pain. Burning on urination. COMPARISON: February 12, 2024 TECHNIQUE: CT examination of the abdomen and pelvis was performed without intravenous contrast. Multiplanar 2D reformatted images were created from the source data. This examination, like all CT scans performed in the [facility], was performed utilizing techniques to minimize radiation dose exposure, including the use of iterative reconstruction and automated exposure control. Radiation dose length product (DLP) for this visit: 1257.41 mGy-cm Enteric Contrast: Not administered. FINDINGS: ABDOMEN LOWER CHEST: No clinically significant abnormality in the visualized lower chest. LIVER/BILIARY TREE: Stable small lipoma. Newly demonstrated within the central aspect of the medial segment are ill-defined areas of hypoattenuation which " are new compared to October and February exams. Although the appearance suggests focal fatty deposition, this seems less likely given the short interval time. Given recent history of findings suggesting vasculitis, potentially representing sequela of avascular etiology such as localized infarct. Cannot better assessed on this unenhanced exam. GALLBLADDER: No calcified gallstones. No pericholecystic inflammatory change. SPLEEN: Unremarkable. PANCREAS: Unremarkable. ADRENAL GLANDS: Unremarkable. KIDNEYS/URETERS: No hydronephrosis. No nephrolithiasis. Renal size and contour appearing stable. Lobulated renal surface contour suggesting underlying scarring. No perinephric collections. Single subcentimeter hyperdense focus in the left mid upper cortex most commonly associated with hemorrhagic cyst. STOMACH AND BOWEL: Unremarkable. APPENDIX: No findings to suggest appendicitis. ABDOMINOPELVIC CAVITY: No ascites. No pneumoperitoneum. No lymphadenopathy. VESSELS: Unremarkable for patient's age. PELVIS REPRODUCTIVE ORGANS: Lobulated uterus, stable appearance, presumably underlying myoma. No adnexal mass. URINARY BLADDER: Unremarkable. ABDOMINAL WALL/INGUINAL REGIONS: Unremarkable. BONES: No acute fracture or suspicious osseous lesion.     Impression: Kidney showing bilateral symmetric mild cortical irregularity suggesting some scarring. There is no hydronephrosis or calculi. No significant alteration in size or contour. Liver now demonstrating nodular and patchy ill-defined areas of decreased attenuation within the central aspect of the medial segment. This appearance and location is often seen in focal fatty deposition but is new compared to unenhanced imaging from October and not apparent on the postcontrast imaging in February. Would be uncommon for rapid change related to focal fat. Given the recent history of reported vasculitis, potentially representing sequela of perfusion abnormality. Workstation performed: [id number]        Current Facility-Administered Medications   Medication Dose Route Frequency    acetaminophen (TYLENOL) tablet 650 mg  650 mg Oral Q6H PRN    amLODIPine (NORVASC) tablet 10 mg  10 mg Oral Daily    aspirin (ECOTRIN LOW STRENGTH) EC tablet 81 mg  81 mg Oral Daily    atorvastatin (LIPITOR) tablet 40 mg  40 mg Oral Daily With Dinner    carvedilol (COREG) tablet 25 mg  25 mg Oral BID With Meals    Cholecalciferol (VITAMIN D3) tablet 1,000 Units  1,000 Units Oral Daily    clopidogrel (PLAVIX) tablet 75 mg  75 mg Oral Daily    cyanocobalamin (VITAMIN B-12) tablet 500 mcg  500 mcg Oral Daily    escitalopram (LEXAPRO) tablet 10 mg  10 mg Oral Daily    folic acid (FOLVITE) tablet 1 mg  1 mg Oral Daily    insulin lispro (HumALOG/ADMELOG) 100 units/mL subcutaneous injection 1-6 Units  1-6 Units Subcutaneous TID AC    levothyroxine tablet 100 mcg  100 mcg Oral Early Morning    montelukast (SINGULAIR) tablet 10 mg  10 mg Oral HS    multi-electrolyte (PLASMALYTE-A/ISOLYTE-S PH 7.4) IV solution  75 mL/hr Intravenous Continuous    pantoprazole (PROTONIX) EC tablet 20 mg  20 mg Oral Daily    predniSONE tablet 20 mg  20 mg Oral Daily     Medications Discontinued During This Encounter   Medication Reason    Iron-Vitamin C  MG TABS 1 tablet     heparin (porcine) subcutaneous injection 5,000 Units     sodium bicarbonate 150 mEq in dextrose 5 % 1,000 mL infusion        Williams Ulloa, DO      This progress note was produced in part using a dictation device which may document imprecise wording from author's original intent.

## 2024-04-26 NOTE — ASSESSMENT & PLAN NOTE
History of aortitis thyroidectomy diabetes mellitus who presents to the hospital for back pain found to have kidney injury  Holding losartan and furosemide.  Possible cause appears prerenal/dehydration.  Baseline creatinine 1.4-1.6  Metabolic acidosis: Resolved with bicarbonate infusion; now on isolyte per nephrology  Disposition: Discussed with nephrology.  Dissipate and restarting losartan and furosemide in 48 hours on Sunday    Results from last 7 days   Lab Units 04/26/24  0522 04/25/24  0503 04/24/24  0625 04/23/24  1812 04/23/24  0853   BUN mg/dL 59* 88* 118* 126* 113*   CREATININE mg/dL 2.16* 2.93* 4.05* 5.01* 4.8*   EGFR ml/min/1.73sq m 25 17 12 9 11*

## 2024-04-26 NOTE — PLAN OF CARE
Problem: PAIN - ADULT  Goal: Verbalizes/displays adequate comfort level or baseline comfort level  Description: Interventions:  - Encourage patient to monitor pain and request assistance  - Assess pain using appropriate pain scale  - Administer analgesics based on type and severity of pain and evaluate response  - Implement non-pharmacological measures as appropriate and evaluate response  - Consider cultural and social influences on pain and pain management  - Notify physician/advanced practitioner if interventions unsuccessful or patient reports new pain  Outcome: Progressing     Problem: INFECTION - ADULT  Goal: Absence or prevention of progression during hospitalization  Description: INTERVENTIONS:  - Assess and monitor for signs and symptoms of infection  - Monitor lab/diagnostic results  - Monitor all insertion sites, i.e. indwelling lines, tubes, and drains  - Monitor endotracheal if appropriate and nasal secretions for changes in amount and color  - [place] appropriate cooling/warming therapies per order  - Administer medications as ordered  - Instruct and encourage patient and family to use good hand hygiene technique  - Identify and instruct in appropriate isolation precautions for identified infection/condition  Outcome: Progressing  Goal: Absence of fever/infection during neutropenic period  Description: INTERVENTIONS:  - Monitor WBC    Outcome: Progressing     Problem: SAFETY ADULT  Goal: Patient will remain free of falls  Description: INTERVENTIONS:  - Educate patient/family on patient safety including physical limitations  - Instruct patient to call for assistance with activity   - Consult OT/PT to assist with strengthening/mobility   - Keep Call bell within reach  - Keep bed low and locked with side rails adjusted as appropriate  - Keep care items and personal belongings within reach  - Initiate and maintain comfort rounds  - Make Fall Risk Sign visible to staff  - Offer Toileting every 2 Hours,  in advance of need  - Initiate/Maintain fall alarm  - Obtain necessary fall risk management equipment: non-skid footwear  - Apply yellow socks and bracelet for high fall risk patients  - Consider moving patient to room near nurses station  Outcome: Progressing  Goal: Maintain or return to baseline ADL function  Description: INTERVENTIONS:  -  Assess patient's ability to carry out ADLs; assess patient's baseline for ADL function and identify physical deficits which impact ability to perform ADLs (bathing, care of mouth/teeth, toileting, grooming, dressing, etc.)  - Assess/evaluate cause of self-care deficits   - Assess range of motion  - Assess patient's mobility; develop plan if impaired  - Assess patient's need for assistive devices and provide as appropriate  - Encourage maximum independence but intervene and supervise when necessary  - Involve family in performance of ADLs  - Assess for home care needs following discharge   - Consider OT consult to assist with ADL evaluation and planning for discharge  - Provide patient education as appropriate  Outcome: Progressing  Goal: Maintains/Returns to pre admission functional level  Description: INTERVENTIONS:  - Perform AM-PAC 6 Click Basic Mobility/ Daily Activity assessment daily.  - Set and communicate daily mobility goal to care team and patient/family/caregiver.   - Collaborate with rehabilitation services on mobility goals if consulted  - Perform Range of Motion 3 times a day.  - Reposition patient every 2 hours.  - Dangle patient 3 times a day  - Stand patient 3 times a day  - Ambulate patient 3 times a day  - Out of bed to chair 3 times a day   - Out of bed for meals 3 times a day  - Out of bed for toileting  - Record patient progress and toleration of activity level   Outcome: Progressing     Problem: DISCHARGE PLANNING  Goal: Discharge to home or other facility with appropriate resources  Description: INTERVENTIONS:  - Identify barriers to discharge w/patient  and caregiver  - Arrange for needed discharge resources and transportation as appropriate  - Identify discharge learning needs (meds, wound care, etc.)  - Arrange for interpretive services to assist at discharge as needed  - Refer to Case Management Department for coordinating discharge planning if the patient needs post-hospital services based on physician/advanced practitioner order or complex needs related to functional status, cognitive ability, or social support system  Outcome: Progressing     Problem: Knowledge Deficit  Goal: Patient/family/caregiver demonstrates understanding of disease process, treatment plan, medications, and discharge instructions  Description: Complete learning assessment and assess knowledge base.  Interventions:  - Provide teaching at level of understanding  - Provide teaching via preferred learning methods  Outcome: Progressing     Problem: RESPIRATORY - ADULT  Goal: Achieves optimal ventilation and oxygenation  Description: INTERVENTIONS:  - Assess for changes in respiratory status  - Assess for changes in mentation and behavior  - Position to facilitate oxygenation and minimize respiratory effort  - Oxygen administered by appropriate delivery if ordered  - Initiate smoking cessation education as indicated  - Encourage broncho-pulmonary hygiene including cough, deep breathe, Incentive Spirometry  - Assess the need for suctioning and aspirate as needed  - Assess and instruct to report SOB or any respiratory difficulty  - Respiratory Therapy support as indicated  Outcome: Progressing     Problem: GENITOURINARY - ADULT  Goal: Maintains or returns to baseline urinary function  Description: INTERVENTIONS:  - Assess urinary function  - Encourage oral fluids to ensure adequate hydration if ordered  - Administer IV fluids as ordered to ensure adequate hydration  - Administer ordered medications as needed  - Offer frequent toileting  - Follow urinary retention protocol if ordered  Outcome:  Progressing  Goal: Absence of urinary retention  Description: INTERVENTIONS:  - Assess patient’s ability to void and empty bladder  - Monitor I/O  - Bladder scan as needed  - Discuss with physician/AP medications to alleviate retention as needed  - Discuss catheterization for long term situations as appropriate  Outcome: Progressing  Goal: Urinary catheter remains patent  Description: INTERVENTIONS:  - Assess patency of urinary catheter  - If patient has a chronic singh, consider changing catheter if non-functioning  - Follow guidelines for intermittent irrigation of non-functioning urinary catheter  Outcome: Progressing     Problem: METABOLIC, FLUID AND ELECTROLYTES - ADULT  Goal: Electrolytes maintained within normal limits  Description: INTERVENTIONS:  - Monitor labs and assess patient for signs and symptoms of electrolyte imbalances  - Administer electrolyte replacement as ordered  - Monitor response to electrolyte replacements, including repeat lab results as appropriate  - Instruct patient on fluid and nutrition as appropriate  Outcome: Progressing  Goal: Fluid balance maintained  Description: INTERVENTIONS:  - Monitor labs   - Monitor I/O and WT  - Instruct patient on fluid and nutrition as appropriate  - Assess for signs & symptoms of volume excess or deficit  Outcome: Progressing  Goal: Glucose maintained within target range  Description: INTERVENTIONS:  - Monitor Blood Glucose as ordered  - Assess for signs and symptoms of hyperglycemia and hypoglycemia  - Administer ordered medications to maintain glucose within target range  - Assess nutritional intake and initiate nutrition service referral as needed  Outcome: Progressing     Problem: HEMATOLOGIC - ADULT  Goal: Maintains hematologic stability  Description: INTERVENTIONS  - Assess for signs and symptoms of bleeding or hemorrhage  - Monitor labs  - Administer supportive blood products/factors as ordered and appropriate  Outcome: Progressing     Problem:  MUSCULOSKELETAL - ADULT  Goal: Maintain or return mobility to safest level of function  Description: INTERVENTIONS:  - Assess patient's ability to carry out ADLs; assess patient's baseline for ADL function and identify physical deficits which impact ability to perform ADLs (bathing, care of mouth/teeth, toileting, grooming, dressing, etc.)  - Assess/evaluate cause of self-care deficits   - Assess range of motion  - Assess patient's mobility  - Assess patient's need for assistive devices and provide as appropriate  - Encourage maximum independence but intervene and supervise when necessary  - Involve family in performance of ADLs  - Assess for home care needs following discharge   - Consider OT consult to assist with ADL evaluation and planning for discharge  - Provide patient education as appropriate  Outcome: Progressing  Goal: Maintain proper alignment of affected body part  Description: INTERVENTIONS:  - Support, maintain and protect limb and body alignment  - Provide patient/ family with appropriate education  Outcome: Progressing

## 2024-04-26 NOTE — NURSING NOTE
Patient was discharged in walking stable condition. AVS was reviewed by the virtual nurse, with a verbal understanding given. Patient was transferred home by her family.

## 2024-04-26 NOTE — ASSESSMENT & PLAN NOTE
Lab Results   Component Value Date    HGBA1C 7.3 (H) 02/13/2024     Results from last 7 days   Lab Units 04/26/24  1121 04/26/24  0715 04/25/24  1549 04/25/24  1124 04/25/24  0708   POC GLUCOSE mg/dl 181* 108 308* 215* 160*     Holding metformin due to kidney injury.  Was placed on sliding scale insulin.    Patient inquiring about injectable.  Unfortunately Trulicity is contraindicated with history of thyroid malignancy.  Discussed with nephrology.  Recommendation to start Tradjenta.  Confirmed co-pay of $0 with patient outpatient pharmacist

## 2024-04-26 NOTE — DISCHARGE INSTR - AVS FIRST PAGE
Acute kidney injury.  Discontinue metformin.  Restart losartan and furosemide on Sunday.  Please follow-up with your nephrologist Dr. Shrestha  Diabetes mellitus.  Stop metformin.  Start Tradjenta.

## 2024-04-26 NOTE — CASE MANAGEMENT
Case Management Discharge Planning Note    Patient name Tatiana Cohen /[id number] MRN [id number]  : 1975 Date 2024       Current Admission Date: 2024  Current Admission Diagnosis:Acute kidney injury superimposed on chronic kidney disease  (HCC)   Patient Active Problem List    Diagnosis Date Noted    Acute kidney injury superimposed on chronic kidney disease  (HCC) 2024    Aneurysm of ascending aorta without rupture (HCC) 2024    Pericardial effusion 2024    Subclavian artery stenosis, left (HCC) 2024    CKD (chronic kidney disease) stage 3, GFR 30-59 ml/min (HCC) 2024    Post-surgical hypothyroidism 2024    History of thyroid cancer 2022    S/P total thyroidectomy 2022    LVH (left ventricular hypertrophy) 2022    Low iron stores 2021    Anxiety state 2021    Gastroesophageal reflux disease without esophagitis 2020    Hypercalcemia 05/15/2020    Hyperthyroidism 05/15/2020    Abnormal LFTs 2020    Low back pain 2020    Fatigue 2020    Recurrent major depressive disorder (HCC) 2020    Hyperphosphatemia 2020    Latent tuberculosis 2020    Abnormal thyroid function test 2020    SIRS (systemic inflammatory response syndrome) 2020    Acute on chronic right-sided low back pain without sciatica 2020    Aortitis  04/15/2020    Pulmonary hypertension (HCC) 2020    Intramural aortic hematoma (HCC) 2020    Retrosternal chest pain 2020    Left flank pain 2020    Type 2 diabetes mellitus with obesity  (HCC) 2020    Iron deficiency anemia 2020    Thrombocytosis  2020    Multiple thyroid nodules 2019    Depression 2018    Dyslipidemia, goal LDL below 100 2018    Mild persistent asthma without complication 2018    Essential hypertension 2018    Vitamin D deficiency 2018      LOS (days): 3  Geometric  Mean LOS (GMLOS) (days): 3.1  Days to GMLOS:0.5     OBJECTIVE:  Risk of Unplanned Readmission Score: 18.09         Current admission status: Inpatient   Preferred Pharmacy:   [facility] - [address] [place] [address]  [place] [id number]  Phone: [phone] Fax: [phone]    Primary Care Provider: Darian Rollins MD    Primary Insurance: TASHIA SOTELO  Secondary Insurance:     DISCHARGE DETAILS:  Pt is being dc'd home on this date. Going home on Trajenta. Provider checked and 100% covered.

## 2024-04-26 NOTE — ASSESSMENT & PLAN NOTE
Continue carvedilol and amlodipine.  During hospitalization losartan and furosemide were held due to kidney injury  Discussed with nephrology.  Anticipate restarting on Sunday

## 2024-04-26 NOTE — DISCHARGE SUMMARY
[facility]  Discharge- Tatiana Balbuena 1975, 48 y.o. female MRN: [id number]  Unit/Bed#: 423-01 Encounter: [id number]  Primary Care Provider: Darian Rollins MD   Date and time admitted to hospital: 4/23/2024  5:11 PM    Discharge Diagnoses:  * Acute kidney injury superimposed on chronic kidney disease  (HCC)  Assessment & Plan  History of aortitis thyroidectomy diabetes mellitus who presents to the hospital for back pain found to have kidney injury  Holding losartan and furosemide.  Possible cause appears prerenal/dehydration.  Baseline creatinine 1.4-1.6  Metabolic acidosis: Resolved with bicarbonate infusion; now on isolyte per nephrology  Disposition: Discussed with nephrology.  Dissipate and restarting losartan and furosemide in 48 hours on Sunday    Results from last 7 days   Lab Units 04/26/24  0522 04/25/24  0503 04/24/24  0625 04/23/24  1812 04/23/24  0853   BUN mg/dL 59* 88* 118* 126* 113*   CREATININE mg/dL 2.16* 2.93* 4.05* 5.01* 4.8*   EGFR ml/min/1.73sq m 25 17 12 9 11*       Type 2 diabetes mellitus with obesity  (HCC)  Assessment & Plan  Lab Results   Component Value Date    HGBA1C 7.3 (H) 02/13/2024     Results from last 7 days   Lab Units 04/26/24  1121 04/26/24  0715 04/25/24  1549 04/25/24  1124 04/25/24  0708   POC GLUCOSE mg/dl 181* 108 308* 215* 160*     Holding metformin due to kidney injury.  Was placed on sliding scale insulin.    Patient inquiring about injectable.  Unfortunately Trulicity is contraindicated with history of thyroid malignancy.  Discussed with nephrology.  Recommendation to start Tradjenta.  Confirmed co-pay of $0 with patient outpatient pharmacist    Post-surgical hypothyroidism  Assessment & Plan  History of papillary microcarcinoma status post thyroidectomy 2022.    Continue levothyroxine.    Aortitis   Assessment & Plan  History of aortitis with recent subclavian artery stenosis/stenting  Follows with rheumatology as an outpatient.  Currently on  prednisone 20 mg daily.  Due to latent TB has not been able to start biologics    Essential hypertension  Assessment & Plan  Continue carvedilol and amlodipine.  During hospitalization losartan and furosemide were held due to kidney injury  Discussed with nephrology.  Anticipate restarting on Sunday    Mild persistent asthma without complication  Assessment & Plan  No exacerbation continue montelukast    Depression  Assessment & Plan  Mood stable.  Continue escitalopram      Medical Problems       Resolved Problems  Date Reviewed: 4/26/2024   None        Discharging Physician / Practitioner: Kristopher Steinberg DO  PCP: Darian Rollins MD  Admission Date:   Admission Orders (From admission, onward)       Ordered        04/23/24 2127  INPATIENT ADMISSION  Once                        Discharge Date: 04/26/24    Consultations During Hospital Stay:  IP CONSULT TO NEPHROLOGY     Procedures Performed:   * No surgery found *     Images:   XR chest 1 view portable    Result Date: 4/24/2024  Impression: Mild prominence of the cardiomediastinal silhouette although less so than on the prior exam. Workstation performed: [id number]     CT abdomen pelvis wo contrast    Result Date: 4/23/2024  Impression: Kidney showing bilateral symmetric mild cortical irregularity suggesting some scarring. There is no hydronephrosis or calculi. No significant alteration in size or contour. Liver now demonstrating nodular and patchy ill-defined areas of decreased attenuation within the central aspect of the medial segment. This appearance and location is often seen in focal fatty deposition but is new compared to unenhanced imaging from October and not apparent on the postcontrast imaging in February. Would be uncommon for rapid change related to focal fat. Given the recent history of reported vasculitis, potentially representing sequela of perfusion abnormality. Workstation performed: [id number]       Lab Results:   Results from last 7 days   Lab  Units 04/26/24  0522 04/25/24  0503 04/24/24  0630 04/23/24  1812   WBC Thousand/uL 8.16 7.56 7.46 9.69   HEMOGLOBIN g/dL 9.0* 10.4* 9.5* 10.8*   HEMATOCRIT % 28.2* 31.3* 29.7* 34.8   MCV fL 83 82 84 86   TOTAL NEUT ABS Thousand/uL  --   --   --  7.75*   BANDS PCT %  --   --   --  2   PLATELETS Thousands/uL 228 261 230 259     Results from last 7 days   Lab Units 04/26/24  0522 04/25/24  0503 04/24/24  0625 04/23/24  1812 04/23/24  0853   SODIUM mmol/L 142 139 133* 133* 142   POTASSIUM mmol/L 4.2 3.1* 3.5 4.3 4.4   CHLORIDE mmol/L 106 102 101 105 106   CO2 mmol/L 26 26 19* 11* 13*   BUN mg/dL 59* 88* 118* 126* 113*   CREATININE mg/dL 2.16* 2.93* 4.05* 5.01* 4.8*   CALCIUM mg/dL 8.2* 8.6 7.8* 8.8 9.4   ALBUMIN g/dL  --   --   --   --  4.0   TOTAL BILIRUBIN mg/dL  --   --   --   --  0.2   ALK PHOS U/L  --   --   --   --  88   ALT U/L  --   --   --   --  20   AST U/L  --   --   --   --  5*   EGFR ml/min/1.73sq m 25 17 12 9 11*   GLUCOSE RANDOM mg/dL 114 159* 432* 281* 131*                      Results from last 7 days   Lab Units 04/26/24  1121 04/26/24  0715 04/25/24  1549 04/25/24  1124 04/25/24  0708 04/24/24  1637 04/24/24  1059 04/24/24  0729   POC GLUCOSE mg/dl 181* 108 308* 215* 160* 304* 235* 170*         Incidental Findings:   Liver now demonstrating nodular and patchy ill-defined areas of decreased attenuation within the central aspect of the medial segment. This appearance and location is often seen in focal fatty deposition but is new compared to unenhanced imaging from October and not apparent on the postcontrast imaging in February. Would be uncommon for rapid change related to focal fat.  Follow-up with PCP    Test Results Pending at Discharge (will require follow up):      Reason for Admission:   Back Pain (Lower back pain and burning with urination for the past 2 weeks. Denies any history of kidney stones )    Hospital Course:   Tatiana Balbuena is a 48 y.o. female patient who originally presented to the  "[facility] on 4/23/2024 due to abnormal labs and back pain.  The patient follows with [facility] nephrology and got a call saying she has had kidney dysfunction.  She was found to have a creatinine of 5.09 on admission.  She was started on IV fluids.  Her renal function continued to rapidly improve.  She is close to baseline.  Nephrology recommends holding ARB and furosemide for another 48 hours.  Due to renal dysfunction metformin has been discontinued indefinitely and she is being started on Tradjenta.    Please see above list of diagnoses and related plan for additional information.     Condition at Discharge: stable     Discharge Day Visit / Exam:   Subjective: Patient seen and examined.  Feeling very good.  No complaints.    Vitals: Blood Pressure: 113/62 (04/26/24 0712)  Pulse: 68 (04/26/24 0712)  Temperature: 97.7 °F (36.5 °C) (04/25/24 2137)  Respirations: 18 (04/26/24 0712)  Height: 5' 3\" (160 cm) (04/24/24 1330)  Weight - Scale: 101 kg (222 lb 10.6 oz) (04/24/24 1330)  SpO2: 98 % (04/26/24 0712)    Exam:   Physical Exam  Vitals reviewed.   Constitutional:       General: She is not in acute distress.     Appearance: Normal appearance. She is obese.   HENT:      Head: Atraumatic.   Eyes:      Extraocular Movements: Extraocular movements intact.   Cardiovascular:      Rate and Rhythm: Regular rhythm.   Pulmonary:      Breath sounds: No wheezing.   Abdominal:      General: Bowel sounds are normal.      Palpations: Abdomen is soft.      Tenderness: There is no guarding or rebound.   Musculoskeletal:         General: No swelling.   Skin:     General: Skin is warm.   Neurological:      General: No focal deficit present.      Mental Status: She is alert.      Motor: No weakness.   Psychiatric:         Mood and Affect: Mood normal.     Discharge instructions/Information to patient and family:   See after visit summary for information provided to patient and family.      Provisions for Follow-Up Care:  See after visit " summary for information related to follow-up care and any pertinent home health orders.      Mobility at time of Discharge:  Basic Mobility Inpatient Raw Score: 24  JH-HLM Goal: 8: Walk 250 feet or more  JH-HLM Achieved: 7: Walk 25 feet or more  JH-HLM Goal NOT achieved. Continue with multidisciplinary rounding and encourage appropriate mobility to improve upon JH-HLM goals.    Disposition:   Home    Planned Readmission: No     Discharge Statement:  I spent 35 minutes discharging the patient. This time was spent on the day of discharge. I had direct contact with the patient on the day of discharge. Greater than 50% of the total time was spent examining patient, answering all patient questions, arranging and discussing plan of care with patient as well as directly providing post-discharge instructions.  Additional time then spent on discharge activities.    Discharge Medications:  See after visit summary for reconciled discharge medications provided to patient and family.      ** Please Note: This note has been constructed using a voice recognition system **

## 2024-04-29 ENCOUNTER — TELEPHONE (OUTPATIENT)
Dept: OTHER | Facility: OTHER | Age: 49
End: 2024-04-29

## 2024-04-29 ENCOUNTER — CONSULT (OUTPATIENT)
Age: 49
End: 2024-04-29
Payer: COMMERCIAL

## 2024-04-29 VITALS
DIASTOLIC BLOOD PRESSURE: 62 MMHG | SYSTOLIC BLOOD PRESSURE: 122 MMHG | WEIGHT: 229.2 LBS | HEART RATE: 73 BPM | OXYGEN SATURATION: 98 % | HEIGHT: 63 IN | BODY MASS INDEX: 40.61 KG/M2 | TEMPERATURE: 99.1 F

## 2024-04-29 DIAGNOSIS — I77.6 AORTITIS (HCC): ICD-10-CM

## 2024-04-29 DIAGNOSIS — R76.12 POSITIVE QUANTIFERON-TB GOLD TEST: Primary | ICD-10-CM

## 2024-04-29 PROCEDURE — 99204 OFFICE O/P NEW MOD 45 MIN: CPT | Performed by: PHYSICIAN ASSISTANT

## 2024-04-29 RX ORDER — ISONIAZID 300 MG/1
300 TABLET ORAL DAILY
Qty: 90 TABLET | Refills: 2 | Status: SHIPPED | OUTPATIENT
Start: 2024-04-29 | End: 2025-01-24

## 2024-04-29 RX ORDER — PYRIDOXINE HCL (VITAMIN B6) 50 MG
50 TABLET ORAL DAILY
Qty: 270 TABLET | Refills: 0 | Status: SHIPPED | OUTPATIENT
Start: 2024-04-29 | End: 2025-01-24

## 2024-04-29 NOTE — UTILIZATION REVIEW
NOTIFICATION OF ADMISSION DISCHARGE   This is a Notification of Discharge from [facility]. Please be advised that this patient has been discharge from our facility. Below you will find the admission and discharge date and time including the patient’s disposition.   UTILIZATION REVIEW CONTACT:  Chapincito Parada  Utilization   Network Utilization Review Department  Phone: [phone] x carefully listen to the prompts. All voicemails are confidential.  Email: [email or web address]     ADMISSION INFORMATION  PRESENTATION DATE: 4/23/2024  5:11 PM  OBERVATION ADMISSION DATE:   INPATIENT ADMISSION DATE: 4/23/24  9:27 PM   DISCHARGE DATE: 4/26/2024  1:00 PM   DISPOSITION:Home/Self Care    Network Utilization Review Department  ATTENTION: Please call with any questions or concerns to [phone] and carefully listen to the prompts so that you are directed to the right person. All voicemails are confidential.   For Discharge needs, contact Care Management DC Support Team at [phone] opt. 2  Send all requests for admission clinical reviews, approved or denied determinations and any other requests to dedicated fax number below belonging to the campus where the patient is receiving treatment. List of dedicated fax numbers for the Facilities:  FACILITY NAME UR FAX NUMBER   ADMISSION DENIALS (Administrative/Medical Necessity) [phone]   DISCHARGE SUPPORT TEAM ([facility]) [phone]   PARENT CHILD HEALTH (Maternity/NICU/Pediatrics) [phone]   [facility] [phone]   [facility] [phone]   [facility] [phone]   [facility] [phone]   [facility] [phone]   [facility] [phone]   [facility] [phone]   [facility] [phone]   [facility]  [facility] [phone]   [facility] [phone]   [facility] [phone]   [facility] [phone]

## 2024-05-29 ENCOUNTER — TELEPHONE (OUTPATIENT)
Dept: INFECTIOUS DISEASES | Facility: CLINIC | Age: 49
End: 2024-05-29

## 2024-05-29 NOTE — TELEPHONE ENCOUNTER
Left message for patient to confirm virtual appointment on 06/03 and to make sure labs are drawn prior to visit.

## 2024-05-31 NOTE — PROGRESS NOTES
Virtual Outpatient Progress Note - [facility] Infectious Disease   Tatiana Balbuena 48 y.o. female MRN: [id number]  Encounter: [id number]    Virtual Regular Visit  Verification of patient location: [place]   Patient is located in the following state in which I hold an active license [place]    Problem List Items Addressed This Visit          Other    Latent tuberculosis - Primary         Encounter provider Bean Garcia PA-C    Provider located at INFECTIOUS DISEASE [facility] [address] [place] [address] [phone]    The patient was identified by name and date of birth. Tatiana Balbuena was informed that this is a telemedicine visit and that the visit is being conducted through the Epic Embedded platform. She agrees to proceed..  My office door was closed. No one else was in the room.  She acknowledged consent and understanding of privacy and security of the video platform. The patient has agreed to participate and understands they can discontinue the visit at any time.    Patient is aware this is a billable service.     Assessment/Plan:  1. Latent TB. Serum quantiferon gold positive on 4/16/2020 during work up for #2. CXR on 4/23/24 negative for active disease. Pt has no current signs or symptoms of active TB. Discussed treatment options with patient who is agreeable to initiate the safest, most effective option. Unfortunately, did not tolerate Rifampin in 2020. Per prior ID recommendations, regimens to consider include INH/B6 v. Linezolid/ethambutol. She started INH/B6 approximately 3 weeks ago around May 13th. Recent labs 6/1 show normal LFTs and elevated WBC (steroid induced).   -Continue INH/B6 x 9 months   -Repeat LFTs and CBC prior to next visit   -Return to clinic 8 weeks - OK for virtual      2.  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 Follows Rheum Dr. Gary at [facility] with plan to start Humira.  -OK to start Humira once on treatment for at least  1 month      3. DM2, Obesity. A1C 7.3%, BMI 39.44.     Above assessment and plan discussed in detail with patient during examination.     Antibiotics:  INH/B6    Subjective:  Tatiana Balbuena is a 48 y.o. female patient who presents to outpatient ID office virtually for ongoing management of latent TB. Patient feels well on INH B6 thus far. Had some fatigue and nausea early on but this resolved. No fevers or chills. No cough or SOB. Has some whole body paresthesias chronically. On prednisone daily. Had some bruising but this resolved. She sees rheum again on .       Past Medical History:   Diagnosis Date    Asthma     Chronic anemia     Diabetes mellitus (HCC)     Hypertension     Hyperthyroidism     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HCC)     TB lung, latent        Past Surgical History:   Procedure Laterality Date     SECTION      IR BIOPSY LIVER MASS  07/15/2020    LUNG BIOPSY      THYROIDECTOMY         Current Outpatient Medications   Medication Sig Dispense Refill    acetaminophen (TYLENOL) 325 mg tablet Take 650 mg by mouth every 6 (six) hours as needed for mild pain      Advair -21 MCG/ACT inhaler       albuterol (PROVENTIL HFA,VENTOLIN HFA) 90 mcg/act inhaler Inhale 2 puffs 2 (two) times a day      amLODIPine (NORVASC) 10 mg tablet Take 1 tablet (10 mg total) by mouth daily 30 tablet 0    aspirin (ECOTRIN LOW STRENGTH) 81 mg EC tablet Take 81 mg by mouth daily      atorvastatin (LIPITOR) 40 mg tablet       carvedilol (COREG) 25 mg tablet Take 1 tablet (25 mg total) by mouth 2 (two) times a day with meals 60 tablet 0    cholecalciferol (VITAMIN D3) 1,000 units tablet Take 1 tablet (1,000 Units total) by mouth daily 90 tablet 3    clopidogrel (PLAVIX) 75 mg tablet Take 75 mg by mouth daily      diclofenac sodium (VOLTAREN) 50 mg EC tablet Take 1 tablet (50 mg total) by mouth 2 (two) times a day 30 tablet 0    escitalopram (LEXAPRO) 10 mg tablet Take 10 mg by mouth daily      folic acid (FOLVITE) 1 mg tablet        furosemide (LASIX) 40 mg tablet Take 1 tablet (40 mg total) by mouth daily 30 tablet 0    Iron-Vitamin C (Vitron-C)  MG TABS Take 1 tablet by mouth in the morning 90 tablet 3    isoniazid (NYDRAZID) 300 mg tablet Take 1 tablet (300 mg total) by mouth daily 90 tablet 2    levothyroxine 100 mcg tablet       linaGLIPtin 5 MG TABS Take 5 mg by mouth daily 30 tablet 0    losartan (COZAAR) 100 MG tablet Take 100 mg by mouth daily       montelukast (SINGULAIR) 10 mg tablet Take 10 mg by mouth daily at bedtime      omeprazole (PriLOSEC) 20 mg delayed release capsule Take 1 capsule (20 mg total) by mouth daily 30 capsule 0    predniSONE 10 mg tablet Take by mouth daily Take as directed by rheumatology      Pyridoxine HCl (vitamin B-6) 50 MG TABS Take 1 tablet (50 mg total) by mouth daily 270 tablet 0    vitamin B-12 (VITAMIN B-12) 500 mcg tablet Take 500 mcg by mouth daily       No current facility-administered medications for this visit.        Allergies   Allergen Reactions    Other Allergic Rhinitis     pollen       Video Exam  Exam limited due to virtual visit     There were no vitals filed for this visit.    Physical Exam  Constitutional:       Appearance: Normal appearance. She is not ill-appearing.   HENT:      Head: Normocephalic and atraumatic.      Mouth/Throat:      Mouth: Mucous membranes are moist.   Eyes:      Extraocular Movements: Extraocular movements intact.   Musculoskeletal:      Cervical back: Normal range of motion and neck supple.   Neurological:      Mental Status: She is alert.   Psychiatric:         Mood and Affect: Mood normal.         Behavior: Behavior normal.          Labs, Imaging, & Other studies:   All pertinent labs and imaging studies were personally reviewed by me from 6/1    The following portions of the patient's history were reviewed and updated as appropriate: allergies, current medications, past family history, past medical history, past social history, past surgical  history and problem list.    I spent 28 minutes with patient today in which greater than 50% of the time was spent in counseling/coordination of care regarding review of recent labs, review of abx plan , answered all questions to the best of my ability.

## 2024-06-01 ENCOUNTER — APPOINTMENT (OUTPATIENT)
Dept: LAB | Facility: MEDICAL CENTER | Age: 49
End: 2024-06-01
Payer: COMMERCIAL

## 2024-06-01 DIAGNOSIS — R76.12 POSITIVE QUANTIFERON-TB GOLD TEST: ICD-10-CM

## 2024-06-01 LAB
ALBUMIN SERPL BCP-MCNC: 3.7 G/DL (ref 3.5–5)
ALP SERPL-CCNC: 80 U/L (ref 34–104)
ALT SERPL W P-5'-P-CCNC: 21 U/L (ref 7–52)
ANION GAP SERPL CALCULATED.3IONS-SCNC: 10 MMOL/L (ref 4–13)
ANISOCYTOSIS BLD QL SMEAR: PRESENT
AST SERPL W P-5'-P-CCNC: 12 U/L (ref 13–39)
BASOPHILS # BLD MANUAL: 0 THOUSAND/UL (ref 0–0.1)
BASOPHILS NFR MAR MANUAL: 0 % (ref 0–1)
BILIRUB SERPL-MCNC: 0.25 MG/DL (ref 0.2–1)
BUN SERPL-MCNC: 41 MG/DL (ref 5–25)
CALCIUM SERPL-MCNC: 8.9 MG/DL (ref 8.4–10.2)
CHLORIDE SERPL-SCNC: 108 MMOL/L (ref 96–108)
CO2 SERPL-SCNC: 22 MMOL/L (ref 21–32)
CREAT SERPL-MCNC: 1.65 MG/DL (ref 0.6–1.3)
EOSINOPHIL # BLD MANUAL: 0.34 THOUSAND/UL (ref 0–0.4)
EOSINOPHIL NFR BLD MANUAL: 3 % (ref 0–6)
ERYTHROCYTE [DISTWIDTH] IN BLOOD BY AUTOMATED COUNT: 18.6 % (ref 11.6–15.1)
GFR SERPL CREATININE-BSD FRML MDRD: 35 ML/MIN/1.73SQ M
GLUCOSE P FAST SERPL-MCNC: 99 MG/DL (ref 65–99)
HCT VFR BLD AUTO: 32 % (ref 34.8–46.1)
HGB BLD-MCNC: 10 G/DL (ref 11.5–15.4)
LYMPHOCYTES # BLD AUTO: 28 % (ref 14–44)
LYMPHOCYTES # BLD AUTO: 3.26 THOUSAND/UL (ref 0.6–4.47)
MCH RBC QN AUTO: 27.7 PG (ref 26.8–34.3)
MCHC RBC AUTO-ENTMCNC: 31.3 G/DL (ref 31.4–37.4)
MCV RBC AUTO: 89 FL (ref 82–98)
MONOCYTES # BLD AUTO: 1.35 THOUSAND/UL (ref 0–1.22)
MONOCYTES NFR BLD: 12 % (ref 4–12)
NEUTROPHILS # BLD MANUAL: 6.3 THOUSAND/UL (ref 1.85–7.62)
NEUTS SEG NFR BLD AUTO: 56 % (ref 43–75)
PLATELET # BLD AUTO: 333 THOUSANDS/UL (ref 149–390)
PLATELET BLD QL SMEAR: ADEQUATE
PMV BLD AUTO: 9.9 FL (ref 8.9–12.7)
POTASSIUM SERPL-SCNC: 4.1 MMOL/L (ref 3.5–5.3)
PROT SERPL-MCNC: 7 G/DL (ref 6.4–8.4)
RBC # BLD AUTO: 3.61 MILLION/UL (ref 3.81–5.12)
RBC MORPH BLD: PRESENT
SODIUM SERPL-SCNC: 140 MMOL/L (ref 135–147)
VARIANT LYMPHS # BLD AUTO: 1 %
WBC # BLD AUTO: 11.25 THOUSAND/UL (ref 4.31–10.16)

## 2024-06-01 PROCEDURE — 80053 COMPREHEN METABOLIC PANEL: CPT

## 2024-06-01 PROCEDURE — 85027 COMPLETE CBC AUTOMATED: CPT

## 2024-06-01 PROCEDURE — 36415 COLL VENOUS BLD VENIPUNCTURE: CPT

## 2024-06-01 PROCEDURE — 85007 BL SMEAR W/DIFF WBC COUNT: CPT

## 2024-06-03 ENCOUNTER — TELEMEDICINE (OUTPATIENT)
Age: 49
End: 2024-06-03
Payer: COMMERCIAL

## 2024-06-03 ENCOUNTER — TELEPHONE (OUTPATIENT)
Dept: INFECTIOUS DISEASES | Facility: CLINIC | Age: 49
End: 2024-06-03

## 2024-06-03 DIAGNOSIS — R76.12 POSITIVE QUANTIFERON-TB GOLD TEST: Primary | ICD-10-CM

## 2024-06-03 PROCEDURE — 99214 OFFICE O/P EST MOD 30 MIN: CPT | Performed by: PHYSICIAN ASSISTANT

## 2024-06-03 NOTE — TELEPHONE ENCOUNTER
Patient returning call:    Scheduled appointment per Debora's note.    No further needs at this time.

## 2024-06-03 NOTE — TELEPHONE ENCOUNTER
Called and left message for patient today.   Informed patient to call the office back,   Was calling to schedule patient for 8 week follow up appointment with BEBETO Del Angel. Ok for this to be virtual.

## 2024-07-01 ENCOUNTER — APPOINTMENT (OUTPATIENT)
Dept: LAB | Facility: MEDICAL CENTER | Age: 49
End: 2024-07-01
Payer: COMMERCIAL

## 2024-07-01 DIAGNOSIS — E78.5 DYSLIPIDEMIA, GOAL LDL BELOW 100: Primary | ICD-10-CM

## 2024-07-01 DIAGNOSIS — R76.12 POSITIVE QUANTIFERON-TB GOLD TEST: ICD-10-CM

## 2024-07-01 LAB
ALBUMIN SERPL BCG-MCNC: 3.4 G/DL (ref 3.5–5)
ALP SERPL-CCNC: 85 U/L (ref 34–104)
ALT SERPL W P-5'-P-CCNC: 20 U/L (ref 7–52)
ANION GAP SERPL CALCULATED.3IONS-SCNC: 13 MMOL/L (ref 4–13)
AST SERPL W P-5'-P-CCNC: 12 U/L (ref 13–39)
BASOPHILS # BLD AUTO: 0.11 THOUSANDS/ÂΜL (ref 0–0.1)
BASOPHILS NFR BLD AUTO: 1 % (ref 0–1)
BILIRUB SERPL-MCNC: 0.23 MG/DL (ref 0.2–1)
BUN SERPL-MCNC: 49 MG/DL (ref 5–25)
CALCIUM ALBUM COR SERPL-MCNC: 9.5 MG/DL (ref 8.3–10.1)
CALCIUM SERPL-MCNC: 9 MG/DL (ref 8.4–10.2)
CHLORIDE SERPL-SCNC: 111 MMOL/L (ref 96–108)
CHOLEST SERPL-MCNC: 193 MG/DL
CO2 SERPL-SCNC: 19 MMOL/L (ref 21–32)
CREAT SERPL-MCNC: 1.76 MG/DL (ref 0.6–1.3)
EOSINOPHIL # BLD AUTO: 0.21 THOUSAND/ÂΜL (ref 0–0.61)
EOSINOPHIL NFR BLD AUTO: 2 % (ref 0–6)
ERYTHROCYTE [DISTWIDTH] IN BLOOD BY AUTOMATED COUNT: 16.2 % (ref 11.6–15.1)
GFR SERPL CREATININE-BSD FRML MDRD: 33 ML/MIN/1.73SQ M
GLUCOSE P FAST SERPL-MCNC: 92 MG/DL (ref 65–99)
HCT VFR BLD AUTO: 31.6 % (ref 34.8–46.1)
HDLC SERPL-MCNC: 60 MG/DL
HGB BLD-MCNC: 9.7 G/DL (ref 11.5–15.4)
IMM GRANULOCYTES # BLD AUTO: 0.2 THOUSAND/UL (ref 0–0.2)
IMM GRANULOCYTES NFR BLD AUTO: 2 % (ref 0–2)
LDLC SERPL CALC-MCNC: 105 MG/DL (ref 0–100)
LDLC SERPL DIRECT ASSAY-MCNC: 118 MG/DL (ref 0–100)
LYMPHOCYTES # BLD AUTO: 3.53 THOUSANDS/ÂΜL (ref 0.6–4.47)
LYMPHOCYTES NFR BLD AUTO: 35 % (ref 14–44)
MCH RBC QN AUTO: 27.6 PG (ref 26.8–34.3)
MCHC RBC AUTO-ENTMCNC: 30.7 G/DL (ref 31.4–37.4)
MCV RBC AUTO: 90 FL (ref 82–98)
MONOCYTES # BLD AUTO: 0.83 THOUSAND/ÂΜL (ref 0.17–1.22)
MONOCYTES NFR BLD AUTO: 8 % (ref 4–12)
NEUTROPHILS # BLD AUTO: 5.31 THOUSANDS/ÂΜL (ref 1.85–7.62)
NEUTS SEG NFR BLD AUTO: 52 % (ref 43–75)
NONHDLC SERPL-MCNC: 133 MG/DL
NRBC BLD AUTO-RTO: 0 /100 WBCS
PLATELET # BLD AUTO: 316 THOUSANDS/UL (ref 149–390)
PMV BLD AUTO: 10 FL (ref 8.9–12.7)
POTASSIUM SERPL-SCNC: 4.6 MMOL/L (ref 3.5–5.3)
PROT SERPL-MCNC: 6.8 G/DL (ref 6.4–8.4)
RBC # BLD AUTO: 3.51 MILLION/UL (ref 3.81–5.12)
SODIUM SERPL-SCNC: 143 MMOL/L (ref 135–147)
TRIGL SERPL-MCNC: 139 MG/DL
WBC # BLD AUTO: 10.19 THOUSAND/UL (ref 4.31–10.16)

## 2024-07-01 PROCEDURE — 80053 COMPREHEN METABOLIC PANEL: CPT

## 2024-07-01 PROCEDURE — 85025 COMPLETE CBC W/AUTO DIFF WBC: CPT

## 2024-07-01 PROCEDURE — 36415 COLL VENOUS BLD VENIPUNCTURE: CPT

## 2024-07-01 PROCEDURE — 80061 LIPID PANEL: CPT

## 2024-07-01 PROCEDURE — 83721 ASSAY OF BLOOD LIPOPROTEIN: CPT

## 2024-07-13 ENCOUNTER — TELEPHONE (OUTPATIENT)
Dept: OTHER | Facility: OTHER | Age: 49
End: 2024-07-13

## 2024-07-13 NOTE — TELEPHONE ENCOUNTER
Patient calling in requesting to schedule a follow up appointment with the office. Informed patient that a message would be sent to the office for follow up when open on Monday. Patient verbalized understanding, no further assistance needed.

## 2024-07-15 ENCOUNTER — TELEPHONE (OUTPATIENT)
Dept: GASTROENTEROLOGY | Facility: CLINIC | Age: 49
End: 2024-07-15

## 2024-07-15 NOTE — TELEPHONE ENCOUNTER
Tatiana is requesting a follow up visit with [facility]. I left a message stating I am holding the next available in [place] office, which is Tuesday Sept 10th @ 1:40. Please call us back if this needs to be changed. Thank you.

## 2024-07-26 ENCOUNTER — HOSPITAL ENCOUNTER (OUTPATIENT)
Dept: NON INVASIVE DIAGNOSTICS | Facility: HOSPITAL | Age: 49
Discharge: HOME | End: 2024-07-26
Payer: COMMERCIAL

## 2024-07-26 VITALS
BODY MASS INDEX: 40.62 KG/M2 | DIASTOLIC BLOOD PRESSURE: 64 MMHG | HEART RATE: 71 BPM | HEIGHT: 63 IN | WEIGHT: 229.28 LBS | SYSTOLIC BLOOD PRESSURE: 134 MMHG

## 2024-07-26 DIAGNOSIS — I31.39 PERICARDIAL EFFUSION: ICD-10-CM

## 2024-07-26 LAB
AORTIC ROOT: 3 CM
AV REGURGITATION PRESSURE HALF TIME: 475 MS
BSA FOR ECHO PROCEDURE: 2.05 M2
E WAVE DECELERATION TIME: 279 MS
E/A RATIO: 0.78
FRACTIONAL SHORTENING: 38 (ref 28–44)
INTERVENTRICULAR SEPTUM IN DIASTOLE (PARASTERNAL SHORT AXIS VIEW): 1.2 CM
INTERVENTRICULAR SEPTUM: 1.2 CM (ref 0.6–1.1)
LEFT ATRIUM SIZE: 4.4 CM
LEFT INTERNAL DIMENSION IN SYSTOLE: 3.5 CM (ref 2.1–4)
LEFT VENTRICULAR INTERNAL DIMENSION IN DIASTOLE: 5.6 CM (ref 3.5–6)
LEFT VENTRICULAR POSTERIOR WALL IN END DIASTOLE: 1.2 CM
LEFT VENTRICULAR STROKE VOLUME: 100 ML
LVSV (TEICH): 100 ML
MV E'TISSUE VEL-LAT: 5 CM/S
MV E'TISSUE VEL-SEP: 5 CM/S
MV PEAK A VEL: 1 M/S
MV PEAK E VEL: 78 CM/S
MV STENOSIS PRESSURE HALF TIME: 81 MS
MV VALVE AREA P 1/2 METHOD: 2.72
SL CV AV DECELERATION TIME RETROGRADE: 1636 MS
SL CV AV PEAK GRADIENT RETROGRADE: 93 MMHG
SL CV LV EF: 60
SL CV PED ECHO LEFT VENTRICLE DIASTOLIC VOLUME (MOD BIPLANE) 2D: 152 ML
SL CV PED ECHO LEFT VENTRICLE SYSTOLIC VOLUME (MOD BIPLANE) 2D: 53 ML

## 2024-07-26 PROCEDURE — 93308 TTE F-UP OR LMTD: CPT

## 2024-07-26 PROCEDURE — 93325 DOPPLER ECHO COLOR FLOW MAPG: CPT

## 2024-07-26 PROCEDURE — 93325 DOPPLER ECHO COLOR FLOW MAPG: CPT | Performed by: INTERNAL MEDICINE

## 2024-07-26 PROCEDURE — 93308 TTE F-UP OR LMTD: CPT | Performed by: INTERNAL MEDICINE

## 2024-07-26 PROCEDURE — 93321 DOPPLER ECHO F-UP/LMTD STD: CPT | Performed by: INTERNAL MEDICINE

## 2024-07-26 PROCEDURE — 93321 DOPPLER ECHO F-UP/LMTD STD: CPT

## 2024-07-29 ENCOUNTER — TELEMEDICINE (OUTPATIENT)
Age: 49
End: 2024-07-29
Payer: COMMERCIAL

## 2024-07-29 ENCOUNTER — TELEPHONE (OUTPATIENT)
Dept: INFECTIOUS DISEASES | Facility: CLINIC | Age: 49
End: 2024-07-29

## 2024-07-29 DIAGNOSIS — E66.9 TYPE 2 DIABETES MELLITUS WITH OBESITY  (HCC): ICD-10-CM

## 2024-07-29 DIAGNOSIS — I77.6 AORTITIS (HCC): ICD-10-CM

## 2024-07-29 DIAGNOSIS — E89.0 POST-SURGICAL HYPOTHYROIDISM: ICD-10-CM

## 2024-07-29 DIAGNOSIS — R76.12 POSITIVE QUANTIFERON-TB GOLD TEST: Primary | ICD-10-CM

## 2024-07-29 DIAGNOSIS — E11.69 TYPE 2 DIABETES MELLITUS WITH OBESITY  (HCC): ICD-10-CM

## 2024-07-29 PROCEDURE — 99213 OFFICE O/P EST LOW 20 MIN: CPT | Performed by: PHYSICIAN ASSISTANT

## 2024-07-29 RX ORDER — PYRIDOXINE HCL (VITAMIN B6) 50 MG
50 TABLET ORAL DAILY
Qty: 270 TABLET | Refills: 0 | Status: SHIPPED | OUTPATIENT
Start: 2024-07-29 | End: 2025-04-25

## 2024-07-29 RX ORDER — ISONIAZID 300 MG/1
300 TABLET ORAL DAILY
Qty: 90 TABLET | Refills: 2 | Status: SHIPPED | OUTPATIENT
Start: 2024-07-29 | End: 2025-04-25

## 2024-07-29 NOTE — TELEPHONE ENCOUNTER
LM for patient to schedule an 8 week follow up appointment with Bean Edmond PA-C. Per Bean's staff message note, virtual would be ok. Please schedule patient when she calls.

## 2024-07-29 NOTE — TELEPHONE ENCOUNTER
Patient called- she received a call at 3:30 pm to schedule an appointment. [facility] mean to call infectious disease.     I warm transferred her over to ID at this time

## 2024-07-29 NOTE — PATIENT INSTRUCTIONS
-Continue INH/B6 x 6 months through mid October 2024  -Repeat LFTs and CBC prior to next visit   -Return to clinic 8 weeks - OK for virtual

## 2024-07-29 NOTE — PROGRESS NOTES
Virtual Outpatient Progress Note - [facility] Infectious Disease   Tatiana Balbuena 48 y.o. female MRN: [id number]  Encounter: [id number]      Virtual Regular Visit  Verification of patient location: [place]  Patient is located in the following [facility] in which I hold an active license [place]    Problem List Items Addressed This Visit          Cardiovascular and Mediastinum    Aortitis        Endocrine    Type 2 diabetes mellitus with obesity  (HCC)    Post-surgical hypothyroidism       Other    Latent tuberculosis - Primary            Encounter provider Bean Garcia PA-C    Provider located at INFECTIOUS DISEASE [facility] [address] [place] [address] [phone]    The patient was identified by name and date of birth. Tatiana Balbuena was informed that this is a telemedicine visit and that the visit is being conducted through the [employer] platform. She agrees to proceed..  My office door was closed. No one else was in the room.  She acknowledged consent and understanding of privacy and security of the video platform. The patient has agreed to participate and understands they can discontinue the visit at any time.    Patient is aware this is a billable service.       Assessment/Plan:  1. Latent TB. Serum quantiferon gold positive on 4/16/2020 during work up for #2. CXR on 4/23/24 negative for active disease. Pt has no current signs or symptoms of active TB. Unfortunately, did not tolerate Rifampin in 2020. Per prior ID recommendations, regimens to consider include INH/B6 v. Linezolid/ethambutol. She started INH/B6 around May 13th. Recent labs 7/1 and 7/12 show chronic and stable leukocytosis with Cr at baseline and normal LFTs.  -Continue INH/B6 x 6 months through early November 2024  -Repeat LFTs and CBC prior to next visit   -Return to clinic 8 weeks - OK for virtual      2.  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 Follows Rheum Dr. Gary at [facility] with  plan to start Humira in August.  -Close follow-up ongoing with rheumatology     3. DM2, Obesity. A1C 7.3%, BMI 39.44.     Above assessment and plan discussed in detail with patient during examination.     Antibiotics:  PO INH + B6    Subjective:  Tatiana Balbuena is a 48 y.o. female patient who presents to outpatient ID office virtually for ongoing management of latent tuberculosis.  Patient states she is feeling well.  No nausea, vomiting or stomach upset.  No fevers or chills.  No cough or shortness of breath.  No new or worsening whole body paresthesias.  Plan to initiate Remicade for aortitis next month.    Past Medical History:   Diagnosis Date    Asthma     Chronic anemia     Diabetes mellitus (HCC)     Hypertension     Hyperthyroidism     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HCC)     TB lung, latent        Past Surgical History:   Procedure Laterality Date     SECTION      IR BIOPSY LIVER MASS  07/15/2020    LUNG BIOPSY      THYROIDECTOMY         Current Outpatient Medications   Medication Sig Dispense Refill    acetaminophen (TYLENOL) 325 mg tablet Take 650 mg by mouth every 6 (six) hours as needed for mild pain      Advair -21 MCG/ACT inhaler       albuterol (PROVENTIL HFA,VENTOLIN HFA) 90 mcg/act inhaler Inhale 2 puffs 2 (two) times a day      amLODIPine (NORVASC) 10 mg tablet Take 1 tablet (10 mg total) by mouth daily 30 tablet 0    aspirin (ECOTRIN LOW STRENGTH) 81 mg EC tablet Take 81 mg by mouth daily      atorvastatin (LIPITOR) 40 mg tablet       carvedilol (COREG) 25 mg tablet Take 1 tablet (25 mg total) by mouth 2 (two) times a day with meals 60 tablet 0    cholecalciferol (VITAMIN D3) 1,000 units tablet Take 1 tablet (1,000 Units total) by mouth daily 90 tablet 3    clopidogrel (PLAVIX) 75 mg tablet Take 75 mg by mouth daily      diclofenac sodium (VOLTAREN) 50 mg EC tablet Take 1 tablet (50 mg total) by mouth 2 (two) times a day 30 tablet 0    escitalopram (LEXAPRO) 10 mg tablet Take 10 mg by mouth  daily      folic acid (FOLVITE) 1 mg tablet       furosemide (LASIX) 40 mg tablet Take 1 tablet (40 mg total) by mouth daily 30 tablet 0    Iron-Vitamin C (Vitron-C)  MG TABS Take 1 tablet by mouth in the morning 90 tablet 3    isoniazid (NYDRAZID) 300 mg tablet Take 1 tablet (300 mg total) by mouth daily 90 tablet 2    levothyroxine 100 mcg tablet       linaGLIPtin 5 MG TABS Take 5 mg by mouth daily 30 tablet 0    losartan (COZAAR) 100 MG tablet Take 100 mg by mouth daily       montelukast (SINGULAIR) 10 mg tablet Take 10 mg by mouth daily at bedtime      omeprazole (PriLOSEC) 20 mg delayed release capsule Take 1 capsule (20 mg total) by mouth daily 30 capsule 0    predniSONE 10 mg tablet Take by mouth daily Take as directed by rheumatology      Pyridoxine HCl (vitamin B-6) 50 MG TABS Take 1 tablet (50 mg total) by mouth daily 270 tablet 0    vitamin B-12 (VITAMIN B-12) 500 mcg tablet Take 500 mcg by mouth daily       No current facility-administered medications for this visit.        Allergies   Allergen Reactions    Other Allergic Rhinitis     pollen       Video Exam  Exam limited due to virtual visit     There were no vitals filed for this visit.    Physical Exam  Constitutional:       Appearance: Normal appearance. She is obese. She is not ill-appearing.   HENT:      Head: Normocephalic and atraumatic.   Eyes:      Extraocular Movements: Extraocular movements intact.      Pupils: Pupils are equal, round, and reactive to light.   Musculoskeletal:         General: Normal range of motion.      Cervical back: Normal range of motion and neck supple.   Skin:     General: Skin is warm and dry.   Neurological:      General: No focal deficit present.      Mental Status: She is alert.   Psychiatric:         Mood and Affect: Mood normal.         Behavior: Behavior normal.      Comments: In good spirits          Labs, Imaging, & Other studies:   All pertinent labs and imaging studies were personally reviewed by me  from 7/1 and 7/12.      The following portions of the patient's history were reviewed and updated as appropriate: allergies, current medications, past family history, past medical history, past social history, past surgical history and problem list.    I spent 28 minutes with patient today in which greater than 50% of the time was spent in counseling/coordination of care regarding ongoing management of latent tuberculosis with 6-month course of INH plus B6, reviewed recent labs, reviewed recent rheumatology record, answered all questions to the best of my ability

## 2024-08-27 ENCOUNTER — APPOINTMENT (OUTPATIENT)
Dept: LAB | Facility: MEDICAL CENTER | Age: 49
End: 2024-08-27
Payer: COMMERCIAL

## 2024-08-27 DIAGNOSIS — R76.12 POSITIVE QUANTIFERON-TB GOLD TEST: ICD-10-CM

## 2024-08-27 LAB
BASOPHILS # BLD AUTO: 0.09 THOUSANDS/ÂΜL (ref 0–0.1)
BASOPHILS NFR BLD AUTO: 1 % (ref 0–1)
EOSINOPHIL # BLD AUTO: 0.21 THOUSAND/ÂΜL (ref 0–0.61)
EOSINOPHIL NFR BLD AUTO: 2 % (ref 0–6)
ERYTHROCYTE [DISTWIDTH] IN BLOOD BY AUTOMATED COUNT: 18.5 % (ref 11.6–15.1)
HCT VFR BLD AUTO: 33.4 % (ref 34.8–46.1)
HGB BLD-MCNC: 10.1 G/DL (ref 11.5–15.4)
IMM GRANULOCYTES # BLD AUTO: 0.08 THOUSAND/UL (ref 0–0.2)
IMM GRANULOCYTES NFR BLD AUTO: 1 % (ref 0–2)
LYMPHOCYTES # BLD AUTO: 4.91 THOUSANDS/ÂΜL (ref 0.6–4.47)
LYMPHOCYTES NFR BLD AUTO: 46 % (ref 14–44)
MCH RBC QN AUTO: 27.4 PG (ref 26.8–34.3)
MCHC RBC AUTO-ENTMCNC: 30.2 G/DL (ref 31.4–37.4)
MCV RBC AUTO: 91 FL (ref 82–98)
MONOCYTES # BLD AUTO: 0.47 THOUSAND/ÂΜL (ref 0.17–1.22)
MONOCYTES NFR BLD AUTO: 4 % (ref 4–12)
NEUTROPHILS # BLD AUTO: 4.84 THOUSANDS/ÂΜL (ref 1.85–7.62)
NEUTS SEG NFR BLD AUTO: 46 % (ref 43–75)
NRBC BLD AUTO-RTO: 0 /100 WBCS
PLATELET # BLD AUTO: 302 THOUSANDS/UL (ref 149–390)
PMV BLD AUTO: 10.7 FL (ref 8.9–12.7)
RBC # BLD AUTO: 3.69 MILLION/UL (ref 3.81–5.12)
WBC # BLD AUTO: 10.6 THOUSAND/UL (ref 4.31–10.16)

## 2024-08-27 PROCEDURE — 80053 COMPREHEN METABOLIC PANEL: CPT

## 2024-08-27 PROCEDURE — 85025 COMPLETE CBC W/AUTO DIFF WBC: CPT

## 2024-08-27 PROCEDURE — 36415 COLL VENOUS BLD VENIPUNCTURE: CPT

## 2024-08-28 LAB
ALBUMIN SERPL BCG-MCNC: 3.7 G/DL (ref 3.5–5)
ALP SERPL-CCNC: 69 U/L (ref 34–104)
ALT SERPL W P-5'-P-CCNC: 20 U/L (ref 7–52)
ANION GAP SERPL CALCULATED.3IONS-SCNC: 12 MMOL/L (ref 4–13)
AST SERPL W P-5'-P-CCNC: 14 U/L (ref 13–39)
BILIRUB SERPL-MCNC: 0.23 MG/DL (ref 0.2–1)
BUN SERPL-MCNC: 45 MG/DL (ref 5–25)
CALCIUM SERPL-MCNC: 8.6 MG/DL (ref 8.4–10.2)
CHLORIDE SERPL-SCNC: 111 MMOL/L (ref 96–108)
CO2 SERPL-SCNC: 19 MMOL/L (ref 21–32)
CREAT SERPL-MCNC: 1.79 MG/DL (ref 0.6–1.3)
GFR SERPL CREATININE-BSD FRML MDRD: 32 ML/MIN/1.73SQ M
GLUCOSE P FAST SERPL-MCNC: 82 MG/DL (ref 65–99)
POTASSIUM SERPL-SCNC: 4 MMOL/L (ref 3.5–5.3)
PROT SERPL-MCNC: 6.7 G/DL (ref 6.4–8.4)
SODIUM SERPL-SCNC: 142 MMOL/L (ref 135–147)

## 2024-09-10 ENCOUNTER — APPOINTMENT (OUTPATIENT)
Dept: LAB | Facility: HOSPITAL | Age: 49
End: 2024-09-10
Payer: COMMERCIAL

## 2024-09-10 ENCOUNTER — OFFICE VISIT (OUTPATIENT)
Dept: HEMATOLOGY ONCOLOGY | Facility: CLINIC | Age: 49
End: 2024-09-10
Payer: COMMERCIAL

## 2024-09-10 VITALS
DIASTOLIC BLOOD PRESSURE: 69 MMHG | OXYGEN SATURATION: 97 % | WEIGHT: 228 LBS | TEMPERATURE: 97.9 F | HEIGHT: 63 IN | HEART RATE: 81 BPM | SYSTOLIC BLOOD PRESSURE: 112 MMHG | BODY MASS INDEX: 40.4 KG/M2

## 2024-09-10 DIAGNOSIS — E56.1 VITAMIN K DEFICIENCY: ICD-10-CM

## 2024-09-10 DIAGNOSIS — E54 VITAMIN C DEFICIENCY: ICD-10-CM

## 2024-09-10 DIAGNOSIS — D75.839 THROMBOCYTOSIS: ICD-10-CM

## 2024-09-10 DIAGNOSIS — D50.9 IRON DEFICIENCY ANEMIA, UNSPECIFIED IRON DEFICIENCY ANEMIA TYPE: ICD-10-CM

## 2024-09-10 DIAGNOSIS — R53.83 OTHER FATIGUE: ICD-10-CM

## 2024-09-10 DIAGNOSIS — R23.3 EASY BRUISING: ICD-10-CM

## 2024-09-10 DIAGNOSIS — E53.8 VITAMIN B 12 DEFICIENCY: ICD-10-CM

## 2024-09-10 DIAGNOSIS — D50.9 IRON DEFICIENCY ANEMIA, UNSPECIFIED IRON DEFICIENCY ANEMIA TYPE: Primary | ICD-10-CM

## 2024-09-10 LAB
FERRITIN SERPL-MCNC: 31 NG/ML (ref 11–307)
IRON SATN MFR SERPL: 17 % (ref 15–50)
IRON SERPL-MCNC: 62 UG/DL (ref 50–212)
TIBC SERPL-MCNC: 357 UG/DL (ref 250–450)
UIBC SERPL-MCNC: 295 UG/DL (ref 155–355)
VIT B12 SERPL-MCNC: 1281 PG/ML (ref 180–914)

## 2024-09-10 PROCEDURE — 82728 ASSAY OF FERRITIN: CPT

## 2024-09-10 PROCEDURE — 84597 ASSAY OF VITAMIN K: CPT

## 2024-09-10 PROCEDURE — 99214 OFFICE O/P EST MOD 30 MIN: CPT | Performed by: NURSE PRACTITIONER

## 2024-09-10 PROCEDURE — 36415 COLL VENOUS BLD VENIPUNCTURE: CPT

## 2024-09-10 PROCEDURE — 82180 ASSAY OF ASCORBIC ACID: CPT

## 2024-09-10 PROCEDURE — 83550 IRON BINDING TEST: CPT

## 2024-09-10 PROCEDURE — 83540 ASSAY OF IRON: CPT

## 2024-09-10 PROCEDURE — 82607 VITAMIN B-12: CPT

## 2024-09-10 NOTE — PROGRESS NOTES
[facility] [place]  [address] [facility] [address] [phone]  Hematology Ambulatory Follow-Up  Tatiana Balbuena, 1975, [id number]  9/10/2024    Assessment/Plan:    1. Iron deficiency anemia, unspecified iron deficiency anemia type  2. Vitamin B 12 deficiency  3. Thrombocytosis   4. Other fatigue  5. Easy bruising  6. Vitamin C deficiency  7. Vitamin K deficiency   Patient is a 49-year-old female with a history of obesity, diabetes, hypertension, hypothyroid, vasculitis, CKD, menorrhagia, thrombocytosis, iron deficiency and vitamin B12 deficiency.  We previously followed her for iron deficiency.  She was discharged from our practice in November 2023 and advised to continue following with her PCP for management of iron deficiency.  She recently resumed care with her PCP who advised her to reestablish care with our practice for iron deficiency.   Most recent labs on 8/27/2024 showed an elevated white blood cell count, RBC 3.69, hemoglobin 10.1, MCV 91, platelets 302, lymphocytes 46% with an absolute lymphocyte of 4.91 otherwise normal differential.  Anemia has been present since at least 2020 with a hemoglobin ranging between 8.1-11.0.  MCV mostly in the 80s.  CMP showed a creatinine of 1.79, normal calcium and protein, EGFR 32.  Most recent iron panel is from 11/20/2023 that showed a ferritin level of 64, iron saturation 11%.  At that time hemoglobin was 11.0 with an MCV of 83.  Per chart review she has had an SPEP done in April 2024 that showed a small abnormality in the gamma region, immunofixation was normal.   Patient has been getting treatment for Takayasu disease, Avsola, and aortitis via [facility].  We discussed getting updated iron and vitamin B12 levels.  Patient also reports increased bruising.  We will request vitamin C and vitamin K levels.  Will see her in about 3 weeks to review.    - Vitamin B12; Future  - Vitamin K; Future  - Vitamin C;  Future  - Iron Panel (Includes Ferritin, Iron Sat%, Iron, and TIBC); Future    Patient verbalized understanding and is in agreement to the plan.   Patient knows to call the Hematology/Oncology office with any questions and concerns regarding the above.    Barrier(s) to care: None  The patient is able to self care.    Sherin ORNELAS  Medical Oncology/Hematology  [facility]    Interval history: clinically stable    Review of Systems   Constitutional:  Positive for fatigue. Negative for activity change, appetite change, fever and unexpected weight change.   Respiratory:  Negative for cough and shortness of breath.    Cardiovascular:  Negative for chest pain and leg swelling.   Gastrointestinal:  Negative for abdominal pain, constipation, diarrhea and nausea.   Endocrine: Negative for cold intolerance and heat intolerance.   Musculoskeletal:  Negative for arthralgias and myalgias.   Skin: Negative.    Neurological:  Negative for dizziness, weakness and headaches.   Hematological:  Negative for adenopathy. Bruises/bleeds easily.     Patient Active Problem List   Diagnosis    Intramural aortic hematoma (HCC)    Retrosternal chest pain    Left flank pain    Thrombocytosis     Multiple thyroid nodules    Type 2 diabetes mellitus with obesity  (HCC)    Iron deficiency anemia    Pulmonary hypertension (HCC)    Depression    Dyslipidemia, goal LDL below 100    Mild persistent asthma without complication    Essential hypertension    Aortitis     Acute on chronic right-sided low back pain without sciatica    Latent tuberculosis    Abnormal thyroid function test    SIRS (systemic inflammatory response syndrome)    Hyperphosphatemia    Fatigue    Abnormal LFTs    Low back pain    Hypercalcemia    Hyperthyroidism    Low iron stores    Pericardial effusion    Subclavian artery stenosis, left (HCC)    CKD (chronic kidney disease) stage 3, GFR 30-59 ml/min (HCC)    Post-surgical hypothyroidism    Acute  kidney injury superimposed on chronic kidney disease  (HCC)    Anxiety state    Vitamin D deficiency    Gastroesophageal reflux disease without esophagitis    History of thyroid cancer    LVH (left ventricular hypertrophy)    Recurrent major depressive disorder (HCC)    S/P total thyroidectomy    Aneurysm of ascending aorta without rupture (HCC)     Past Medical History:   Diagnosis Date    Anemia     Asthma     Chronic anemia     Diabetes mellitus (HCC)     Disease of thyroid gland     Hypertension     Hyperthyroidism     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HCC)     TB lung, latent      Past Surgical History:   Procedure Laterality Date     SECTION      IR BIOPSY LIVER MASS  07/15/2020    LIVER BIOPSY      LUNG BIOPSY      THYROIDECTOMY       Current Outpatient Medications:     Advair -21 MCG/ACT inhaler, , Disp: , Rfl:     albuterol (PROVENTIL HFA,VENTOLIN HFA) 90 mcg/act inhaler, Inhale 2 puffs 2 (two) times a day, Disp: , Rfl:     amLODIPine (NORVASC) 10 mg tablet, Take 1 tablet (10 mg total) by mouth daily, Disp: 30 tablet, Rfl: 0    aspirin (ECOTRIN LOW STRENGTH) 81 mg EC tablet, Take 81 mg by mouth daily, Disp: , Rfl:     atorvastatin (LIPITOR) 40 mg tablet, , Disp: , Rfl:     carvedilol (COREG) 25 mg tablet, Take 1 tablet (25 mg total) by mouth 2 (two) times a day with meals, Disp: 60 tablet, Rfl: 0    cholecalciferol (VITAMIN D3) 1,000 units tablet, Take 1 tablet (1,000 Units total) by mouth daily, Disp: 90 tablet, Rfl: 3    clopidogrel (PLAVIX) 75 mg tablet, Take 75 mg by mouth daily, Disp: , Rfl:     diclofenac sodium (VOLTAREN) 50 mg EC tablet, Take 1 tablet (50 mg total) by mouth 2 (two) times a day, Disp: 30 tablet, Rfl: 0    escitalopram (LEXAPRO) 10 mg tablet, Take 10 mg by mouth daily, Disp: , Rfl:     folic acid (FOLVITE) 1 mg tablet, , Disp: , Rfl:     furosemide (LASIX) 40 mg tablet, Take 1 tablet (40 mg total) by mouth daily, Disp: 30 tablet, Rfl: 0    isoniazid (NYDRAZID) 300 mg tablet,  "Take 1 tablet (300 mg total) by mouth daily, Disp: 90 tablet, Rfl: 2    levothyroxine 100 mcg tablet, , Disp: , Rfl:     linaGLIPtin 5 MG TABS, Take 5 mg by mouth daily, Disp: 30 tablet, Rfl: 0    losartan (COZAAR) 100 MG tablet, Take 100 mg by mouth daily , Disp: , Rfl:     montelukast (SINGULAIR) 10 mg tablet, Take 10 mg by mouth daily at bedtime, Disp: , Rfl:     omeprazole (PriLOSEC) 20 mg delayed release capsule, Take 1 capsule (20 mg total) by mouth daily, Disp: 30 capsule, Rfl: 0    predniSONE 10 mg tablet, Take by mouth daily Take as directed by rheumatology, Disp: , Rfl:     Pyridoxine HCl (vitamin B-6) 50 MG TABS, Take 1 tablet (50 mg total) by mouth daily, Disp: 270 tablet, Rfl: 0    vitamin B-12 (VITAMIN B-12) 500 mcg tablet, Take 500 mcg by mouth daily, Disp: , Rfl:     Allergies   Allergen Reactions    Other Allergic Rhinitis     pollen     Objective:  /69 (BP Location: Left arm, Patient Position: Sitting, Cuff Size: Standard)   Pulse 81   Temp 97.9 °F (36.6 °C) (Temporal)   Ht 5' 3\" (1.6 m)   Wt 103 kg (228 lb)   LMP 04/04/2020 (Exact Date)   SpO2 97%   BMI 40.39 kg/m²    Physical Exam  Vitals reviewed.   Constitutional:       Appearance: Normal appearance. She is well-developed.   HENT:      Head: Normocephalic and atraumatic.   Eyes:      Conjunctiva/sclera: Conjunctivae normal.      Pupils: Pupils are equal, round, and reactive to light.   Pulmonary:      Effort: Pulmonary effort is normal. No respiratory distress.   Musculoskeletal:         General: Normal range of motion.      Cervical back: Normal range of motion.   Lymphadenopathy:      Cervical: No cervical adenopathy.   Skin:     General: Skin is dry.   Neurological:      Mental Status: She is alert and oriented to person, place, and time.   Psychiatric:         Behavior: Behavior normal.     Result Review  Labs:  Lab Results   Component Value Date    WBC 10.60 (H) 08/27/2024    HGB 10.1 (L) 08/27/2024    HCT 33.4 (L) 08/27/2024 "    MCV 91 08/27/2024     08/27/2024     Lab Results   Component Value Date    SODIUM 142 08/27/2024    K 4.0 08/27/2024     (H) 08/27/2024    CO2 19 (L) 08/27/2024    AGAP 12 08/27/2024    BUN 45 (H) 08/27/2024    CREATININE 1.79 (H) 08/27/2024    GLUC 97 07/12/2024    GLUF 82 08/27/2024    CALCIUM 8.6 08/27/2024    AST 14 08/27/2024    ALT 20 08/27/2024    ALKPHOS 69 08/27/2024    TP 6.7 08/27/2024    TBILI 0.23 08/27/2024    EGFR 32 08/27/2024     Please note:  This report has been generated by a voice recognition software system. Therefore there may be syntax, spelling, and/or grammatical errors. Please call if you have any questions.

## 2024-09-14 LAB — PHYTONADIONE SERPL-MCNC: 0.88 NG/ML (ref 0.1–2.2)

## 2024-09-15 LAB — VIT C SERPL-MCNC: 0.4 MG/DL (ref 0.4–2)

## 2024-09-23 ENCOUNTER — TELEMEDICINE (OUTPATIENT)
Age: 49
End: 2024-09-23
Payer: COMMERCIAL

## 2024-09-23 DIAGNOSIS — R76.12 POSITIVE QUANTIFERON-TB GOLD TEST: Primary | ICD-10-CM

## 2024-09-23 DIAGNOSIS — E66.9 TYPE 2 DIABETES MELLITUS WITH OBESITY  (HCC): ICD-10-CM

## 2024-09-23 DIAGNOSIS — I77.6 AORTITIS (HCC): ICD-10-CM

## 2024-09-23 DIAGNOSIS — E11.69 TYPE 2 DIABETES MELLITUS WITH OBESITY  (HCC): ICD-10-CM

## 2024-09-23 PROCEDURE — 99214 OFFICE O/P EST MOD 30 MIN: CPT | Performed by: PHYSICIAN ASSISTANT

## 2024-09-23 NOTE — ASSESSMENT & PLAN NOTE
Latent TB. Serum quantiferon gold positive on 4/16/2020 during work up for #2. CXR on 4/23/24 negative for active disease. Pt has no current signs or symptoms of active TB. Unfortunately, did not tolerate Rifampin in 2020. Per prior ID recommendations, regimens to consider include INH/B6 v. Linezolid/ethambutol. She started INH/B6 around May 13th. Recent labs 8/27 show chronic and stable leukocytosis with Cr at baseline and normal LFTs.  -Continue INH/B6 x 6 months through early November 2024  -No additional labs or F/u required   -Will upload tx completion letter to [employer]

## 2024-09-23 NOTE — PROGRESS NOTES
Virtual Outpatient Progress Note - [facility] Infectious Disease   Name: Tatiana Balbuena      : 1975      MRN: [id number]  Encounter Provider: Bean Garcia PA-C  Encounter Date: 2024   Encounter department: [facility] DISEASE [facility]    Virtual Regular Visit  [place] of [place] location: [place]  Patient is located in the following [facility] in which I hold an active license [place]    Assessment & Plan  Latent tuberculosis  Latent TB. Serum quantiferon gold positive on 2020 during work up for #2. CXR on 24 negative for active disease. Pt has no current signs or symptoms of active TB. Unfortunately, did not tolerate Rifampin in . Per prior ID recommendations, regimens to consider include INH/B6 v. Linezolid/ethambutol. She started INH/B6 around May 13th. Recent labs  show chronic and stable leukocytosis with Cr at baseline and normal LFTs.  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 Follows Rheum Dr. Gary at [facility] and started Remicade last month.  -Close follow-up ongoing with rheumatology  Type 2 diabetes mellitus with obesity  (HCC)  Recent A1C 7.7% recommend tight glycemic control.      Encounter provider Bean Garcia PA-C    [facility] [address] [address] [phone]    The patient was identified by name and date of birth. Tatiana Balbuena was informed that this is a telemedicine visit and that the visit is being conducted through the [employer] platform. She agrees to proceed..  My office door was closed. No one else was in the room.  She acknowledged consent and understanding of privacy and security of the video platform. The patient has agreed to participate and understands they can  discontinue the visit at any time.    Patient is aware this is a billable service.     Above assessment and plan discussed in detail with patient during examination.     Antibiotics:  PO INH + B6    Subjective:  Tatiana Balbuena is a 49 y.o. female patient who presents to outpatient ID office virtually for ongoing management of latent tuberculosis.  Patient is tolerating the antibiotics and B vitamin.  States she has also started her Remicade infusions.  She feels tired sometimes but overall denies new symptoms.  No fevers or chills.  No shortness of breath, cough, weight loss, night sweats, loss of appetite.  She will complete treatment at the end of October/early November.  She declines refills.    Past Medical History:   Diagnosis Date    Anemia     Asthma     Chronic anemia     Diabetes mellitus (HCC)     Disease of thyroid gland     Hypertension     Hyperthyroidism     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HCC)     TB lung, latent        Past Surgical History:   Procedure Laterality Date     SECTION      IR BIOPSY LIVER MASS  07/15/2020    LIVER BIOPSY      LUNG BIOPSY      THYROIDECTOMY         Current Outpatient Medications   Medication Sig Dispense Refill    Advair -21 MCG/ACT inhaler       albuterol (PROVENTIL HFA,VENTOLIN HFA) 90 mcg/act inhaler Inhale 2 puffs 2 (two) times a day      amLODIPine (NORVASC) 10 mg tablet Take 1 tablet (10 mg total) by mouth daily 30 tablet 0    aspirin (ECOTRIN LOW STRENGTH) 81 mg EC tablet Take 81 mg by mouth daily      atorvastatin (LIPITOR) 40 mg tablet       carvedilol (COREG) 25 mg tablet Take 1 tablet (25 mg total) by mouth 2 (two) times a day with meals 60 tablet 0    cholecalciferol (VITAMIN D3) 1,000 units tablet Take 1 tablet (1,000 Units total) by mouth daily 90 tablet 3    clopidogrel (PLAVIX) 75 mg tablet Take 75 mg by mouth daily      diclofenac sodium (VOLTAREN) 50 mg EC tablet Take 1 tablet (50 mg total) by mouth 2 (two) times a day 30 tablet 0    escitalopram  (LEXAPRO) 10 mg tablet Take 10 mg by mouth daily      folic acid (FOLVITE) 1 mg tablet       furosemide (LASIX) 40 mg tablet Take 1 tablet (40 mg total) by mouth daily 30 tablet 0    isoniazid (NYDRAZID) 300 mg tablet Take 1 tablet (300 mg total) by mouth daily 90 tablet 2    levothyroxine 100 mcg tablet       linaGLIPtin 5 MG TABS Take 5 mg by mouth daily 30 tablet 0    losartan (COZAAR) 100 MG tablet Take 100 mg by mouth daily       montelukast (SINGULAIR) 10 mg tablet Take 10 mg by mouth daily at bedtime      omeprazole (PriLOSEC) 20 mg delayed release capsule Take 1 capsule (20 mg total) by mouth daily 30 capsule 0    predniSONE 10 mg tablet Take by mouth daily Take as directed by rheumatology      Pyridoxine HCl (vitamin B-6) 50 MG TABS Take 1 tablet (50 mg total) by mouth daily 270 tablet 0    vitamin B-12 (VITAMIN B-12) 500 mcg tablet Take 500 mcg by mouth daily       No current facility-administered medications for this visit.        Allergies   Allergen Reactions    Other Allergic Rhinitis     pollen       Video Exam  Exam limited due to virtual visit     There were no vitals filed for this visit.    Physical Exam  Constitutional:       Appearance: Normal appearance. She is not ill-appearing.   HENT:      Head: Normocephalic and atraumatic.      Mouth/Throat:      Mouth: Mucous membranes are moist.   Eyes:      Extraocular Movements: Extraocular movements intact.   Musculoskeletal:      Cervical back: Normal range of motion and neck supple.   Neurological:      Mental Status: She is alert.        Labs, Imaging, & Other studies:   All pertinent labs and imaging studies were personally reviewed by me from 8/27/2024.      The following portions of the patient's history were reviewed and updated as appropriate: allergies, current medications, past family history, past medical history, past social history, past surgical history and problem list.    I spent 30 minutes with patient today in which greater than 50%  of the time was spent in counseling/coordination of care regarding review of plan of care for latent tuberculosis, review of recent labs, answered all questions to the best of my ability

## 2024-09-23 NOTE — LETTER
11/1/2024    Tatiana Balbuena   [birth year]    Whom this may concern,        The above named patient has completed a course of treatment for latent tuberculosis under the guidance and monitoring of this office.    No further treatment or laboratory testing is recommended.    Please don't hesitate to reach out to this office with any questions or concerns.        Sincerely,    Bean Garcia PA-C

## 2024-09-23 NOTE — ASSESSMENT & PLAN NOTE
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 Follows Rheum Dr. Gary at [facility] and started Remicade last month.  -Close follow-up ongoing with rheumatology

## 2024-09-27 ENCOUNTER — OFFICE VISIT (OUTPATIENT)
Dept: HEMATOLOGY ONCOLOGY | Facility: CLINIC | Age: 49
End: 2024-09-27
Payer: COMMERCIAL

## 2024-09-27 ENCOUNTER — TELEPHONE (OUTPATIENT)
Dept: HEMATOLOGY ONCOLOGY | Facility: CLINIC | Age: 49
End: 2024-09-27

## 2024-09-27 VITALS
HEART RATE: 76 BPM | HEIGHT: 63 IN | WEIGHT: 226 LBS | BODY MASS INDEX: 40.04 KG/M2 | DIASTOLIC BLOOD PRESSURE: 62 MMHG | OXYGEN SATURATION: 98 % | SYSTOLIC BLOOD PRESSURE: 116 MMHG | TEMPERATURE: 98.4 F

## 2024-09-27 DIAGNOSIS — D75.839 THROMBOCYTOSIS: Primary | ICD-10-CM

## 2024-09-27 DIAGNOSIS — D50.9 IRON DEFICIENCY ANEMIA, UNSPECIFIED IRON DEFICIENCY ANEMIA TYPE: ICD-10-CM

## 2024-09-27 DIAGNOSIS — R53.83 OTHER FATIGUE: ICD-10-CM

## 2024-09-27 PROCEDURE — 99214 OFFICE O/P EST MOD 30 MIN: CPT | Performed by: NURSE PRACTITIONER

## 2024-09-27 RX ORDER — DAPAGLIFLOZIN 5 MG/1
5 TABLET, FILM COATED ORAL DAILY
COMMUNITY
Start: 2024-09-23

## 2024-09-27 RX ORDER — ACETAMINOPHEN 325 MG/1
650 TABLET ORAL ONCE
Start: 2024-10-14 | End: 2024-10-09

## 2024-09-27 RX ORDER — SODIUM CHLORIDE 9 MG/ML
20 INJECTION, SOLUTION INTRAVENOUS ONCE
OUTPATIENT
Start: 2024-10-14

## 2024-09-27 NOTE — TELEPHONE ENCOUNTER
Please contact Tatiana to schedule Venofer infusion at [facility]. She asks for a Monday or Friday please.     Thank you.

## 2024-09-27 NOTE — PROGRESS NOTES
[facility] H[facility]ATOLOGY [facility]Y [facility]ISTS [place]  [address] [facility] [address] [phone]  Hematology Ambulatory Follow-Up  Tatiana Balbuena, 1975, [id number]  9/27/2024    Assessment/Plan:    1. Thrombocytosis   2. Iron deficiency anemia, unspecified iron deficiency anemia type  3. Other fatigue   Patient is a 49-year-old female with a history of obesity, diabetes, hypertension, hypothyroid, vasculitis, CKD, menorrhagia, thrombocytosis, iron deficiency and vitamin B12 deficiency.  Labs from 9/25/2024 show a WBC 12.66, RBC 3.99, hemoglobin 11.1, MCV 91, platelets 317.  Iron saturation 17% with a ferritin level of 31.  Vitamin B12 level was elevated at 1281.  Vitamin C and vitamin K levels were within normal limits.   Patient previously had iron infusions most recent May 16, 2022.  She had Venofer 200 mg IV x 4 with premeds Tylenol, Benadryl, Zofran and dexamethasone.  We will plan on the same regimen.   I reviewed indications and adverse reactions including shortness of breath, chest heaviness, muscle cramping, headache, itching, Anaphylaxis/allergic reaction, arthralgias/myalgias, nausea; agrees to proceed with treatment. Patient is advised to contact our office if they has any of the symptoms.  We will repeat labs every 12 weeks and follow-up in 6 months.  Patient is up-to-date on mammogram but has not had colonoscopy.  I advised her to discuss with her PCP for referral.    - CBC; Standing  - Iron Panel (Includes Ferritin, Iron Sat%, Iron, and TIBC); Standing    Patient verbalized understanding and is in agreement to the plan.   Patient knows to call the Hematology/Oncology office with any questions and concerns regarding the above.    Barrier(s) to care: None  The patient is able to self care.    Sherin ORNELAS  Medical Oncology/Hematology  [facility]    Interval history: clinically stable    Review of Systems    Patient Active Problem List   Diagnosis     Intramural aortic hematoma (HCC)    Retrosternal chest pain    Left flank pain    Thrombocytosis     Multiple thyroid nodules    Type 2 diabetes mellitus with obesity  (HCC)    Iron deficiency anemia    Pulmonary hypertension (HCC)    Depression    Dyslipidemia, goal LDL below 100    Mild persistent asthma without complication    Essential hypertension    Aortitis     Acute on chronic right-sided low back pain without sciatica    Latent tuberculosis    Abnormal thyroid function test    SIRS (systemic inflammatory response syndrome)    Hyperphosphatemia    Fatigue    Abnormal LFTs    Low back pain    Hypercalcemia    Hyperthyroidism    Low iron stores    Pericardial effusion    Subclavian artery stenosis, left (HCC)    CKD (chronic kidney disease) stage 3, GFR 30-59 ml/min (HCC)    Post-surgical hypothyroidism    Acute kidney injury superimposed on chronic kidney disease  (HCC)    Anxiety state    Vitamin D deficiency    Gastroesophageal reflux disease without esophagitis    History of thyroid cancer    LVH (left ventricular hypertrophy)    Recurrent major depressive disorder (HCC)    S/P total thyroidectomy    Aneurysm of ascending aorta without rupture (HCC)       Past Medical History:   Diagnosis Date    Anemia     Asthma     Chronic anemia     Diabetes mellitus (HCC)     Disease of thyroid gland     Hypertension     Hyperthyroidism     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HCC)     TB lung, latent        Past Surgical History:   Procedure Laterality Date     SECTION      IR BIOPSY LIVER MASS  07/15/2020    LIVER BIOPSY      LUNG BIOPSY      THYROIDECTOMY           Current Outpatient Medications:     Farxiga 5 MG TABS, Take 5 mg by mouth daily, Disp: , Rfl:     Advair -21 MCG/ACT inhaler, , Disp: , Rfl:     albuterol (PROVENTIL HFA,VENTOLIN HFA) 90 mcg/act inhaler, Inhale 2 puffs 2 (two) times a day, Disp: , Rfl:     amLODIPine (NORVASC) 10 mg tablet, Take 1 tablet (10 mg total) by mouth daily, Disp: 30  "tablet, Rfl: 0    aspirin (ECOTRIN LOW STRENGTH) 81 mg EC tablet, Take 81 mg by mouth daily, Disp: , Rfl:     atorvastatin (LIPITOR) 40 mg tablet, , Disp: , Rfl:     carvedilol (COREG) 25 mg tablet, Take 1 tablet (25 mg total) by mouth 2 (two) times a day with meals, Disp: 60 tablet, Rfl: 0    cholecalciferol (VITAMIN D3) 1,000 units tablet, Take 1 tablet (1,000 Units total) by mouth daily, Disp: 90 tablet, Rfl: 3    clopidogrel (PLAVIX) 75 mg tablet, Take 75 mg by mouth daily, Disp: , Rfl:     diclofenac sodium (VOLTAREN) 50 mg EC tablet, Take 1 tablet (50 mg total) by mouth 2 (two) times a day, Disp: 30 tablet, Rfl: 0    escitalopram (LEXAPRO) 10 mg tablet, Take 10 mg by mouth daily, Disp: , Rfl:     folic acid (FOLVITE) 1 mg tablet, , Disp: , Rfl:     furosemide (LASIX) 40 mg tablet, Take 1 tablet (40 mg total) by mouth daily, Disp: 30 tablet, Rfl: 0    isoniazid (NYDRAZID) 300 mg tablet, Take 1 tablet (300 mg total) by mouth daily, Disp: 90 tablet, Rfl: 2    levothyroxine 100 mcg tablet, , Disp: , Rfl:     linaGLIPtin 5 MG TABS, Take 5 mg by mouth daily, Disp: 30 tablet, Rfl: 0    losartan (COZAAR) 100 MG tablet, Take 100 mg by mouth daily , Disp: , Rfl:     montelukast (SINGULAIR) 10 mg tablet, Take 10 mg by mouth daily at bedtime, Disp: , Rfl:     omeprazole (PriLOSEC) 20 mg delayed release capsule, Take 1 capsule (20 mg total) by mouth daily, Disp: 30 capsule, Rfl: 0    predniSONE 10 mg tablet, Take by mouth daily Take as directed by rheumatology, Disp: , Rfl:     Pyridoxine HCl (vitamin B-6) 50 MG TABS, Take 1 tablet (50 mg total) by mouth daily, Disp: 270 tablet, Rfl: 0    vitamin B-12 (VITAMIN B-12) 500 mcg tablet, Take 500 mcg by mouth daily, Disp: , Rfl:     Allergies   Allergen Reactions    Other Allergic Rhinitis     pollen       Objective:  /62 (BP Location: Left arm, Patient Position: Sitting, Cuff Size: Standard)   Pulse 76   Temp 98.4 °F (36.9 °C) (Temporal)   Ht 5' 3\" (1.6 m)   Wt 103 kg " (226 lb)   LMP 04/04/2020 (Exact Date)   SpO2 98%   BMI 40.03 kg/m²    Physical Exam    Result Review  Labs:  Lab Results   Component Value Date    WBC 10.60 (H) 08/27/2024    HGB 10.1 (L) 08/27/2024    HCT 33.4 (L) 08/27/2024    MCV 91 08/27/2024     08/27/2024     Lab Results   Component Value Date    SODIUM 141 09/25/2024    K 4.1 09/25/2024     09/25/2024    CO2 20 (L) 09/25/2024    AGAP 14 09/25/2024    BUN 35 (H) 09/25/2024    CREATININE 2.4 (H) 09/25/2024    GLUC 119 09/25/2024    GLUF 82 08/27/2024    CALCIUM 9.1 09/25/2024    AST 14 08/27/2024    ALT 20 08/27/2024    ALKPHOS 69 08/27/2024    TP 6.7 08/27/2024    TBILI 0.23 08/27/2024    EGFR 24 (L) 09/25/2024     Imaging:  No results found.    Please note:  This report has been generated by a voice recognition software system. Therefore there may be syntax, spelling, and/or grammatical errors. Please call if you have any questions.

## 2024-10-14 ENCOUNTER — HOSPITAL ENCOUNTER (OUTPATIENT)
Dept: INFUSION CENTER | Facility: HOSPITAL | Age: 49
Discharge: HOME | End: 2024-10-14
Attending: INTERNAL MEDICINE
Payer: COMMERCIAL

## 2024-10-14 VITALS
SYSTOLIC BLOOD PRESSURE: 111 MMHG | RESPIRATION RATE: 18 BRPM | TEMPERATURE: 97.8 F | DIASTOLIC BLOOD PRESSURE: 73 MMHG | HEART RATE: 85 BPM

## 2024-10-14 DIAGNOSIS — R79.0 LOW IRON STORES: ICD-10-CM

## 2024-10-14 DIAGNOSIS — D50.9 IRON DEFICIENCY ANEMIA, UNSPECIFIED IRON DEFICIENCY ANEMIA TYPE: ICD-10-CM

## 2024-10-14 DIAGNOSIS — D75.839 THROMBOCYTOSIS: Primary | ICD-10-CM

## 2024-10-14 PROCEDURE — 96367 TX/PROPH/DG ADDL SEQ IV INF: CPT

## 2024-10-14 PROCEDURE — 96365 THER/PROPH/DIAG IV INF INIT: CPT

## 2024-10-14 RX ORDER — SODIUM CHLORIDE 9 MG/ML
20 INJECTION, SOLUTION INTRAVENOUS ONCE
Status: COMPLETED | OUTPATIENT
Start: 2024-10-14 | End: 2024-10-14

## 2024-10-14 RX ORDER — ACETAMINOPHEN 325 MG/1
650 TABLET ORAL ONCE
Status: COMPLETED | OUTPATIENT
Start: 2024-10-14 | End: 2024-10-14

## 2024-10-14 RX ORDER — SODIUM CHLORIDE 9 MG/ML
20 INJECTION, SOLUTION INTRAVENOUS ONCE
Status: CANCELLED | OUTPATIENT
Start: 2024-10-21

## 2024-10-14 RX ORDER — ACETAMINOPHEN 325 MG/1
650 TABLET ORAL ONCE
Status: CANCELLED
Start: 2024-10-21 | End: 2024-10-21

## 2024-10-14 RX ADMIN — DIPHENHYDRAMINE HYDROCHLORIDE 25 MG: 50 INJECTION, SOLUTION INTRAMUSCULAR; INTRAVENOUS at 12:02

## 2024-10-14 RX ADMIN — ACETAMINOPHEN 650 MG: 325 TABLET ORAL at 12:06

## 2024-10-14 RX ADMIN — SODIUM CHLORIDE 20 ML/HR: 0.9 INJECTION, SOLUTION INTRAVENOUS at 12:02

## 2024-10-14 RX ADMIN — IRON SUCROSE 200 MG: 20 INJECTION, SOLUTION INTRAVENOUS at 13:05

## 2024-10-14 RX ADMIN — DEXAMETHASONE SODIUM PHOSPHATE: 10 INJECTION, SOLUTION INTRAMUSCULAR; INTRAVENOUS at 12:24

## 2024-10-14 NOTE — PROGRESS NOTES
Tatiana Balbuena  tolerated treatment well with no complications.      Tatiana Balbuena is aware of future appt on 10/21/24 at 1210.

## 2024-10-21 ENCOUNTER — HOSPITAL ENCOUNTER (OUTPATIENT)
Dept: INFUSION CENTER | Facility: HOSPITAL | Age: 49
Discharge: HOME | End: 2024-10-21
Attending: INTERNAL MEDICINE
Payer: COMMERCIAL

## 2024-10-21 VITALS
TEMPERATURE: 97.5 F | DIASTOLIC BLOOD PRESSURE: 88 MMHG | HEART RATE: 82 BPM | OXYGEN SATURATION: 98 % | RESPIRATION RATE: 16 BRPM | SYSTOLIC BLOOD PRESSURE: 159 MMHG

## 2024-10-21 DIAGNOSIS — R79.0 LOW IRON STORES: ICD-10-CM

## 2024-10-21 DIAGNOSIS — D75.839 THROMBOCYTOSIS: Primary | ICD-10-CM

## 2024-10-21 DIAGNOSIS — D50.9 IRON DEFICIENCY ANEMIA, UNSPECIFIED IRON DEFICIENCY ANEMIA TYPE: ICD-10-CM

## 2024-10-21 PROCEDURE — 96367 TX/PROPH/DG ADDL SEQ IV INF: CPT

## 2024-10-21 PROCEDURE — 96365 THER/PROPH/DIAG IV INF INIT: CPT

## 2024-10-21 RX ORDER — SODIUM CHLORIDE 9 MG/ML
20 INJECTION, SOLUTION INTRAVENOUS ONCE
OUTPATIENT
Start: 2024-10-28

## 2024-10-21 RX ORDER — ACETAMINOPHEN 325 MG/1
650 TABLET ORAL ONCE
Start: 2024-10-28 | End: 2024-10-28

## 2024-10-21 RX ORDER — SODIUM CHLORIDE 9 MG/ML
20 INJECTION, SOLUTION INTRAVENOUS ONCE
Status: COMPLETED | OUTPATIENT
Start: 2024-10-21 | End: 2024-10-21

## 2024-10-21 RX ORDER — ACETAMINOPHEN 325 MG/1
650 TABLET ORAL ONCE
Status: COMPLETED | OUTPATIENT
Start: 2024-10-21 | End: 2024-10-21

## 2024-10-21 RX ADMIN — SODIUM CHLORIDE 20 ML/HR: 0.9 INJECTION, SOLUTION INTRAVENOUS at 12:23

## 2024-10-21 RX ADMIN — SODIUM CHLORIDE 200 MG: 9 INJECTION, SOLUTION INTRAVENOUS at 13:30

## 2024-10-21 RX ADMIN — ACETAMINOPHEN 650 MG: 325 TABLET ORAL at 12:25

## 2024-10-21 RX ADMIN — DEXAMETHASONE SODIUM PHOSPHATE: 10 INJECTION, SOLUTION INTRAMUSCULAR; INTRAVENOUS at 12:23

## 2024-10-21 RX ADMIN — DIPHENHYDRAMINE HYDROCHLORIDE 25 MG: 50 INJECTION, SOLUTION INTRAMUSCULAR; INTRAVENOUS at 12:57

## 2024-10-21 NOTE — PROGRESS NOTES
Tatiana Balbuena tolerated IV Venofer well with no complications.      Tatiana Balbuena is aware of future appt on 10/28/24 at 1030.     AVS declined.

## 2024-10-28 ENCOUNTER — HOSPITAL ENCOUNTER (OUTPATIENT)
Dept: INFUSION CENTER | Facility: HOSPITAL | Age: 49
Discharge: HOME | End: 2024-10-28
Attending: INTERNAL MEDICINE
Payer: COMMERCIAL

## 2024-10-28 VITALS
SYSTOLIC BLOOD PRESSURE: 114 MMHG | OXYGEN SATURATION: 97 % | TEMPERATURE: 98 F | DIASTOLIC BLOOD PRESSURE: 73 MMHG | RESPIRATION RATE: 17 BRPM | HEART RATE: 83 BPM

## 2024-10-28 DIAGNOSIS — D75.839 THROMBOCYTOSIS: Primary | ICD-10-CM

## 2024-10-28 DIAGNOSIS — R79.0 LOW IRON STORES: ICD-10-CM

## 2024-10-28 DIAGNOSIS — D50.9 IRON DEFICIENCY ANEMIA, UNSPECIFIED IRON DEFICIENCY ANEMIA TYPE: ICD-10-CM

## 2024-10-28 PROCEDURE — 96367 TX/PROPH/DG ADDL SEQ IV INF: CPT

## 2024-10-28 PROCEDURE — 96365 THER/PROPH/DIAG IV INF INIT: CPT

## 2024-10-28 RX ORDER — SODIUM CHLORIDE 9 MG/ML
20 INJECTION, SOLUTION INTRAVENOUS ONCE
Status: COMPLETED | OUTPATIENT
Start: 2024-10-28 | End: 2024-10-28

## 2024-10-28 RX ORDER — ACETAMINOPHEN 325 MG/1
650 TABLET ORAL ONCE
Status: CANCELLED
Start: 2024-11-08 | End: 2024-11-04

## 2024-10-28 RX ORDER — ACETAMINOPHEN 325 MG/1
650 TABLET ORAL ONCE
Status: COMPLETED | OUTPATIENT
Start: 2024-10-28 | End: 2024-10-28

## 2024-10-28 RX ORDER — SODIUM CHLORIDE 9 MG/ML
20 INJECTION, SOLUTION INTRAVENOUS ONCE
Status: CANCELLED | OUTPATIENT
Start: 2024-11-08

## 2024-10-28 RX ADMIN — DIPHENHYDRAMINE HYDROCHLORIDE 25 MG: 50 INJECTION, SOLUTION INTRAMUSCULAR; INTRAVENOUS at 10:59

## 2024-10-28 RX ADMIN — SODIUM CHLORIDE 20 ML/HR: 0.9 INJECTION, SOLUTION INTRAVENOUS at 10:30

## 2024-10-28 RX ADMIN — IRON SUCROSE 200 MG: 20 INJECTION, SOLUTION INTRAVENOUS at 11:37

## 2024-10-28 RX ADMIN — DEXAMETHASONE SODIUM PHOSPHATE: 10 INJECTION, SOLUTION INTRAMUSCULAR; INTRAVENOUS at 10:28

## 2024-10-28 RX ADMIN — ACETAMINOPHEN 650 MG: 325 TABLET ORAL at 10:29

## 2024-10-28 NOTE — PLAN OF CARE
Problem: Potential for Falls  Goal: Patient will remain free of falls  Description: INTERVENTIONS:  - Educate patient/family on patient safety including physical limitations  - Instruct patient to call for assistance with activity   - Consult OT/PT to assist with strengthening/mobility   - Keep Call bell within reach  - Keep bed low and locked with side rails adjusted as appropriate  - Keep care items and personal belongings within reach  - Initiate and maintain comfort rounds  - Make Fall Risk Sign visible to staff  - Consider moving patient to room near nurses station  Outcome: Progressing     Problem: INFECTION - ADULT  Goal: Absence or prevention of progression during hospitalization  Description: INTERVENTIONS:  - Assess and monitor for signs and symptoms of infection  - Monitor lab/diagnostic results  - Monitor all insertion sites, i.e. indwelling lines, tubes, and drains  - Monitor endotracheal if appropriate and nasal secretions for changes in amount and color  - [place] appropriate cooling/warming therapies per order  - Administer medications as ordered  - Instruct and encourage patient and family to use good hand hygiene technique  - Identify and instruct in appropriate isolation precautions for identified infection/condition  Outcome: Progressing     Problem: Knowledge Deficit  Goal: Patient/family/caregiver demonstrates understanding of disease process, treatment plan, medications, and discharge instructions  Description: Complete learning assessment and assess knowledge base.  Interventions:  - Provide teaching at level of understanding  - Provide teaching via preferred learning methods  Outcome: Progressing

## 2024-10-28 NOTE — PROGRESS NOTES
Tatiana Balbuena tolerated IV Venofer well with no complications. PIV removed without incident.     Tatiana Balbuena is aware of future appt on 11/8/24 at 1PM.      AVS declined. Pt discharged off unit in stable condition with all personal belongings.

## 2024-11-07 DIAGNOSIS — R79.0 LOW IRON STORES: ICD-10-CM

## 2024-11-07 DIAGNOSIS — D75.839 THROMBOCYTOSIS: Primary | ICD-10-CM

## 2024-11-07 DIAGNOSIS — D50.9 IRON DEFICIENCY ANEMIA, UNSPECIFIED IRON DEFICIENCY ANEMIA TYPE: ICD-10-CM

## 2024-11-07 RX ORDER — ACETAMINOPHEN 325 MG/1
650 TABLET ORAL ONCE
Status: CANCELLED
Start: 2024-11-08 | End: 2024-11-08

## 2024-11-07 RX ORDER — SODIUM CHLORIDE 9 MG/ML
20 INJECTION, SOLUTION INTRAVENOUS ONCE
Status: CANCELLED | OUTPATIENT
Start: 2024-11-08

## 2024-11-08 ENCOUNTER — HOSPITAL ENCOUNTER (OUTPATIENT)
Dept: INFUSION CENTER | Facility: HOSPITAL | Age: 49
End: 2024-11-08
Attending: INTERNAL MEDICINE
Payer: COMMERCIAL

## 2024-11-08 VITALS
DIASTOLIC BLOOD PRESSURE: 84 MMHG | SYSTOLIC BLOOD PRESSURE: 136 MMHG | TEMPERATURE: 98.4 F | RESPIRATION RATE: 16 BRPM | HEART RATE: 86 BPM

## 2024-11-08 DIAGNOSIS — D75.839 THROMBOCYTOSIS: ICD-10-CM

## 2024-11-08 DIAGNOSIS — D50.9 IRON DEFICIENCY ANEMIA, UNSPECIFIED IRON DEFICIENCY ANEMIA TYPE: Primary | ICD-10-CM

## 2024-11-08 DIAGNOSIS — R79.0 LOW IRON STORES: ICD-10-CM

## 2024-11-08 PROCEDURE — 96367 TX/PROPH/DG ADDL SEQ IV INF: CPT

## 2024-11-08 PROCEDURE — 96365 THER/PROPH/DIAG IV INF INIT: CPT

## 2024-11-08 RX ORDER — ACETAMINOPHEN 325 MG/1
650 TABLET ORAL ONCE
Status: COMPLETED | OUTPATIENT
Start: 2024-11-08 | End: 2024-11-08

## 2024-11-08 RX ORDER — SODIUM CHLORIDE 9 MG/ML
20 INJECTION, SOLUTION INTRAVENOUS ONCE
Status: COMPLETED | OUTPATIENT
Start: 2024-11-08 | End: 2024-11-08

## 2024-11-08 RX ORDER — ACETAMINOPHEN 325 MG/1
650 TABLET ORAL ONCE
Status: CANCELLED
Start: 2024-11-08 | End: 2024-11-08

## 2024-11-08 RX ORDER — SODIUM CHLORIDE 9 MG/ML
20 INJECTION, SOLUTION INTRAVENOUS ONCE
Status: CANCELLED | OUTPATIENT
Start: 2024-11-08

## 2024-11-08 RX ADMIN — SODIUM CHLORIDE 20 ML/HR: 0.9 INJECTION, SOLUTION INTRAVENOUS at 13:10

## 2024-11-08 RX ADMIN — ACETAMINOPHEN 650 MG: 325 TABLET, FILM COATED ORAL at 13:10

## 2024-11-08 RX ADMIN — DEXAMETHASONE SODIUM PHOSPHATE: 10 INJECTION, SOLUTION INTRAMUSCULAR; INTRAVENOUS at 13:13

## 2024-11-08 RX ADMIN — DIPHENHYDRAMINE HYDROCHLORIDE 25 MG: 50 INJECTION, SOLUTION INTRAMUSCULAR; INTRAVENOUS at 13:49

## 2024-11-08 RX ADMIN — IRON SUCROSE 200 MG: 20 INJECTION, SOLUTION INTRAVENOUS at 14:28

## 2024-11-08 NOTE — PLAN OF CARE
Problem: Potential for Falls  Goal: Patient will remain free of falls  Description: INTERVENTIONS:  - Educate patient/family on patient safety including physical limitations  - Instruct patient to call for assistance with activity   - Consult OT/PT to assist with strengthening/mobility   - Keep Call bell within reach  - Keep bed low and locked with side rails adjusted as appropriate  - Keep care items and personal belongings within reach  - Initiate and maintain comfort rounds  - Make Fall Risk Sign visible to staff  - Consider moving patient to room near nurses station  Outcome: Progressing     Problem: INFECTION - ADULT  Goal: Absence or prevention of progression during hospitalization  Description: INTERVENTIONS:  - Assess and monitor for signs and symptoms of infection  - Monitor lab/diagnostic results  - Monitor all insertion sites, i.e. indwelling lines, tubes, and drains  - Monitor endotracheal if appropriate and nasal secretions for changes in amount and color  - [place] appropriate cooling/warming therapies per order  - Administer medications as ordered  - Instruct and encourage patient and family to use good hand hygiene technique  - Identify and instruct in appropriate isolation precautions for identified infection/condition  Outcome: Progressing     Problem: Knowledge Deficit  Goal: Patient/family/caregiver demonstrates understanding of disease process, treatment plan, medications, and discharge instructions  Description: Complete learning assessment and assess knowledge base.  Interventions:  - Provide teaching at level of understanding  - Provide teaching via preferred learning methods  Outcome: Progressing

## 2024-11-08 NOTE — PROGRESS NOTES
Tatiana Balbuena tolerated last ordered Venofer infusion well with no complications. PIV removed without incident.     Tatiana Balbuena does not need future f/u appt with infusion at this time.       AVS declined by pt.     Pt discharged off unit in stable condition with all personal belongings.

## 2024-11-21 ENCOUNTER — OFFICE VISIT (OUTPATIENT)
Dept: CARDIOLOGY CLINIC | Facility: HOSPITAL | Age: 49
End: 2024-11-21
Payer: COMMERCIAL

## 2024-11-21 VITALS
DIASTOLIC BLOOD PRESSURE: 76 MMHG | OXYGEN SATURATION: 98 % | HEART RATE: 84 BPM | SYSTOLIC BLOOD PRESSURE: 124 MMHG | HEIGHT: 63 IN | BODY MASS INDEX: 45.64 KG/M2 | WEIGHT: 257.6 LBS

## 2024-11-21 DIAGNOSIS — R07.89 ATYPICAL CHEST PAIN: ICD-10-CM

## 2024-11-21 DIAGNOSIS — I51.7 LVH (LEFT VENTRICULAR HYPERTROPHY): ICD-10-CM

## 2024-11-21 DIAGNOSIS — I71.00 INTRAMURAL AORTIC HEMATOMA (HCC): ICD-10-CM

## 2024-11-21 DIAGNOSIS — I71.21 ANEURYSM OF ASCENDING AORTA WITHOUT RUPTURE (HCC): Primary | ICD-10-CM

## 2024-11-21 DIAGNOSIS — I77.6 AORTITIS (HCC): ICD-10-CM

## 2024-11-21 PROCEDURE — 99214 OFFICE O/P EST MOD 30 MIN: CPT | Performed by: INTERNAL MEDICINE

## 2024-11-22 PROBLEM — R07.89 ATYPICAL CHEST PAIN: Status: ACTIVE | Noted: 2024-11-22

## 2024-11-22 NOTE — PROGRESS NOTES
Cardiology Follow Up    Tatiana Balbuena  1975  [id number]  [facility] [address] [id number] [phone]    1. Aneurysm of ascending aorta without rupture (HCC)        2. LVH (left ventricular hypertrophy)        3. Intramural aortic hematoma (HCC)        4. Aortitis         5. Atypical chest pain              Discussion/Summary: All of her assessed cardiac problems are stable. I have reviewed her medications and made no changes. No cardiac testing is ordered.  RTO 9 months.      Interval History: I last saw her 12/21/2020. Earlier this year she has L subclavian artery stenting at [facility]. She still follows with vascular there. Her PCP is also at [facility].  She has not had any cardiac problems recently. She has chronic and constant L chest and L arm pain.    Creatinine 2.0  A1C 7.4    Her weight is up from 229 to 257 lbs.    Nuclear stress test 12/2020 - EF 58 %, no ischemia    /80    Patient Active Problem List   Diagnosis    Intramural aortic hematoma (HCC)    Retrosternal chest pain    Left flank pain    Thrombocytosis     Multiple thyroid nodules    Type 2 diabetes mellitus with obesity  (HCC)    Iron deficiency anemia    Pulmonary hypertension (HCC)    Depression    Dyslipidemia, goal LDL below 100    Mild persistent asthma without complication    Essential hypertension    Aortitis     Acute on chronic right-sided low back pain without sciatica    Latent tuberculosis    Abnormal thyroid function test    SIRS (systemic inflammatory response syndrome)    Hyperphosphatemia    Fatigue    Abnormal LFTs    Low back pain    Hypercalcemia    Hyperthyroidism    Low iron stores    Pericardial effusion    Subclavian artery stenosis, left (HCC)    CKD (chronic kidney disease) stage 3, GFR 30-59 ml/min (HCC)    Post-surgical hypothyroidism    Acute kidney injury superimposed on chronic  kidney disease  (HCC)    Anxiety state    Vitamin D deficiency    Gastroesophageal reflux disease without esophagitis    History of thyroid cancer    LVH (left ventricular hypertrophy)    Recurrent major depressive disorder (HCC)    S/P total thyroidectomy    Aneurysm of ascending aorta without rupture (HCC)    Atypical chest pain     Past Medical History:   Diagnosis Date    Anemia     Asthma     Chronic anemia     Diabetes mellitus (HCC)     Disease of thyroid gland     Hypertension     Hyperthyroidism     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HCC)     TB lung, latent      Social History     Socioeconomic History    Marital status: /Civil Union     Spouse name: Not on file    Number of children: Not on file    Years of education: Not on file    Highest education level: Not on file   Occupational History    Occupation: not working   Tobacco Use    Smoking status: Never    Smokeless tobacco: Never   Vaping Use    Vaping status: Never Used   Substance and Sexual Activity    Alcohol use: Never    Drug use: Never    Sexual activity: Yes     Partners: Male   Other Topics Concern    Not on file   Social History Narrative    Not on file     Social Drivers of Health     Financial Resource Strain: Low Risk  (5/3/2024)    Received from [employer]    Financial Resource Strain     Do you have any trouble paying for your medications, or do you think you might in the future?: No     Does your family have trouble paying for medicine? (Household - for ages 0-17 years): Not on file   Food Insecurity: No Food Insecurity (5/3/2024)    Received from [employer]    Hunger Vital Sign     Worried About Running Out of Food in the Last Year: Never true     Ran Out of Food in the Last Year: Never true   Transportation Needs: Unmet Transportation Needs (5/3/2024)    Received from [employer]    Transportation Needs     READ ONLY Do you have trouble getting a ride to medical visits or work?: Sometimes True     Does your family have a hard time getting a  ride to doctors’ visits? (Household - for ages 0-17 years): Not on file     Has lack of transportation kept you from medical appointments, meetings, work, or from getting things needed for daily living? Check all that apply. (Adult - for ages 18 years and over): Not on file     Do you (or your family) have trouble finding or paying for a ride (transportation)? (Household - for ages 0-17 years): Not on file   Physical Activity: Not on file   Stress: Not on file   Social Connections: Socially Integrated (5/3/2024)    Received from [employer]     How often do you feel lonely or isolated from those around you?: Never   Intimate Partner Violence: Not on file   Housing Stability: Low Risk  (5/3/2024)    Received from [employer]    Housing Stability     Do you currently live in a shelter or have no steady place to sleep at night?: No     READ ONLY Do you think you are at risk of becoming homeless?: No     Does your family worry about paying for your home or becoming homeless? (Household - for ages 0-17 years): Not on file     Are you homeless or worried that you might be in the future? (Adult - for ages 18 years and over): Not on file     Are you (or your family) homeless or worried that you might be in the future? (Household - for ages 0-17 years): Not on file      Family History   Problem Relation Age of Onset    Diabetes unspecified Mother      Past Surgical History:   Procedure Laterality Date     SECTION      IR BIOPSY LIVER MASS  07/15/2020    LIVER BIOPSY      LUNG BIOPSY      THYROIDECTOMY         Current Outpatient Medications:     Advair -21 MCG/ACT inhaler, , Disp: , Rfl:     amLODIPine (NORVASC) 10 mg tablet, Take 1 tablet (10 mg total) by mouth daily, Disp: 30 tablet, Rfl: 0    aspirin (ECOTRIN LOW STRENGTH) 81 mg EC tablet, Take 81 mg by mouth daily, Disp: , Rfl:     atorvastatin (LIPITOR) 40 mg tablet, , Disp: , Rfl:     carvedilol (COREG) 25 mg tablet, Take 1 tablet (25 mg  total) by mouth 2 (two) times a day with meals, Disp: 60 tablet, Rfl: 0    cholecalciferol (VITAMIN D3) 1,000 units tablet, Take 1 tablet (1,000 Units total) by mouth daily, Disp: 90 tablet, Rfl: 3    clopidogrel (PLAVIX) 75 mg tablet, Take 75 mg by mouth daily, Disp: , Rfl:     diclofenac sodium (VOLTAREN) 50 mg EC tablet, Take 1 tablet (50 mg total) by mouth 2 (two) times a day, Disp: 30 tablet, Rfl: 0    escitalopram (LEXAPRO) 10 mg tablet, Take 10 mg by mouth daily, Disp: , Rfl:     Farxiga 5 MG TABS, Take 5 mg by mouth daily, Disp: , Rfl:     folic acid (FOLVITE) 1 mg tablet, , Disp: , Rfl:     furosemide (LASIX) 40 mg tablet, Take 1 tablet (40 mg total) by mouth daily, Disp: 30 tablet, Rfl: 0    levothyroxine 100 mcg tablet, , Disp: , Rfl:     linaGLIPtin 5 MG TABS, Take 5 mg by mouth daily, Disp: 30 tablet, Rfl: 0    losartan (COZAAR) 100 MG tablet, Take 100 mg by mouth daily , Disp: , Rfl:     montelukast (SINGULAIR) 10 mg tablet, Take 10 mg by mouth daily at bedtime, Disp: , Rfl:     omeprazole (PriLOSEC) 20 mg delayed release capsule, Take 1 capsule (20 mg total) by mouth daily, Disp: 30 capsule, Rfl: 0    predniSONE 10 mg tablet, Take by mouth daily Take as directed by rheumatology, Disp: , Rfl:     Pyridoxine HCl (vitamin B-6) 50 MG TABS, Take 1 tablet (50 mg total) by mouth daily, Disp: 270 tablet, Rfl: 0    vitamin B-12 (VITAMIN B-12) 500 mcg tablet, Take 500 mcg by mouth daily, Disp: , Rfl:     albuterol (PROVENTIL HFA,VENTOLIN HFA) 90 mcg/act inhaler, Inhale 2 puffs 2 (two) times a day (Patient not taking: Reported on 11/21/2024), Disp: , Rfl:     isoniazid (NYDRAZID) 300 mg tablet, Take 1 tablet (300 mg total) by mouth daily (Patient not taking: Reported on 11/21/2024), Disp: 90 tablet, Rfl: 2  Allergies   Allergen Reactions    Other Allergic Rhinitis     pollen     Vitals:    11/21/24 1514   BP: 124/76   Patient Position: Sitting   Cuff Size: Large   Pulse: 84   SpO2: 98%   Weight: 117 kg (257 lb  "9.6 oz)   Height: 5' 3\" (1.6 m)     Weight (last 2 days)       Date/Time Weight    11/21/24 1514 117 (257.6)           Blood pressure 124/76, pulse 84, height 5' 3\" (1.6 m), weight 117 kg (257 lb 9.6 oz), last menstrual period 04/04/2020, SpO2 98%., Body mass index is 45.63 kg/m².    Labs:  Appointment on 09/10/2024   Component Date Value    Vitamin B-12 09/10/2024 1,281 (H)     Vitamin K 09/10/2024 0.88     Vitamin C 09/10/2024 0.4     Iron Saturation 09/10/2024 17     TIBC 09/10/2024 357     Iron 09/10/2024 62     UIBC 09/10/2024 295     Ferritin 09/10/2024 31    Appointment on 08/27/2024   Component Date Value    WBC 08/27/2024 10.60 (H)     RBC 08/27/2024 3.69 (L)     Hemoglobin 08/27/2024 10.1 (L)     Hematocrit 08/27/2024 33.4 (L)     MCV 08/27/2024 91     MCH 08/27/2024 27.4     MCHC 08/27/2024 30.2 (L)     RDW 08/27/2024 18.5 (H)     MPV 08/27/2024 10.7     Platelets 08/27/2024 302     nRBC 08/27/2024 0     Segmented % 08/27/2024 46     Immature Grans % 08/27/2024 1     Lymphocytes % 08/27/2024 46 (H)     Monocytes % 08/27/2024 4     Eosinophils Relative 08/27/2024 2     Basophils Relative 08/27/2024 1     Absolute Neutrophils 08/27/2024 4.84     Absolute Immature Grans 08/27/2024 0.08     Absolute Lymphocytes 08/27/2024 4.91 (H)     Absolute Monocytes 08/27/2024 0.47     Eosinophils Absolute 08/27/2024 0.21     Basophils Absolute 08/27/2024 0.09     Sodium 08/27/2024 142     Potassium 08/27/2024 4.0     Chloride 08/27/2024 111 (H)     CO2 08/27/2024 19 (L)     ANION GAP 08/27/2024 12     BUN 08/27/2024 45 (H)     Creatinine 08/27/2024 1.79 (H)     Glucose, Fasting 08/27/2024 82     Calcium 08/27/2024 8.6     AST 08/27/2024 14     ALT 08/27/2024 20     Alkaline Phosphatase 08/27/2024 69     Total Protein 08/27/2024 6.7     Albumin 08/27/2024 3.7     Total Bilirubin 08/27/2024 0.23     eGFR 08/27/2024 32    Hospital Outpatient Visit on 07/26/2024   Component Date Value    AV peak gradient 07/26/2024 93     MV " Peak A Kole 07/26/2024 1     MV stenosis pressure 1/2* 07/26/2024 81     MV Peak E Kole 07/26/2024 78     E wave deceleration time 07/26/2024 279     E/A ratio 07/26/2024 0.78     MV valve area p 1/2 meth* 07/26/2024 2.72     AV regurgitation pressur* 07/26/2024 475     Left ventricular stroke * 07/26/2024 100.00     IVSd 07/26/2024 1.20     Ao root 07/26/2024 3.00     LVPWd 07/26/2024 1.20     LA size 07/26/2024 4.4     FS 07/26/2024 38     LVIDS 07/26/2024 3.50     IVS 07/26/2024 1.2     LVIDd 07/26/2024 5.60     AV Deceleration Time 07/26/2024 1,636     LEFT VENTRICLE SYSTOLIC * 07/26/2024 53     LV DIASTOLIC VOLUME (MOD* 07/26/2024 152     MV E' Tissue Velocity La* 07/26/2024 5     MV E' Tissue Velocity Se* 07/26/2024 5     LVSV, 2D 07/26/2024 100     BSA 07/26/2024 2.05     LV EF 07/26/2024 60    Appointment on 07/01/2024   Component Date Value    WBC 07/01/2024 10.19 (H)     RBC 07/01/2024 3.51 (L)     Hemoglobin 07/01/2024 9.7 (L)     Hematocrit 07/01/2024 31.6 (L)     MCV 07/01/2024 90     MCH 07/01/2024 27.6     MCHC 07/01/2024 30.7 (L)     RDW 07/01/2024 16.2 (H)     MPV 07/01/2024 10.0     Platelets 07/01/2024 316     nRBC 07/01/2024 0     Segmented % 07/01/2024 52     Immature Grans % 07/01/2024 2     Lymphocytes % 07/01/2024 35     Monocytes % 07/01/2024 8     Eosinophils Relative 07/01/2024 2     Basophils Relative 07/01/2024 1     Absolute Neutrophils 07/01/2024 5.31     Absolute Immature Grans 07/01/2024 0.20     Absolute Lymphocytes 07/01/2024 3.53     Absolute Monocytes 07/01/2024 0.83     Eosinophils Absolute 07/01/2024 0.21     Basophils Absolute 07/01/2024 0.11 (H)     Sodium 07/01/2024 143     Potassium 07/01/2024 4.6     Chloride 07/01/2024 111 (H)     CO2 07/01/2024 19 (L)     ANION GAP 07/01/2024 13     BUN 07/01/2024 49 (H)     Creatinine 07/01/2024 1.76 (H)     Glucose, Fasting 07/01/2024 92     Calcium 07/01/2024 9.0     Corrected Calcium 07/01/2024 9.5     AST 07/01/2024 12 (L)     ALT  07/01/2024 20     Alkaline Phosphatase 07/01/2024 85     Total Protein 07/01/2024 6.8     Albumin 07/01/2024 3.4 (L)     Total Bilirubin 07/01/2024 0.23     eGFR 07/01/2024 33     Cholesterol 07/01/2024 193     Triglycerides 07/01/2024 139     HDL, Direct 07/01/2024 60     LDL Calculated 07/01/2024 105 (H)     Non-HDL-Chol (CHOL-HDL) 07/01/2024 133     LDL Direct 07/01/2024 118 (H)    Appointment on 06/01/2024   Component Date Value    WBC 06/01/2024 11.25 (H)     RBC 06/01/2024 3.61 (L)     Hemoglobin 06/01/2024 10.0 (L)     Hematocrit 06/01/2024 32.0 (L)     MCV 06/01/2024 89     MCH 06/01/2024 27.7     MCHC 06/01/2024 31.3 (L)     RDW 06/01/2024 18.6 (H)     MPV 06/01/2024 9.9     Platelets 06/01/2024 333     Sodium 06/01/2024 140     Potassium 06/01/2024 4.1     Chloride 06/01/2024 108     CO2 06/01/2024 22     ANION GAP 06/01/2024 10     BUN 06/01/2024 41 (H)     Creatinine 06/01/2024 1.65 (H)     Glucose, Fasting 06/01/2024 99     Calcium 06/01/2024 8.9     AST 06/01/2024 12 (L)     ALT 06/01/2024 21     Alkaline Phosphatase 06/01/2024 80     Total Protein 06/01/2024 7.0     Albumin 06/01/2024 3.7     Total Bilirubin 06/01/2024 0.25     eGFR 06/01/2024 35     Segmented % 06/01/2024 56     Lymphocytes % 06/01/2024 28     Monocytes % 06/01/2024 12     Eosinophils % 06/01/2024 3     Basophils % 06/01/2024 0     Atypical Lymphocytes % 06/01/2024 1 (H)     Absolute Neutrophils 06/01/2024 6.30     Absolute Lymphocytes 06/01/2024 3.26     Absolute Monocytes 06/01/2024 1.35 (H)     Absolute Eosinophils 06/01/2024 0.34     Absolute Basophils 06/01/2024 0.00     RBC Morphology 06/01/2024 Present     Platelet Estimate 06/01/2024 Adequate     Anisocytosis 06/01/2024 Present      Imaging: No results found.    Review of Systems:  Review of Systems   Constitutional:  Negative for diaphoresis, fatigue, fever and unexpected weight change.   HENT: Negative.     Respiratory:  Negative for cough, shortness of breath and wheezing.     Cardiovascular:  Positive for chest pain. Negative for palpitations and leg swelling.   Gastrointestinal:  Negative for abdominal pain, diarrhea and nausea.   Musculoskeletal:  Negative for gait problem and myalgias.   Skin:  Negative for rash.   Neurological:  Negative for dizziness and numbness.   Psychiatric/Behavioral: Negative.         Physical Exam:  Physical Exam  Constitutional:       Appearance: She is well-developed.   HENT:      Head: Normocephalic and atraumatic.   Eyes:      Pupils: Pupils are equal, round, and reactive to light.   Neck:      Vascular: No JVD.   Cardiovascular:      Rate and Rhythm: Regular rhythm.      Pulses: Normal pulses.           Carotid pulses are 2+ on the right side and 2+ on the left side.     Heart sounds: S1 normal and S2 normal.   Pulmonary:      Effort: Pulmonary effort is normal.      Breath sounds: Normal breath sounds. No wheezing or rales.   Abdominal:      General: Bowel sounds are normal.      Palpations: Abdomen is soft.   Musculoskeletal:         General: No tenderness. Normal range of motion.      Cervical back: Normal range of motion and neck supple.   Skin:     General: Skin is warm.   Neurological:      Mental Status: She is alert and oriented to person, place, and time.      Cranial Nerves: No cranial nerve deficit.      Deep Tendon Reflexes: Reflexes are normal and symmetric.

## 2025-03-26 ENCOUNTER — TELEPHONE (OUTPATIENT)
Dept: HEMATOLOGY ONCOLOGY | Facility: CLINIC | Age: 50
End: 2025-03-26

## 2025-03-26 NOTE — TELEPHONE ENCOUNTER
Message left on voicemail as a reminder to complete labs ordered before upcoming appointment with Sherin.

## 2025-03-27 ENCOUNTER — APPOINTMENT (OUTPATIENT)
Dept: LAB | Facility: MEDICAL CENTER | Age: 50
End: 2025-03-27
Payer: COMMERCIAL

## 2025-03-27 DIAGNOSIS — D75.839 THROMBOCYTOSIS: ICD-10-CM

## 2025-03-27 DIAGNOSIS — D50.9 IRON DEFICIENCY ANEMIA, UNSPECIFIED IRON DEFICIENCY ANEMIA TYPE: ICD-10-CM

## 2025-03-27 DIAGNOSIS — R53.83 OTHER FATIGUE: ICD-10-CM

## 2025-03-27 LAB
ERYTHROCYTE [DISTWIDTH] IN BLOOD BY AUTOMATED COUNT: 16.1 % (ref 11.6–15.1)
FERRITIN SERPL-MCNC: 125 NG/ML (ref 11–307)
HCT VFR BLD AUTO: 36.5 % (ref 34.8–46.1)
HGB BLD-MCNC: 11.5 G/DL (ref 11.5–15.4)
IRON SATN MFR SERPL: 13 % (ref 15–50)
IRON SERPL-MCNC: 42 UG/DL (ref 50–212)
MCH RBC QN AUTO: 30.3 PG (ref 26.8–34.3)
MCHC RBC AUTO-ENTMCNC: 31.5 G/DL (ref 31.4–37.4)
MCV RBC AUTO: 96 FL (ref 82–98)
PLATELET # BLD AUTO: 310 THOUSANDS/UL (ref 149–390)
PMV BLD AUTO: 10.5 FL (ref 8.9–12.7)
RBC # BLD AUTO: 3.79 MILLION/UL (ref 3.81–5.12)
TIBC SERPL-MCNC: 312.2 UG/DL (ref 250–450)
TRANSFERRIN SERPL-MCNC: 223 MG/DL (ref 203–362)
UIBC SERPL-MCNC: 270 UG/DL (ref 155–355)
WBC # BLD AUTO: 8.66 THOUSAND/UL (ref 4.31–10.16)

## 2025-03-27 PROCEDURE — 83550 IRON BINDING TEST: CPT

## 2025-03-27 PROCEDURE — 36415 COLL VENOUS BLD VENIPUNCTURE: CPT

## 2025-03-27 PROCEDURE — 83540 ASSAY OF IRON: CPT

## 2025-03-27 PROCEDURE — 82728 ASSAY OF FERRITIN: CPT

## 2025-03-27 PROCEDURE — 85027 COMPLETE CBC AUTOMATED: CPT

## 2025-03-27 NOTE — PROGRESS NOTES
Name: Tatiana Balbuena      : 1975      MRN: [id number]  Encounter Provider: JOHNSON Dalal  Encounter Date: 3/28/2025   Encounter department: [facility] [place]  :  Assessment & Plan  Iron deficiency anemia, unspecified iron deficiency anemia type   Etiology of iron deficiency likely secondary to heavy menses.  Labs from 3/27/2025 show normal WBC, RBC 3.79, hemoglobin 11.5, hematocrit 36.5, MCV 96, platelets 310.  Iron saturation 13% with a ferritin level of 125.  For now we will continue to monitor with labs every 6 months and reestablish infusions if indicated.  Patient is up-to-date on mammogram in 2025 however has had a Cologuard testing in 2022 that was negative.    Orders:    CBC and differential; Standing    Iron Panel (Includes Ferritin, Iron Sat%, Iron, and TIBC); Standing    Other fatigue    Return in about 52 weeks (around 3/27/2026) for Office Visit.    History of Present Illness   No chief complaint on file.    Follow-up    Pertinent Medical History     25: Patient is a 49-year-old female with a history of obesity, diabetes, hypertension, hypothyroid, vasculitis, CKD, menorrhagia, thrombocytosis, iron deficiency and vitamin B12 deficiency.  Patient has required iron infusions in the past, most recent on 2024.  Patient required premeds with Tylenol 650 mg, Benadryl 25 mg, ondansetron and dexamethasone 8 and 10 mg for premeds.     Review of Systems   Constitutional:  Positive for fatigue. Negative for activity change, appetite change, fever and unexpected weight change.   Respiratory:  Negative for cough and shortness of breath.    Cardiovascular:  Negative for chest pain and leg swelling.   Gastrointestinal:  Negative for abdominal pain, constipation, diarrhea and nausea.   Endocrine: Negative for cold intolerance and heat intolerance.   Musculoskeletal:  Negative for arthralgias and myalgias.   Skin: Negative.    Neurological:  Negative  "for dizziness, weakness and headaches.   Hematological:  Negative for adenopathy. Does not bruise/bleed easily.       Objective   /64 (BP Location: Left arm, Patient Position: Sitting, Cuff Size: Standard)   Pulse 79   Temp 98.1 °F (36.7 °C) (Temporal)   Ht 5' 3\" (1.6 m)   Wt 111 kg (244 lb)   LMP 04/04/2020 (Exact Date)   SpO2 98%   BMI 43.22 kg/m²     Physical Exam  Vitals reviewed.   Constitutional:       Appearance: Normal appearance. She is well-developed.   HENT:      Head: Normocephalic and atraumatic.   Eyes:      Conjunctiva/sclera: Conjunctivae normal.      Pupils: Pupils are equal, round, and reactive to light.   Pulmonary:      Effort: Pulmonary effort is normal. No respiratory distress.   Musculoskeletal:         General: Normal range of motion.      Cervical back: Normal range of motion.   Lymphadenopathy:      Cervical: No cervical adenopathy.   Skin:     General: Skin is dry.   Neurological:      Mental Status: She is alert and oriented to person, place, and time.   Psychiatric:         Behavior: Behavior normal.     Labs: I have reviewed the following labs:  Results for orders placed or performed in visit on 03/27/25   CBC   Result Value Ref Range    WBC 8.66 4.31 - 10.16 Thousand/uL    RBC 3.79 (L) 3.81 - 5.12 Million/uL    Hemoglobin 11.5 11.5 - 15.4 g/dL    Hematocrit 36.5 34.8 - 46.1 %    MCV 96 82 - 98 fL    MCH 30.3 26.8 - 34.3 pg    MCHC 31.5 31.4 - 37.4 g/dL    RDW 16.1 (H) 11.6 - 15.1 %    Platelets 310 149 - 390 Thousands/uL    MPV 10.5 8.9 - 12.7 fL   TIBC Panel (incl. Iron, TIBC, % Iron Saturation)   Result Value Ref Range    Iron Saturation 13 (L) 15 - 50 %    TIBC 312.2 250 - 450 ug/dL    Iron 42 (L) 50 - 212 ug/dL    Transferrin 223 203 - 362 mg/dL    UIBC 270 155 - 355 ug/dL   Result Value Ref Range    Ferritin 125 11 - 307 ng/mL             "

## 2025-03-28 ENCOUNTER — OFFICE VISIT (OUTPATIENT)
Dept: HEMATOLOGY ONCOLOGY | Facility: CLINIC | Age: 50
End: 2025-03-28
Payer: COMMERCIAL

## 2025-03-28 VITALS
WEIGHT: 244 LBS | DIASTOLIC BLOOD PRESSURE: 64 MMHG | OXYGEN SATURATION: 98 % | BODY MASS INDEX: 43.23 KG/M2 | TEMPERATURE: 98.1 F | HEIGHT: 63 IN | HEART RATE: 79 BPM | SYSTOLIC BLOOD PRESSURE: 118 MMHG

## 2025-03-28 DIAGNOSIS — R53.83 OTHER FATIGUE: ICD-10-CM

## 2025-03-28 DIAGNOSIS — D50.9 IRON DEFICIENCY ANEMIA, UNSPECIFIED IRON DEFICIENCY ANEMIA TYPE: Primary | ICD-10-CM

## 2025-03-28 PROCEDURE — 99214 OFFICE O/P EST MOD 30 MIN: CPT | Performed by: NURSE PRACTITIONER

## 2025-03-28 RX ORDER — AMLODIPINE BESYLATE 5 MG/1
TABLET ORAL
COMMUNITY
Start: 2025-03-17

## 2025-03-28 NOTE — LETTER
2025     Darian Rollins MD  [address]  [place] [address]    Patient: Tatiana Balbuena   YOB: 1975   Date of Visit: 3/28/2025       Dear Dr. Rollins:    Thank you for referring Tatiana Balbuena to me for evaluation. Below are my notes for this consultation.    If you have questions, please do not hesitate to call me. I look forward to following your patient along with you.         Sincerely,        JOHNSON Dalal        CC: No Recipients    JOHNSON Dalal  2025  3:27 PM  Sign when Signing Visit  Name: Tatiana Balbuena      : 1975      MRN: [id number]  Encounter Provider: JOHNSON Dalal  Encounter Date: 3/28/2025   Encounter department: [facility] [place]  :  Assessment & Plan  Iron deficiency anemia, unspecified iron deficiency anemia type   Etiology of iron deficiency likely secondary to heavy menses.  Labs from 3/27/2025 show normal WBC, RBC 3.79, hemoglobin 11.5, hematocrit 36.5, MCV 96, platelets 310.  Iron saturation 13% with a ferritin level of 125.  For now we will continue to monitor with labs every 6 months and reestablish infusions if indicated.  Patient is up-to-date on mammogram in 2025 however has had a Cologuard testing in 2022 that was negative.    Orders:  •  CBC and differential; Standing  •  Iron Panel (Includes Ferritin, Iron Sat%, Iron, and TIBC); Standing    Other fatigue    Return in about 52 weeks (around 3/27/2026) for Office Visit.    History of Present Illness  No chief complaint on file.    Follow-up    Pertinent Medical History    25: Patient is a 49-year-old female with a history of obesity, diabetes, hypertension, hypothyroid, vasculitis, CKD, menorrhagia, thrombocytosis, iron deficiency and vitamin B12 deficiency.  Patient has required iron infusions in the past, most recent on 2024.  Patient required premeds with Tylenol 650 mg, Benadryl 25 mg, ondansetron and dexamethasone 8 and 10 mg for  "premeds.     Review of Systems   Constitutional:  Positive for fatigue. Negative for activity change, appetite change, fever and unexpected weight change.   Respiratory:  Negative for cough and shortness of breath.    Cardiovascular:  Negative for chest pain and leg swelling.   Gastrointestinal:  Negative for abdominal pain, constipation, diarrhea and nausea.   Endocrine: Negative for cold intolerance and heat intolerance.   Musculoskeletal:  Negative for arthralgias and myalgias.   Skin: Negative.    Neurological:  Negative for dizziness, weakness and headaches.   Hematological:  Negative for adenopathy. Does not bruise/bleed easily.       Objective  /64 (BP Location: Left arm, Patient Position: Sitting, Cuff Size: Standard)   Pulse 79   Temp 98.1 °F (36.7 °C) (Temporal)   Ht 5' 3\" (1.6 m)   Wt 111 kg (244 lb)   LMP 04/04/2020 (Exact Date)   SpO2 98%   BMI 43.22 kg/m²     Physical Exam  Vitals reviewed.   Constitutional:       Appearance: Normal appearance. She is well-developed.   HENT:      Head: Normocephalic and atraumatic.   Eyes:      Conjunctiva/sclera: Conjunctivae normal.      Pupils: Pupils are equal, round, and reactive to light.   Pulmonary:      Effort: Pulmonary effort is normal. No respiratory distress.   Musculoskeletal:         General: Normal range of motion.      Cervical back: Normal range of motion.   Lymphadenopathy:      Cervical: No cervical adenopathy.   Skin:     General: Skin is dry.   Neurological:      Mental Status: She is alert and oriented to person, place, and time.   Psychiatric:         Behavior: Behavior normal.     Labs: I have reviewed the following labs:  Results for orders placed or performed in visit on 03/27/25   CBC   Result Value Ref Range    WBC 8.66 4.31 - 10.16 Thousand/uL    RBC 3.79 (L) 3.81 - 5.12 Million/uL    Hemoglobin 11.5 11.5 - 15.4 g/dL    Hematocrit 36.5 34.8 - 46.1 %    MCV 96 82 - 98 fL    MCH 30.3 26.8 - 34.3 pg    MCHC 31.5 31.4 - 37.4 g/dL "    RDW 16.1 (H) 11.6 - 15.1 %    Platelets 310 149 - 390 Thousands/uL    MPV 10.5 8.9 - 12.7 fL   TIBC Panel (incl. Iron, TIBC, % Iron Saturation)   Result Value Ref Range    Iron Saturation 13 (L) 15 - 50 %    TIBC 312.2 250 - 450 ug/dL    Iron 42 (L) 50 - 212 ug/dL    Transferrin 223 203 - 362 mg/dL    UIBC 270 155 - 355 ug/dL   Result Value Ref Range    Ferritin 125 11 - 307 ng/mL

## 2025-03-28 NOTE — ASSESSMENT & PLAN NOTE
Etiology of iron deficiency likely secondary to heavy menses.  Labs from 3/27/2025 show normal WBC, RBC 3.79, hemoglobin 11.5, hematocrit 36.5, MCV 96, platelets 310.  Iron saturation 13% with a ferritin level of 125.  For now we will continue to monitor with labs every 6 months and reestablish infusions if indicated.  Patient is up-to-date on mammogram in March 2025 however has had a Cologuard testing in October 2022 that was negative.    Orders:    CBC and differential; Standing    Iron Panel (Includes Ferritin, Iron Sat%, Iron, and TIBC); Standing

## 2025-04-20 ENCOUNTER — APPOINTMENT (EMERGENCY)
Dept: CT IMAGING | Facility: HOSPITAL | Age: 50
DRG: 249 | End: 2025-04-20
Payer: COMMERCIAL

## 2025-04-20 ENCOUNTER — HOSPITAL ENCOUNTER (INPATIENT)
Facility: HOSPITAL | Age: 50
LOS: 2 days | Discharge: HOME | DRG: 249 | End: 2025-04-22
Attending: EMERGENCY MEDICINE | Admitting: FAMILY MEDICINE
Payer: COMMERCIAL

## 2025-04-20 DIAGNOSIS — R10.9 ABDOMINAL PAIN: ICD-10-CM

## 2025-04-20 DIAGNOSIS — N18.31 STAGE 3A CHRONIC KIDNEY DISEASE (HCC): ICD-10-CM

## 2025-04-20 DIAGNOSIS — R94.4 DECREASED GFR: ICD-10-CM

## 2025-04-20 DIAGNOSIS — K52.9 COLITIS: ICD-10-CM

## 2025-04-20 DIAGNOSIS — K52.9 ACUTE COLITIS: ICD-10-CM

## 2025-04-20 DIAGNOSIS — N17.9 AKI (ACUTE KIDNEY INJURY) (HCC): ICD-10-CM

## 2025-04-20 DIAGNOSIS — I10 ESSENTIAL HYPERTENSION: ICD-10-CM

## 2025-04-20 DIAGNOSIS — R19.7 DIARRHEA: ICD-10-CM

## 2025-04-20 DIAGNOSIS — E87.20 LACTIC ACIDOSIS: ICD-10-CM

## 2025-04-20 DIAGNOSIS — D72.829 LEUKOCYTOSIS: Primary | ICD-10-CM

## 2025-04-20 DIAGNOSIS — N18.4 CKD (CHRONIC KIDNEY DISEASE) STAGE 4, GFR 15-29 ML/MIN (HCC): ICD-10-CM

## 2025-04-20 LAB
ABO GROUP BLD: NORMAL
ABO GROUP BLD: NORMAL
ALBUMIN SERPL BCG-MCNC: 4.2 G/DL (ref 3.5–5)
ALP SERPL-CCNC: 116 U/L (ref 34–104)
ALT SERPL W P-5'-P-CCNC: 23 U/L (ref 7–52)
ANION GAP SERPL CALCULATED.3IONS-SCNC: 12 MMOL/L (ref 4–13)
AST SERPL W P-5'-P-CCNC: 29 U/L (ref 13–39)
BACTERIA UR QL AUTO: NORMAL /HPF
BASOPHILS # BLD AUTO: 0.08 THOUSANDS/ÂΜL (ref 0–0.1)
BASOPHILS NFR BLD AUTO: 1 % (ref 0–1)
BILIRUB SERPL-MCNC: 0.37 MG/DL (ref 0.2–1)
BILIRUB UR QL STRIP: NEGATIVE
BLD GP AB SCN SERPL QL: NEGATIVE
BUN SERPL-MCNC: 32 MG/DL (ref 5–25)
CALCIUM SERPL-MCNC: 9.5 MG/DL (ref 8.4–10.2)
CHLORIDE SERPL-SCNC: 109 MMOL/L (ref 96–108)
CLARITY UR: CLEAR
CO2 SERPL-SCNC: 17 MMOL/L (ref 21–32)
COLOR UR: COLORLESS
CREAT SERPL-MCNC: 2.2 MG/DL (ref 0.6–1.3)
CREAT UR-MCNC: 48.4 MG/DL
CRP SERPL QL: 17.4 MG/L
EOSINOPHIL # BLD AUTO: 0.1 THOUSAND/ÂΜL (ref 0–0.61)
EOSINOPHIL NFR BLD AUTO: 1 % (ref 0–6)
ERYTHROCYTE [DISTWIDTH] IN BLOOD BY AUTOMATED COUNT: 16.4 % (ref 11.6–15.1)
ERYTHROCYTE [SEDIMENTATION RATE] IN BLOOD: 97 MM/HOUR (ref 0–29)
EST. AVERAGE GLUCOSE BLD GHB EST-MCNC: 146 MG/DL
GFR SERPL CREATININE-BSD FRML MDRD: 25 ML/MIN/1.73SQ M
GLUCOSE SERPL-MCNC: 117 MG/DL (ref 65–140)
GLUCOSE SERPL-MCNC: 127 MG/DL (ref 65–140)
GLUCOSE SERPL-MCNC: 179 MG/DL (ref 65–140)
GLUCOSE UR STRIP-MCNC: ABNORMAL MG/DL
HBA1C MFR BLD: 6.7 %
HCT VFR BLD AUTO: 37.3 % (ref 34.8–46.1)
HGB BLD-MCNC: 11.9 G/DL (ref 11.5–15.4)
HGB UR QL STRIP.AUTO: ABNORMAL
IMM GRANULOCYTES # BLD AUTO: 0.07 THOUSAND/UL (ref 0–0.2)
IMM GRANULOCYTES NFR BLD AUTO: 1 % (ref 0–2)
KETONES UR STRIP-MCNC: NEGATIVE MG/DL
LACTATE SERPL-SCNC: 1.2 MMOL/L (ref 0.5–2)
LACTATE SERPL-SCNC: 2.1 MMOL/L (ref 0.5–2)
LEUKOCYTE ESTERASE UR QL STRIP: NEGATIVE
LIPASE SERPL-CCNC: 26 U/L (ref 11–82)
LYMPHOCYTES # BLD AUTO: 1.56 THOUSANDS/ÂΜL (ref 0.6–4.47)
LYMPHOCYTES NFR BLD AUTO: 13 % (ref 14–44)
MAGNESIUM SERPL-MCNC: 2.4 MG/DL (ref 1.9–2.7)
MCH RBC QN AUTO: 30.1 PG (ref 26.8–34.3)
MCHC RBC AUTO-ENTMCNC: 31.9 G/DL (ref 31.4–37.4)
MCV RBC AUTO: 94 FL (ref 82–98)
MONOCYTES # BLD AUTO: 0.52 THOUSAND/ÂΜL (ref 0.17–1.22)
MONOCYTES NFR BLD AUTO: 4 % (ref 4–12)
NEUTROPHILS # BLD AUTO: 10.02 THOUSANDS/ÂΜL (ref 1.85–7.62)
NEUTS SEG NFR BLD AUTO: 80 % (ref 43–75)
NITRITE UR QL STRIP: NEGATIVE
NON-SQ EPI CELLS URNS QL MICRO: NORMAL /HPF
NRBC BLD AUTO-RTO: 0 /100 WBCS
PH UR STRIP.AUTO: 5.5 [PH]
PLATELET # BLD AUTO: 336 THOUSANDS/UL (ref 149–390)
PMV BLD AUTO: 10.4 FL (ref 8.9–12.7)
POTASSIUM SERPL-SCNC: 5 MMOL/L (ref 3.5–5.3)
PROT SERPL-MCNC: 8.2 G/DL (ref 6.4–8.4)
PROT UR STRIP-MCNC: ABNORMAL MG/DL
RBC # BLD AUTO: 3.95 MILLION/UL (ref 3.81–5.12)
RBC #/AREA URNS AUTO: NORMAL /HPF
RH BLD: POSITIVE
RH BLD: POSITIVE
SARS-COV-2 RNA RESP QL NAA+PROBE: NEGATIVE
SODIUM SERPL-SCNC: 138 MMOL/L (ref 135–147)
SODIUM UR-SCNC: 93 MMOL/L
SP GR UR STRIP.AUTO: 1.01 (ref 1–1.03)
SPECIMEN EXPIRATION DATE: NORMAL
UROBILINOGEN UR STRIP-ACNC: <2 MG/DL
WBC # BLD AUTO: 12.35 THOUSAND/UL (ref 4.31–10.16)
WBC #/AREA URNS AUTO: NORMAL /HPF

## 2025-04-20 PROCEDURE — 82570 ASSAY OF URINE CREATININE: CPT

## 2025-04-20 PROCEDURE — 83605 ASSAY OF LACTIC ACID: CPT | Performed by: EMERGENCY MEDICINE

## 2025-04-20 PROCEDURE — 86901 BLOOD TYPING SEROLOGIC RH(D): CPT | Performed by: EMERGENCY MEDICINE

## 2025-04-20 PROCEDURE — 86900 BLOOD TYPING SEROLOGIC ABO: CPT | Performed by: EMERGENCY MEDICINE

## 2025-04-20 PROCEDURE — 99285 EMERGENCY DEPT VISIT HI MDM: CPT | Performed by: EMERGENCY MEDICINE

## 2025-04-20 PROCEDURE — 96361 HYDRATE IV INFUSION ADD-ON: CPT

## 2025-04-20 PROCEDURE — 96375 TX/PRO/DX INJ NEW DRUG ADDON: CPT

## 2025-04-20 PROCEDURE — 82948 REAGENT STRIP/BLOOD GLUCOSE: CPT

## 2025-04-20 PROCEDURE — 86850 RBC ANTIBODY SCREEN: CPT | Performed by: EMERGENCY MEDICINE

## 2025-04-20 PROCEDURE — 74176 CT ABD & PELVIS W/O CONTRAST: CPT

## 2025-04-20 PROCEDURE — 84300 ASSAY OF URINE SODIUM: CPT

## 2025-04-20 PROCEDURE — 89055 LEUKOCYTE ASSESSMENT FECAL: CPT

## 2025-04-20 PROCEDURE — 87209 SMEAR COMPLEX STAIN: CPT

## 2025-04-20 PROCEDURE — 85025 COMPLETE CBC W/AUTO DIFF WBC: CPT | Performed by: EMERGENCY MEDICINE

## 2025-04-20 PROCEDURE — 80053 COMPREHEN METABOLIC PANEL: CPT | Performed by: EMERGENCY MEDICINE

## 2025-04-20 PROCEDURE — 81001 URINALYSIS AUTO W/SCOPE: CPT

## 2025-04-20 PROCEDURE — 99223 1ST HOSP IP/OBS HIGH 75: CPT | Performed by: FAMILY MEDICINE

## 2025-04-20 PROCEDURE — 36415 COLL VENOUS BLD VENIPUNCTURE: CPT | Performed by: EMERGENCY MEDICINE

## 2025-04-20 PROCEDURE — 83735 ASSAY OF MAGNESIUM: CPT | Performed by: EMERGENCY MEDICINE

## 2025-04-20 PROCEDURE — 86140 C-REACTIVE PROTEIN: CPT

## 2025-04-20 PROCEDURE — 87177 OVA AND PARASITES SMEARS: CPT

## 2025-04-20 PROCEDURE — 96365 THER/PROPH/DIAG IV INF INIT: CPT

## 2025-04-20 PROCEDURE — 87635 SARS-COV-2 COVID-19 AMP PRB: CPT

## 2025-04-20 PROCEDURE — 83993 ASSAY FOR CALPROTECTIN FECAL: CPT

## 2025-04-20 PROCEDURE — 83690 ASSAY OF LIPASE: CPT | Performed by: EMERGENCY MEDICINE

## 2025-04-20 PROCEDURE — 83036 HEMOGLOBIN GLYCOSYLATED A1C: CPT

## 2025-04-20 PROCEDURE — 99222 1ST HOSP IP/OBS MODERATE 55: CPT | Performed by: SURGERY

## 2025-04-20 PROCEDURE — 85652 RBC SED RATE AUTOMATED: CPT

## 2025-04-20 PROCEDURE — 99285 EMERGENCY DEPT VISIT HI MDM: CPT

## 2025-04-20 PROCEDURE — 87505 NFCT AGENT DETECTION GI: CPT | Performed by: FAMILY MEDICINE

## 2025-04-20 RX ORDER — SODIUM CHLORIDE, SODIUM GLUCONATE, SODIUM ACETATE, POTASSIUM CHLORIDE, MAGNESIUM CHLORIDE, SODIUM PHOSPHATE, DIBASIC, AND POTASSIUM PHOSPHATE .53; .5; .37; .037; .03; .012; .00082 G/100ML; G/100ML; G/100ML; G/100ML; G/100ML; G/100ML; G/100ML
100 INJECTION, SOLUTION INTRAVENOUS CONTINUOUS
Status: DISCONTINUED | OUTPATIENT
Start: 2025-04-20 | End: 2025-04-22 | Stop reason: HOSPADM

## 2025-04-20 RX ORDER — PANTOPRAZOLE SODIUM 40 MG/10ML
40 INJECTION, POWDER, LYOPHILIZED, FOR SOLUTION INTRAVENOUS EVERY 12 HOURS SCHEDULED
Status: DISCONTINUED | OUTPATIENT
Start: 2025-04-20 | End: 2025-04-22 | Stop reason: HOSPADM

## 2025-04-20 RX ORDER — ESCITALOPRAM OXALATE 10 MG/1
10 TABLET ORAL DAILY
Status: CANCELLED | OUTPATIENT
Start: 2025-04-20

## 2025-04-20 RX ORDER — HYDROMORPHONE HCL/PF 1 MG/ML
0.5 SYRINGE (ML) INJECTION ONCE
Refills: 0 | Status: COMPLETED | OUTPATIENT
Start: 2025-04-20 | End: 2025-04-20

## 2025-04-20 RX ORDER — INSULIN LISPRO 100 [IU]/ML
1-5 INJECTION, SOLUTION INTRAVENOUS; SUBCUTANEOUS
Status: DISCONTINUED | OUTPATIENT
Start: 2025-04-20 | End: 2025-04-20

## 2025-04-20 RX ORDER — ACETAMINOPHEN 325 MG/1
650 TABLET ORAL EVERY 6 HOURS PRN
Status: DISCONTINUED | OUTPATIENT
Start: 2025-04-20 | End: 2025-04-20

## 2025-04-20 RX ORDER — CLOPIDOGREL BISULFATE 75 MG/1
75 TABLET ORAL DAILY
Status: DISCONTINUED | OUTPATIENT
Start: 2025-04-20 | End: 2025-04-22 | Stop reason: HOSPADM

## 2025-04-20 RX ORDER — CARVEDILOL 12.5 MG/1
25 TABLET ORAL 2 TIMES DAILY WITH MEALS
Status: DISCONTINUED | OUTPATIENT
Start: 2025-04-21 | End: 2025-04-22 | Stop reason: HOSPADM

## 2025-04-20 RX ORDER — ONDANSETRON 2 MG/ML
4 INJECTION INTRAMUSCULAR; INTRAVENOUS ONCE AS NEEDED
Status: COMPLETED | OUTPATIENT
Start: 2025-04-20 | End: 2025-04-20

## 2025-04-20 RX ORDER — ATORVASTATIN CALCIUM 40 MG/1
40 TABLET, FILM COATED ORAL
Status: DISCONTINUED | OUTPATIENT
Start: 2025-04-20 | End: 2025-04-20

## 2025-04-20 RX ORDER — FLUTICASONE FUROATE AND VILANTEROL 200; 25 UG/1; UG/1
1 POWDER RESPIRATORY (INHALATION) DAILY
Status: DISCONTINUED | OUTPATIENT
Start: 2025-04-20 | End: 2025-04-22 | Stop reason: HOSPADM

## 2025-04-20 RX ORDER — INSULIN LISPRO 100 [IU]/ML
1-5 INJECTION, SOLUTION INTRAVENOUS; SUBCUTANEOUS
Status: DISCONTINUED | OUTPATIENT
Start: 2025-04-21 | End: 2025-04-22 | Stop reason: HOSPADM

## 2025-04-20 RX ORDER — LOSARTAN POTASSIUM 50 MG/1
100 TABLET ORAL DAILY
Status: DISCONTINUED | OUTPATIENT
Start: 2025-04-20 | End: 2025-04-20

## 2025-04-20 RX ORDER — HYDROMORPHONE HCL/PF 1 MG/ML
0.5 SYRINGE (ML) INJECTION EVERY 4 HOURS PRN
Status: DISCONTINUED | OUTPATIENT
Start: 2025-04-20 | End: 2025-04-22 | Stop reason: HOSPADM

## 2025-04-20 RX ORDER — ONDANSETRON 2 MG/ML
4 INJECTION INTRAMUSCULAR; INTRAVENOUS EVERY 6 HOURS PRN
Status: DISCONTINUED | OUTPATIENT
Start: 2025-04-20 | End: 2025-04-22 | Stop reason: HOSPADM

## 2025-04-20 RX ORDER — LEVOTHYROXINE SODIUM 100 UG/1
100 TABLET ORAL
Status: DISCONTINUED | OUTPATIENT
Start: 2025-04-21 | End: 2025-04-22 | Stop reason: HOSPADM

## 2025-04-20 RX ORDER — ACETAMINOPHEN 10 MG/ML
1000 INJECTION, SOLUTION INTRAVENOUS ONCE
Status: COMPLETED | OUTPATIENT
Start: 2025-04-20 | End: 2025-04-20

## 2025-04-20 RX ORDER — ISONIAZID 100 MG/1
300 TABLET ORAL
Status: DISCONTINUED | OUTPATIENT
Start: 2025-04-26 | End: 2025-04-22 | Stop reason: HOSPADM

## 2025-04-20 RX ORDER — HYDROMORPHONE HCL IN WATER/PF 6 MG/30 ML
0.2 PATIENT CONTROLLED ANALGESIA SYRINGE INTRAVENOUS EVERY 6 HOURS PRN
Status: DISCONTINUED | OUTPATIENT
Start: 2025-04-20 | End: 2025-04-20

## 2025-04-20 RX ORDER — HEPARIN SODIUM 5000 [USP'U]/ML
5000 INJECTION, SOLUTION INTRAVENOUS; SUBCUTANEOUS EVERY 8 HOURS SCHEDULED
Status: CANCELLED | OUTPATIENT
Start: 2025-04-20

## 2025-04-20 RX ORDER — ISONIAZID 100 MG/1
300 TABLET ORAL DAILY
Status: DISCONTINUED | OUTPATIENT
Start: 2025-04-20 | End: 2025-04-20

## 2025-04-20 RX ORDER — PREDNISONE 10 MG/1
10 TABLET ORAL DAILY
Status: DISCONTINUED | OUTPATIENT
Start: 2025-04-20 | End: 2025-04-22 | Stop reason: HOSPADM

## 2025-04-20 RX ORDER — ASPIRIN 81 MG/1
81 TABLET ORAL DAILY
Status: DISCONTINUED | OUTPATIENT
Start: 2025-04-20 | End: 2025-04-20

## 2025-04-20 RX ORDER — AMLODIPINE BESYLATE 5 MG/1
5 TABLET ORAL DAILY
Status: DISCONTINUED | OUTPATIENT
Start: 2025-04-20 | End: 2025-04-20

## 2025-04-20 RX ORDER — CARVEDILOL 12.5 MG/1
25 TABLET ORAL 2 TIMES DAILY WITH MEALS
Status: DISCONTINUED | OUTPATIENT
Start: 2025-04-20 | End: 2025-04-20

## 2025-04-20 RX ORDER — ONDANSETRON 2 MG/ML
4 INJECTION INTRAMUSCULAR; INTRAVENOUS EVERY 6 HOURS
Status: DISCONTINUED | OUTPATIENT
Start: 2025-04-20 | End: 2025-04-20

## 2025-04-20 RX ORDER — PANTOPRAZOLE SODIUM 40 MG/1
40 TABLET, DELAYED RELEASE ORAL
Status: DISCONTINUED | OUTPATIENT
Start: 2025-04-20 | End: 2025-04-20

## 2025-04-20 RX ORDER — MONTELUKAST SODIUM 10 MG/1
10 TABLET ORAL
Status: DISCONTINUED | OUTPATIENT
Start: 2025-04-20 | End: 2025-04-22 | Stop reason: HOSPADM

## 2025-04-20 RX ORDER — ACETAMINOPHEN 10 MG/ML
1000 INJECTION, SOLUTION INTRAVENOUS EVERY 6 HOURS PRN
Status: ACTIVE | OUTPATIENT
Start: 2025-04-20 | End: 2025-04-22

## 2025-04-20 RX ADMIN — PANTOPRAZOLE SODIUM 40 MG: 40 INJECTION, POWDER, FOR SOLUTION INTRAVENOUS at 11:57

## 2025-04-20 RX ADMIN — PANTOPRAZOLE SODIUM 40 MG: 40 INJECTION, POWDER, FOR SOLUTION INTRAVENOUS at 21:29

## 2025-04-20 RX ADMIN — PIPERACILLIN AND TAZOBACTAM 4.5 G: 4; .5 INJECTION, POWDER, LYOPHILIZED, FOR SOLUTION INTRAVENOUS; PARENTERAL at 12:45

## 2025-04-20 RX ADMIN — SODIUM CHLORIDE, SODIUM GLUCONATE, SODIUM ACETATE, POTASSIUM CHLORIDE, MAGNESIUM CHLORIDE, SODIUM PHOSPHATE, DIBASIC, AND POTASSIUM PHOSPHATE 100 ML/HR: .53; .5; .37; .037; .03; .012; .00082 INJECTION, SOLUTION INTRAVENOUS at 23:25

## 2025-04-20 RX ADMIN — MONTELUKAST 10 MG: 10 TABLET, FILM COATED ORAL at 21:29

## 2025-04-20 RX ADMIN — PREDNISONE 10 MG: 10 TABLET ORAL at 15:23

## 2025-04-20 RX ADMIN — SODIUM CHLORIDE, SODIUM GLUCONATE, SODIUM ACETATE, POTASSIUM CHLORIDE, MAGNESIUM CHLORIDE, SODIUM PHOSPHATE, DIBASIC, AND POTASSIUM PHOSPHATE 100 ML/HR: .53; .5; .37; .037; .03; .012; .00082 INJECTION, SOLUTION INTRAVENOUS at 11:57

## 2025-04-20 RX ADMIN — HYDROMORPHONE HYDROCHLORIDE 0.5 MG: 1 INJECTION, SOLUTION INTRAMUSCULAR; INTRAVENOUS; SUBCUTANEOUS at 11:58

## 2025-04-20 RX ADMIN — HYDROMORPHONE HYDROCHLORIDE 0.5 MG: 1 INJECTION, SOLUTION INTRAMUSCULAR; INTRAVENOUS; SUBCUTANEOUS at 23:31

## 2025-04-20 RX ADMIN — HYDROMORPHONE HYDROCHLORIDE 0.5 MG: 1 INJECTION, SOLUTION INTRAMUSCULAR; INTRAVENOUS; SUBCUTANEOUS at 16:17

## 2025-04-20 RX ADMIN — SODIUM CHLORIDE 1000 ML: 0.9 INJECTION, SOLUTION INTRAVENOUS at 07:38

## 2025-04-20 RX ADMIN — ONDANSETRON 4 MG: 2 INJECTION INTRAMUSCULAR; INTRAVENOUS at 07:33

## 2025-04-20 RX ADMIN — ACETAMINOPHEN 1000 MG: 10 INJECTION INTRAVENOUS at 07:33

## 2025-04-20 RX ADMIN — HYDROMORPHONE HYDROCHLORIDE 0.5 MG: 1 INJECTION, SOLUTION INTRAMUSCULAR; INTRAVENOUS; SUBCUTANEOUS at 07:33

## 2025-04-20 NOTE — CONSULTS
"Consultation - Surgery-General   Name: Tatiana Balbuena 49 y.o. female I MRN: [id number]  Unit/Bed#: 426-01 I Date of Admission: 4/20/2025   Date of Service: 4/20/2025 I Hospital Day: 0   Inpatient consult to Acute Care Surgery  Consult performed by: Parisa Jamison PA-C  Consult ordered by: Pattie Pryor DO        Physician Requesting Evaluation: Ailin Kuhn MD   Reason for Evaluation / Principal Problem: colitis    Assessment & Plan  Acute colitis  48yo F w/ PMH of T2DM, HTN, CKD, dyslipidemia, multivessel disease on ASA/statin/plavix presenting with abdominal pain, diarrhea, and BRBPR    AVSS   WBC 12.3  Hemoglobin stable 11.9  Lactic initially elevated at 2.1, since cleared with hydration   Cr 2.2    4/20 CTAP \"Fluid in the ascending colon which could represent a nonspecific colitis/diarrheal illness. Otherwise no potential acute findings in the abdomen or pelvis.\"     In march of 2025 patient underwent CTA at outside facility noting that SMA, celiac, b/l renal, and b/l iliac arteries were patent. CTA deferred today due to no findings suspicious for ischemia on non-con CT and worsening renal function.     Plan:  - Pt is hemodynamically and serologically stable at this time, no urgent/emergent surgical intervention indicated at this time. Surgery will follow.  If clinically worsening wound recommend CT w contrast after proper hydration and clearance from nephrology team   - serial abdominal exams  - IVF hydration  - trend labs and vitals, type and screen in light of rectal bleeding   - strict I/Os, monitor character of stools   - empiric antibiotics per primary team   - hold antiplatelet and pharmacologic DVT ppx   - recommend GI consultation   - Remainder of medical management per primary   Type 2 diabetes mellitus with obesity  (HCC)  Lab Results   Component Value Date    HGBA1C 7.4 (H) 10/29/2024       Recent Labs     04/20/25  1151   POCGLU 127       Blood Sugar Average: Last 72 hrs:  (P) 127  SSI and glu " checks   Management per medical team   CKD (chronic kidney disease) stage 3, GFR 30-59 ml/min ([facility])  Lab Results   Component Value Date    EGFR 25 04/20/2025    EGFR 28 (L) 03/08/2025    EGFR 33 (L) 01/06/2025    CREATININE 2.20 (H) 04/20/2025    CREATININE 2.09 (H) 03/08/2025    CREATININE 1.8 (H) 01/06/2025   Nephrology consulted   Management per medical team   Essential hypertension  Management per medical team   S/P total thyroidectomy  Home levothyroxine reordered   Management per medical team   Please contact the SecureChat role for the Surgery-General service with any questions/concerns.    History of Present Illness   Tatiana Balbuena is a 49 y.o. female w/ PMH of T2DM, HTN, CKD, dyslipidemia, multivessel disease presenting with abdominal pain, diarrhea, and BRBPR. Reports that symptoms started at 11pm last evening. Initially was having liquid brown BM which became bloody overnight. Counted 9-10 BM in total overnight, 6 with blood, no clots. Does have problems with diarrhea at baseline after taking her weekly dose of ozempic but has never had blood in her stools before.   No recent travel, sick contacts, new medications, dietary changes.   CT obtained in ED significant for nonspecific colitis/diarrheal illness. Lactic initially 2.1, and cleared with hydration. WBC 12. Hemoglobin stable.     Surgical hx significant for C section, liver bx (IR)  Does take antiplatelet (Plavix) - currently held     Review of Systems   Constitutional:  Negative for chills and fever.   HENT:  Negative for congestion.    Respiratory:  Negative for shortness of breath.    Cardiovascular:  Negative for chest pain.   Gastrointestinal:  Positive for abdominal pain, blood in stool and diarrhea. Negative for nausea and vomiting.   Musculoskeletal:  Negative for gait problem.   Skin:  Negative for pallor.   Neurological:  Negative for dizziness and weakness.   Psychiatric/Behavioral:  Negative for confusion.      Medical History Review: I have  reviewed the patient's PMH, PSH, Social History, Family History, Meds, and Allergies     Objective :  Temp:  [97.8 °F (36.6 °C)-98 °F (36.7 °C)] 98 °F (36.7 °C)  HR:  [75-78] 75  BP: (125-163)/(75-85) 163/85  Resp:  [14-18] 18  SpO2:  [96 %-99 %] 99 %  O2 Device: None (Room air)      Physical Exam  Vitals and nursing note reviewed.   Constitutional:       Appearance: She is not ill-appearing, toxic-appearing or diaphoretic.   HENT:      Head: Normocephalic and atraumatic.   Cardiovascular:      Rate and Rhythm: Normal rate.   Pulmonary:      Effort: Pulmonary effort is normal.   Abdominal:      General: Bowel sounds are normal.      Palpations: Abdomen is soft.      Tenderness: There is abdominal tenderness (LLQ, RLQ). There is no guarding or rebound.      Comments: Nonperitoneal    Skin:     General: Skin is warm and dry.      Coloration: Skin is not pale.   Neurological:      General: No focal deficit present.      Mental Status: She is alert.      Motor: No weakness.      Gait: Gait (ambulating around room) normal.   Psychiatric:         Mood and Affect: Mood normal.         Behavior: Behavior normal.         Lab Results: I have reviewed the following results:  Recent Labs     04/20/25  0732 04/20/25  1025   WBC 12.35*  --    HGB 11.9  --    HCT 37.3  --      --    SODIUM 138  --    K 5.0  --    *  --    CO2 17*  --    BUN 32*  --    CREATININE 2.20*  --    GLUC 179*  --    MG 2.4  --    AST 29  --    ALT 23  --    ALB 4.2  --    TBILI 0.37  --    ALKPHOS 116*  --    LACTICACID 2.1* 1.2       Imaging Results Review: I reviewed radiology reports from this admission including: CT abdomen/pelvis.  Other Study Results Review: No additional pertinent studies reviewed.    VTE Pharmacologic Prophylaxis: VTE covered by:    None     Hold DVT ppx in setting of GI bleed     VTE Mechanical Prophylaxis: sequential compression device      Parisa Jamison, PAC  04/20/25

## 2025-04-20 NOTE — ED PROVIDER NOTES
Time reflects when diagnosis was documented in both MDM as applicable and the Disposition within this note       Time User Action Codes Description Comment    4/20/2025  7:57 AM de Samuel Espinosa Add [D72.829] Leukocytosis     4/20/2025  8:29 AM de Samuel Espinosa Add [E87.20] Lactic acidosis     4/20/2025  8:29 AM de Samuel Espinosa Add [N17.9] GENE (acute kidney injury) (HCC)     4/20/2025 10:07 AM de Samuel Espinosa Add [K52.9] Colitis     4/20/2025 10:07 AM de Samuel Espinosa Add [R19.7] Diarrhea           ED Disposition       ED Disposition   Admit    Condition   Stable    Date/Time   Sun Apr 20, 2025 10:07 AM    Comment   Case was discussed with Dr. Kuhn and the patient's admission status was agreed to be Admission Status: inpatient status to the service of Dr. Kuhn.               Assessment & Plan       Medical Decision Making  49-year-old female with significant past medical history of Takayasu arteritis, CKD, Multivessel disease, presented due to to multiple episodes of diarrhea with associated lower abdominal pain beginning last evening at approximately 11 PM. Most recent for were reported as hematochezia.  Differential diagnosis includes but is not limited to diverticulosis, diverticulitis, colitis, bowel obstruction, mesenteric ischemia.  Examination without focal findings, only generalized lower abdominal tenderness. Hemodynamically stable, provided with pain control with improvement of symptoms however labs showed leukocytosis of 12,000 as well as a lactic of 2.1, does not meet SIRS criteria and has no other infectious symptoms. Patient's GFR unfortunately is 25, as such we did not obtain CTA however did obtain Noncon CT which showed evidence of nonspecific colitis/diarrheal illness without other acute findings. Patient has not had any bloody bowel movements while here in the emergency department. Given patient's vascular history as well as our inability to obtain CTA discussed with general  surgery who recommended admission to internal medicine service with them as consult.  Zosyn ordered as per recommendation of internal medicine.  Patient admitted under the service of Dr. Kuhn for further evaluation management.      Amount and/or Complexity of Data Reviewed  Labs: ordered. Decision-making details documented in ED Course.  Radiology: ordered.    Risk  Prescription drug management.  Decision regarding hospitalization.        ED Course as of 04/20/25 1011   Sun Apr 20, 2025   0757 WBC(!): 12.35  WBC       Date                     Value               Ref Range           Status                04/20/2025               12.35 (H)           4.31 - 10.16 T*     Final                 03/27/2025               8.66                4.31 - 10.16 T*     Final            ----------  Slight increase   0814 LACTIC ACID(!): 2.1  Elevated, receiving IV fluids at this time, due to patient's GFR will obtain Noncon at this time   0939 CT findings showing concern for colitis/diarrheal illness.  Due to patient's GFR of 25, unable to obtain CTA at this time.  Pain is improved, however has not completely resolved.  Given patient's extensive vascular history we will reach out to general surgery regarding recommendations.  At this time I still doubt mesenteric ischemia as cause for patient's illness, and is likely simple colitis versus gastroenteritis.  Will continue to monitor and reassess.       Medications   piperacillin-tazobactam (ZOSYN) IVPB 4.5 g (has no administration in time range)   HYDROmorphone (DILAUDID) injection 0.5 mg (0.5 mg Intravenous Given 4/20/25 0733)   sodium chloride 0.9 % bolus 1,000 mL (0 mL Intravenous Stopped 4/20/25 1005)   acetaminophen (Ofirmev) injection 1,000 mg (0 mg Intravenous Stopped 4/20/25 0749)   ondansetron (ZOFRAN) injection 4 mg (4 mg Intravenous Given 4/20/25 0733)       ED Risk Strat Scores                    No data recorded        SBIRT 20yo+      Flowsheet Row Most Recent Value    Initial Alcohol Screen: US AUDIT-C     1. How often do you have a drink containing alcohol? 0 Filed at: 2025   2. How many drinks containing alcohol do you have on a typical day you are drinking?  0 Filed at: 2025   3a. Male UNDER 65: How often do you have five or more drinks on one occasion? 0 Filed at: 2025   3b. FEMALE Any Age, or MALE 65+: How often do you have 4 or more drinks on one occassion? 0 Filed at: 2025   Audit-C Score 0 Filed at: 2025   VALERIY: How many times in the past year have you...    Used an illegal drug or used a prescription medication for non-medical reasons? Never Filed at: 2025                            History of Present Illness       Chief Complaint   Patient presents with    Rectal Bleeding     Pt states that she started with abd pain around 2300 last night- States she has about 7 loose bowel movements, 4 of them with bright red blood       Past Medical History:   Diagnosis Date    Anemia     Asthma     Cardiomyopathy (HCC)     Chronic anemia     Diabetes mellitus (HCC)     Disease of thyroid gland     Hypertension     Hyperthyroidism     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HCC)     TB lung, latent       Past Surgical History:   Procedure Laterality Date     SECTION      IR BIOPSY LIVER MASS  07/15/2020    LIVER BIOPSY      LUNG BIOPSY      THYROIDECTOMY        Family History   Problem Relation Age of Onset    Diabetes unspecified Mother       Social History     Tobacco Use    Smoking status: Never    Smokeless tobacco: Never   Vaping Use    Vaping status: Never Used   Substance Use Topics    Alcohol use: Never    Drug use: Never      E-Cigarette/Vaping    E-Cigarette Use Never User       E-Cigarette/Vaping Substances    Nicotine No     THC No     CBD No     Flavoring No     Other No     Unknown No       I have reviewed and agree with the history as documented.     (Tatiana Balbuena) Tatiana Balbuena is a 49 y.o. female     They  presented to the emergency department on April 20, 2025. Patient presents with:  Rectal Bleeding: Pt states that she started with abd pain around 2300 last night- States she has about 7 loose bowel movements, 4 of them with bright red blood.    The patient states that she has a history of anemia for which she gets infusions, as well as vascular disease involving her aorta.  Patient states that she began experiencing lower abdominal pain at approximately 11:00 at night.  This pain was gradual, but however has worsened and now involves the lower back.  Patient has not had any nausea or emesis secondary due to the pain however notes that she did have approximately 7 loose bowel movements and the most recent 4 appeared to have blood.  Patient describes the blood as bright red, without signs of melena. Patient denies fever, chills, chest pain, difficulty breathing, recent travel, abnormal intake of food/liquids, dysuria, hematuria, or any other complaint at this time.              Review of Systems   Constitutional:  Negative for chills and fever.   HENT:  Negative for ear pain and sore throat.    Eyes:  Negative for pain and visual disturbance.   Respiratory:  Negative for cough and shortness of breath.    Cardiovascular:  Negative for chest pain and palpitations.   Gastrointestinal:  Positive for abdominal pain, blood in stool and diarrhea. Negative for nausea and vomiting.   Genitourinary:  Negative for dysuria and hematuria.   Musculoskeletal:  Negative for arthralgias and back pain.   Skin:  Negative for color change and rash.   Neurological:  Negative for seizures and syncope.   All other systems reviewed and are negative.          Objective       ED Triage Vitals [04/20/25 0719]   Temperature Pulse Blood Pressure Respirations SpO2 Patient Position - Orthostatic VS   97.8 °F (36.6 °C) 75 129/85 14 99 % Sitting      Temp Source Heart Rate Source BP Location FiO2 (%) Pain Score    Temporal Monitor Left arm -- 10 -  Worst Possible Pain      Vitals      Date and Time Temp Pulse SpO2 Resp BP Pain Score FACES Pain Rating User   04/20/25 0921 97.8 °F (36.6 °C) 78 96 % 15 125/75 4 -- AB   04/20/25 0809 -- -- -- -- -- 4 -- AB   04/20/25 0733 -- -- -- -- -- 10 - Worst Possible Pain -- AB   04/20/25 0719 97.8 °F (36.6 °C) 75 99 % 14 129/85 10 - Worst Possible Pain -- AB            Physical Exam  Vitals and nursing note reviewed.   Constitutional:       General: She is in acute distress (Moderate).      Appearance: Normal appearance. She is obese.   HENT:      Head: Normocephalic and atraumatic.      Right Ear: External ear normal.      Left Ear: External ear normal.      Nose: Nose normal.      Mouth/Throat:      Mouth: Mucous membranes are moist.   Eyes:      Conjunctiva/sclera: Conjunctivae normal.   Cardiovascular:      Rate and Rhythm: Normal rate and regular rhythm.   Pulmonary:      Effort: Pulmonary effort is normal. No respiratory distress.      Breath sounds: Normal breath sounds.   Abdominal:      General: Abdomen is flat. Bowel sounds are normal.      Tenderness: There is abdominal tenderness (Diffuse, lower). There is no right CVA tenderness, left CVA tenderness, guarding or rebound.   Musculoskeletal:         General: No tenderness or signs of injury. Normal range of motion.      Cervical back: Normal range of motion. No tenderness.   Skin:     General: Skin is warm and dry.      Capillary Refill: Capillary refill takes less than 2 seconds.      Findings: No bruising, erythema, lesion or rash.   Neurological:      Mental Status: She is alert. Mental status is at baseline.   Psychiatric:         Mood and Affect: Mood normal.         Results Reviewed       Procedure Component Value Units Date/Time    Comprehensive metabolic panel [[id number]]  (Abnormal) Collected: 04/20/25 0732    Lab Status: Final result Specimen: Blood from Hand, Right Updated: 04/20/25 0807     Sodium 138 mmol/L      Potassium 5.0 mmol/L      Chloride 109  mmol/L      CO2 17 mmol/L      ANION GAP 12 mmol/L      BUN 32 mg/dL      Creatinine 2.20 mg/dL      Glucose 179 mg/dL      Calcium 9.5 mg/dL      AST 29 U/L      ALT 23 U/L      Alkaline Phosphatase 116 U/L      Total Protein 8.2 g/dL      Albumin 4.2 g/dL      Total Bilirubin 0.37 mg/dL      eGFR 25 ml/min/1.73sq m     Narrative:      National Kid[employer] Disease Foundation guidelines for Chronic Kidney Disease (CKD):     Stage 1 with normal or high GFR (GFR > 90 mL/min/1.73 square meters)    Stage 2 Mild CKD (GFR = 60-89 mL/min/1.73 square meters)    Stage 3A Moderate CKD (GFR = 45-59 mL/min/1.73 square meters)    Stage 3B Moderate CKD (GFR = 30-44 mL/min/1.73 square meters)    Stage 4 Severe CKD (GFR = 15-29 mL/min/1.73 square meters)    Stage 5 End Stage CKD (GFR <15 mL/min/1.73 square meters)  Note: GFR calculation is accurate only with a steady state creatinine    Lipase [[id number]]  (Normal) Collected: 04/20/25 0732    Lab Status: Final result Specimen: Blood from Hand, Right Updated: 04/20/25 0807     Lipase 26 u/L     Magnesium [[id number]]  (Normal) Collected: 04/20/25 0732    Lab Status: Final result Specimen: Blood from Hand, Right Updated: 04/20/25 0807     Magnesium 2.4 mg/dL     Lactic acid, plasma (w/reflex if result > 2.0) [[id number]]  (Abnormal) Collected: 04/20/25 0732    Lab Status: Final result Specimen: Blood from Hand, Right Updated: 04/20/25 0806     LACTIC ACID 2.1 mmol/L     Narrative:      Result may be elevated if tourniquet was used during collection.    Lactic acid 2 Hours [[id number]]     Lab Status: No result Specimen: Blood     CBC and differential [[id number]]  (Abnormal) Collected: 04/20/25 0732    Lab Status: Final result Specimen: Blood from Hand, Right Updated: 04/20/25 0750     WBC 12.35 Thousand/uL      RBC 3.95 Million/uL      Hemoglobin 11.9 g/dL      Hematocrit 37.3 %      MCV 94 fL      MCH 30.1 pg      MCHC 31.9 g/dL      RDW 16.4 %      MPV 10.4 fL      Platelets 336  Thousands/uL      nRBC 0 /100 WBCs      Segmented % 80 %      Immature Grans % 1 %      Lymphocytes % 13 %      Monocytes % 4 %      Eosinophils Relative 1 %      Basophils Relative 1 %      Absolute Neutrophils 10.02 Thousands/µL      Absolute Immature Grans 0.07 Thousand/uL      Absolute Lymphocytes 1.56 Thousands/µL      Absolute Monocytes 0.52 Thousand/µL      Eosinophils Absolute 0.10 Thousand/µL      Basophils Absolute 0.08 Thousands/µL     UA w Reflex to Microscopic w Reflex to Culture [[id number]]     Lab Status: No result Specimen: Urine             CT abdomen pelvis wo contrast   Final Interpretation by Lars Heard MD (04/20 0921)      Fluid in the ascending colon which could represent a nonspecific colitis/diarrheal illness. Otherwise no potential acute findings in the abdomen or pelvis.      Resident: Cachorro Asencio I, the attending radiologist, have reviewed the images and agree with the final report above.      Workstation performed: [id number]             Procedures    ED Medication and Procedure Management   Prior to Admission Medications   Prescriptions Last Dose Informant Patient Reported? Taking?   Advair -21 MCG/ACT inhaler 4/19/2025  Yes Yes   Farxiga 5 MG TABS   Yes No   Sig: Take 5 mg by mouth daily   MAGNESIUM PO 4/19/2025  Yes Yes   Sig: Take by mouth   Pyridoxine HCl (vitamin B-6) 50 MG TABS 4/19/2025  No Yes   Sig: Take 1 tablet (50 mg total) by mouth daily   albuterol (PROVENTIL HFA,VENTOLIN HFA) 90 mcg/act inhaler  Self Yes No   Sig: Inhale 2 puffs 2 (two) times a day   Patient not taking: Reported on 11/21/2024   amLODIPine (NORVASC) 5 mg tablet 4/19/2025  Yes Yes   aspirin (ECOTRIN LOW STRENGTH) 81 mg EC tablet 4/19/2025 Morning Self Yes Yes   Sig: Take 81 mg by mouth daily   atorvastatin (LIPITOR) 40 mg tablet 4/19/2025  Yes Yes   carvedilol (COREG) 25 mg tablet   No No   Sig: Take 1 tablet (25 mg total) by mouth 2 (two) times a day with meals   cholecalciferol  (VITAMIN D3) 1,000 units tablet 4/19/2025  No Yes   Sig: Take 1 tablet (1,000 Units total) by mouth daily   clopidogrel (PLAVIX) 75 mg tablet 4/19/2025  Yes Yes   Sig: Take 75 mg by mouth daily   diclofenac sodium (VOLTAREN) 50 mg EC tablet  Self No No   Sig: Take 1 tablet (50 mg total) by mouth 2 (two) times a day   escitalopram (LEXAPRO) 10 mg tablet 4/19/2025 Self Yes Yes   Sig: Take 10 mg by mouth daily   folic acid (FOLVITE) 1 mg tablet 4/19/2025  Yes Yes   isoniazid (NYDRAZID) 300 mg tablet   No No   Sig: Take 1 tablet (300 mg total) by mouth daily   Patient not taking: Reported on 11/21/2024   levothyroxine 100 mcg tablet 4/19/2025  Yes Yes   linaGLIPtin 5 MG TABS   No No   Sig: Take 5 mg by mouth daily   losartan (COZAAR) 100 MG tablet 4/19/2025 Self Yes Yes   Sig: Take 100 mg by mouth daily    montelukast (SINGULAIR) 10 mg tablet 4/19/2025 Self Yes Yes   Sig: Take 10 mg by mouth daily at bedtime   omeprazole (PriLOSEC) 20 mg delayed release capsule 4/19/2025  No Yes   Sig: Take 1 capsule (20 mg total) by mouth daily   predniSONE 10 mg tablet 4/19/2025  Yes Yes   Sig: Take by mouth daily Take as directed by rheumatology   vitamin B-12 (VITAMIN B-12) 500 mcg tablet 4/19/2025  Yes Yes   Sig: Take 500 mcg by mouth daily      Facility-Administered Medications: None     Patient's Medications   Discharge Prescriptions    No medications on file     No discharge procedures on file.  ED SEPSIS DOCUMENTATION   Time reflects when diagnosis was documented in both MDM as applicable and the Disposition within this note       Time User Action Codes Description Comment    4/20/2025  7:57 AM Samuel Carpenter [D72.829] Leukocytosis     4/20/2025  8:29 AM Samuel Carpenter [E87.20] Lactic acidosis     4/20/2025  8:29 AM Samuel Carpenter Add [N17.9] GENE (acute kidney injury) (HCC)     4/20/2025 10:07 AM Samuel Carpenter [K52.9] Colitis     4/20/2025 10:07 AM Samuel Carpenter [R19.7] Diarrhea             Initial Sepsis Screening       Row Name 04/20/25 0924                Is the patient's history suggestive of a new or worsening infection? Yes (Proceed)  -TD        Suspected source of infection acute abdominal infection  -TD        Indicate SIRS criteria Leukocytosis (WBC > 72088 IJL) OR Leukopenia (WBC <4000 IJL) OR Bandemia (WBC >10% bands)  -TD        Are two or more of the above signs & symptoms of infection both present and new to the patient? No  -TD                  User Key  (r) = Recorded By, (t) = Taken By, (c) = Cosigned By      Initials Name Provider Type    TD Samuel Carpenter MD Physician                       Samuel Carpenter MD  04/20/25 1010

## 2025-04-20 NOTE — ASSESSMENT & PLAN NOTE
Lab Results   Component Value Date    HGBA1C 7.4 (H) 10/29/2024       Recent Labs     04/20/25  1151   POCGLU 127       Blood Sugar Average: Last 72 hrs:  (P) 127  SSI and glu checks   Management per medical team

## 2025-04-20 NOTE — ASSESSMENT & PLAN NOTE
Lab Results   Component Value Date    EGFR 25 04/20/2025    EGFR 28 (L) 03/08/2025    EGFR 33 (L) 01/06/2025    CREATININE 2.20 (H) 04/20/2025    CREATININE 2.09 (H) 03/08/2025    CREATININE 1.8 (H) 01/06/2025   Baseline around 2.0, mildly elevated 2.2 on admission  Low GFR 25  F/u nephrology outpatient  On cozaar for HTN    - continue IV fluids 100cc/hr  - monitor renal function  - nephrology consult placed  - hold cozaar and lipitor

## 2025-04-20 NOTE — ASSESSMENT & PLAN NOTE
> 4 bouts of watery diarrhea with bright red blood since 04/19 PM with associated lower abdominal pain. Denied N/V.   No recent antibiotics use or diet change  In ED labs with leukocytosis 12 with lactic acidosis 2.1.  Only obtain non-contrast CT abdomen due to low GFR 25    CT abdomen non-contrast: Fluid in the ascending colon which could represent a nonspecific colitis/diarrheal illness. Otherwise no potential acute findings in the abdomen or pelvis.      Surgery consulted for possible mesenteric ischemia  Initiated Zosyn on 04/20. Repeat lactic acid normal.    - continue NPO  - continue IV Zosyn  - continue IV fluids 100cc/hr  - protonix 40mg BID  - zofran prn for nausea  - IV ofirmir prn for mild pain and IV dilaudid 0.5mg q4hr prn for moderate to severe pain  - f/u stool studies, ova/parasite test, c. diff tests  - GI consult placed  - surgery consult placed, concern for ischemia colitis based on past vascular hx.

## 2025-04-20 NOTE — ASSESSMENT & PLAN NOTE
"Lab Results   Component Value Date    HGBA1C 7.4 (H) 10/29/2024       No results for input(s): \"POCGLU\" in the last 72 hours.    Blood Sugar Average: Last 72 hrs:  on weekly ozempic and farxiga 5mg daily    - currently NPO  - Home regimen on hold  - utilize ISS with accu check ACHS  - Hypoglycemia protocol    "

## 2025-04-20 NOTE — ASSESSMENT & PLAN NOTE
"50yo F w/ PMH of T2DM, HTN, CKD, dyslipidemia, multivessel disease on ASA/statin/plavix presenting with abdominal pain, diarrhea, and BRBPR    AVSS   WBC 12.3  Hemoglobin stable 11.9  Lactic initially elevated at 2.1, since cleared with hydration   Cr 2.2    4/20 CTAP \"Fluid in the ascending colon which could represent a nonspecific colitis/diarrheal illness. Otherwise no potential acute findings in the abdomen or pelvis.\"     In march of 2025 patient underwent CTA at outside facility noting that SMA, celiac, b/l renal, and b/l iliac arteries were patent. CTA deferred today due to no findings suspicious for ischemia on non-con CT and worsening renal function.     Plan:  - Pt is hemodynamically and serologically stable at this time, no urgent/emergent surgical intervention indicated at this time. Surgery will follow.  If clinically worsening wound recommend CT w contrast after proper hydration and clearance from nephrology team   - serial abdominal exams  - IVF hydration  - trend labs and vitals, type and screen in light of rectal bleeding   - strict I/Os, monitor character of stools   - empiric antibiotics per primary team   - hold antiplatelet and pharmacologic DVT ppx   - recommend GI consultation   - Remainder of medical management per primary   "

## 2025-04-20 NOTE — H&P
"H&P - Hospitalist   Name: Tatiana Balbuena 49 y.o. female I MRN: [id number]  Unit/Bed#: 426-01 I Date of Admission: 4/20/2025   Date of Service: 4/20/2025 I Hospital Day: 0     Assessment & Plan  Acute colitis  > 4 bouts of watery diarrhea with bright red blood since 04/19 PM with associated lower abdominal pain. Denied N/V.   No recent antibiotics use or diet change  In ED labs with leukocytosis 12 with lactic acidosis 2.1.  Only obtain non-contrast CT abdomen due to low GFR 25    CT abdomen non-contrast: Fluid in the ascending colon which could represent a nonspecific colitis/diarrheal illness. Otherwise no potential acute findings in the abdomen or pelvis.      Surgery consulted for possible mesenteric ischemia  Initiated Zosyn on 04/20. Repeat lactic acid normal.    - continue NPO  - continue IV Zosyn  - continue IV fluids 100cc/hr  - protonix 40mg BID  - zofran prn for nausea  - IV ofirmir prn for mild pain and IV dilaudid 0.5mg q4hr prn for moderate to severe pain  - f/u stool studies, ova/parasite test, c. diff tests  - GI consult placed  - surgery consult placed, concern for ischemia colitis based on past vascular hx.  Type 2 diabetes mellitus with obesity  (HCC)  Lab Results   Component Value Date    HGBA1C 7.4 (H) 10/29/2024       No results for input(s): \"POCGLU\" in the last 72 hours.    Blood Sugar Average: Last 72 hrs:  on weekly ozempic and farxiga 5mg daily    - currently NPO  - Home regimen on hold  - utilize ISS with accu check ACHS  - Hypoglycemia protocol    Essential hypertension  Home meds on norvasc 5mg, cozaar 100mg, coreg 25mg BID  BP stable on admission    - continue coreg 25mg BID  - hold home meds Norvasc and Cozaar, consider DC cozaar due to CKD  - monitor BP  Dyslipidemia, goal LDL below 100  Hold home med lipitor 40mg  CKD (chronic kidney disease) stage 3, GFR 30-59 ml/min ([facility])  Lab Results   Component Value Date    EGFR 25 04/20/2025    EGFR 28 (L) 03/08/2025    EGFR 33 (L) 01/06/2025    " CREATININE 2.20 (H) 04/20/2025    CREATININE 2.09 (H) 03/08/2025    CREATININE 1.8 (H) 01/06/2025   Baseline around 2.0, mildly elevated 2.2 on admission  Low GFR 25  F/u nephrology outpatient  On cozaar for HTN    - continue IV fluids 100cc/hr  - monitor renal function  - nephrology consult placed  - hold cozaar and lipitor  S/P total thyroidectomy  Continue home med levothyroxine 112mcg daily  Subclavian artery stenosis, left (HCC)  Hold home med ASA and plavix in the setting of bloody stool, colitis  Mild persistent asthma without complication  Stable. Continue home inhaler as needed      VTE Pharmacologic Prophylaxis:   Moderate Risk (Score 3-4) - Pharmacological DVT Prophylaxis Contraindicated. Sequential Compression Devices Ordered.  Code Status: Level 1 - Full Code   Discussion with family:  will call spouse.     Anticipated Length of Stay: Patient will be admitted on an inpatient basis with an anticipated length of stay of greater than 2 midnights secondary to colitis and hematochezia requiring IV antibiotics and close monitoring.    History of Present Illness   Chief Complaint: abdominal pain with multiple bouts of bloody diarrhea    Tatiana Balbuena is a 49 y.o. female with a PMH of L subclavian artery stenosis s/p stent placement, DM2, s/p thyroidectomy, depression, HTN, asthma, and HLD who presents with abdominal pain with multiple hematochezia diarrhea. Patient reported she has more than 4 bouts of bloody watery diarrhea at home on night of 04/19. She denied nausea and vomiting. No new diet or recent use of antibiotics. This is the first time she has this type of abdominal pain. The pain is located in lower abdomen. She eat variety of food, mainly vegetables. Drink plenty of water for hydration daily. Denied fever, CP, SOB, leg swelling, changes with urination.     In ED, patient was hemodynamically stable. Labs show leukocytosis 12, lactic acid 2.1, and Cr 2.2 with GFR 25. Surgery was consulted for possible  ischemia colitis. Since patient has low GFR, only non-contrast CT could be obtain. Image shows fluid in the ascending colon which could represent a nonspecific colitis/diarrheal illness. Otherwise no potential acute findings in the abdomen or pelvis. Patient diarrhea stopped in ED however her abdominal pain still persist. Surgery consulted and recommended bowel rest with IV fluids. Last CTA done 1 month ago with patent mesenteric vessels. Pain is controlled with IV dilaudid 0.5mg. IV Zosyn was imitated. Thus, patient is admitted for further monitoring of her symptoms and continuing antibiotic treatment for colitis while waiting for stool studies result.     Review of Systems   Constitutional:  Positive for chills. Negative for appetite change and fever.   HENT:  Negative for congestion.    Eyes:  Negative for visual disturbance.   Respiratory:  Negative for cough and shortness of breath.    Cardiovascular:  Negative for chest pain and palpitations.   Gastrointestinal:  Positive for abdominal pain, blood in stool and diarrhea. Negative for constipation and vomiting.   Genitourinary:  Negative for dysuria and hematuria.   Musculoskeletal:  Negative for back pain.   Skin:  Negative for color change and rash.   Neurological:  Negative for dizziness and headaches.   All other systems reviewed and are negative.      Historical Information   Past Medical History:   Diagnosis Date    Anemia     Asthma     Cardiomyopathy (HCC)     Chronic anemia     Diabetes mellitus (HCC)     Disease of thyroid gland     Hypertension     Hyperthyroidism     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HCC)     TB lung, latent      Past Surgical History:   Procedure Laterality Date     SECTION      IR BIOPSY LIVER MASS  07/15/2020    LIVER BIOPSY      LUNG BIOPSY      THYROIDECTOMY       Social History     Tobacco Use    Smoking status: Never    Smokeless tobacco: Never   Vaping Use    Vaping status: Never Used   Substance and Sexual Activity    Alcohol  use: Never    Drug use: Never    Sexual activity: Yes     Partners: Male     E-Cigarette/Vaping    E-Cigarette Use Never User      E-Cigarette/Vaping Substances    Nicotine No     THC No     CBD No     Flavoring No     Other No     Unknown No      Family History   Problem Relation Age of Onset    Diabetes unspecified Mother      Social History:  Marital Status: /Civil Union   Occupation: not working  Patient Pre-hospital Living Situation: Home  Patient Pre-hospital Level of Mobility: walks  Patient Pre-hospital Diet Restrictions: none    Meds/Allergies   I have reviewed home medications with patient personally.  Prior to Admission medications    Medication Sig Start Date End Date Taking? Authorizing Provider   Advair -21 MCG/ACT inhaler  12/19/22  Yes Historical Provider, MD   amLODIPine (NORVASC) 5 mg tablet  3/17/25  Yes Historical Provider, MD   aspirin (ECOTRIN LOW STRENGTH) 81 mg EC tablet Take 81 mg by mouth daily   Yes Historical Provider, MD   atorvastatin (LIPITOR) 40 mg tablet  11/10/22  Yes Historical Provider, MD   cholecalciferol (VITAMIN D3) 1,000 units tablet Take 1 tablet (1,000 Units total) by mouth daily 6/28/22  Yes JOHNSON Dalal   clopidogrel (PLAVIX) 75 mg tablet Take 75 mg by mouth daily 2/1/24  Yes Historical Provider, MD   escitalopram (LEXAPRO) 10 mg tablet Take 10 mg by mouth daily   Yes Historical Provider, MD   folic acid (FOLVITE) 1 mg tablet  10/16/23  Yes Historical Provider, MD   levothyroxine 100 mcg tablet  11/7/22  Yes Historical Provider, MD   losartan (COZAAR) 100 MG tablet Take 100 mg by mouth daily    Yes Historical Provider, MD   MAGNESIUM PO Take by mouth   Yes Historical Provider, MD   montelukast (SINGULAIR) 10 mg tablet Take 10 mg by mouth daily at bedtime   Yes Historical Provider, MD   omeprazole (PriLOSEC) 20 mg delayed release capsule Take 1 capsule (20 mg total) by mouth daily 2/15/24  Yes Yousif Goff MD   predniSONE 10 mg tablet Take by mouth  daily Take as directed by rheumatology 2/27/24  Yes Historical Provider, MD   Pyridoxine HCl (vitamin B-6) 50 MG TABS Take 1 tablet (50 mg total) by mouth daily 7/29/24 4/25/25 Yes Bean Edmond PA-C   vitamin B-12 (VITAMIN B-12) 500 mcg tablet Take 500 mcg by mouth daily 3/6/24  Yes Historical Provider, MD   albuterol (PROVENTIL HFA,VENTOLIN HFA) 90 mcg/act inhaler Inhale 2 puffs 2 (two) times a day  Patient not taking: Reported on 11/21/2024    Historical Provider, MD   carvedilol (COREG) 25 mg tablet Take 1 tablet (25 mg total) by mouth 2 (two) times a day with meals 2/15/24 11/21/24  Yousif Goff MD   diclofenac sodium (VOLTAREN) 50 mg EC tablet Take 1 tablet (50 mg total) by mouth 2 (two) times a day 4/11/20   Shashi Villarreal DO   Farxiga 5 MG TABS Take 5 mg by mouth daily 9/23/24   Historical Provider, MD   isoniazid (NYDRAZID) 300 mg tablet Take 1 tablet (300 mg total) by mouth daily  Patient not taking: Reported on 11/21/2024 7/29/24 4/25/25  Bean Edmond PA-C   linaGLIPtin 5 MG TABS Take 5 mg by mouth daily 4/26/24   Kristopher Steinberg DO     Allergies   Allergen Reactions    Other Allergic Rhinitis     pollen       Objective :  Temp:  [97.8 °F (36.6 °C)] 97.8 °F (36.6 °C)  HR:  [75-78] 78  BP: (125-129)/(75-85) 125/75  Resp:  [14-15] 15  SpO2:  [96 %-99 %] 96 %  O2 Device: None (Room air)    Physical Exam  Vitals and nursing note reviewed.   Constitutional:       General: She is not in acute distress.     Appearance: Normal appearance. She is well-developed. She is ill-appearing.   HENT:      Head: Normocephalic and atraumatic.      Right Ear: External ear normal.      Left Ear: External ear normal.      Nose: Nose normal. No congestion.      Mouth/Throat:      Mouth: Mucous membranes are moist.      Pharynx: Oropharynx is clear. No oropharyngeal exudate or posterior oropharyngeal erythema.   Eyes:      General:         Right eye: No discharge.         Left eye: No discharge.      Extraocular  Movements: Extraocular movements intact.      Conjunctiva/sclera: Conjunctivae normal.   Cardiovascular:      Rate and Rhythm: Normal rate and regular rhythm.      Pulses: Normal pulses.      Heart sounds: Normal heart sounds. No murmur heard.  Pulmonary:      Effort: Pulmonary effort is normal. No respiratory distress.      Breath sounds: Normal breath sounds. No wheezing.   Abdominal:      General: Abdomen is flat. Bowel sounds are normal. There is no distension.      Palpations: Abdomen is soft.      Tenderness: There is abdominal tenderness. There is no guarding.      Comments: Tenderness with deep palpation of lower abdomen   Musculoskeletal:         General: No swelling. Normal range of motion.      Cervical back: Normal range of motion and neck supple.      Right lower leg: No edema.      Left lower leg: No edema.   Skin:     General: Skin is warm and dry.      Capillary Refill: Capillary refill takes less than 2 seconds.      Findings: No rash.   Neurological:      General: No focal deficit present.      Mental Status: She is alert and oriented to person, place, and time.   Psychiatric:         Mood and Affect: Mood normal.              Lab Results: I have reviewed the following results:  Results from last 7 days   Lab Units 04/20/25  0732   WBC Thousand/uL 12.35*   HEMOGLOBIN g/dL 11.9   HEMATOCRIT % 37.3   PLATELETS Thousands/uL 336   SEGS PCT % 80*   LYMPHO PCT % 13*   MONO PCT % 4   EOS PCT % 1     Results from last 7 days   Lab Units 04/20/25  0732   SODIUM mmol/L 138   POTASSIUM mmol/L 5.0   CHLORIDE mmol/L 109*   CO2 mmol/L 17*   BUN mg/dL 32*   CREATININE mg/dL 2.20*   ANION GAP mmol/L 12   CALCIUM mg/dL 9.5   ALBUMIN g/dL 4.2   TOTAL BILIRUBIN mg/dL 0.37   ALK PHOS U/L 116*   ALT U/L 23   AST U/L 29   GLUCOSE RANDOM mg/dL 179*             Lab Results   Component Value Date    HGBA1C 7.4 (H) 10/29/2024    HGBA1C 7.7 (H) 07/29/2024    HGBA1C 7.3 (H) 02/13/2024     Results from last 7 days   Lab Units  04/20/25  1025 04/20/25  0732   LACTIC ACID mmol/L 1.2 2.1*       Imaging Results Review: I reviewed radiology reports from this admission including: non-contrast CT abdomen.  Other Study Results Review: EKG was reviewed.     Administrative Statements   I have spent a total time of 30 minutes in caring for this patient on the day of the visit/encounter including Impressions, Counseling / Coordination of care, Documenting in the medical record, Reviewing/placing orders in the medical record (including tests, medications, and/or procedures), Obtaining or reviewing history  , and Communicating with other healthcare professionals .    ** Please Note: This note has been constructed using a voice recognition system. **

## 2025-04-20 NOTE — PLAN OF CARE
Problem: PAIN - ADULT  Goal: Verbalizes/displays adequate comfort level or baseline comfort level  Description: Interventions:- Encourage patient to monitor pain and request assistance- Assess pain using appropriate pain scale- Administer analgesics based on type and severity of pain and evaluate response- Implement non-pharmacological measures as appropriate and evaluate response- Consider cultural and social influences on pain and pain management- Notify physician/advanced practitioner if interventions unsuccessful or patient reports new pain  Outcome: Progressing     Problem: INFECTION - ADULT  Goal: Absence or prevention of progression during hospitalization  Description: INTERVENTIONS:- Assess and monitor for signs and symptoms of infection- Monitor lab/diagnostic results- Monitor all insertion sites, i.e. indwelling lines, tubes, and drains- Monitor endotracheal if appropriate and nasal secretions for changes in amount and color- Fenton appropriate cooling/warming therapies per order- Administer medications as ordered- Instruct and encourage patient and family to use good hand hygiene technique- Identify and instruct in appropriate isolation precautions for identified infection/condition  Outcome: Progressing     Problem: DISCHARGE PLANNING  Goal: Discharge to home or other facility with appropriate resources  Description: INTERVENTIONS:- Identify barriers to discharge w/patient and caregiver- Arrange for needed discharge resources and transportation as appropriate- Identify discharge learning needs (meds, wound care, etc.)- Arrange for interpretive services to assist at discharge as needed- Refer to Case Management Department for coordinating discharge planning if the patient needs post-hospital services based on physician/advanced practitioner order or complex needs related to functional status, cognitive ability, or social support system  Outcome: Progressing     Problem: Knowledge Deficit  Goal:  Patient/family/caregiver demonstrates understanding of disease process, treatment plan, medications, and discharge instructions  Description: Complete learning assessment and assess knowledge base.Interventions:- Provide teaching at level of understanding- Provide teaching via preferred learning methods  Outcome: Progressing     Problem: GASTROINTESTINAL - ADULT  Goal: Minimal or absence of nausea and/or vomiting  Description: INTERVENTIONS:- Administer IV fluids if ordered to ensure adequate hydration- Maintain NPO status until nausea and vomiting are resolved- Nasogastric tube if ordered- Administer ordered antiemetic medications as needed- Provide nonpharmacologic comfort measures as appropriate- Advance diet as tolerated, if ordered- Consider nutrition services referral to assist patient with adequate nutrition and appropriate food choices  Outcome: Progressing  Goal: Maintains or returns to baseline bowel function  Description: INTERVENTIONS:- Assess bowel function- Encourage oral fluids to ensure adequate hydration- Administer IV fluids if ordered to ensure adequate hydration- Administer ordered medications as needed- Encourage mobilization and activity- Consider nutritional services referral to assist patient with adequate nutrition and appropriate food choices  Outcome: Progressing  Goal: Maintains adequate nutritional intake  Description: INTERVENTIONS:- Monitor percentage of each meal consumed- Identify factors contributing to decreased intake, treat as appropriate- Assist with meals as needed- Monitor I&O, weight, and lab values if indicated- Obtain nutrition services referral as needed  Outcome: Progressing

## 2025-04-20 NOTE — ASSESSMENT & PLAN NOTE
Home meds on norvasc 5mg, cozaar 100mg, coreg 25mg BID  BP stable on admission    - continue coreg 25mg BID  - hold home meds Norvasc and Cozaar, consider DC cozaar due to CKD  - monitor BP

## 2025-04-20 NOTE — ASSESSMENT & PLAN NOTE
Lab Results   Component Value Date    EGFR 25 04/20/2025    EGFR 28 (L) 03/08/2025    EGFR 33 (L) 01/06/2025    CREATININE 2.20 (H) 04/20/2025    CREATININE 2.09 (H) 03/08/2025    CREATININE 1.8 (H) 01/06/2025   Nephrology consulted   Management per medical team

## 2025-04-20 NOTE — ED ATTENDING ATTESTATION
2025  I, Jori Kellogg DO, saw and evaluated the patient. I have discussed the patient with the resident/non-physician practitioner and agree with the resident's/non-physician practitioner's findings, Plan of Care, and MDM as documented in the resident's/non-physician practitioner's note, except where noted. All available labs and Radiology studies were reviewed.  I was present for key portions of any procedure(s) performed by the resident/non-physician practitioner and I was immediately available to provide assistance.       At this point I agree with the current assessment done in the Emergency Department.  I have conducted an independent evaluation of this patient a history and physical is as follows:    ED Course     Emergency Department Note- Tatiana Balbuena 49 y.o. female MRN: [id number]    Unit/Bed#: RM03 Encounter: [id number]    Tatiana Balbuena is a 49 y.o. female who presents with   Chief Complaint   Patient presents with    Rectal Bleeding     Pt states that she started with abd pain around 2300 last night- States she has about 7 loose bowel movements, 4 of them with bright red blood         History of Present Illness   HPI:  Tatiana Balbuena is a 49 y.o. female who presents with abdominal pain that began approx 11 pm last night associated with bloody stools. Patient states she's had 4 BMs with bright red blood in the stool.  No recent change in medications.  No recent illness or infections.  No recent antibiotic usage.  No recent travel.  Patient not anticoagulated.    ROS is otherwise unremarkable.    Historical Information   Past Medical History:   Diagnosis Date    Anemia     Asthma     Cardiomyopathy (HCC)     Chronic anemia     Diabetes mellitus (HCC)     Disease of thyroid gland     Hypertension     Hyperthyroidism     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HCC)     TB lung, latent      Past Surgical History:   Procedure Laterality Date     SECTION      IR BIOPSY LIVER MASS  07/15/2020    LIVER BIOPSY       LUNG BIOPSY      THYROIDECTOMY       Social History   Social History     Substance and Sexual Activity   Alcohol Use Never     Social History     Substance and Sexual Activity   Drug Use Never     Social History     Tobacco Use   Smoking Status Never   Smokeless Tobacco Never       Family History:   Meds/Allergies     Allergies   Allergen Reactions    Other Allergic Rhinitis     pollen       Objective   First Vitals:   Blood Pressure: 129/85 (25)  Pulse: 75 (25)  Temperature: 97.8 °F (36.6 °C) (25)  Temp Source: Temporal (25)  Respirations: 14 (25)  Weight - Scale: 110 kg (242 lb 8.1 oz) (25)  SpO2: 99 % (25)    Current Vitals:   Blood Pressure: 125/75 (25)  Pulse: 78 (25)  Temperature: 97.8 °F (36.6 °C) (25)  Temp Source: Temporal (25)  Respirations: 15 (25)  Weight - Scale: 110 kg (242 lb 8.1 oz) (25)  SpO2: 96 % (25)      Intake/Output Summary (Last 24 hours) at 2025 1021  Last data filed at 2025 1005  Gross per 24 hour   Intake 1100 ml   Output --   Net 1100 ml       Invasive Devices       Peripheral Intravenous Line  Duration             Peripheral IV 24 Dorsal (posterior);Right Hand 432 days    Peripheral IV 24 Right;Ventral (anterior) Forearm 432 days                          Medical Decision Makin.  CT and labs reviewed.  Given bloody stool, CT findings of enteritis, elevated leukocytosis, elevated lactic acid level and pain and nausea, discussed with general surgery who recommended patient be placed in observation for IV fluids, analgesia, bowel rest and repeat abdominal exams.  Discussed with hospitalist who accepted patient.  Patient updated and agrees with treatment plan.    Recent Results (from the past 36 hours)   CBC and differential    Collection Time: 25  7:32 AM   Result Value Ref Range    WBC 12.35 (H) 4.31 -  10.16 Thousand/uL    RBC 3.95 3.81 - 5.12 Million/uL    Hemoglobin 11.9 11.5 - 15.4 g/dL    Hematocrit 37.3 34.8 - 46.1 %    MCV 94 82 - 98 fL    MCH 30.1 26.8 - 34.3 pg    MCHC 31.9 31.4 - 37.4 g/dL    RDW 16.4 (H) 11.6 - 15.1 %    MPV 10.4 8.9 - 12.7 fL    Platelets 336 149 - 390 Thousands/uL    nRBC 0 /100 WBCs    Segmented % 80 (H) 43 - 75 %    Immature Grans % 1 0 - 2 %    Lymphocytes % 13 (L) 14 - 44 %    Monocytes % 4 4 - 12 %    Eosinophils Relative 1 0 - 6 %    Basophils Relative 1 0 - 1 %    Absolute Neutrophils 10.02 (H) 1.85 - 7.62 Thousands/µL    Absolute Immature Grans 0.07 0.00 - 0.20 Thousand/uL    Absolute Lymphocytes 1.56 0.60 - 4.47 Thousands/µL    Absolute Monocytes 0.52 0.17 - 1.22 Thousand/µL    Eosinophils Absolute 0.10 0.00 - 0.61 Thousand/µL    Basophils Absolute 0.08 0.00 - 0.10 Thousands/µL   Comprehensive metabolic panel    Collection Time: 04/20/25  7:32 AM   Result Value Ref Range    Sodium 138 135 - 147 mmol/L    Potassium 5.0 3.5 - 5.3 mmol/L    Chloride 109 (H) 96 - 108 mmol/L    CO2 17 (L) 21 - 32 mmol/L    ANION GAP 12 4 - 13 mmol/L    BUN 32 (H) 5 - 25 mg/dL    Creatinine 2.20 (H) 0.60 - 1.30 mg/dL    Glucose 179 (H) 65 - 140 mg/dL    Calcium 9.5 8.4 - 10.2 mg/dL    AST 29 13 - 39 U/L    ALT 23 7 - 52 U/L    Alkaline Phosphatase 116 (H) 34 - 104 U/L    Total Protein 8.2 6.4 - 8.4 g/dL    Albumin 4.2 3.5 - 5.0 g/dL    Total Bilirubin 0.37 0.20 - 1.00 mg/dL    eGFR 25 ml/min/1.73sq m   Lipase    Collection Time: 04/20/25  7:32 AM   Result Value Ref Range    Lipase 26 11 - 82 u/L   Lactic acid, plasma (w/reflex if result > 2.0)    Collection Time: 04/20/25  7:32 AM   Result Value Ref Range    LACTIC ACID 2.1 (H) 0.5 - 2.0 mmol/L   Magnesium    Collection Time: 04/20/25  7:32 AM   Result Value Ref Range    Magnesium 2.4 1.9 - 2.7 mg/dL     CT abdomen pelvis wo contrast   Final Result      Fluid in the ascending colon which could represent a nonspecific colitis/diarrheal illness.  "Otherwise no potential acute findings in the abdomen or pelvis.      Resident: Cachorro Asencio I, the attending radiologist, have reviewed the images and agree with the final report above.      Workstation performed: [id number]               Portions of the record may have been created with voice recognition software. Occasional wrong word or \"sound a like\" substitutions may have occurred due to the inherent limitations of voice recognition software.        Critical Care Time  Procedures      "

## 2025-04-20 NOTE — PLAN OF CARE
Problem: PAIN - ADULT  Goal: Verbalizes/displays adequate comfort level or baseline comfort level  Description: Interventions:- Encourage patient to monitor pain and request assistance- Assess pain using appropriate pain scale- Administer analgesics based on type and severity of pain and evaluate response- Implement non-pharmacological measures as appropriate and evaluate response- Consider cultural and social influences on pain and pain management- Notify physician/advanced practitioner if interventions unsuccessful or patient reports new pain  Outcome: Progressing     Problem: INFECTION - ADULT  Goal: Absence or prevention of progression during hospitalization  Description: INTERVENTIONS:- Assess and monitor for signs and symptoms of infection- Monitor lab/diagnostic results- Monitor all insertion sites, i.e. indwelling lines, tubes, and drains- Monitor endotracheal if appropriate and nasal secretions for changes in amount and color- [place] appropriate cooling/warming therapies per order- Administer medications as ordered- Instruct and encourage patient and family to use good hand hygiene technique- Identify and instruct in appropriate isolation precautions for identified infection/condition  Outcome: Progressing     Problem: DISCHARGE PLANNING  Goal: Discharge to home or other facility with appropriate resources  Description: INTERVENTIONS:- Identify barriers to discharge w/patient and caregiver- Arrange for needed discharge resources and transportation as appropriate- Identify discharge learning needs (meds, wound care, etc.)- Arrange for interpretive services to assist at discharge as needed- Refer to Case Management Department for coordinating discharge planning if the patient needs post-hospital services based on physician/advanced practitioner order or complex needs related to functional status, cognitive ability, or social support system  Outcome: Progressing     Problem: Knowledge Deficit  Goal:  Patient/family/caregiver demonstrates understanding of disease process, treatment plan, medications, and discharge instructions  Description: Complete learning assessment and assess knowledge base.Interventions:- Provide teaching at level of understanding- Provide teaching via preferred learning methods  Outcome: Progressing     Problem: GASTROINTESTINAL - ADULT  Goal: Minimal or absence of nausea and/or vomiting  Description: INTERVENTIONS:- Administer IV fluids if ordered to ensure adequate hydration- Maintain NPO status until nausea and vomiting are resolved- Nasogastric tube if ordered- Administer ordered antiemetic medications as needed- Provide nonpharmacologic comfort measures as appropriate- Advance diet as tolerated, if ordered- Consider nutrition services referral to assist patient with adequate nutrition and appropriate food choices  Outcome: Progressing  Goal: Maintains or returns to baseline bowel function  Description: INTERVENTIONS:- Assess bowel function- Encourage oral fluids to ensure adequate hydration- Administer IV fluids if ordered to ensure adequate hydration- Administer ordered medications as needed- Encourage mobilization and activity- Consider nutritional services referral to assist patient with adequate nutrition and appropriate food choices  Outcome: Progressing  Goal: Maintains adequate nutritional intake  Description: INTERVENTIONS:- Monitor percentage of each meal consumed- Identify factors contributing to decreased intake, treat as appropriate- Assist with meals as needed- Monitor I&O, weight, and lab values if indicated- Obtain nutrition services referral as needed  Outcome: Progressing

## 2025-04-21 LAB
ALBUMIN SERPL BCG-MCNC: 3.7 G/DL (ref 3.5–5)
ALP SERPL-CCNC: 95 U/L (ref 34–104)
ALT SERPL W P-5'-P-CCNC: 17 U/L (ref 7–52)
ANION GAP SERPL CALCULATED.3IONS-SCNC: 9 MMOL/L (ref 4–13)
AST SERPL W P-5'-P-CCNC: 15 U/L (ref 13–39)
BASOPHILS # BLD AUTO: 0.07 THOUSANDS/ÂΜL (ref 0–0.1)
BASOPHILS NFR BLD AUTO: 1 % (ref 0–1)
BILIRUB SERPL-MCNC: 0.36 MG/DL (ref 0.2–1)
BUN SERPL-MCNC: 23 MG/DL (ref 5–25)
C COLI+JEJUNI TUF STL QL NAA+PROBE: NEGATIVE
C DIFF TOX GENS STL QL NAA+PROBE: NEGATIVE
CALCIUM SERPL-MCNC: 8.6 MG/DL (ref 8.4–10.2)
CALPROTECTIN STL-MCNC: 68.3 ÂΜG/G
CHLORIDE SERPL-SCNC: 113 MMOL/L (ref 96–108)
CO2 SERPL-SCNC: 20 MMOL/L (ref 21–32)
CREAT SERPL-MCNC: 2.05 MG/DL (ref 0.6–1.3)
EC STX1+STX2 GENES STL QL NAA+PROBE: NEGATIVE
EOSINOPHIL # BLD AUTO: 0.03 THOUSAND/ÂΜL (ref 0–0.61)
EOSINOPHIL NFR BLD AUTO: 0 % (ref 0–6)
ERYTHROCYTE [DISTWIDTH] IN BLOOD BY AUTOMATED COUNT: 16.4 % (ref 11.6–15.1)
GFR SERPL CREATININE-BSD FRML MDRD: 27 ML/MIN/1.73SQ M
GLUCOSE SERPL-MCNC: 106 MG/DL (ref 65–140)
GLUCOSE SERPL-MCNC: 133 MG/DL (ref 65–140)
GLUCOSE SERPL-MCNC: 142 MG/DL (ref 65–140)
GLUCOSE SERPL-MCNC: 144 MG/DL (ref 65–140)
HCT VFR BLD AUTO: 31.8 % (ref 34.8–46.1)
HGB BLD-MCNC: 10.3 G/DL (ref 11.5–15.4)
IMM GRANULOCYTES # BLD AUTO: 0.04 THOUSAND/UL (ref 0–0.2)
IMM GRANULOCYTES NFR BLD AUTO: 0 % (ref 0–2)
LYMPHOCYTES # BLD AUTO: 2.24 THOUSANDS/ÂΜL (ref 0.6–4.47)
LYMPHOCYTES NFR BLD AUTO: 21 % (ref 14–44)
MAGNESIUM SERPL-MCNC: 2.5 MG/DL (ref 1.9–2.7)
MCH RBC QN AUTO: 30.3 PG (ref 26.8–34.3)
MCHC RBC AUTO-ENTMCNC: 32.4 G/DL (ref 31.4–37.4)
MCV RBC AUTO: 94 FL (ref 82–98)
MONOCYTES # BLD AUTO: 0.63 THOUSAND/ÂΜL (ref 0.17–1.22)
MONOCYTES NFR BLD AUTO: 6 % (ref 4–12)
NEUTROPHILS # BLD AUTO: 7.44 THOUSANDS/ÂΜL (ref 1.85–7.62)
NEUTS SEG NFR BLD AUTO: 72 % (ref 43–75)
NRBC BLD AUTO-RTO: 0 /100 WBCS
PHOSPHATE SERPL-MCNC: 4.7 MG/DL (ref 2.7–4.5)
PLATELET # BLD AUTO: 324 THOUSANDS/UL (ref 149–390)
PMV BLD AUTO: 10.1 FL (ref 8.9–12.7)
POTASSIUM SERPL-SCNC: 4.1 MMOL/L (ref 3.5–5.3)
PROT SERPL-MCNC: 6.4 G/DL (ref 6.4–8.4)
RBC # BLD AUTO: 3.4 MILLION/UL (ref 3.81–5.12)
SALMONELLA SP SPAO STL QL NAA+PROBE: NEGATIVE
SHIGELLA SP+EIEC IPAH STL QL NAA+PROBE: NEGATIVE
SODIUM SERPL-SCNC: 142 MMOL/L (ref 135–147)
WBC # BLD AUTO: 10.45 THOUSAND/UL (ref 4.31–10.16)

## 2025-04-21 PROCEDURE — 85025 COMPLETE CBC W/AUTO DIFF WBC: CPT

## 2025-04-21 PROCEDURE — 80053 COMPREHEN METABOLIC PANEL: CPT

## 2025-04-21 PROCEDURE — 84100 ASSAY OF PHOSPHORUS: CPT

## 2025-04-21 PROCEDURE — 99232 SBSQ HOSP IP/OBS MODERATE 35: CPT | Performed by: SURGERY

## 2025-04-21 PROCEDURE — 82948 REAGENT STRIP/BLOOD GLUCOSE: CPT

## 2025-04-21 PROCEDURE — 99233 SBSQ HOSP IP/OBS HIGH 50: CPT | Performed by: HOSPITALIST

## 2025-04-21 PROCEDURE — 83735 ASSAY OF MAGNESIUM: CPT

## 2025-04-21 RX ORDER — BISACODYL 5 MG/1
10 TABLET, DELAYED RELEASE ORAL EVERY 4 HOURS
Status: COMPLETED | OUTPATIENT
Start: 2025-04-21 | End: 2025-04-21

## 2025-04-21 RX ORDER — SODIUM CHLORIDE, SODIUM LACTATE, POTASSIUM CHLORIDE, CALCIUM CHLORIDE 600; 310; 30; 20 MG/100ML; MG/100ML; MG/100ML; MG/100ML
125 INJECTION, SOLUTION INTRAVENOUS CONTINUOUS
Status: DISCONTINUED | OUTPATIENT
Start: 2025-04-21 | End: 2025-04-21

## 2025-04-21 RX ADMIN — LEVOTHYROXINE SODIUM 100 MCG: 100 TABLET ORAL at 05:15

## 2025-04-21 RX ADMIN — CARVEDILOL 25 MG: 12.5 TABLET, FILM COATED ORAL at 16:13

## 2025-04-21 RX ADMIN — PIPERACILLIN AND TAZOBACTAM 4.5 G: 4; .5 INJECTION, POWDER, LYOPHILIZED, FOR SOLUTION INTRAVENOUS; PARENTERAL at 14:58

## 2025-04-21 RX ADMIN — BISACODYL 10 MG: 5 TABLET, COATED ORAL at 23:14

## 2025-04-21 RX ADMIN — MONTELUKAST 10 MG: 10 TABLET, FILM COATED ORAL at 23:14

## 2025-04-21 RX ADMIN — CARVEDILOL 25 MG: 12.5 TABLET, FILM COATED ORAL at 08:18

## 2025-04-21 RX ADMIN — PIPERACILLIN AND TAZOBACTAM 4.5 G: 4; .5 INJECTION, POWDER, LYOPHILIZED, FOR SOLUTION INTRAVENOUS; PARENTERAL at 23:14

## 2025-04-21 RX ADMIN — PANTOPRAZOLE SODIUM 40 MG: 40 INJECTION, POWDER, FOR SOLUTION INTRAVENOUS at 08:18

## 2025-04-21 RX ADMIN — SODIUM CHLORIDE, SODIUM GLUCONATE, SODIUM ACETATE, POTASSIUM CHLORIDE, MAGNESIUM CHLORIDE, SODIUM PHOSPHATE, DIBASIC, AND POTASSIUM PHOSPHATE 100 ML/HR: .53; .5; .37; .037; .03; .012; .00082 INJECTION, SOLUTION INTRAVENOUS at 08:26

## 2025-04-21 RX ADMIN — POLYETHYLENE GLYCOL 3350, SODIUM SULFATE ANHYDROUS, SODIUM BICARBONATE, SODIUM CHLORIDE, POTASSIUM CHLORIDE 4000 ML: 236; 22.74; 6.74; 5.86; 2.97 POWDER, FOR SOLUTION ORAL at 16:13

## 2025-04-21 RX ADMIN — PREDNISONE 10 MG: 10 TABLET ORAL at 08:18

## 2025-04-21 RX ADMIN — BISACODYL 10 MG: 5 TABLET, COATED ORAL at 16:13

## 2025-04-21 RX ADMIN — PANTOPRAZOLE SODIUM 40 MG: 40 INJECTION, POWDER, FOR SOLUTION INTRAVENOUS at 22:16

## 2025-04-21 NOTE — MALNUTRITION/BMI
This medical record reflects one or more clinical indicators suggestive of morbid obesity.      BMI Findings:  Adult BMI Classifications: Morbid Obesity 40-44.9        Body mass index is 42.53 kg/m².     See Nutrition note dated 04/21/25  for additional details.  Completed nutrition assessment is viewable in the nutrition documentation.

## 2025-04-21 NOTE — ASSESSMENT & PLAN NOTE
"  49-year-old female with history of significant vascular disease and left subclavian artery stenosis with stent 2 years ago was on Plavix and aspirin admitted here with abdominal pain and bloody loose watery bowel movements for about 2 days prior to admission, seen in initial general surgical consultation yesterday for possible ischemic colitis.  She has acute on chronic kidney injury.  Noncontrast CT abdomen pelvis showed nonspecific mild colitis.  No concern for ischemic change.  Initial lactic acidosis cleared.  Patient seen and kept n.p.o. yesterday, 1 dose of empiric IV Zosyn was given.  Patient has been on chronic prednisone for large vessel vasculitis and history of latent TB currently on meds.  Patient with significant history of multivessel cardiac disease.    Overnight the patient had 3 loose bowel movements also with bright red blood, continues with pain low abdomen in the suprapubic area.  Denies any blood in her urine or pneumaturia.    No prior history of similar episodes or inflammatory bowel disease.    CBC this morning showed hemoglobin 10.3 (11.9)  WBC 10.45 (12.35)  Platelets 324  CMP sodium 142/potassium 4.1.    Creatinine 2.05 (2.20.  Seems to be at or near baseline for her upon review of prior labs)  GFR 27    4/20 CTAP \"Fluid in the ascending colon which could represent a nonspecific colitis/diarrheal illness. Otherwise no potential acute findings in the abdomen or pelvis.\"     In march of 2025 patient underwent CTA at outside facility noting that SMA, celiac, b/l renal, and b/l iliac arteries were patent. CTA deferred today due to no findings suspicious for ischemia on non-con CT and worsening renal function.     Plan:    -Bright red rectal bleeding possibly secondary to aspirin and Plavix, need to rule out infectious vs inflammatory bowel disease.  -GI consultation.  -Contact precautions in place   -Stool studies including C diff pending.  -Fecal calprotectin, sed rate, CRP, repeat a.m. " labs and electrolytes.  Replete electrolytes as needed.  -IV fluids for hydration  -Analgesics and antiemetics as ordered as needed, IV Ofirmev every 6 hours as needed mild pain.  -Sliding insulin scale for glycemic control managed by attending primary service.  -At this time low suspicion for ischemic colitis.  -No indication for general surgical intervention, will continue to follow.  -Continue to monitor hemoglobin  -Gastroenterology consultation, will see tomorrow.  -Continue to hold Plavix and aspirin given bright red rectal bleeding  -Clear liquids today, n.p.o. after midnight.  -Possible colonoscopy tomorrow.  -Continue serial abdominal exams  -Discussed and updated with Dr. Isaiah Licea who will examine the patient later today  - IVF hydration  -Continue to trend AM labs and vitals, type and screen in light of rectal bleeding   - I&Os  - Continue empiric IV antibiotics, renew Zosyn.  - Remainder of medical management per attending service, updated Dr. Lawrence.

## 2025-04-21 NOTE — PLAN OF CARE
Problem: PAIN - ADULT  Goal: Verbalizes/displays adequate comfort level or baseline comfort level  Description: Interventions:- Encourage patient to monitor pain and request assistance- Assess pain using appropriate pain scale- Administer analgesics based on type and severity of pain and evaluate response- Implement non-pharmacological measures as appropriate and evaluate response- Consider cultural and social influences on pain and pain management- Notify physician/advanced practitioner if interventions unsuccessful or patient reports new pain  Outcome: Progressing     Problem: INFECTION - ADULT  Goal: Absence or prevention of progression during hospitalization  Description: INTERVENTIONS:- Assess and monitor for signs and symptoms of infection- Monitor lab/diagnostic results- Monitor all insertion sites, i.e. indwelling lines, tubes, and drains- Monitor endotracheal if appropriate and nasal secretions for changes in amount and color- [place] appropriate cooling/warming therapies per order- Administer medications as ordered- Instruct and encourage patient and family to use good hand hygiene technique- Identify and instruct in appropriate isolation precautions for identified infection/condition  Outcome: Progressing     Problem: DISCHARGE PLANNING  Goal: Discharge to home or other facility with appropriate resources  Description: INTERVENTIONS:- Identify barriers to discharge w/patient and caregiver- Arrange for needed discharge resources and transportation as appropriate- Identify discharge learning needs (meds, wound care, etc.)- Arrange for interpretive services to assist at discharge as needed- Refer to Case Management Department for coordinating discharge planning if the patient needs post-hospital services based on physician/advanced practitioner order or complex needs related to functional status, cognitive ability, or social support system  Outcome: Progressing     Problem: Knowledge Deficit  Goal:  Patient/family/caregiver demonstrates understanding of disease process, treatment plan, medications, and discharge instructions  Description: Complete learning assessment and assess knowledge base.Interventions:- Provide teaching at level of understanding- Provide teaching via preferred learning methods  Outcome: Progressing     Problem: GASTROINTESTINAL - ADULT  Goal: Minimal or absence of nausea and/or vomiting  Description: INTERVENTIONS:- Administer IV fluids if ordered to ensure adequate hydration- Maintain NPO status until nausea and vomiting are resolved- Nasogastric tube if ordered- Administer ordered antiemetic medications as needed- Provide nonpharmacologic comfort measures as appropriate- Advance diet as tolerated, if ordered- Consider nutrition services referral to assist patient with adequate nutrition and appropriate food choices  Outcome: Progressing  Goal: Maintains or returns to baseline bowel function  Description: INTERVENTIONS:- Assess bowel function- Encourage oral fluids to ensure adequate hydration- Administer IV fluids if ordered to ensure adequate hydration- Administer ordered medications as needed- Encourage mobilization and activity- Consider nutritional services referral to assist patient with adequate nutrition and appropriate food choices  Outcome: Progressing  Goal: Maintains adequate nutritional intake  Description: INTERVENTIONS:- Monitor percentage of each meal consumed- Identify factors contributing to decreased intake, treat as appropriate- Assist with meals as needed- Monitor I&O, weight, and lab values if indicated- Obtain nutrition services referral as needed  Outcome: Progressing

## 2025-04-21 NOTE — ASSESSMENT & PLAN NOTE
> 4 bouts of watery diarrhea with bright red blood since 04/19 PM with associated lower abdominal pain. Denied N/V.   No recent antibiotics use or diet change  In ED labs with leukocytosis 12 with lactic acidosis 2.1.  Only obtain non-contrast CT abdomen due to low GFR 25    CT abdomen non-contrast: Fluid in the ascending colon which could represent a nonspecific colitis/diarrheal illness. Otherwise no potential acute findings in the abdomen or pelvis.      Surgery consulted for possible mesenteric ischemia  Initiated Zosyn on 04/20. Repeat lactic acid normal.    - surgery consulted and rec appreciated. No emergent surgical intervention. Consider CT w contrast if clinically worsen and after clearance from nephrology.   - hemodynamically stable on 04/21  - continue with clear liquid diet  - continue IV Zosyn  - continue IV fluids 100cc/hr  - protonix 40mg BID  - zofran prn for nausea  - IV ofirmir prn for mild pain and IV dilaudid 0.5mg q4hr prn for moderate to severe pain  - f/u stool studies, ova/parasite test, c. diff tests  - GI consult placed

## 2025-04-21 NOTE — ASSESSMENT & PLAN NOTE
Presented with elevated Cr from baseline of 2.0  Trending down 2.2 >> 2.05 on 04/21  - continue IV fluids  - monitor renal fucntion

## 2025-04-21 NOTE — ASSESSMENT & PLAN NOTE
Lab Results   Component Value Date    HGBA1C 6.7 (H) 04/20/2025       Recent Labs     04/20/25  1151 04/20/25  1807 04/21/25  0023 04/21/25  1114   POCGLU 127 117 133 144*       Blood Sugar Average: Last 72 hrs:  (P) 130.25  SSI and glu checks   Management per medical team

## 2025-04-21 NOTE — ASSESSMENT & PLAN NOTE
Lab Results   Component Value Date    HGBA1C 6.7 (H) 04/20/2025       Recent Labs     04/20/25  1151 04/20/25  1807 04/21/25  0023   POCGLU 127 117 133       Blood Sugar Average: Last 72 hrs:  (P) [id number] weekly ozempic and farxiga 5mg daily    - currently NPO  - Home regimen on hold  - utilize ISS with accu check ACHS  - Hypoglycemia protocol

## 2025-04-21 NOTE — ASSESSMENT & PLAN NOTE
Lab Results   Component Value Date    EGFR 27 04/21/2025    EGFR 25 04/20/2025    EGFR 28 (L) 03/08/2025    CREATININE 2.05 (H) 04/21/2025    CREATININE 2.20 (H) 04/20/2025    CREATININE 2.09 (H) 03/08/2025   Baseline around 2.0, mildly elevated 2.2 on admission  Low GFR 25  F/u nephrology outpatient  On cozaar for HTN  Cr trending down.     - continue IV fluids 100cc/hr  - monitor renal function  - hold cozaar and lipitor

## 2025-04-21 NOTE — PROGRESS NOTES
"Progress Note - Surgery-General   Name: Tatiana Balbuena 49 y.o. female I MRN: [id number]  Unit/Bed#: 426-01 I Date of Admission: 4/20/2025   Date of Service: 4/21/2025 I Hospital Day: 1     Assessment & Plan  Acute colitis    49-year-old female with history of significant vascular disease and left subclavian artery stenosis with stent 2 years ago was on Plavix and aspirin admitted here with abdominal pain and bloody loose watery bowel movements for about 2 days prior to admission, seen in initial general surgical consultation yesterday for possible ischemic colitis.  She has acute on chronic kidney injury.  Noncontrast CT abdomen pelvis showed nonspecific mild colitis.  No concern for ischemic change.  Initial lactic acidosis cleared.  Patient seen and kept n.p.o. yesterday, 1 dose of empiric IV Zosyn was given.  Patient has been on chronic prednisone for large vessel vasculitis and history of latent TB currently on meds.  Patient with significant history of multivessel cardiac disease.    Overnight the patient had 3 loose bowel movements also with bright red blood, continues with pain low abdomen in the suprapubic area.  Denies any blood in her urine or pneumaturia.    No prior history of similar episodes or inflammatory bowel disease.    CBC this morning showed hemoglobin 10.3 (11.9)  WBC 10.45 (12.35)  Platelets 324  CMP sodium 142/potassium 4.1.    Creatinine 2.05 (2.20.  Seems to be at or near baseline for her upon review of prior labs)  GFR 27    4/20 CTAP \"Fluid in the ascending colon which could represent a nonspecific colitis/diarrheal illness. Otherwise no potential acute findings in the abdomen or pelvis.\"     In march of 2025 patient underwent CTA at outside facility noting that SMA, celiac, b/l renal, and b/l iliac arteries were patent. CTA deferred today due to no findings suspicious for ischemia on non-con CT and worsening renal function.     Plan:    -Bright red rectal bleeding possibly secondary to " aspirin and Plavix, need to rule out infectious vs inflammatory bowel disease.  -GI consultation.  -Contact precautions in place   -Stool studies including C diff pending.  -Fecal calprotectin, sed rate, CRP, repeat a.m. labs and electrolytes.  Replete electrolytes as needed.  -IV fluids for hydration  -Analgesics and antiemetics as ordered as needed, IV Ofirmev every 6 hours as needed mild pain.  -Sliding insulin scale for glycemic control managed by attending primary service.  -At this time low suspicion for ischemic colitis.  -No indication for general surgical intervention, will continue to follow.  -Continue to monitor hemoglobin  -Gastroenterology consultation, will see tomorrow.  -Continue to hold Plavix and aspirin given bright red rectal bleeding  -Clear liquids today, n.p.o. after midnight.  -Possible colonoscopy tomorrow.  -Continue serial abdominal exams  -Discussed and updated with Dr. Isaiah Lieca who will examine the patient later today  - IVF hydration  -Continue to trend AM labs and vitals, type and screen in light of rectal bleeding   - I&Os  - Continue empiric IV antibiotics, renew Zosyn.  - Remainder of medical management per attending service, updated Dr. Lawrence.  Type 2 diabetes mellitus with obesity  ([facility])  Lab Results   Component Value Date    HGBA1C 6.7 (H) 04/20/2025       Recent Labs     04/20/25  1151 04/20/25  1807 04/21/25  0023 04/21/25  1114   POCGLU 127 117 133 144*       Blood Sugar Average: Last 72 hrs:  (P) 130.25  SSI and glu checks   Management per medical team   CKD (chronic kidney disease) stage 3, GFR 30-59 ml/min ([facility])  Lab Results   Component Value Date    EGFR 27 04/21/2025    EGFR 25 04/20/2025    EGFR 28 (L) 03/08/2025    CREATININE 2.05 (H) 04/21/2025    CREATININE 2.20 (H) 04/20/2025    CREATININE 2.09 (H) 03/08/2025   Nephrology consulted   Management per medical team   Essential hypertension  Management per medical team   S/P total thyroidectomy  Home  levothyroxine reordered   Management per medical team   Mild persistent asthma without complication    Subclavian artery stenosis, left (HCC)    Acute kidney injury (HCC)      Surgery-General service will follow.    Subjective     Continued loose watery nonpainful bloody bowel movements, 3 since last evening.  No fever.  Currently NPO.  Seen in general surgical consultation yesterday, continue current management and await gastroenterology consultation.  Aspirin and Plavix currently on hold, patient with significant peripheral vascular and cardiovascular disease including left subclavian artery stent 2 years ago.  CT abdomen pelvis suggested nonspecific colitis.  No previous history of inflammatory bowel disease.  The patient had a Cologuard exam she believes about 3 years ago which she reported is negative.  She has never had a colonoscopy.    Scheduled Meds:  Current Facility-Administered Medications   Medication Dose Route Frequency Provider Last Rate    acetaminophen  1,000 mg Intravenous Q6H PRN Ailin Kuhn MD      carvedilol  25 mg Oral BID With Meals Ailin Kuhn MD      [Held by provider] clopidogrel  75 mg Oral Daily Pattie Pryor DO      fluticasone-vilanterol  1 puff Inhalation Daily Pattie Pryor DO      HYDROmorphone  0.5 mg Intravenous Q4H PRN Ailin Kuhn MD      insulin lispro  1-5 Units Subcutaneous TID AC Pattie Pryor DO      [START ON 4/26/2025] isoniazid  300 mg Oral Q7 Days Ailin Kuhn MD      levothyroxine  100 mcg Oral Early Morning Pattie Pryor DO      montelukast  10 mg Oral HS Pattie Pryor DO      multi-electrolyte  100 mL/hr Intravenous Continuous Pattie Pryor  mL/hr (04/21/25 0826)    ondansetron  4 mg Intravenous Q6H PRN Ailin Kuhn MD      pantoprazole  40 mg Intravenous Q12H LAKEISHA Ailin Kuhn MD      predniSONE  10 mg Oral Daily Ailin Kuhn MD       Continuous Infusions:multi-electrolyte, 100 mL/hr, Last Rate: 100 mL/hr (04/21/25 0826)      PRN Meds:.   acetaminophen    HYDROmorphone    ondansetron        Objective :  Temp:  [97.8 °F (36.6 °C)-98.9 °F (37.2 °C)] 98.1 °F (36.7 °C)  HR:  [75-90] 75  BP: (119-152)/(62-79) 144/79  Resp:  [15-20] 15  SpO2:  [96 %-98 %] 97 %  O2 Device: None (Room air)    I/O         04/19 0701  04/20 0700 04/20 0701  04/21 0700 04/21 0701  04/22 0700    P.O.  0 0    IV Piggyback  1100     Total Intake(mL/kg)  1100 (10.1) 0 (0)    Net  +1100 0                   Physical Exam  Vitals reviewed.   HENT:      Head: Normocephalic.      Nose: Nose normal.      Mouth/Throat:      Mouth: Mucous membranes are moist.   Eyes:      Conjunctiva/sclera: Conjunctivae normal.   Cardiovascular:      Rate and Rhythm: Regular rhythm.      Heart sounds: Normal heart sounds. No murmur heard.     No gallop.   Pulmonary:      Effort: Pulmonary effort is normal. No respiratory distress.      Breath sounds: Normal breath sounds. No wheezing or rales.   Abdominal:      General: Bowel sounds are normal. There is no distension.      Palpations: Abdomen is soft.      Tenderness: There is abdominal tenderness. There is no right CVA tenderness, guarding or rebound.      Hernia: No hernia is present.      Comments: Abdomen soft.  Nondistended.  Mild suprapubic tenderness, no guarding or rebound.  She does not exhibit any peritoneal signs.   Musculoskeletal:         General: No swelling or deformity.      Cervical back: Neck supple.      Right lower leg: No edema.      Left lower leg: No edema.   Skin:     Coloration: Skin is not jaundiced or pale.      Findings: No bruising, erythema, lesion or rash.   Neurological:      General: No focal deficit present.      Mental Status: She is alert and oriented to person, place, and time.      Cranial Nerves: No cranial nerve deficit.      Motor: No weakness.   Psychiatric:         Mood and Affect: Mood normal.         Thought Content: Thought content normal.         Lab Results: I have reviewed the following results:  Recent  "Labs     04/20/25  1025 04/21/25  0539   WBC  --  10.45*   HGB  --  10.3*   HCT  --  31.8*   PLT  --  324   SODIUM  --  142   K  --  4.1   CL  --  113*   CO2  --  20*   BUN  --  23   CREATININE  --  2.05*   GLUC  --  106   MG  --  2.5   PHOS  --  4.7*   AST  --  15   ALT  --  17   ALB  --  3.7   TBILI  --  0.36   ALKPHOS  --  95   LACTICACID 1.2  --        Imaging Results Review: No pertinent imaging studies reviewed.  Other Study Results Review: No additional pertinent studies reviewed.    VTE Pharmacologic Prophylaxis: Reason for no pharmacologic prophylaxis GI bleeding, aspirin and Plavix on hold.  VTE Mechanical Prophylaxis: sequential compression device    Adrián Mcmillan PA-C    **Please Note: Portions of the record may have been created using voice recognition software.  Occasional wrong word or \"sound a like\" substitutions may have occurred due to the inherent limitations of voice recognition software.  Read the chart carefully and recognize, using context, where substitutions have occurred.**      "

## 2025-04-21 NOTE — PROGRESS NOTES
Progress Note - Hospitalist   Name: Tatiana Balbuena 49 y.o. female I MRN: [id number]  Unit/Bed#: 426-01 I Date of Admission: 4/20/2025   Date of Service: 4/21/2025 I Hospital Day: 1    Assessment & Plan  Acute colitis  > 4 bouts of watery diarrhea with bright red blood since 04/19 PM with associated lower abdominal pain. Denied N/V.   No recent antibiotics use or diet change  In ED labs with leukocytosis 12 with lactic acidosis 2.1.  Only obtain non-contrast CT abdomen due to low GFR 25    CT abdomen non-contrast: Fluid in the ascending colon which could represent a nonspecific colitis/diarrheal illness. Otherwise no potential acute findings in the abdomen or pelvis.      Surgery consulted for possible mesenteric ischemia  Initiated Zosyn on 04/20. Repeat lactic acid normal.    - surgery consulted and rec appreciated. No emergent surgical intervention. Consider CT w contrast if clinically worsen and after clearance from nephrology.   - hemodynamically stable on 04/21  - continue with clear liquid diet  - continue IV Zosyn  - continue IV fluids 100cc/hr  - protonix 40mg BID  - zofran prn for nausea  - IV ofirmir prn for mild pain and IV dilaudid 0.5mg q4hr prn for moderate to severe pain  - f/u stool studies, ova/parasite test, c. diff tests  - GI consult placed    Type 2 diabetes mellitus with obesity  (HCC)  Lab Results   Component Value Date    HGBA1C 6.7 (H) 04/20/2025       Recent Labs     04/20/25  1151 04/20/25  1807 04/21/25  0023   POCGLU 127 117 133       Blood Sugar Average: Last 72 hrs:  (P) [id number] weekly ozempic and farxiga 5mg daily    - currently NPO  - Home regimen on hold  - utilize ISS with accu check ACHS  - Hypoglycemia protocol    Essential hypertension  Home meds on norvasc 5mg, cozaar 100mg, coreg 25mg BID  BP stable on admission    - continue coreg 25mg BID  - hold home meds Norvasc and Cozaar, consider DC cozaar due to CKD  - monitor BP  Dyslipidemia, goal LDL below 100  Hold home  med lipitor 40mg  CKD (chronic kidney disease) stage 3, GFR 30-59 ml/min ([facility])  Lab Results   Component Value Date    EGFR 27 04/21/2025    EGFR 25 04/20/2025    EGFR 28 (L) 03/08/2025    CREATININE 2.05 (H) 04/21/2025    CREATININE 2.20 (H) 04/20/2025    CREATININE 2.09 (H) 03/08/2025   Baseline around 2.0, mildly elevated 2.2 on admission  Low GFR 25  F/u nephrology outpatient  On cozaar for HTN  Cr trending down.     - continue IV fluids 100cc/hr  - monitor renal function  - hold cozaar and lipitor  Acute kidney injury (HCC)  Presented with elevated Cr from baseline of 2.0  Trending down 2.2 >> 2.05 on 04/21  - continue IV fluids  - monitor renal fucntion  S/P total thyroidectomy  Continue home med levothyroxine 112mcg daily  Subclavian artery stenosis, left (HCC)  Hold home med ASA and plavix in the setting of bloody stool, colitis  Mild persistent asthma without complication  Stable. Continue home inhaler as needed    VTE Pharmacologic Prophylaxis: VTE Score: 4 Moderate Risk (Score 3-4) - Pharmacological DVT Prophylaxis Contraindicated. Sequential Compression Devices Ordered.    Mobility:   Basic Mobility Inpatient Raw Score: 24  JH-HLM Goal: 8: Walk 250 feet or more  JH-HLM Achieved: 8: Walk 250 feet ot more  JH-HLM Goal achieved. Continue to encourage appropriate mobility.    Patient Centered Rounds: I performed bedside rounds with nursing staff today.   Discussions with Specialists or Other Care Team Provider: surgery, case management    Education and Discussions with Family / Patient:  will call patient's son.     Current Length of Stay: 1 day(s)  Current Patient Status: Inpatient   Certification Statement: The patient will continue to require additional inpatient hospital stay due to acute colitis requiring IV antibiotics and IV fluids  Discharge Plan: Anticipate discharge in 24-48 hrs to home.    Code Status: Level 1 - Full Code    Subjective   Patient was examined bedside. She reported feeling a bit  better but still has lower abdominal pain last night. Denied chills, N/V. Has one bout of soft stool with blood last night.     Objective :  Temp:  [97.8 °F (36.6 °C)-98.9 °F (37.2 °C)] 98.1 °F (36.7 °C)  HR:  [75-90] 76  BP: (119-163)/(62-85) 136/62  Resp:  [15-20] 18  SpO2:  [96 %-99 %] 96 %  O2 Device: None (Room air)    Body mass index is 42.53 kg/m².     Input and Output Summary (last 24 hours):     Intake/Output Summary (Last 24 hours) at 4/21/2025 0907  Last data filed at 4/21/2025 0806  Gross per 24 hour   Intake 1000 ml   Output --   Net 1000 ml       Physical Exam  Vitals and nursing note reviewed.   Constitutional:       General: She is not in acute distress.     Appearance: Normal appearance. She is well-developed. She is not ill-appearing.   HENT:      Head: Normocephalic and atraumatic.      Right Ear: External ear normal.      Left Ear: External ear normal.      Nose: Nose normal. No congestion.      Mouth/Throat:      Mouth: Mucous membranes are moist.      Pharynx: Oropharynx is clear. No oropharyngeal exudate or posterior oropharyngeal erythema.   Eyes:      General:         Right eye: No discharge.         Left eye: No discharge.      Extraocular Movements: Extraocular movements intact.      Conjunctiva/sclera: Conjunctivae normal.   Cardiovascular:      Rate and Rhythm: Normal rate and regular rhythm.      Pulses: Normal pulses.      Heart sounds: Normal heart sounds. No murmur heard.  Pulmonary:      Effort: Pulmonary effort is normal. No respiratory distress.      Breath sounds: Normal breath sounds. No wheezing.   Abdominal:      General: Abdomen is flat. Bowel sounds are normal. There is no distension.      Palpations: Abdomen is soft.      Tenderness: There is abdominal tenderness. There is no guarding.      Comments: Mild tenderness with palpation in lower abdomen   Musculoskeletal:         General: No swelling. Normal range of motion.      Cervical back: Normal range of motion and neck  supple.      Right lower leg: No edema.      Left lower leg: No edema.   Skin:     General: Skin is warm and dry.      Capillary Refill: Capillary refill takes less than 2 seconds.      Findings: No rash.   Neurological:      General: No focal deficit present.      Mental Status: She is alert and oriented to person, place, and time.   Psychiatric:         Mood and Affect: Mood normal.                 Lab Results: I have reviewed the following results:   Results from last 7 days   Lab Units 04/21/25  0539   WBC Thousand/uL 10.45*   HEMOGLOBIN g/dL 10.3*   HEMATOCRIT % 31.8*   PLATELETS Thousands/uL 324   SEGS PCT % 72   LYMPHO PCT % 21   MONO PCT % 6   EOS PCT % 0     Results from last 7 days   Lab Units 04/21/25  0539   SODIUM mmol/L 142   POTASSIUM mmol/L 4.1   CHLORIDE mmol/L 113*   CO2 mmol/L 20*   BUN mg/dL 23   CREATININE mg/dL 2.05*   ANION GAP mmol/L 9   CALCIUM mg/dL 8.6   ALBUMIN g/dL 3.7   TOTAL BILIRUBIN mg/dL 0.36   ALK PHOS U/L 95   ALT U/L 17   AST U/L 15   GLUCOSE RANDOM mg/dL 106         Results from last 7 days   Lab Units 04/21/25  0023 04/20/25  1807 04/20/25  1151   POC GLUCOSE mg/dl 133 117 127     Results from last 7 days   Lab Units 04/20/25  1202   HEMOGLOBIN A1C % 6.7*     Results from last 7 days   Lab Units 04/20/25  1025 04/20/25  0732   LACTIC ACID mmol/L 1.2 2.1*       Recent Cultures (last 7 days):         Imaging Results Review: No pertinent imaging studies reviewed.  Other Study Results Review: No additional pertinent studies reviewed.    Last 24 Hours Medication List:     Current Facility-Administered Medications:     acetaminophen (Ofirmev) injection 1,000 mg, Q6H PRN    carvedilol (COREG) tablet 25 mg, BID With Meals    [Held by provider] clopidogrel (PLAVIX) tablet 75 mg, Daily    fluticasone-vilanterol 200-25 mcg/actuation 1 puff, Daily    HYDROmorphone (DILAUDID) injection 0.5 mg, Q4H PRN    insulin lispro (HumALOG/ADMELOG) 100 units/mL subcutaneous injection 1-5 Units, TID AC  **AND** Fingerstick Glucose (POCT), Q6H    [START ON 4/26/2025] isoniazid (NYDRAZID) tablet 300 mg, Q7 Days    levothyroxine tablet 100 mcg, Early Morning    montelukast (SINGULAIR) tablet 10 mg, HS    multi-electrolyte (ISOLYTE-S PH 7.4 equivalent) IV solution, Continuous, Last Rate: 100 mL/hr (04/21/25 0826)    ondansetron (ZOFRAN) injection 4 mg, Q6H PRN    pantoprazole (PROTONIX) injection 40 mg, Q12H LAKEISHA    predniSONE tablet 10 mg, Daily    Administrative Statements   Today, Patient Was Seen By: Pattie Pryor, DO  I have spent a total time of 20 minutes in caring for this patient on the day of the visit/encounter including Impressions, Counseling / Coordination of care, Documenting in the medical record, Reviewing/placing orders in the medical record (including tests, medications, and/or procedures), Obtaining or reviewing history  , and Communicating with other healthcare professionals .    **Please Note: This note may have been constructed using a voice recognition system.**

## 2025-04-21 NOTE — UTILIZATION REVIEW
Initial Clinical Review    Admission: Date/Time/Statement:   Admission Orders (From admission, onward)       Ordered        04/20/25 1009  INPATIENT ADMISSION  Once                          Orders Placed This Encounter   Procedures    INPATIENT ADMISSION     Standing Status:   Standing     Number of Occurrences:   1     Level of Care:   Med Surg [16]     Estimated length of stay:   More than 2 Midnights     Certification:   I certify that inpatient services are medically necessary for this patient for a duration of greater than two midnights. See H&P and MD Progress Notes for additional information about the patient's course of treatment.     ED Arrival Information       Expected   -    Arrival   4/20/2025 07:03    Acuity   Urgent              Means of arrival   Walk-In    Escorted by   Family Member    Service   Hospitalist    Admission type   Emergency              Arrival complaint   abdominal pain, bloody stool             Chief Complaint   Patient presents with    Rectal Bleeding     Pt states that she started with abd pain around 2300 last night- States she has about 7 loose bowel movements, 4 of them with bright red blood       Initial Presentation:   49 yof to ER from home for lower abd pain, bloody loose watery BM's. Hx takayasu arteritis CKD, multivessel disease. Presents with generalized lower abd tenderness. Admission CT a/p: fluid in ascending colon which could represent a nonspecific colitis/diarrheal illness. Labs: WBC 12.35, Cl 109, CO2 17, BUN 32, Cr 2.20, alk phos 116, gluc 179, lactic acid 2.1, CRP 17.4, sed rate 97, u/a+gluc, blood, prot.  Admitted to inpatient status for acute colitis. Plan: IVABT, IV PPI, IVF, pain mgt., accuchecks, surgery & GI consulted.     Per surgery: colitis  Ischemic vs infectious colitis. Recommend bowel rest, IVF, IVABT, serial abd exams.     Anticipated Length of Stay/Certification Statement:   Pt will be admitted on an inpatient basis with anticipated LOS>MND 2nd  colitis & hematochezia requiring IVABT & close monitoring.    Date: 4/21/25   Day 2:   IVABT continued for colitis. Persistent lower abd pain, soft stool with blood last night x1. Exam: mild tenderness with palpation lower abd. Trial clear liquids, IVF maintained. Continue serial abd exams, pain mgt, monitor diet tolerance, monitor VS, follow labs.  Per surgery: acute colitis  Stool studies, pending. Continue abt. No indication for surgical intervention. Hold Plavix, ASA, clear liquids. Serial abd exams.       ED Treatment-Medication Administration from 04/20/2025 0703 to 04/20/2025 1102         Date/Time Order Dose Route Action     04/20/2025 0733 HYDROmorphone (DILAUDID) injection 0.5 mg 0.5 mg Intravenous Given     04/20/2025 0738 sodium chloride 0.9 % bolus 1,000 mL 1,000 mL Intravenous New Bag     04/20/2025 0733 acetaminophen (Ofirmev) injection 1,000 mg 1,000 mg Intravenous New Bag     04/20/2025 0733 ondansetron (ZOFRAN) injection 4 mg 4 mg Intravenous Given            Scheduled Medications:  carvedilol, 25 mg, Oral, BID With Meals  [Held by provider] clopidogrel, 75 mg, Oral, Daily  fluticasone-vilanterol, 1 puff, Inhalation, Daily  insulin lispro, 1-5 Units, Subcutaneous, TID AC  [START ON 4/26/2025] isoniazid, 300 mg, Oral, Q7 Days  levothyroxine, 100 mcg, Oral, Early Morning  montelukast, 10 mg, Oral, HS  pantoprazole, 40 mg, Intravenous, Q12H LAKEISHA  piperacillin-tazobactam, 4.5 g, Intravenous, Q8H  predniSONE, 10 mg, Oral, Daily      Continuous IV Infusions:  multi-electrolyte, 100 mL/hr, Intravenous, Continuous      PRN Meds:  acetaminophen, 1,000 mg, Intravenous, Q6H PRN  HYDROmorphone, 0.5 mg, Intravenous, Q4H PRN  ondansetron, 4 mg, Intravenous, Q6H PRN      ED Triage Vitals [04/20/25 0719]   Temperature Pulse Respirations Blood Pressure SpO2 Pain Score   97.8 °F (36.6 °C) 75 14 129/85 99 % 10 - Worst Possible Pain     Weight (last 2 days)       Date/Time Weight    04/20/25 11:45:41 109 (240.08)     04/20/25 0719 110 (242.51)            Vital Signs (last 3 days)       Date/Time Temp Pulse Resp BP MAP (mmHg) SpO2 O2 Device Patient Position - Orthostatic VS Siva Coma Scale Score Pain    04/21/25 11:16:31 -- 75 15 144/79 101 97 % -- -- -- --    04/21/25 0818 -- -- -- -- -- -- None (Room air) -- -- No Pain    04/21/25 07:00:51 98.1 °F (36.7 °C) 76 18 136/62 87 96 % None (Room air) Lying -- --    04/21/25 03:29:30 98.3 °F (36.8 °C) 80 20 142/77 99 96 % -- -- -- --    04/20/25 2331 -- -- -- -- -- -- -- -- -- 7    04/20/25 23:08:41 98.5 °F (36.9 °C) 90 -- 152/73 99 97 % -- -- -- --    04/20/25 1930 -- -- -- -- -- 97 % None (Room air) -- -- 4 04/20/25 18:59:23 98.9 °F (37.2 °C) 77 18 122/70 87 97 % -- -- -- --    04/20/25 1617 -- -- -- -- -- -- -- -- -- 10 - Worst Possible Pain    04/20/25 14:17:09 97.8 °F (36.6 °C) 85 18 119/72 88 98 % None (Room air) Lying -- --    04/20/25 1158 -- -- -- -- -- -- -- -- -- 10 - Worst Possible Pain    04/20/25 11:45:41 98 °F (36.7 °C) 75 18 163/85 111 99 % None (Room air) Sitting -- --    04/20/25 0921 97.8 °F (36.6 °C) 78 15 125/75 -- 96 % None (Room air) Sitting -- 4 04/20/25 0809 -- -- -- -- -- -- -- -- -- 4    04/20/25 0742 -- -- -- -- -- -- None (Room air) -- 15 --    04/20/25 0733 -- -- -- -- -- -- -- -- -- 10 - Worst Possible Pain    04/20/25 0719 97.8 °F (36.6 °C) 75 14 129/85 100 99 % None (Room air) Sitting -- 10 - Worst Possible Pain              Pertinent Labs/Diagnostic Test Results:   Radiology:  CT abdomen pelvis wo contrast   Final Interpretation by Lars Heard MD (04/20 0921)      Fluid in the ascending colon which could represent a nonspecific colitis/diarrheal illness. Otherwise no potential acute findings in the abdomen or pelvis.      Resident: Cachorro Asencio I, the attending radiologist, have reviewed the images and agree with the final report above.      Workstation performed: [id number]           Cardiology:  No orders to display  "    GI:  No orders to display       Results from last 7 days   Lab Units 04/20/25  1137   SARS-COV-2  Negative     Results from last 7 days   Lab Units 04/21/25  0539 04/20/25  0732   WBC Thousand/uL 10.45* 12.35*   HEMOGLOBIN g/dL 10.3* 11.9   HEMATOCRIT % 31.8* 37.3   PLATELETS Thousands/uL 324 336   TOTAL NEUT ABS Thousands/µL 7.44 10.02*         Results from last 7 days   Lab Units 04/21/25  0539 04/20/25  0732   SODIUM mmol/L 142 138   POTASSIUM mmol/L 4.1 5.0   CHLORIDE mmol/L 113* 109*   CO2 mmol/L 20* 17*   ANION GAP mmol/L 9 12   BUN mg/dL 23 32*   CREATININE mg/dL 2.05* 2.20*   EGFR ml/min/1.73sq m 27 25   CALCIUM mg/dL 8.6 9.5   MAGNESIUM mg/dL 2.5 2.4   PHOSPHORUS mg/dL 4.7*  --      Results from last 7 days   Lab Units 04/21/25  0539 04/20/25  0732   AST U/L 15 29   ALT U/L 17 23   ALK PHOS U/L 95 116*   TOTAL PROTEIN g/dL 6.4 8.2   ALBUMIN g/dL 3.7 4.2   TOTAL BILIRUBIN mg/dL 0.36 0.37     Results from last 7 days   Lab Units 04/21/25  1114 04/21/25  0023 04/20/25  1807 04/20/25  1151   POC GLUCOSE mg/dl 144* 133 117 127     Results from last 7 days   Lab Units 04/21/25  0539 04/20/25  0732   GLUCOSE RANDOM mg/dL 106 179*         Results from last 7 days   Lab Units 04/20/25  1202   HEMOGLOBIN A1C % 6.7*   EAG mg/dl 146     No results found for: \"BETA-HYDROXYBUTYRATE\"                                       Results from last 7 days   Lab Units 04/20/25  1025 04/20/25  0732   LACTIC ACID mmol/L 1.2 2.1*                                 Results from last 7 days   Lab Units 04/20/25  0732   LIPASE u/L 26     Results from last 7 days   Lab Units 04/20/25  1202   CRP mg/L 17.4*   SED RATE mm/hour 97*             Results from last 7 days   Lab Units 04/20/25  1454   CLARITY UA  Clear   COLOR UA  Colorless   SPEC GRAV UA  1.015   PH UA  5.5   GLUCOSE UA mg/dl 150 (3/20%)*   KETONES UA mg/dl Negative   BLOOD UA  Trace*   PROTEIN UA mg/dl 100 (2+)*   NITRITE UA  Negative   BILIRUBIN UA  Negative   UROBILINOGEN UA " (BE) mg/dl <2.0   LEUKOCYTES UA  Negative   WBC UA /hpf 0-1   RBC UA /hpf None Seen   BACTERIA UA /hpf None Seen   EPITHELIAL CELLS WET PREP /hpf Occasional   SODIUM UR mmol/L 93.0   CREATININE UR mg/dL 48.4                     Results from last 7 days   Lab Units 04/20/25  1238   C DIFF TOXIN B BY PCR  Negative     Results from last 7 days   Lab Units 04/20/25  1241   SALMONELLA SP PCR  Negative   SHIGELLA SP/ENTEROINVASIVE E. COLI (EIEC)  Negative   CAMPYLOBACTER SP (JEJUNI AND COLI)  Negative   SHIGA TOXIN 1/SHIGA TOXIN 2  Negative                           Past Medical History:   Diagnosis Date    Anemia     Asthma     Cardiomyopathy (HCC)     Chronic anemia     Diabetes mellitus (HCC)     Disease of thyroid gland     Hypertension     Hyperthyroidism     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HCC)     TB lung, latent      Present on Admission:   Type 2 diabetes mellitus with obesity  (HCC)   Essential hypertension   Dyslipidemia, goal LDL below 100   CKD (chronic kidney disease) stage 3, GFR 30-59 ml/min ([facility])   Subclavian artery stenosis, left ([facility])   Mild persistent asthma without complication   Acute kidney injury ([facility])      Admitting Diagnosis: Diarrhea [R19.7]  Lactic acidosis [E87.20]  Colitis [K52.9]  Leukocytosis [D72.829]  Rectal bleeding [K62.5]  Abdominal pain [R10.9]  Bloody stool [K92.1]  GENE (acute kidney injury) (HCC) [N17.9]  Age/Sex: 49 y.o. female    Network Utilization Review Department  ATTENTION: Please call with any questions or concerns to [phone] and carefully listen to the prompts so that you are directed to the right person. All voicemails are confidential.   For Discharge needs, contact Care Management DC Support Team at [phone] opt. 2  Send all requests for admission clinical reviews, approved or denied determinations and any other requests to dedicated fax number below belonging to the campus where the patient is receiving treatment. List of dedicated fax numbers for the  Facilities:  FACILITY NAME UR FAX NUMBER   ADMISSION DENIALS (Administrative/Medical Necessity) [phone]   DISCHARGE SUPPORT TEAM (NETWORK) [phone]   PARENT CHILD HEALTH (Maternity/NICU/Pediatrics) [phone]   [facility] [phone]   [facility] [phone]   [facility] [phone]   [facility] [phone]   [facility] [phone]   [facility] [phone]   [facility] [phone]   [facility] [phone]   [facility] [phone]   [facility] [phone]   [facility] [phone]   [facility] [phone]

## 2025-04-21 NOTE — ASSESSMENT & PLAN NOTE
Lab Results   Component Value Date    EGFR 27 04/21/2025    EGFR 25 04/20/2025    EGFR 28 (L) 03/08/2025    CREATININE 2.05 (H) 04/21/2025    CREATININE 2.20 (H) 04/20/2025    CREATININE 2.09 (H) 03/08/2025   Nephrology consulted   Management per medical team

## 2025-04-21 NOTE — CASE MANAGEMENT
Case Management Progress Note    Patient name Tatiana Cohen /[id number] MRN [id number]  : 1975 Date 2025       LOS (days): 1  Geometric Mean LOS (GMLOS) (days):   Days to GMLOS:        OBJECTIVE:        Current admission status: Inpatient  Preferred Pharmacy:   [facility] [place] [place] - [address] [place] [address]  [place] [address]  Phone: [phone] Fax: [phone]    Primary Care Provider: Darian Rollins MD    Primary Insurance: TASHIA SOTELO  Secondary Insurance:     PROGRESS NOTE:    Chart review completed.  No CM needs at this time.  CM to follow for any discharge needs.

## 2025-04-22 ENCOUNTER — ANESTHESIA (INPATIENT)
Dept: PERIOP | Facility: HOSPITAL | Age: 50
DRG: 249 | End: 2025-04-22
Payer: COMMERCIAL

## 2025-04-22 ENCOUNTER — APPOINTMENT (INPATIENT)
Dept: PERIOP | Facility: HOSPITAL | Age: 50
DRG: 249 | End: 2025-04-22
Attending: INTERNAL MEDICINE
Payer: COMMERCIAL

## 2025-04-22 ENCOUNTER — ANESTHESIA EVENT (INPATIENT)
Dept: PERIOP | Facility: HOSPITAL | Age: 50
DRG: 249 | End: 2025-04-22
Payer: COMMERCIAL

## 2025-04-22 VITALS
TEMPERATURE: 98.3 F | BODY MASS INDEX: 42.54 KG/M2 | DIASTOLIC BLOOD PRESSURE: 67 MMHG | HEART RATE: 74 BPM | HEIGHT: 63 IN | RESPIRATION RATE: 20 BRPM | WEIGHT: 240.08 LBS | OXYGEN SATURATION: 81 % | SYSTOLIC BLOOD PRESSURE: 162 MMHG

## 2025-04-22 PROBLEM — E66.01 MORBID OBESITY WITH BMI OF 40.0-44.9, ADULT (HCC): Status: ACTIVE | Noted: 2025-04-22

## 2025-04-22 LAB
ANION GAP SERPL CALCULATED.3IONS-SCNC: 11 MMOL/L (ref 4–13)
BASOPHILS # BLD AUTO: 0.08 THOUSANDS/ÂΜL (ref 0–0.1)
BASOPHILS NFR BLD AUTO: 1 % (ref 0–1)
BUN SERPL-MCNC: 21 MG/DL (ref 5–25)
CALCIUM SERPL-MCNC: 8.6 MG/DL (ref 8.4–10.2)
CHLORIDE SERPL-SCNC: 109 MMOL/L (ref 96–108)
CO2 SERPL-SCNC: 21 MMOL/L (ref 21–32)
CREAT SERPL-MCNC: 2.13 MG/DL (ref 0.6–1.3)
CRP SERPL QL: 15.9 MG/L
EOSINOPHIL # BLD AUTO: 0.21 THOUSAND/ÂΜL (ref 0–0.61)
EOSINOPHIL NFR BLD AUTO: 2 % (ref 0–6)
ERYTHROCYTE [DISTWIDTH] IN BLOOD BY AUTOMATED COUNT: 16.4 % (ref 11.6–15.1)
ERYTHROCYTE [SEDIMENTATION RATE] IN BLOOD: 63 MM/HOUR (ref 0–29)
GFR SERPL CREATININE-BSD FRML MDRD: 26 ML/MIN/1.73SQ M
GLUCOSE SERPL-MCNC: 114 MG/DL (ref 65–140)
GLUCOSE SERPL-MCNC: 159 MG/DL (ref 65–140)
GLUCOSE SERPL-MCNC: 94 MG/DL (ref 65–140)
GLUCOSE SERPL-MCNC: 98 MG/DL (ref 65–140)
GLUCOSE SERPL-MCNC: 99 MG/DL (ref 65–140)
HCT VFR BLD AUTO: 30.5 % (ref 34.8–46.1)
HGB BLD-MCNC: 9.8 G/DL (ref 11.5–15.4)
IMM GRANULOCYTES # BLD AUTO: 0.02 THOUSAND/UL (ref 0–0.2)
IMM GRANULOCYTES NFR BLD AUTO: 0 % (ref 0–2)
LYMPHOCYTES # BLD AUTO: 3.41 THOUSANDS/ÂΜL (ref 0.6–4.47)
LYMPHOCYTES NFR BLD AUTO: 36 % (ref 14–44)
MAGNESIUM SERPL-MCNC: 2.3 MG/DL (ref 1.9–2.7)
MCH RBC QN AUTO: 29.6 PG (ref 26.8–34.3)
MCHC RBC AUTO-ENTMCNC: 32.1 G/DL (ref 31.4–37.4)
MCV RBC AUTO: 92 FL (ref 82–98)
MONOCYTES # BLD AUTO: 0.36 THOUSAND/ÂΜL (ref 0.17–1.22)
MONOCYTES NFR BLD AUTO: 4 % (ref 4–12)
NEUTROPHILS # BLD AUTO: 5.29 THOUSANDS/ÂΜL (ref 1.85–7.62)
NEUTS SEG NFR BLD AUTO: 57 % (ref 43–75)
NRBC BLD AUTO-RTO: 0 /100 WBCS
PLATELET # BLD AUTO: 303 THOUSANDS/UL (ref 149–390)
PMV BLD AUTO: 10.2 FL (ref 8.9–12.7)
POTASSIUM SERPL-SCNC: 3.4 MMOL/L (ref 3.5–5.3)
RBC # BLD AUTO: 3.31 MILLION/UL (ref 3.81–5.12)
SODIUM SERPL-SCNC: 141 MMOL/L (ref 135–147)
WBC # BLD AUTO: 9.37 THOUSAND/UL (ref 4.31–10.16)

## 2025-04-22 PROCEDURE — 0DBK8ZX EXCISION OF ASCENDING COLON, VIA NATURAL OR ARTIFICIAL OPENING ENDOSCOPIC, DIAGNOSTIC: ICD-10-PCS | Performed by: INTERNAL MEDICINE

## 2025-04-22 PROCEDURE — 99239 HOSP IP/OBS DSCHRG MGMT >30: CPT | Performed by: HOSPITALIST

## 2025-04-22 PROCEDURE — 0DBM8ZX EXCISION OF DESCENDING COLON, VIA NATURAL OR ARTIFICIAL OPENING ENDOSCOPIC, DIAGNOSTIC: ICD-10-PCS | Performed by: INTERNAL MEDICINE

## 2025-04-22 PROCEDURE — 99222 1ST HOSP IP/OBS MODERATE 55: CPT | Performed by: INTERNAL MEDICINE

## 2025-04-22 PROCEDURE — 80048 BASIC METABOLIC PNL TOTAL CA: CPT

## 2025-04-22 PROCEDURE — 82948 REAGENT STRIP/BLOOD GLUCOSE: CPT

## 2025-04-22 PROCEDURE — 45380 COLONOSCOPY AND BIOPSY: CPT | Performed by: INTERNAL MEDICINE

## 2025-04-22 PROCEDURE — 85652 RBC SED RATE AUTOMATED: CPT

## 2025-04-22 PROCEDURE — 83735 ASSAY OF MAGNESIUM: CPT

## 2025-04-22 PROCEDURE — 88305 TISSUE EXAM BY PATHOLOGIST: CPT | Performed by: PATHOLOGY

## 2025-04-22 PROCEDURE — 86140 C-REACTIVE PROTEIN: CPT

## 2025-04-22 PROCEDURE — 99232 SBSQ HOSP IP/OBS MODERATE 35: CPT | Performed by: SURGERY

## 2025-04-22 PROCEDURE — 85025 COMPLETE CBC W/AUTO DIFF WBC: CPT

## 2025-04-22 PROCEDURE — 0DBB8ZX EXCISION OF ILEUM, VIA NATURAL OR ARTIFICIAL OPENING ENDOSCOPIC, DIAGNOSTIC: ICD-10-PCS | Performed by: INTERNAL MEDICINE

## 2025-04-22 RX ORDER — PROPOFOL 10 MG/ML
INJECTION, EMULSION INTRAVENOUS AS NEEDED
Status: DISCONTINUED | OUTPATIENT
Start: 2025-04-22 | End: 2025-04-22

## 2025-04-22 RX ORDER — POTASSIUM CHLORIDE 14.9 MG/ML
20 INJECTION INTRAVENOUS ONCE
Status: DISCONTINUED | OUTPATIENT
Start: 2025-04-22 | End: 2025-04-22

## 2025-04-22 RX ORDER — LIDOCAINE HYDROCHLORIDE 20 MG/ML
INJECTION, SOLUTION EPIDURAL; INFILTRATION; INTRACAUDAL; PERINEURAL AS NEEDED
Status: DISCONTINUED | OUTPATIENT
Start: 2025-04-22 | End: 2025-04-22

## 2025-04-22 RX ORDER — CARVEDILOL 25 MG/1
25 TABLET ORAL 2 TIMES DAILY WITH MEALS
Start: 2025-04-22

## 2025-04-22 RX ORDER — SODIUM CHLORIDE, SODIUM LACTATE, POTASSIUM CHLORIDE, CALCIUM CHLORIDE 600; 310; 30; 20 MG/100ML; MG/100ML; MG/100ML; MG/100ML
INJECTION, SOLUTION INTRAVENOUS CONTINUOUS PRN
Status: DISCONTINUED | OUTPATIENT
Start: 2025-04-22 | End: 2025-04-22

## 2025-04-22 RX ORDER — POTASSIUM CHLORIDE 1500 MG/1
40 TABLET, EXTENDED RELEASE ORAL ONCE
Status: COMPLETED | OUTPATIENT
Start: 2025-04-22 | End: 2025-04-22

## 2025-04-22 RX ADMIN — CARVEDILOL 25 MG: 12.5 TABLET, FILM COATED ORAL at 08:25

## 2025-04-22 RX ADMIN — PANTOPRAZOLE SODIUM 40 MG: 40 INJECTION, POWDER, FOR SOLUTION INTRAVENOUS at 08:26

## 2025-04-22 RX ADMIN — POTASSIUM CHLORIDE 20 MEQ: 14.9 INJECTION, SOLUTION INTRAVENOUS at 08:26

## 2025-04-22 RX ADMIN — PROPOFOL 50 MG: 10 INJECTION, EMULSION INTRAVENOUS at 12:04

## 2025-04-22 RX ADMIN — SODIUM CHLORIDE, SODIUM LACTATE, POTASSIUM CHLORIDE, AND CALCIUM CHLORIDE: .6; .31; .03; .02 INJECTION, SOLUTION INTRAVENOUS at 11:53

## 2025-04-22 RX ADMIN — SODIUM CHLORIDE, SODIUM GLUCONATE, SODIUM ACETATE, POTASSIUM CHLORIDE, MAGNESIUM CHLORIDE, SODIUM PHOSPHATE, DIBASIC, AND POTASSIUM PHOSPHATE 100 ML/HR: .53; .5; .37; .037; .03; .012; .00082 INJECTION, SOLUTION INTRAVENOUS at 06:48

## 2025-04-22 RX ADMIN — PREDNISONE 10 MG: 10 TABLET ORAL at 08:26

## 2025-04-22 RX ADMIN — LIDOCAINE HYDROCHLORIDE 100 MG: 20 INJECTION, SOLUTION EPIDURAL; INFILTRATION; INTRACAUDAL; PERINEURAL at 12:00

## 2025-04-22 RX ADMIN — PROPOFOL 100 MG: 10 INJECTION, EMULSION INTRAVENOUS at 12:00

## 2025-04-22 RX ADMIN — POTASSIUM CHLORIDE 40 MEQ: 1500 TABLET, EXTENDED RELEASE ORAL at 09:54

## 2025-04-22 RX ADMIN — PROPOFOL 50 MG: 10 INJECTION, EMULSION INTRAVENOUS at 12:01

## 2025-04-22 RX ADMIN — PROPOFOL 100 MCG/KG/MIN: 10 INJECTION, EMULSION INTRAVENOUS at 12:06

## 2025-04-22 RX ADMIN — PIPERACILLIN AND TAZOBACTAM 4.5 G: 4; .5 INJECTION, POWDER, LYOPHILIZED, FOR SOLUTION INTRAVENOUS; PARENTERAL at 06:51

## 2025-04-22 RX ADMIN — LEVOTHYROXINE SODIUM 100 MCG: 100 TABLET ORAL at 06:45

## 2025-04-22 NOTE — ASSESSMENT & PLAN NOTE
Lab Results   Component Value Date    HGBA1C 6.7 (H) 04/20/2025     Recent Labs     04/21/25  1114 04/21/25  1732 04/22/25  0000 04/22/25  0737   POCGLU 144* 142* 98 94     Blood Sugar Average: Last 72 hrs:  (P) 122.4226356284151237    Defer management to primary team.

## 2025-04-22 NOTE — ASSESSMENT & PLAN NOTE
Lab Results   Component Value Date    HGBA1C 6.7 (H) 04/20/2025       Recent Labs     04/21/25  1732 04/22/25  0000 04/22/25  0737 04/22/25  1121   POCGLU 142* 98 94 114       Blood Sugar Average: Last 72 hrs:  (P) 121.125on weekly ozempic and farxiga 5mg daily    - resume home regimen on discharge  - utilize ISS with accu check ACHS  - Hypoglycemia protocol

## 2025-04-22 NOTE — ASSESSMENT & PLAN NOTE
> 4 bouts of watery diarrhea with bright red blood since 04/19 PM with associated lower abdominal pain. Denied N/V.   No recent antibiotics use or diet change  In ED labs with leukocytosis 12 with lactic acidosis 2.1.  Only obtain non-contrast CT abdomen due to low GFR 25    CT abdomen non-contrast: Fluid in the ascending colon which could represent a nonspecific colitis/diarrheal illness. Otherwise no potential acute findings in the abdomen or pelvis.      Surgery consulted for possible mesenteric ischemia  Initiated Zosyn on 04/20. Repeat lactic acid normal.  c. diff tests and stool enteric bacterial panel negative    - surgery consulted and rec appreciated. No emergent surgical intervention. Consider CT w contrast if clinically worsen and after clearance from nephrology. Bright red blood per rectum could likely be from anal fissure.  - hemodynamically stable on 04/22 with less abdominal pain  - GI consulted and recs appreciated  - NPO after 04/22 midnight, plan for colonoscopy by GI  - continue IV Zosyn  - continue IV fluids 100cc/hr  - protonix 40mg BID  - zofran prn for nausea  - IV ofirmir prn for mild pain and IV dilaudid 0.5mg q4hr prn for moderate to severe pain  - f/u ova/parasite test     Number Of Freeze-Thaw Cycles: 1 freeze-thaw cycle Detail Level: Zone Render Post-Care Instructions In Note?: yes Consent: The patient's consent was obtained including but not limited to risks of crusting, scabbing, blistering, scarring, darker or lighter pigmentary change, recurrence, incomplete removal and infection. Post-Care Instructions: I reviewed with the patient in detail post-care instructions. Patient is to wear sun protection, and avoid picking at any of the treated lesions. Pt was advised to wash daily with gentle soap and water and may apply Vaseline to crusted or scabbing areas. Duration Of Freeze Thaw-Cycle (Seconds): 3

## 2025-04-22 NOTE — PROGRESS NOTES
"Progress Note - General Surgery  Name: Tatiana Balbuena 49 y.o. female I MRN: [id number]  Unit/Bed#: 426-01 I Date of Admission: 4/20/2025   Date of Service: 4/22/2025 I Hospital Day: 2     Assessment & Plan  Acute colitis    Bright red rectal bleeding stopped, 3-4 loose bowel movements yesterday.  Gastroenterology saw the patient today, they will proceed with diagnostic colonoscopy this afternoon.  Patient just finished GoLytely bowel prep.  Currently NPO.    Hemoglobin today 9.8 (10.3 and 11.9)    Abdomen soft, nontender.  Digital rectal examination yesterday showed no obvious hemorrhoidal disease performed Dr. Isaiah Licea.  There may have been an anal fissure palpable in the posterior midline.    General Surgery will sign off at this time, no indication for any surgical intervention.  Reconsult if any questions or problems arise.    49-year-old female with history of significant vascular disease and left subclavian artery stenosis with stent 2 years ago was on Plavix and aspirin admitted here with abdominal pain and bloody loose watery bowel movements for about 2 days prior to admission, seen in initial general surgical consultation yesterday for possible ischemic colitis.  She has acute on chronic kidney injury.  Noncontrast CT abdomen pelvis showed nonspecific mild colitis.  No concern for ischemic change.  Initial lactic acidosis cleared.  Patient seen and kept n.p.o. yesterday, 1 dose of empiric IV Zosyn was given.  Patient has been on chronic prednisone for large vessel vasculitis and history of latent TB currently on meds.  Patient with significant history of multivessel cardiac disease.    4/20 CTAP \"Fluid in the ascending colon which could represent a nonspecific colitis/diarrheal illness. Otherwise no potential acute findings in the abdomen or pelvis.\"     In march of 2025 patient underwent CTA at outside facility noting that SMA, celiac, b/l renal, and b/l iliac arteries were patent. CTA deferred today " due to no findings suspicious for ischemia on non-con CT and worsening renal function.     Type 2 diabetes mellitus with obesity  ([facility])  Lab Results   Component Value Date    HGBA1C 6.7 (H) 04/20/2025       Recent Labs     04/21/25  1114 04/21/25  1732 04/22/25  0000 04/22/25  0737   POCGLU 144* 142* 98 94       Blood Sugar Average: Last 72 hrs:  (P) 122.3625279967990235  SSI and glu checks   Management per medical team   CKD (chronic kidney disease) stage 3, GFR 30-59 ml/min ([facility])  Lab Results   Component Value Date    EGFR 26 04/22/2025    EGFR 27 04/21/2025    EGFR 25 04/20/2025    CREATININE 2.13 (H) 04/22/2025    CREATININE 2.05 (H) 04/21/2025    CREATININE 2.20 (H) 04/20/2025   Nephrology consulted   Management per medical team   Essential hypertension  Management per medical team   S/P total thyroidectomy  Home levothyroxine reordered   Management per medical team   Mild persistent asthma without complication  Management per medicine.  Subclavian artery stenosis, left ([facility])  Plavix on hold.  Acute kidney injury ([facility])  Creatinine 2.13 (2.05 and 2.20.  Patient at her near baseline)  Morbid obesity with BMI of 40.0-44.9, adult ([facility])  BMI 42.53    Surgery-General service signing off.    Subjective     Loose bowel movements, no further bright red rectal bleeding.  Seen yesterday in general surgery evaluation.  Patient currently finished bowel prep, gastroenterology consultation performed and they will proceed with diagnostic colonoscopy today.    Scheduled Meds:  Current Facility-Administered Medications   Medication Dose Route Frequency Provider Last Rate    acetaminophen  1,000 mg Intravenous Q6H PRN Ailin Kuhn MD      carvedilol  25 mg Oral BID With Meals Ailin Kuhn MD      [Held by provider] clopidogrel  75 mg Oral Daily Pattie Pryor, DO      fluticasone-vilanterol  1 puff Inhalation Daily Pattie Pryor, DO      HYDROmorphone  0.5 mg Intravenous Q4H PRN Ailin Kuhn MD      insulin lispro  1-5 Units  Subcutaneous TID AC Pattie Pryor DO      [START ON 4/26/2025] isoniazid  300 mg Oral Q7 Days Ailin Kuhn MD      levothyroxine  100 mcg Oral Early Morning Pattie Pryor DO      montelukast  10 mg Oral HS Pattie Pryor DO      multi-electrolyte  100 mL/hr Intravenous Continuous Pattie Pryor  mL/hr (04/22/25 0648)    ondansetron  4 mg Intravenous Q6H PRN Ailin Kuhn MD      pantoprazole  40 mg Intravenous Q12H LAKEISHA Ailin Kuhn MD      piperacillin-tazobactam  4.5 g Intravenous Q8H Adrián Mcmillan PA-C 4.5 g (04/22/25 0651)    polyethylene glycol  4,000 mL Oral See Admin Instructions Donny Carrillo MD      predniSONE  10 mg Oral Daily Ailin Kuhn MD       Continuous Infusions:multi-electrolyte, 100 mL/hr, Last Rate: 100 mL/hr (04/22/25 0648)      PRN Meds:.  acetaminophen    HYDROmorphone    ondansetron      Objective :  Temp:  [97.3 °F (36.3 °C)-98.5 °F (36.9 °C)] 98.4 °F (36.9 °C)  HR:  [61-79] 64  BP: (124-144)/(67-89) 124/67  Resp:  [15-21] 19  SpO2:  [95 %-99 %] 98 %  O2 Device: None (Room air)    I/O         04/20 0701  04/21 0700 04/21 0701  04/22 0700 04/22 0701  04/23 0700    P.O. 0 0 0    IV Piggyback 1100      Total Intake(mL/kg) 1100 (10.1) 0 (0) 0 (0)    Net +1100 0 0           Unmeasured Urine Occurrence  5 x     Unmeasured Stool Occurrence  3 x 1 x            Physical Exam    HENT:      Head: Normocephalic.      Nose: Nose normal.      Mouth/Throat: Clear and moist.  Eyes:      Conjunctiva/sclera: Sclera white OU.  Cardiovascular:      Rate and Rhythm: RRR     Heart sounds: Normal heart sounds. No murmur heard.     No gallop.   Pulmonary:      Effort: Pulmonary effort is normal. No respiratory distress.      Breath sounds: Normal breath sounds. No wheezing or rales.   Abdominal:      General: Bowel sounds are normal. There is no distension.      Palpations: Abdomen is soft.      Tenderness: Abdomen obese, soft throughout, not particularly tender.. There is no right CVA tenderness, guarding  "or rebound.      Hernia: No hernia is present.   Musculoskeletal:         General: No swelling or deformity.      Cervical back: Neck supple.      Right lower leg: No edema.      Left lower leg: No edema.   Skin:     Coloration: Skin is not jaundiced or pale.      Findings: No bruising, erythema, lesion or rash.   Neurological:      General: No focal deficit present.      Mental Status: She is alert and oriented to person, place, and time.      Cranial Nerves: No cranial nerve deficit.      Motor: No weakness.   Psychiatric:         Mood and Affect: Mood normal.         Thought Content: Thought content normal.          Lab Results: I have reviewed the following results:  Recent Labs     04/20/25  1025 04/21/25  0539 04/22/25  0508   WBC  --  10.45* 9.37   HGB  --  10.3* 9.8*   HCT  --  31.8* 30.5*   PLT  --  324 303   SODIUM  --  142 141   K  --  4.1 3.4*   CL  --  113* 109*   CO2  --  20* 21   BUN  --  23 21   CREATININE  --  2.05* 2.13*   GLUC  --  106 99   MG  --  2.5 2.3   PHOS  --  4.7*  --    AST  --  15  --    ALT  --  17  --    ALB  --  3.7  --    TBILI  --  0.36  --    ALKPHOS  --  95  --    LACTICACID 1.2  --   --        Imaging Results Review: No pertinent imaging studies reviewed.  Other Study Results Review: No additional pertinent studies reviewed.    VTE Pharmacologic Prophylaxis: Reason for no pharmacologic prophylaxis possible lower GI bleed.  VTE Mechanical Prophylaxis: sequential compression device    Adrián Mcmillan PA-C    **Please Note: Portions of the record may have been created using voice recognition software.  Occasional wrong word or \"sound a like\" substitutions may have occurred due to the inherent limitations of voice recognition software.  Read the chart carefully and recognize, using context, where substitutions have occurred.**      "

## 2025-04-22 NOTE — ASSESSMENT & PLAN NOTE
Home meds on norvasc 5mg, cozaar 100mg, coreg 25mg BID  BP stable on admission, resume home regimen on discharge

## 2025-04-22 NOTE — ASSESSMENT & PLAN NOTE
Management per primary team.  S/p left subclavian artery/stent via left brachial arterial cutdown in 01/2024.  Holding antiplatelets for now.

## 2025-04-22 NOTE — ASSESSMENT & PLAN NOTE
Presented with elevated Cr from baseline of 2.0  Trending down 2.2 >> 2.05 on 04/21  Stable at baseline

## 2025-04-22 NOTE — ASSESSMENT & PLAN NOTE
Acute onset of abdominal pain, diarrhea, and intermittent BRBPR.  Modest leukocytosis at the time of admission, elevated lactate, elevated inflammatory markers, and CT with potential right-sided colitis (albeit without IV contrast).  Differential includes infectious, inflammatory, ischemic etiologies.  Episodes of rectal bleeding may also be from alternate etiologies such as anorectal pathology, polyp, AVM, malignancy, etc.  Her hemoglobin has down trended during hospitalization though the rectal bleeding has ceased.  Recommend diagnostic colonoscopy now.  Patient is finishing GoLytely preparation and she will be maintained n.p.o. until procedure completed.  Colonoscopy ordered for 4/22/2025.  Continue to hold  Antiplatelet regimen for now.  Continue to maintain large-bore IV access, monitor Hb, and transfuse per protocol or for hemodynamic instability.    I discussed informed consent with the patient. The risks/benefits/alternatives of the procedure were discussed with the patient. Risks included, but not limited to, infection, bleeding, perforation, injury to organs in the abdomen, missed lesion and incomplete procedure were discussed. Patient was agreeable.

## 2025-04-22 NOTE — CONSULTS
Consultation - Gastroenterology   Name: Tatiana Balbuena 49 y.o. female I MRN: [id number]  Unit/Bed#: 426-01 I Date of Admission: 4/20/2025   Date of Service: 4/22/2025 I Hospital Day: 2   Inpatient consult to gastroenterology  Consult performed by: Candice Farias PA-C  Consult ordered by: Pattie Pryor DO        Physician Requesting Evaluation: Dejan Lawrence DO   Reason for Evaluation / Principal Problem: colitis, diarrhea     Assessment & Plan  Acute colitis  Acute onset of abdominal pain, diarrhea, and intermittent BRBPR.  Modest leukocytosis at the time of admission, elevated lactate, elevated inflammatory markers, and CT with potential right-sided colitis (albeit without IV contrast).  Differential includes infectious, inflammatory, ischemic etiologies.  Episodes of rectal bleeding may also be from alternate etiologies such as anorectal pathology, polyp, AVM, malignancy, etc.  Her hemoglobin has down trended during hospitalization though the rectal bleeding has ceased.  Recommend diagnostic colonoscopy now.  Patient is finishing GoLytely preparation and she will be maintained n.p.o. until procedure completed.  Colonoscopy ordered for 4/22/2025.  Continue to hold  Antiplatelet regimen for now.  Continue to maintain large-bore IV access, monitor Hb, and transfuse per protocol or for hemodynamic instability.    I discussed informed consent with the patient. The risks/benefits/alternatives of the procedure were discussed with the patient. Risks included, but not limited to, infection, bleeding, perforation, injury to organs in the abdomen, missed lesion and incomplete procedure were discussed. Patient was agreeable.  Type 2 diabetes mellitus with obesity  (HCC)  Lab Results   Component Value Date    HGBA1C 6.7 (H) 04/20/2025     Recent Labs     04/21/25  1114 04/21/25  1732 04/22/25  0000 04/22/25  0737   POCGLU 144* 142* 98 94     Blood Sugar Average: Last 72 hrs:  (P) 122.9702930362325053    Defer management  to primary team.   Subclavian artery stenosis, left (HCC)  Management per primary team.  S/p left subclavian artery/stent via left brachial arterial cutdown in 01/2024.  Holding antiplatelets for now.  CKD (chronic kidney disease) stage 3, GFR 30-59 ml/min (HCC)  Lab Results   Component Value Date    EGFR 26 04/22/2025    EGFR 27 04/21/2025    EGFR 25 04/20/2025    CREATININE 2.13 (H) 04/22/2025    CREATININE 2.05 (H) 04/21/2025    CREATININE 2.20 (H) 04/20/2025   Defer management to primary team.  Acute kidney injury (HCC)  Management per primary team, initially with elevated creatinine from baseline, continue IV fluids, avoid hepatotoxins and monitor renal function.  I have discussed the above management plan in detail with the primary service.     History of Present Illness   HPI:  Tatiana Balbuena is a 49 y.o. female who presented to ER on 04/20/25 with abd pain and diarrhea. 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     Pt was endorsing bright red rectal bleeding at time of hospitalization. CT with possible right sided colitis. Stool studies negative for c diff, calprotectin wnl at 68.3, ESR 63 CRP 15.9. afebrile.  Additional serologies at time of hospitalization with WBC 12.35, Hb 11.9, MCV 94, platelets 336, BUN 32, creatinine 2.2, AST 29, ALT 23, T. bili 0.37, lactate 2.1.  Started on abx empirically with Zosyn. Antiplatelet agent held.    At bedside evaluation on 4/22/2025, pt shares no prior hx of such. No constipation/diarrhea at baseline. No melenic stool output. Abd pain is improving from initial presentation.  No family history of colon cancer or IBD.  No prior colonoscopy.    Review of Systems  Per HPI    Historical Information   Past Medical History:   Diagnosis Date    Anemia     Asthma     Cardiomyopathy (HCC)     Chronic anemia     Diabetes mellitus (HCC)     Disease of thyroid gland      Hypertension     Hyperthyroidism     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HCC)     TB lung, latent      Past Surgical History:   Procedure Laterality Date     SECTION      IR BIOPSY LIVER MASS  07/15/2020    LIVER BIOPSY      LUNG BIOPSY      THYROIDECTOMY       Social History     Tobacco Use    Smoking status: Never    Smokeless tobacco: Never   Vaping Use    Vaping status: Never Used   Substance and Sexual Activity    Alcohol use: Never    Drug use: Never    Sexual activity: Yes     Partners: Male     E-Cigarette/Vaping    E-Cigarette Use Never User      E-Cigarette/Vaping Substances    Nicotine No     THC No     CBD No     Flavoring No     Other No     Unknown No      Family History   Problem Relation Age of Onset    Diabetes unspecified Mother        Objective :  Temp:  [97.3 °F (36.3 °C)-98.5 °F (36.9 °C)] 98.4 °F (36.9 °C)  HR:  [61-79] 64  BP: (124-144)/(67-89) 124/67  Resp:  [15-21] 19  SpO2:  [95 %-99 %] 98 %  O2 Device: None (Room air)    Physical Exam  Vitals and nursing note reviewed.   Constitutional:       General: She is not in acute distress.     Appearance: She is well-developed.   HENT:      Head: Normocephalic and atraumatic.   Eyes:      Conjunctiva/sclera: Conjunctivae normal.   Cardiovascular:      Rate and Rhythm: Normal rate and regular rhythm.      Heart sounds: No murmur heard.  Pulmonary:      Effort: Pulmonary effort is normal. No respiratory distress.      Breath sounds: Normal breath sounds.   Abdominal:      Palpations: Abdomen is soft.      Tenderness: There is no abdominal tenderness.   Musculoskeletal:         General: No swelling.      Cervical back: Neck supple.   Skin:     General: Skin is warm and dry.      Capillary Refill: Capillary refill takes less than 2 seconds.   Neurological:      Mental Status: She is alert.   Psychiatric:         Mood and Affect: Mood normal.         Lab Results: I have reviewed the following results:CBC/BMP:   .     25  0508   WBC 9.37   HGB 9.8*    HCT 30.5*      SODIUM 141   K 3.4*   *   CO2 21   BUN 21   CREATININE 2.13*   GLUC 99   MG 2.3    , Creatinine Clearance: Estimated Creatinine Clearance: 37 mL/min (A) (by C-G formula based on SCr of 2.13 mg/dL (H))., LFTs: No new results in last 24 hours. , PTT/INR:No new results in last 24 hours.     Imaging Results Review: I reviewed radiology reports from this admission including: CT abdomen/pelvis.  Other Study Results Review: No additional pertinent studies reviewed.    **Please note:  Dictation voice to text software may have been used in the creation of this record.  Occasional wrong word or “sound alike” substitutions may have occurred due to the inherent limitations of voice recognition software.  Read the chart carefully and recognize, using context, where substitutions have occurred.**

## 2025-04-22 NOTE — ASSESSMENT & PLAN NOTE
Lab Results   Component Value Date    HGBA1C 6.7 (H) 04/20/2025       Recent Labs     04/21/25  1114 04/21/25  1732 04/22/25  0000 04/22/25  0737   POCGLU 144* 142* 98 94       Blood Sugar Average: Last 72 hrs:  (P) 122.9298609844960078fp weekly ozempic and farxiga 5mg daily    - currently NPO  - Home regimen on hold  - utilize ISS with accu check ACHS  - Hypoglycemia protocol

## 2025-04-22 NOTE — ASSESSMENT & PLAN NOTE
Lab Results   Component Value Date    HGBA1C 6.7 (H) 04/20/2025       Recent Labs     04/21/25  1114 04/21/25  1732 04/22/25  0000 04/22/25  0737   POCGLU 144* 142* 98 94       Blood Sugar Average: Last 72 hrs:  (P) 122.4469701875190421  SSI and glu checks   Management per medical team

## 2025-04-22 NOTE — ASSESSMENT & PLAN NOTE
"  Bright red rectal bleeding stopped, 3-4 loose bowel movements yesterday.  Gastroenterology saw the patient today, they will proceed with diagnostic colonoscopy this afternoon.  Patient just finished GoLytely bowel prep.  Currently NPO.    Hemoglobin today 9.8 (10.3 and 11.9)    Abdomen soft, nontender.  Digital rectal examination yesterday showed no obvious hemorrhoidal disease performed Dr. Isaiah Licea.  There may have been an anal fissure palpable in the posterior midline.    General Surgery will sign off at this time, no indication for any surgical intervention.  Reconsult if any questions or problems arise.    49-year-old female with history of significant vascular disease and left subclavian artery stenosis with stent 2 years ago was on Plavix and aspirin admitted here with abdominal pain and bloody loose watery bowel movements for about 2 days prior to admission, seen in initial general surgical consultation yesterday for possible ischemic colitis.  She has acute on chronic kidney injury.  Noncontrast CT abdomen pelvis showed nonspecific mild colitis.  No concern for ischemic change.  Initial lactic acidosis cleared.  Patient seen and kept n.p.o. yesterday, 1 dose of empiric IV Zosyn was given.  Patient has been on chronic prednisone for large vessel vasculitis and history of latent TB currently on meds.  Patient with significant history of multivessel cardiac disease.    4/20 CTAP \"Fluid in the ascending colon which could represent a nonspecific colitis/diarrheal illness. Otherwise no potential acute findings in the abdomen or pelvis.\"     In march of 2025 patient underwent CTA at outside facility noting that SMA, celiac, b/l renal, and b/l iliac arteries were patent. CTA deferred today due to no findings suspicious for ischemia on non-con CT and worsening renal function.     "

## 2025-04-22 NOTE — ANESTHESIA PREPROCEDURE EVALUATION
Procedure:  COLONOSCOPY    Relevant Problems   CARDIO   (+) Aneurysm of ascending aorta without rupture (HCC)   (+) Aortitis    (+) Atypical chest pain   (+) Dyslipidemia, goal LDL below 100   (+) Essential hypertension   (+) Intramural aortic hematoma (HCC)   (+) Pulmonary hypertension (HCC)   (+) Retrosternal chest pain   (+) Subclavian artery stenosis, left (HCC)      ENDO   (+) Hyperthyroidism   (+) Post-surgical hypothyroidism   (+) Type 2 diabetes mellitus with obesity  (HCC)      GI/HEPATIC   (+) Gastroesophageal reflux disease without esophagitis      /RENAL   (+) Acute kidney injury (HCC)   (+) CKD (chronic kidney disease) stage 3, GFR 30-59 ml/min (HCC)      HEMATOLOGY   (+) Iron deficiency anemia      MUSCULOSKELETAL   (+) Acute on chronic right-sided low back pain without sciatica   (+) Low back pain      NEURO/PSYCH   (+) Anxiety state   (+) Depression   (+) Recurrent major depressive disorder (HCC)      PULMONARY   (+) Mild persistent asthma without complication   Bmi 42.5     Physical Exam    Airway    Mallampati score: II  TM Distance: >3 FB  Neck ROM: full     Dental       Cardiovascular  Cardiovascular exam normal    Pulmonary  Pulmonary exam normal     Other Findings  post-pubertal.      Anesthesia Plan  ASA Score- 3     Anesthesia Type- IV sedation with anesthesia with ASA Monitors.         Additional Monitors:     Airway Plan:            Plan Factors-Exercise tolerance (METS): >4 METS.    Chart reviewed. EKG reviewed. Imaging results reviewed. Existing labs reviewed. Patient summary reviewed.    Patient is not a current smoker.              Induction- intravenous.    Postoperative Plan-     Perioperative Resuscitation Plan - Level 1 - Full Code.       Informed Consent- Anesthetic plan and risks discussed with patient.  I personally reviewed this patient with the CRNA. Discussed and agreed on the Anesthesia Plan with the CRNA..      NPO Status:  Vitals Value Taken Time   Date of last liquid  04/22/25 04/22/25 1112   Time of last liquid 0900 04/22/25 1112   Date of last solid 04/19/25 04/22/25 1112   Time of last solid 1830 04/22/25 1112

## 2025-04-22 NOTE — PLAN OF CARE
Problem: PAIN - ADULT  Goal: Verbalizes/displays adequate comfort level or baseline comfort level  Description: Interventions:- Encourage patient to monitor pain and request assistance- Assess pain using appropriate pain scale- Administer analgesics based on type and severity of pain and evaluate response- Implement non-pharmacological measures as appropriate and evaluate response- Consider cultural and social influences on pain and pain management- Notify physician/advanced practitioner if interventions unsuccessful or patient reports new pain  Outcome: Adequate for Discharge     Problem: INFECTION - ADULT  Goal: Absence or prevention of progression during hospitalization  Description: INTERVENTIONS:- Assess and monitor for signs and symptoms of infection- Monitor lab/diagnostic results- Monitor all insertion sites, i.e. indwelling lines, tubes, and drains- Monitor endotracheal if appropriate and nasal secretions for changes in amount and color- [place] appropriate cooling/warming therapies per order- Administer medications as ordered- Instruct and encourage patient and family to use good hand hygiene technique- Identify and instruct in appropriate isolation precautions for identified infection/condition  Outcome: Adequate for Discharge     Problem: DISCHARGE PLANNING  Goal: Discharge to home or other facility with appropriate resources  Description: INTERVENTIONS:- Identify barriers to discharge w/patient and caregiver- Arrange for needed discharge resources and transportation as appropriate- Identify discharge learning needs (meds, wound care, etc.)- Arrange for interpretive services to assist at discharge as needed- Refer to Case Management Department for coordinating discharge planning if the patient needs post-hospital services based on physician/advanced practitioner order or complex needs related to functional status, cognitive ability, or social support system  Outcome: Adequate for Discharge      Problem: Knowledge Deficit  Goal: Patient/family/caregiver demonstrates understanding of disease process, treatment plan, medications, and discharge instructions  Description: Complete learning assessment and assess knowledge base.Interventions:- Provide teaching at level of understanding- Provide teaching via preferred learning methods  Outcome: Adequate for Discharge     Problem: GASTROINTESTINAL - ADULT  Goal: Minimal or absence of nausea and/or vomiting  Description: INTERVENTIONS:- Administer IV fluids if ordered to ensure adequate hydration- Maintain NPO status until nausea and vomiting are resolved- Nasogastric tube if ordered- Administer ordered antiemetic medications as needed- Provide nonpharmacologic comfort measures as appropriate- Advance diet as tolerated, if ordered- Consider nutrition services referral to assist patient with adequate nutrition and appropriate food choices  Outcome: Adequate for Discharge  Goal: Maintains or returns to baseline bowel function  Description: INTERVENTIONS:- Assess bowel function- Encourage oral fluids to ensure adequate hydration- Administer IV fluids if ordered to ensure adequate hydration- Administer ordered medications as needed- Encourage mobilization and activity- Consider nutritional services referral to assist patient with adequate nutrition and appropriate food choices  Outcome: Adequate for Discharge  Goal: Maintains adequate nutritional intake  Description: INTERVENTIONS:- Monitor percentage of each meal consumed- Identify factors contributing to decreased intake, treat as appropriate- Assist with meals as needed- Monitor I&O, weight, and lab values if indicated- Obtain nutrition services referral as needed  Outcome: Adequate for Discharge

## 2025-04-22 NOTE — ASSESSMENT & PLAN NOTE
Lab Results   Component Value Date    EGFR 26 04/22/2025    EGFR 27 04/21/2025    EGFR 25 04/20/2025    CREATININE 2.13 (H) 04/22/2025    CREATININE 2.05 (H) 04/21/2025    CREATININE 2.20 (H) 04/20/2025   Defer management to primary team.

## 2025-04-22 NOTE — UTILIZATION REVIEW
NOTIFICATION OF INPATIENT ADMISSION   AUTHORIZATION REQUEST   SERVICING FACILITY:   [place] [address]  Tax ID:  [id number]  NPI: [id number] ATTENDING PROVIDER:  Attending Name and NPI#: Dejan LawrenceDo [[id number]]  Address: [address] [place] [address]  Phone: [phone]     ADMISSION INFORMATION:  Place of Service: Inpatient [facility]  Place of Service Code: 21  Inpatient Admission Date/Time: 4/20/25 10:09 AM  Discharge Date/Time: No discharge date for patient encounter.  Admitting Diagnosis Code/Description:  Diarrhea [R19.7]  Lactic acidosis [E87.20]  Colitis [K52.9]  Leukocytosis [D72.829]  Rectal bleeding [K62.5]  Abdominal pain [R10.9]  Bloody stool [K92.1]  GENE (acute kidney injury) (HCC) [N17.9]     UTILIZATION REVIEW CONTACT:  Tracey Cruz Utilization   Network Utilization Review Department  Phone: [phone]  Fax: [phone]  Email: [email or web address]  Contact for approvals/pending authorizations, clinical reviews, and discharge.  .     PHYSICIAN ADVISORY SERVICES:  Medical Necessity Denial & Rwdy[employer]ak-[employer] Review  Phone: [phone]  Fax: [phone]  Email: [email or web address]     DISCHARGE SUPPORT TEAM:  For Patients Discharge Needs & Updates  Phone: [phone] opt. 2 Fax: [phone]  Email: [email or web address]

## 2025-04-22 NOTE — DISCHARGE INSTR - AVS FIRST PAGE
Complete antibiotic course as prescribed  Follow with your primary care doctor  Ambulatory referral to gastroenterology has been placed, they should reach out to you to schedule an appointment

## 2025-04-22 NOTE — ASSESSMENT & PLAN NOTE
Management per primary team, initially with elevated creatinine from baseline, continue IV fluids, avoid hepatotoxins and monitor renal function.

## 2025-04-22 NOTE — ASSESSMENT & PLAN NOTE
"> 4 bouts of watery diarrhea with bright red blood since 04/19 PM with associated lower abdominal pain. Denied N/V.   No recent antibiotics use or diet change  In ED labs with leukocytosis 12 with lactic acidosis 2.1.  Only obtain non-contrast CT abdomen due to low GFR 25    CT abdomen non-contrast: Fluid in the ascending colon which could represent a nonspecific colitis/diarrheal illness. Otherwise no potential acute findings in the abdomen or pelvis.      Surgery consulted for possible mesenteric ischemia  Initiated Zosyn on 04/20. Repeat lactic acid normal.  C. diff tests and stool enteric bacterial panel negative    - surgery consulted and rec appreciated. TROY revealed possible anal fissure.  - GI consult appreciated, colonoscopy 4/22: \"Colonoscopy IMPRESSION: · The terminal ileum appeared normal. Performed 6 random biopsies using biopsy forceps. · Edematous, erythematous, ulcerated mucosa in the ascending colon, descending colon, sigmoid colon and rectum; performed cold forceps biopsies Colitis likely due to infection or ischemia but could be due to inflammatory bowel disease RECOMMENDATION: Repeat colonoscopy in 1 year, due: 4/22/2026 · Inflammatory bowel disease Repeat within the next 1 year because of possible inflammatory bowel disease Return to floor and resume diet Stable for discharge from a GI standpoint \"    - Ambulatory referral to GI for follow up, biopsy results  - Patient clinically improved, symptoms resolved  - Complete course of antibiotics on discharge as prescribed    "

## 2025-04-22 NOTE — ANESTHESIA POSTPROCEDURE EVALUATION
Post-Op Assessment Note    CV Status:  Stable  Pain Score: 0    Pain management: satisfactory to patient       Mental Status:  Awake and sleepy   Hydration Status:  Euvolemic   PONV Controlled:  Controlled   Airway Patency:  Patent     Post Op Vitals Reviewed: Yes    No anethesia notable event occurred.    Staff: CRNA       Last Filed PACU Vitals:  Vitals Value Taken Time   Temp 99.1    Pulse 77 04/22/25 1224   /63    Resp 18 04/22/25 1224   SpO2 100 % 04/22/25 1224   Vitals shown include unfiled device data.

## 2025-04-22 NOTE — ASSESSMENT & PLAN NOTE
Lab Results   Component Value Date    EGFR 26 04/22/2025    EGFR 27 04/21/2025    EGFR 25 04/20/2025    CREATININE 2.13 (H) 04/22/2025    CREATININE 2.05 (H) 04/21/2025    CREATININE 2.20 (H) 04/20/2025   Nephrology consulted   Management per medical team

## 2025-04-22 NOTE — CASE MANAGEMENT
Case Management Discharge Planning Note    Patient name Tatiana Cohen /[id number] MRN [id number]  : 1975 Date 2025       Current Admission Date: 2025  Current Admission Diagnosis:Acute colitis   Patient Active Problem List    Diagnosis Date Noted Date Diagnosed    Morbid obesity with BMI of 40.0-44.9, adult (HCC) 2025     Acute colitis 2025     Atypical chest pain 2024     Acute kidney injury (HCC) 2024     Aneurysm of ascending aorta without rupture (HCC) 2024     Pericardial effusion 2024     Subclavian artery stenosis, left ([facility]) 2024     CKD (chronic kidney disease) stage 3, GFR 30-59 ml/min ([facility]) 2024     Post-surgical hypothyroidism 2024     History of thyroid cancer 2022     S/P total thyroidectomy 2022     LVH (left ventricular hypertrophy) 2022     Low iron stores 2021     Anxiety state 2021     Gastroesophageal reflux disease without esophagitis 2020     Hypercalcemia 05/15/2020     Hyperthyroidism 05/15/2020     Abnormal LFTs 2020     Low back pain 2020     Fatigue 2020     Recurrent major depressive disorder (HCC) 2020     Hyperphosphatemia 2020     Latent tuberculosis 2020     Abnormal thyroid function test 2020     SIRS (systemic inflammatory response syndrome) 2020     Acute on chronic right-sided low back pain without sciatica 2020     Aortitis  04/15/2020     Pulmonary hypertension (HCC) 2020     Intramural aortic hematoma (HCC) 2020     Retrosternal chest pain 2020     Left flank pain 2020     Type 2 diabetes mellitus with obesity  (HCC) 2020     Iron deficiency anemia 2020     Thrombocytosis  2020     Multiple thyroid nodules 2019     Depression 2018     Dyslipidemia, goal LDL below 100 2018     Mild persistent asthma without complication 2018     Essential  hypertension 03/05/2018     Vitamin D deficiency 03/05/2018       LOS (days): 2  Geometric Mean LOS (GMLOS) (days):   Days to GMLOS:     OBJECTIVE:  Risk of Unplanned Readmission Score: 11         Current admission status: Inpatient   Preferred Pharmacy:   [facility] - [address] [place] [address]  [place] [address]  Phone: [phone] Fax: [phone]    [facility] [address], [address]  [address] [place] [address], [address] [facility] [address]  Phone: [phone] Fax: [phone]    Primary Care Provider: Darian Rollins MD    Primary Insurance: TASHIA SOTELO  Secondary Insurance:     DISCHARGE DETAILS:       Patient stable for discharge home today with outpatient medical follow up.    Follow up providers listed on AVS.    Family to transport patient home

## 2025-04-22 NOTE — ANESTHESIA POSTPROCEDURE EVALUATION
Post-Op Assessment Note    CV Status:  Stable    Pain management: adequate       Mental Status:  Alert and awake   Hydration Status:  Euvolemic   PONV Controlled:  Controlled   Airway Patency:  Patent     Post Op Vitals Reviewed: Yes    No anethesia notable event occurred.    Staff: Anesthesiologist, CRNA           Last Filed PACU Vitals:  Vitals Value Taken Time   Temp 98.3 °F (36.8 °C) 04/22/25 1241   Pulse 74 04/22/25 1241   /67 04/22/25 1241   Resp 20 04/22/25 1241   SpO2 81 % 04/22/25 1245       Modified Bubba:     Vitals Value Taken Time   Activity 2 04/22/25 1241   Respiration 2 04/22/25 1241   Circulation 2 04/22/25 1241   Consciousness 2 04/22/25 1241   Oxygen Saturation 2 04/22/25 1241     Modified Bubba Score: 10

## 2025-04-22 NOTE — ASSESSMENT & PLAN NOTE
Lab Results   Component Value Date    EGFR 26 04/22/2025    EGFR 27 04/21/2025    EGFR 25 04/20/2025    CREATININE 2.13 (H) 04/22/2025    CREATININE 2.05 (H) 04/21/2025    CREATININE 2.20 (H) 04/20/2025   Baseline around 2.0, mildly elevated 2.2 on admission  Low GFR 25  F/u nephrology outpatient  On cozaar for HTN  Cr trending down to baseline on 04/21

## 2025-04-22 NOTE — ASSESSMENT & PLAN NOTE
Lab Results   Component Value Date    EGFR 26 04/22/2025    EGFR 27 04/21/2025    EGFR 25 04/20/2025    CREATININE 2.13 (H) 04/22/2025    CREATININE 2.05 (H) 04/21/2025    CREATININE 2.20 (H) 04/20/2025   Baseline around 2.0, mildly elevated 2.2 on admission  Low GFR 25  F/u nephrology outpatient  On cozaar for HTN  Cr trending down to baseline on 04/21    - continue IV fluids 100cc/hr  - monitor renal function  - hold cozaar and lipitor

## 2025-04-22 NOTE — PLAN OF CARE
Problem: PAIN - ADULT  Goal: Verbalizes/displays adequate comfort level or baseline comfort level  Description: Interventions:- Encourage patient to monitor pain and request assistance- Assess pain using appropriate pain scale- Administer analgesics based on type and severity of pain and evaluate response- Implement non-pharmacological measures as appropriate and evaluate response- Consider cultural and social influences on pain and pain management- Notify physician/advanced practitioner if interventions unsuccessful or patient reports new pain  Outcome: Progressing     Problem: INFECTION - ADULT  Goal: Absence or prevention of progression during hospitalization  Description: INTERVENTIONS:- Assess and monitor for signs and symptoms of infection- Monitor lab/diagnostic results- Monitor all insertion sites, i.e. indwelling lines, tubes, and drains- Monitor endotracheal if appropriate and nasal secretions for changes in amount and color- [place] appropriate cooling/warming therapies per order- Administer medications as ordered- Instruct and encourage patient and family to use good hand hygiene technique- Identify and instruct in appropriate isolation precautions for identified infection/condition  Outcome: Progressing     Problem: DISCHARGE PLANNING  Goal: Discharge to home or other facility with appropriate resources  Description: INTERVENTIONS:- Identify barriers to discharge w/patient and caregiver- Arrange for needed discharge resources and transportation as appropriate- Identify discharge learning needs (meds, wound care, etc.)- Arrange for interpretive services to assist at discharge as needed- Refer to Case Management Department for coordinating discharge planning if the patient needs post-hospital services based on physician/advanced practitioner order or complex needs related to functional status, cognitive ability, or social support system  Outcome: Progressing     Problem: Knowledge Deficit  Goal:  Patient/family/caregiver demonstrates understanding of disease process, treatment plan, medications, and discharge instructions  Description: Complete learning assessment and assess knowledge base.Interventions:- Provide teaching at level of understanding- Provide teaching via preferred learning methods  Outcome: Progressing     Problem: GASTROINTESTINAL - ADULT  Goal: Minimal or absence of nausea and/or vomiting  Description: INTERVENTIONS:- Administer IV fluids if ordered to ensure adequate hydration- Maintain NPO status until nausea and vomiting are resolved- Nasogastric tube if ordered- Administer ordered antiemetic medications as needed- Provide nonpharmacologic comfort measures as appropriate- Advance diet as tolerated, if ordered- Consider nutrition services referral to assist patient with adequate nutrition and appropriate food choices  Outcome: Progressing  Goal: Maintains or returns to baseline bowel function  Description: INTERVENTIONS:- Assess bowel function- Encourage oral fluids to ensure adequate hydration- Administer IV fluids if ordered to ensure adequate hydration- Administer ordered medications as needed- Encourage mobilization and activity- Consider nutritional services referral to assist patient with adequate nutrition and appropriate food choices  Outcome: Progressing  Goal: Maintains adequate nutritional intake  Description: INTERVENTIONS:- Monitor percentage of each meal consumed- Identify factors contributing to decreased intake, treat as appropriate- Assist with meals as needed- Monitor I&O, weight, and lab values if indicated- Obtain nutrition services referral as needed  Outcome: Progressing

## 2025-04-22 NOTE — PROGRESS NOTES
Progress Note - Hospitalist   Name: Tatiana Balbuena 49 y.o. female I MRN: [id number]  Unit/Bed#: 426-01 I Date of Admission: 4/20/2025   Date of Service: 4/22/2025 I Hospital Day: 2    Assessment & Plan  Acute colitis  > 4 bouts of watery diarrhea with bright red blood since 04/19 PM with associated lower abdominal pain. Denied N/V.   No recent antibiotics use or diet change  In ED labs with leukocytosis 12 with lactic acidosis 2.1.  Only obtain non-contrast CT abdomen due to low GFR 25    CT abdomen non-contrast: Fluid in the ascending colon which could represent a nonspecific colitis/diarrheal illness. Otherwise no potential acute findings in the abdomen or pelvis.      Surgery consulted for possible mesenteric ischemia  Initiated Zosyn on 04/20. Repeat lactic acid normal.  c. diff tests and stool enteric bacterial panel negative    - surgery consulted and rec appreciated. No emergent surgical intervention. Consider CT w contrast if clinically worsen and after clearance from nephrology. Bright red blood per rectum could likely be from anal fissure.  - hemodynamically stable on 04/22 with less abdominal pain  - GI consulted and recs appreciated  - NPO after 04/22 midnight, plan for colonoscopy by GI  - continue IV Zosyn  - continue IV fluids 100cc/hr  - protonix 40mg BID  - zofran prn for nausea  - IV ofirmir prn for mild pain and IV dilaudid 0.5mg q4hr prn for moderate to severe pain  - f/u ova/parasite test    Type 2 diabetes mellitus with obesity  (HCC)  Lab Results   Component Value Date    HGBA1C 6.7 (H) 04/20/2025       Recent Labs     04/21/25  1114 04/21/25  1732 04/22/25  0000 04/22/25  0737   POCGLU 144* 142* 98 94       Blood Sugar Average: Last 72 hrs:  (P) [id number] weekly ozempic and farxiga 5mg daily    - currently NPO  - Home regimen on hold  - utilize ISS with accu check ACHS  - Hypoglycemia protocol    Essential hypertension  Home meds on norvasc 5mg, cozaar 100mg, coreg 25mg BID  BP  stable on admission    - continue coreg 25mg BID  - hold home meds Norvasc and Cozaar, consider DC cozaar due to CKD  - monitor BP  Dyslipidemia, goal LDL below 100  Hold home med lipitor 40mg  CKD (chronic kidney disease) stage 3, GFR 30-59 ml/min ([facility])  Lab Results   Component Value Date    EGFR 26 04/22/2025    EGFR 27 04/21/2025    EGFR 25 04/20/2025    CREATININE 2.13 (H) 04/22/2025    CREATININE 2.05 (H) 04/21/2025    CREATININE 2.20 (H) 04/20/2025   Baseline around 2.0, mildly elevated 2.2 on admission  Low GFR 25  F/u nephrology outpatient  On cozaar for HTN  Cr trending down to baseline on 04/21    - continue IV fluids 100cc/hr  - monitor renal function  - hold cozaar and lipitor  Acute kidney injury (HCC)  Presented with elevated Cr from baseline of 2.0  Trending down 2.2 >> 2.05 on 04/21  - continue IV fluids  - monitor renal fucntion  S/P total thyroidectomy  Continue home med levothyroxine 112mcg daily  Subclavian artery stenosis, left (HCC)  Hold home med ASA and plavix in the setting of bloody stool, colitis  Mild persistent asthma without complication  Stable. Continue home inhaler as needed    VTE Pharmacologic Prophylaxis: VTE Score: 4 Moderate Risk (Score 3-4) - Pharmacological DVT Prophylaxis Contraindicated. Sequential Compression Devices Ordered.    Mobility:   Basic Mobility Inpatient Raw Score: 24  JH-HLM Goal: 8: Walk 250 feet or more  JH-HLM Achieved: 6: Walk 10 steps or more  JH-HLM Goal achieved. Continue to encourage appropriate mobility.    Patient Centered Rounds: I performed bedside rounds with nursing staff today.   Discussions with Specialists or Other Care Team Provider: surgery, GI    Education and Discussions with Family / Patient:  will call patient's son.     Current Length of Stay: 2 day(s)  Current Patient Status: Inpatient   Certification Statement: The patient will continue to require additional inpatient hospital stay due to acute colitis with hematochezia requiring  colonoscopy by GI  Discharge Plan: Anticipate discharge in 24-48 hrs to home.    Code Status: Level 1 - Full Code    Subjective   Patient was examined bedside. She reported feeling better compared to yesterday. Her pain is mainly mid and right lower abdomen. Denied N/V, fever.     Objective :  Temp:  [97.3 °F (36.3 °C)-98.5 °F (36.9 °C)] 98.4 °F (36.9 °C)  HR:  [61-79] 64  BP: (124-144)/(67-89) 124/67  Resp:  [15-21] 19  SpO2:  [95 %-99 %] 98 %  O2 Device: None (Room air)    Body mass index is 42.53 kg/m².     Input and Output Summary (last 24 hours):     Intake/Output Summary (Last 24 hours) at 4/22/2025 0756  Last data filed at 4/22/2025 0358  Gross per 24 hour   Intake 0 ml   Output --   Net 0 ml       Physical Exam  Vitals and nursing note reviewed.   Constitutional:       General: She is not in acute distress.     Appearance: Normal appearance. She is well-developed. She is not ill-appearing.   HENT:      Head: Normocephalic and atraumatic.      Right Ear: External ear normal.      Left Ear: External ear normal.      Nose: Nose normal. No congestion.      Mouth/Throat:      Mouth: Mucous membranes are moist.      Pharynx: Oropharynx is clear. No oropharyngeal exudate or posterior oropharyngeal erythema.   Eyes:      General:         Right eye: No discharge.         Left eye: No discharge.      Extraocular Movements: Extraocular movements intact.      Conjunctiva/sclera: Conjunctivae normal.   Cardiovascular:      Rate and Rhythm: Normal rate and regular rhythm.      Pulses: Normal pulses.      Heart sounds: Normal heart sounds. No murmur heard.  Pulmonary:      Effort: Pulmonary effort is normal. No respiratory distress.      Breath sounds: Normal breath sounds. No wheezing.   Abdominal:      General: Abdomen is flat. Bowel sounds are normal. There is no distension.      Palpations: Abdomen is soft.      Tenderness: There is abdominal tenderness. There is no guarding or rebound.      Comments: Tenderness with  palpation of the R lower and Mid lower abdomen   Musculoskeletal:         General: No swelling. Normal range of motion.      Cervical back: Normal range of motion and neck supple.      Right lower leg: No edema.      Left lower leg: No edema.   Skin:     General: Skin is warm and dry.      Capillary Refill: Capillary refill takes less than 2 seconds.      Findings: No rash.   Neurological:      General: No focal deficit present.      Mental Status: She is alert and oriented to person, place, and time.   Psychiatric:         Mood and Affect: Mood normal.             Lab Results: I have reviewed the following results:   Results from last 7 days   Lab Units 04/22/25  0508   WBC Thousand/uL 9.37   HEMOGLOBIN g/dL 9.8*   HEMATOCRIT % 30.5*   PLATELETS Thousands/uL 303   SEGS PCT % 57   LYMPHO PCT % 36   MONO PCT % 4   EOS PCT % 2     Results from last 7 days   Lab Units 04/22/25  0508 04/21/25  0539   SODIUM mmol/L 141 142   POTASSIUM mmol/L 3.4* 4.1   CHLORIDE mmol/L 109* 113*   CO2 mmol/L 21 20*   BUN mg/dL 21 23   CREATININE mg/dL 2.13* 2.05*   ANION GAP mmol/L 11 9   CALCIUM mg/dL 8.6 8.6   ALBUMIN g/dL  --  3.7   TOTAL BILIRUBIN mg/dL  --  0.36   ALK PHOS U/L  --  95   ALT U/L  --  17   AST U/L  --  15   GLUCOSE RANDOM mg/dL 99 106         Results from last 7 days   Lab Units 04/22/25  0737 04/22/25  0000 04/21/25  1732 04/21/25  1114 04/21/25  0023 04/20/25  1807 04/20/25  1151   POC GLUCOSE mg/dl 94 98 142* 144* 133 117 127     Results from last 7 days   Lab Units 04/20/25  1202   HEMOGLOBIN A1C % 6.7*     Results from last 7 days   Lab Units 04/20/25  1025 04/20/25  0732   LACTIC ACID mmol/L 1.2 2.1*       Recent Cultures (last 7 days):   Results from last 7 days   Lab Units 04/20/25  1238   C DIFF TOXIN B BY PCR  Negative       Imaging Results Review: No pertinent imaging studies reviewed.  Other Study Results Review: No additional pertinent studies reviewed.    Last 24 Hours Medication List:     Current  Facility-Administered Medications:     acetaminophen (Ofirmev) injection 1,000 mg, Q6H PRN    carvedilol (COREG) tablet 25 mg, BID With Meals    [Held by provider] clopidogrel (PLAVIX) tablet 75 mg, Daily    fluticasone-vilanterol 200-25 mcg/actuation 1 puff, Daily    HYDROmorphone (DILAUDID) injection 0.5 mg, Q4H PRN    insulin lispro (HumALOG/ADMELOG) 100 units/mL subcutaneous injection 1-5 Units, TID AC **AND** Fingerstick Glucose (POCT), Q6H    [START ON 4/26/2025] isoniazid (NYDRAZID) tablet 300 mg, Q7 Days    levothyroxine tablet 100 mcg, Early Morning    montelukast (SINGULAIR) tablet 10 mg, HS    multi-electrolyte (ISOLYTE-S PH 7.4 equivalent) IV solution, Continuous, Last Rate: 100 mL/hr (04/22/25 0648)    ondansetron (ZOFRAN) injection 4 mg, Q6H PRN    pantoprazole (PROTONIX) injection 40 mg, Q12H [place]    piperacillin-tazobactam (ZOSYN) 4.5 g in sodium chloride 0.9 % 100 mL IVPB (EXTENDED INFUSION), Q8H, Last Rate: 4.5 g (04/22/25 0651)    polyethylene glycol (GOLYTELY) bowel prep 4,000 mL, See Admin Instructions    potassium chloride 20 mEq IVPB (premix), Once    predniSONE tablet 10 mg, Daily    Administrative Statements   Today, Patient Was Seen By: Pattie Pryor, DO  I have spent a total time of 20 minutes in caring for this patient on the day of the visit/encounter including Impressions, Counseling / Coordination of care, Documenting in the medical record, Reviewing/placing orders in the medical record (including tests, medications, and/or procedures), Obtaining or reviewing history  , and Communicating with other healthcare professionals .    **Please Note: This note may have been constructed using a voice recognition system.**

## 2025-04-22 NOTE — DISCHARGE SUMMARY
"Discharge Summary - Hospitalist   Name: Tatiana Balbuena 49 y.o. female I MRN: [id number]  Unit/Bed#: 426-01 I Date of Admission: 4/20/2025   Date of Service: 4/22/2025 I Hospital Day: 2     Assessment & Plan  Acute colitis  > 4 bouts of watery diarrhea with bright red blood since 04/19 PM with associated lower abdominal pain. Denied N/V.   No recent antibiotics use or diet change  In ED labs with leukocytosis 12 with lactic acidosis 2.1.  Only obtain non-contrast CT abdomen due to low GFR 25    CT abdomen non-contrast: Fluid in the ascending colon which could represent a nonspecific colitis/diarrheal illness. Otherwise no potential acute findings in the abdomen or pelvis.      Surgery consulted for possible mesenteric ischemia  Initiated Zosyn on 04/20. Repeat lactic acid normal.  C. diff tests and stool enteric bacterial panel negative    - surgery consulted and rec appreciated. TROY revealed possible anal fissure.  - GI consult appreciated, colonoscopy 4/22: \"Colonoscopy IMPRESSION: · The terminal ileum appeared normal. Performed 6 random biopsies using biopsy forceps. · Edematous, erythematous, ulcerated mucosa in the ascending colon, descending colon, sigmoid colon and rectum; performed cold forceps biopsies Colitis likely due to infection or ischemia but could be due to inflammatory bowel disease RECOMMENDATION: Repeat colonoscopy in 1 year, due: 4/22/2026 · Inflammatory bowel disease Repeat within the next 1 year because of possible inflammatory bowel disease Return to floor and resume diet Stable for discharge from a GI standpoint \"    - Ambulatory referral to GI for follow up, biopsy results  - Patient clinically improved, symptoms resolved  - Complete course of antibiotics on discharge as prescribed    Type 2 diabetes mellitus with obesity  (HCC)  Lab Results   Component Value Date    HGBA1C 6.7 (H) 04/20/2025       Recent Labs     04/21/25  1732 04/22/25  0000 04/22/25  0737 04/22/25  1121   POCGLU 142* 98 94 " 114       Blood Sugar Average: Last 72 hrs:  (P) 121.125on weekly ozempic and farxiga 5mg daily    - resume home regimen on discharge  - utilize ISS with accu check ACHS  - Hypoglycemia protocol    Essential hypertension  Home meds on norvasc 5mg, cozaar 100mg, coreg 25mg BID  BP stable on admission, resume home regimen on discharge  Dyslipidemia, goal LDL below 100  Hold home med lipitor 40mg  CKD (chronic kidney disease) stage 3, GFR 30-59 ml/min ([facility])  Lab Results   Component Value Date    EGFR 26 04/22/2025    EGFR 27 04/21/2025    EGFR 25 04/20/2025    CREATININE 2.13 (H) 04/22/2025    CREATININE 2.05 (H) 04/21/2025    CREATININE 2.20 (H) 04/20/2025   Baseline around 2.0, mildly elevated 2.2 on admission  Low GFR 25  F/u nephrology outpatient  On cozaar for HTN  Cr trending down to baseline on 04/21  Acute kidney injury (HCC)  Presented with elevated Cr from baseline of 2.0  Trending down 2.2 >> 2.05 on 04/21  Stable at baseline  S/P total thyroidectomy  Continue home med levothyroxine 112mcg daily  Subclavian artery stenosis, left (HCC)  Okay to resume ASA/Plavix  Mild persistent asthma without complication  Stable. Continue home inhaler as needed  Morbid obesity with BMI of 40.0-44.9, adult (HCC)  Lifestyle changes     Medical Problems       Resolved Problems  Date Reviewed: 11/22/2024   None       Discharging Physician / Practitioner: Dejan Lawrence,   PCP: Darian Rollins MD  Admission Date:   Admission Orders (From admission, onward)       Ordered        04/20/25 1009  INPATIENT ADMISSION  Once                          Discharge Date: 04/22/25    Consultations During Hospital Stay:  GI, Surgery    Procedures Performed:   Colonoscopy    Significant Findings / Test Results:   CT abdomen pelvis wo contrast   Final Result by Lars Heard MD (04/20 0921)      Fluid in the ascending colon which could represent a nonspecific colitis/diarrheal illness. Otherwise no potential acute findings in the  abdomen or pelvis.      Resident: Cachorro Asencio I, the attending radiologist, have reviewed the images and agree with the final report above.      Workstation performed: [id number]           Lab Results   Component Value Date    WBC 9.37 04/22/2025    HGB 9.8 (L) 04/22/2025    HCT 30.5 (L) 04/22/2025    MCV 92 04/22/2025     04/22/2025     Lab Results   Component Value Date    SODIUM 141 04/22/2025    K 3.4 (L) 04/22/2025     (H) 04/22/2025    CO2 21 04/22/2025    BUN 21 04/22/2025    CREATININE 2.13 (H) 04/22/2025    GLUC 99 04/22/2025    CALCIUM 8.6 04/22/2025         Incidental Findings:   See above   I reviewed the above mentioned incidental findings with the patient and/or family and they expressed understanding.    Test Results Pending at Discharge (will require follow up):   none     Outpatient Tests Requested:  none    Complications:  none    Reason for Admission: Colitis    Hospital Course:   Tatiana Balbuena is a 49 y.o. female patient who originally presented to the hospital on 4/20/2025 due to acute colitis, with bright red blood in stool.  Patient was seen by general surgery and gastroenterology.  There is a possible anal fissure noted by general surgery on digital rectal exam.  She underwent colonoscopy on 4/22, findings as noted above under assessment and plan.  Patient's hemoglobin had slight drop, some of this likely dilutional, and her bleeding otherwise resolved.  During colonoscopy, several biopsies were taken.  Patient was treated empirically with antibiotics.  Suspect her colitis to be infectious versus inflammatory, given she showed improvement with antibiotics, will complete a course of antibiotics upon discharge.  Bleeding may have been due to anal fissure, which seems to resolved.  Patient is medically stable for discharge.  Recommend follow-up with gastroenterology, as well as her primary care provider.          Please see above list of diagnoses and related plan for additional  "information.     Condition at Discharge: good    Discharge Day Visit / Exam:   Subjective:  patient feels much better, no abdominal pain  Vitals: Blood Pressure: 162/67 (04/22/25 1241)  Pulse: 74 (04/22/25 1241)  Temperature: 98.3 °F (36.8 °C) (04/22/25 1241)  Temp Source: Temporal (04/22/25 1111)  Respirations: 20 (04/22/25 1241)  Height: 5' 3\" (160 cm) (04/20/25 1158)  Weight - Scale: 109 kg (240 lb 1.3 oz) (04/20/25 1145)  SpO2: (!) 81 % (04/22/25 1245)  Physical Exam  Vitals and nursing note reviewed.   Constitutional:       General: She is not in acute distress.     Appearance: Normal appearance. She is well-developed. She is not ill-appearing.   HENT:      Head: Normocephalic and atraumatic.      Mouth/Throat:      Mouth: Mucous membranes are moist.   Eyes:      Extraocular Movements: Extraocular movements intact.   Cardiovascular:      Rate and Rhythm: Normal rate and regular rhythm.   Pulmonary:      Effort: Pulmonary effort is normal. No respiratory distress.      Breath sounds: Normal breath sounds.   Abdominal:      General: There is no distension.      Palpations: Abdomen is soft.      Tenderness: There is no abdominal tenderness.      Comments: Tenderness with palpation of the R lower and Mid lower abdomen   Musculoskeletal:      Cervical back: Normal range of motion and neck supple.   Skin:     General: Skin is warm and dry.   Neurological:      Mental Status: She is alert.   Psychiatric:         Mood and Affect: Mood normal.          Discussion with Family: Patient declined call to .     Discharge instructions/Information to patient and family:   See after visit summary for information provided to patient and family.      Provisions for Follow-Up Care:  See after visit summary for information related to follow-up care and any pertinent home health orders.      Mobility at time of Discharge:   Basic Mobility Inpatient Raw Score: 24  JH-HLM Goal: 8: Walk 250 feet or more  JH-HLM Achieved: " 7: Walk 25 feet or more  HLM Goal NOT achieved. Continue to encourage mobility in post discharge setting.     Disposition:   Home    Planned Readmission: no    Discharge Medications:  See after visit summary for reconciled discharge medications provided to patient and/or family.      Administrative Statements   Discharge Statement:  I have spent a total time of 40 minutes in caring for this patient on the day of the visit/encounter. >30 minutes of time was spent on: Diagnostic results, Counseling / Coordination of care, Documenting in the medical record, Reviewing / ordering tests, medicine, procedures  , and Communicating with other healthcare professionals .    **Please Note: This note may have been constructed using a voice recognition system**

## 2025-04-23 LAB — WBC SPEC QL GRAM STN: ABNORMAL

## 2025-04-29 PROCEDURE — 88305 TISSUE EXAM BY PATHOLOGIST: CPT | Performed by: PATHOLOGY

## 2025-04-30 ENCOUNTER — RESULTS FOLLOW-UP (OUTPATIENT)
Dept: GASTROENTEROLOGY | Facility: CLINIC | Age: 50
End: 2025-04-30

## 2025-04-30 NOTE — RESULT ENCOUNTER NOTE
I called her with her results.  Repeat colonoscopy in 1 year to rebiopsy the colon and ensure the ischemic colitis is completely resolved and there is no evidence of underlying inflammatory bowel disease or malignancy.

## 2025-05-23 NOTE — PROGRESS NOTES
Name: Tatiana Balbuena      : 1975      MRN: [id number]  Encounter Provider: Lloyd Silverman MD  Encounter Date: 2025   Encounter department: [facility]  :  Assessment & Plan  History of ischemic colitis  Colonoscopy 2025 for abnormal CT findings: edematous, erythematous and ulcerated mucosa in the ascending colon, descending colon, sigmoid colon and rectum s/p bx; path c/f ischemic colitis  - repeat colonoscopy in 1 year to rebiopsy the colon to rule out underlying IBD/malignancy once ischemic colitis resolves       Constipation, unspecified constipation type  Constipation started after Ozempic. Likely related to Ozempic. TSH and Ca wnl. Recent colonoscopy noted.   - advised to speak with her PCP who is prescribing Ozempic regarding her symptoms as her GI symptoms may be related to Ozempic  - okay to use Miralax on the days she is constipated  - increase water intake  - continue high fiber intake; she eats lots of fiber and would like to continue with this instead of addition of fiber supplementation         Nausea and vomiting, unspecified vomiting type  Reports nausea and intermittent vomiting for the past 8-9 months when she was started on Ozempic. Ddx includes medication induced gastroparesis vs worsening gastroparesis given hx of DM vs PUD vs H. Pylori vs less likely mass. Given ongoing vomiting, recommend luminal evaluation  - discussed the risks/benefits of EGD, and the need for escort. Patient agreed to proceed with EGD  - schedule EGD (HP bx) at [facility]  - hold Plavix 5 days prior to the procedure and follow diabetes instructions    Orders:    EGD; Future        History of Present Illness   HPI  Tatiana Balbuena is a 49 y.o. 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   Ozempic started about 8-9 months ago.   Currently on Ozempic which leads to constipation for 2 days then looser stool afterwards and also leads to nausea and intermittent vomiting. No abdominal pain.   Currently on magnesium for sleep issues.   No BRBPR.   Heartburn on omeprazole for the past 3 years. No heartburn on this.   No EGD in the past.   Fhx: no cancer in the family  Shx: no toxic habits x3    CT A/P on 4/20/2025:  IMPRESSION:  Fluid in the ascending colon which could represent a nonspecific colitis/diarrheal illness. Otherwise no potential acute findings in the abdomen or pelvis.  Colonoscopy 4/22/2025:  FINDINGS:  The terminal ileum appeared normal. Performed 6 random biopsies using biopsy forceps.  Edematous, erythematous and ulcerated mucosa in the ascending colon, descending colon, sigmoid colon and rectum; performed 12 cold forceps biopsies  Final Diagnosis   A. Terminal Ileum, biopsy:  - Small bowel mucosa with no significant histopathologic abnormality  - Negative for acute inflammation and malabsorption pattern  - Negative for malignancy       B. Large Intestine, Right/Ascending Colon, biopsy:  - Colonic mucosa with ischemic colitis pattern (see comment)   - No active or chronic colitis is seen     C. Large Intestine, Left/Descending Colon, biopsy:  - Colonic mucosa with ischemic colitis pattern (see comment)  - No active or chronic colitis is seen     Comment: The right and left colon biopsies show withered crypts, and mild lamina propria hyalinization, consistent with early ischemic type mucosal injury.  Possible etiologies include true ischemic colitis, infection (such as pseudomembranous colitis, Escherichia coli O157:H7), drug reaction (such as NSAIDs, digoxin and ergotamine derivatives), etc.    Repeat colonoscopy in 1 yr with biopsies to ensure the ischemic colitis is completley resolved and to r/o underlying IBD or malignancy  "    Review of Systems  Medications Ordered Prior to Encounter[1]      Objective   /64   Pulse 75   Temp 98 °F (36.7 °C)   Resp 18   Ht 5' 3\" (1.6 m)   Wt 108 kg (238 lb)   LMP 04/04/2020 (Exact Date)   SpO2 98%   BMI 42.16 kg/m²      Physical Exam  Vitals reviewed.   Constitutional:       Appearance: Normal appearance. She is obese.   HENT:      Head: Normocephalic and atraumatic.     Cardiovascular:      Rate and Rhythm: Normal rate.   Pulmonary:      Effort: Pulmonary effort is normal. No respiratory distress.   Abdominal:      General: There is no distension.      Palpations: Abdomen is soft.      Tenderness: There is no abdominal tenderness.     Musculoskeletal:         General: No swelling.     Skin:     General: Skin is warm and dry.     Neurological:      General: No focal deficit present.      Mental Status: She is alert.                  [1]   Current Outpatient Medications on File Prior to Visit   Medication Sig Dispense Refill    Advair -21 MCG/ACT inhaler       amLODIPine (NORVASC) 5 mg tablet       aspirin (ECOTRIN LOW STRENGTH) 81 mg EC tablet Take 81 mg by mouth in the morning.      atorvastatin (LIPITOR) 40 mg tablet       carvedilol (COREG) 25 mg tablet Take 1 tablet (25 mg total) by mouth 2 (two) times a day with meals      cholecalciferol (VITAMIN D3) 1,000 units tablet Take 1 tablet (1,000 Units total) by mouth daily 90 tablet 3    clopidogrel (PLAVIX) 75 mg tablet Take 75 mg by mouth in the morning.      diclofenac sodium (VOLTAREN) 50 mg EC tablet Take 1 tablet (50 mg total) by mouth 2 (two) times a day 30 tablet 0    escitalopram (LEXAPRO) 10 mg tablet Take 10 mg by mouth in the morning.      Farxiga 5 MG TABS Take 5 mg by mouth in the morning.      folic acid (FOLVITE) 1 mg tablet       furosemide (LASIX) 20 mg tablet       levothyroxine 100 mcg tablet       losartan (COZAAR) 100 MG tablet Take 100 mg by mouth in the morning.      MAGNESIUM PO Take by mouth      " methotrexate 2.5 MG tablet Take 10 mg by mouth      omeprazole (PriLOSEC) 20 mg delayed release capsule Take 1 capsule (20 mg total) by mouth daily 30 capsule 0    Ozempic, 0.25 or 0.5 MG/DOSE, 2 MG/3ML injection pen Inject 0.5 mg under the skin every 7 days      vitamin B-12 (VITAMIN B-12) 500 mcg tablet Take 500 mcg by mouth in the morning.      [DISCONTINUED] linaGLIPtin 5 MG TABS Take 5 mg by mouth daily 30 tablet 0    [DISCONTINUED] montelukast (SINGULAIR) 10 mg tablet Take 10 mg by mouth daily at bedtime      [DISCONTINUED] predniSONE 10 mg tablet Take by mouth in the morning. Take as directed by rheumatology.      albuterol (PROVENTIL HFA,VENTOLIN HFA) 90 mcg/act inhaler Inhale 2 puffs 2 (two) times a day (Patient not taking: Reported on 11/21/2024)      Pyridoxine HCl (vitamin B-6) 50 MG TABS Take 1 tablet (50 mg total) by mouth daily 270 tablet 0    [DISCONTINUED] isoniazid (NYDRAZID) 300 mg tablet Take 1 tablet (300 mg total) by mouth daily (Patient not taking: Reported on 11/21/2024) 90 tablet 2     No current facility-administered medications on file prior to visit.

## 2025-05-28 ENCOUNTER — OFFICE VISIT (OUTPATIENT)
Age: 50
End: 2025-05-28
Payer: COMMERCIAL

## 2025-05-28 VITALS
HEART RATE: 75 BPM | HEIGHT: 63 IN | WEIGHT: 238 LBS | BODY MASS INDEX: 42.17 KG/M2 | SYSTOLIC BLOOD PRESSURE: 112 MMHG | DIASTOLIC BLOOD PRESSURE: 64 MMHG | OXYGEN SATURATION: 98 % | TEMPERATURE: 98 F | RESPIRATION RATE: 18 BRPM

## 2025-05-28 DIAGNOSIS — K59.00 CONSTIPATION, UNSPECIFIED CONSTIPATION TYPE: ICD-10-CM

## 2025-05-28 DIAGNOSIS — Z87.19 HISTORY OF ISCHEMIC COLITIS: Primary | ICD-10-CM

## 2025-05-28 DIAGNOSIS — R11.2 NAUSEA AND VOMITING, UNSPECIFIED VOMITING TYPE: ICD-10-CM

## 2025-05-28 PROCEDURE — 99214 OFFICE O/P EST MOD 30 MIN: CPT | Performed by: INTERNAL MEDICINE

## 2025-05-28 RX ORDER — SEMAGLUTIDE 0.68 MG/ML
0.5 INJECTION, SOLUTION SUBCUTANEOUS
COMMUNITY
Start: 2025-05-21

## 2025-05-28 RX ORDER — METHOTREXATE 2.5 MG/1
10 TABLET ORAL
COMMUNITY
Start: 2024-12-17

## 2025-05-28 RX ORDER — SODIUM CHLORIDE, SODIUM LACTATE, POTASSIUM CHLORIDE, CALCIUM CHLORIDE 600; 310; 30; 20 MG/100ML; MG/100ML; MG/100ML; MG/100ML
125 INJECTION, SOLUTION INTRAVENOUS CONTINUOUS
OUTPATIENT
Start: 2025-05-28

## 2025-05-28 RX ORDER — FUROSEMIDE 20 MG/1
TABLET ORAL
COMMUNITY
Start: 2025-03-24

## 2025-06-09 ENCOUNTER — TELEPHONE (OUTPATIENT)
Dept: GASTROENTEROLOGY | Facility: CLINIC | Age: 50
End: 2025-06-09

## 2025-07-23 ENCOUNTER — ANESTHESIA (OUTPATIENT)
Dept: GASTROENTEROLOGY | Facility: HOSPITAL | Age: 50
End: 2025-07-23
Payer: COMMERCIAL

## 2025-07-23 ENCOUNTER — HOSPITAL ENCOUNTER (OUTPATIENT)
Dept: GASTROENTEROLOGY | Facility: HOSPITAL | Age: 50
Setting detail: OUTPATIENT SURGERY
Discharge: HOME | End: 2025-07-23
Attending: INTERNAL MEDICINE
Payer: COMMERCIAL

## 2025-07-23 ENCOUNTER — ANESTHESIA EVENT (OUTPATIENT)
Dept: GASTROENTEROLOGY | Facility: HOSPITAL | Age: 50
End: 2025-07-23
Payer: COMMERCIAL

## 2025-07-23 VITALS
TEMPERATURE: 97.9 F | HEART RATE: 66 BPM | DIASTOLIC BLOOD PRESSURE: 52 MMHG | RESPIRATION RATE: 18 BRPM | OXYGEN SATURATION: 96 % | SYSTOLIC BLOOD PRESSURE: 103 MMHG

## 2025-07-23 DIAGNOSIS — R11.2 NAUSEA AND VOMITING, UNSPECIFIED VOMITING TYPE: ICD-10-CM

## 2025-07-23 LAB — GLUCOSE SERPL-MCNC: 132 MG/DL (ref 65–140)

## 2025-07-23 PROCEDURE — 88341 IMHCHEM/IMCYTCHM EA ADD ANTB: CPT | Performed by: PATHOLOGY

## 2025-07-23 PROCEDURE — 43239 EGD BIOPSY SINGLE/MULTIPLE: CPT | Performed by: INTERNAL MEDICINE

## 2025-07-23 PROCEDURE — 88342 IMHCHEM/IMCYTCHM 1ST ANTB: CPT | Performed by: PATHOLOGY

## 2025-07-23 PROCEDURE — 88305 TISSUE EXAM BY PATHOLOGIST: CPT | Performed by: PATHOLOGY

## 2025-07-23 PROCEDURE — 82948 REAGENT STRIP/BLOOD GLUCOSE: CPT

## 2025-07-23 RX ORDER — SODIUM CHLORIDE, SODIUM LACTATE, POTASSIUM CHLORIDE, CALCIUM CHLORIDE 600; 310; 30; 20 MG/100ML; MG/100ML; MG/100ML; MG/100ML
125 INJECTION, SOLUTION INTRAVENOUS CONTINUOUS
Status: DISCONTINUED | OUTPATIENT
Start: 2025-07-23 | End: 2025-07-27 | Stop reason: HOSPADM

## 2025-07-23 RX ORDER — LIDOCAINE HYDROCHLORIDE 20 MG/ML
INJECTION, SOLUTION EPIDURAL; INFILTRATION; INTRACAUDAL; PERINEURAL AS NEEDED
Status: DISCONTINUED | OUTPATIENT
Start: 2025-07-23 | End: 2025-07-23

## 2025-07-23 RX ORDER — PROPOFOL 10 MG/ML
INJECTION, EMULSION INTRAVENOUS AS NEEDED
Status: DISCONTINUED | OUTPATIENT
Start: 2025-07-23 | End: 2025-07-23

## 2025-07-23 RX ADMIN — LIDOCAINE HYDROCHLORIDE 100 MG: 20 INJECTION, SOLUTION EPIDURAL; INFILTRATION; INTRACAUDAL at 11:06

## 2025-07-23 RX ADMIN — PROPOFOL 200 MG: 10 INJECTION, EMULSION INTRAVENOUS at 11:06

## 2025-07-23 RX ADMIN — SODIUM CHLORIDE, SODIUM LACTATE, POTASSIUM CHLORIDE, AND CALCIUM CHLORIDE 125 ML/HR: .6; .31; .03; .02 INJECTION, SOLUTION INTRAVENOUS at 10:33

## 2025-07-23 RX ADMIN — PROPOFOL 50 MG: 10 INJECTION, EMULSION INTRAVENOUS at 11:12

## 2025-07-23 RX ADMIN — SODIUM CHLORIDE, SODIUM LACTATE, POTASSIUM CHLORIDE, AND CALCIUM CHLORIDE 125 ML/HR: .6; .31; .03; .02 INJECTION, SOLUTION INTRAVENOUS at 10:25

## 2025-07-23 NOTE — ANESTHESIA PREPROCEDURE EVALUATION
Procedure:  EGD    Relevant Problems   ANESTHESIA (within normal limits)      CARDIO   (+) Aneurysm of ascending aorta without rupture (HCC)   (+) Aortitis    (+) Atypical chest pain   (+) Dyslipidemia, goal LDL below 100   (+) Essential hypertension   (+) Intramural aortic hematoma (HCC)   (+) Pulmonary hypertension (HCC)   (+) Retrosternal chest pain   (+) Subclavian artery stenosis, left ([facility])      ENDO   (+) Hyperthyroidism   (+) Post-surgical hypothyroidism   (+) Type 2 diabetes mellitus with obesity  ([facility])      GI/HEPATIC   (+) Gastroesophageal reflux disease without esophagitis      /RENAL   (+) Acute kidney injury (HCC)   (+) CKD (chronic kidney disease) stage 3, GFR 30-59 ml/min ([facility])      HEMATOLOGY   (+) Iron deficiency anemia      MUSCULOSKELETAL   (+) Acute on chronic right-sided low back pain without sciatica   (+) Low back pain      NEURO/PSYCH   (+) Anxiety state   (+) Depression   (+) Recurrent major depressive disorder (HCC)      PULMONARY   (+) Mild persistent asthma without complication      Other   (+) Morbid obesity with BMI of 40.0-44.9, adult ([facility])      TTE (7/26/24):    Left Ventricle: Left ventricular cavity size is normal. The left ventricular ejection fraction is 60%. Systolic function is normal. Wall motion is normal.    Right Ventricle: Systolic function is normal.    Aortic Valve: There is mild to moderate regurgitation.    Pericardium: There is a small pericardial effusion. There is no echocardiographic evidence of tamponade.    Physical Exam    Airway     Mallampati score: III  TM Distance: >3 FB  Neck ROM: full      Cardiovascular  Cardiovascular exam normal    Dental   No notable dental hx     Pulmonary  Pulmonary exam normal     Neurological    She appears awake, alert and oriented x3.      Other Findings  post-pubertal.      Anesthesia Plan  ASA Score- 3     Anesthesia Type- IV sedation with anesthesia with ASA Monitors.         Additional Monitors:     Airway Plan: natural  airway.           Plan Factors-Exercise tolerance (METS): >4 METS.    Chart reviewed. EKG reviewed.  Existing labs reviewed. Patient summary reviewed.    Patient is not a current smoker.              Induction- intravenous.    Postoperative Plan- .   Monitoring Plan - Monitoring plan - standard ASA monitoring  Post Operative Pain Plan - non-opiod analgesics        Informed Consent- Anesthetic plan and risks discussed with patient.  I personally reviewed this patient with the CRNA. Discussed and agreed on the Anesthesia Plan with the CRNA..      NPO Status:  Vitals Value Taken Time   Date of last liquid 07/23/25 07/23/25 09:56   Time of last liquid 0930 07/23/25 09:56   Date of last solid 07/22/25 07/23/25 09:56   Time of last solid 2000 07/23/25 09:56

## 2025-07-23 NOTE — ANESTHESIA POSTPROCEDURE EVALUATION
Post-Op Assessment Note    CV Status:  Stable  Pain Score: 0    Pain management: adequate       Mental Status:  Alert and awake   Hydration Status:  Euvolemic   PONV Controlled:  Controlled   Airway Patency:  Patent     Post Op Vitals Reviewed: Yes    No anethesia notable event occurred.    Staff: CRNA           Last Filed PACU Vitals:  Vitals Value Taken Time   Temp     Pulse     /76    Resp 20    SpO2 98

## 2025-07-30 PROCEDURE — 88342 IMHCHEM/IMCYTCHM 1ST ANTB: CPT | Performed by: PATHOLOGY

## 2025-07-30 PROCEDURE — 88305 TISSUE EXAM BY PATHOLOGIST: CPT | Performed by: PATHOLOGY

## 2025-07-30 PROCEDURE — 88341 IMHCHEM/IMCYTCHM EA ADD ANTB: CPT | Performed by: PATHOLOGY

## 2025-08-01 ENCOUNTER — HOSPITAL ENCOUNTER (EMERGENCY)
Facility: HOSPITAL | Age: 50
Discharge: HOME | End: 2025-08-01
Attending: EMERGENCY MEDICINE | Admitting: EMERGENCY MEDICINE
Payer: COMMERCIAL

## 2025-08-01 ENCOUNTER — APPOINTMENT (EMERGENCY)
Dept: CT IMAGING | Facility: HOSPITAL | Age: 50
End: 2025-08-01
Payer: COMMERCIAL

## 2025-08-01 ENCOUNTER — APPOINTMENT (EMERGENCY)
Dept: RADIOLOGY | Facility: HOSPITAL | Age: 50
End: 2025-08-01
Payer: COMMERCIAL

## 2025-08-01 VITALS
OXYGEN SATURATION: 97 % | RESPIRATION RATE: 16 BRPM | HEART RATE: 66 BPM | DIASTOLIC BLOOD PRESSURE: 57 MMHG | TEMPERATURE: 98 F | SYSTOLIC BLOOD PRESSURE: 118 MMHG

## 2025-08-01 DIAGNOSIS — R07.9 CHEST PAIN, UNSPECIFIED: Primary | ICD-10-CM

## 2025-08-01 DIAGNOSIS — I71.20 THORACIC AORTIC ANEURYSM (HCC): ICD-10-CM

## 2025-08-01 LAB
2HR DELTA HS TROPONIN: 0 NG/L
ALBUMIN SERPL BCG-MCNC: 4 G/DL (ref 3.5–5)
ALP SERPL-CCNC: 121 U/L (ref 34–104)
ALT SERPL W P-5'-P-CCNC: 17 U/L (ref 7–52)
ANION GAP SERPL CALCULATED.3IONS-SCNC: 10 MMOL/L (ref 4–13)
AST SERPL W P-5'-P-CCNC: 16 U/L (ref 13–39)
BASOPHILS # BLD AUTO: 0.08 THOUSANDS/ÂΜL (ref 0–0.1)
BASOPHILS NFR BLD AUTO: 1 % (ref 0–1)
BILIRUB SERPL-MCNC: 0.26 MG/DL (ref 0.2–1)
BNP SERPL-MCNC: 44 PG/ML (ref 0–100)
BUN SERPL-MCNC: 37 MG/DL (ref 5–25)
CALCIUM SERPL-MCNC: 8.9 MG/DL (ref 8.4–10.2)
CARDIAC TROPONIN I PNL SERPL HS: 4 NG/L (ref ?–50)
CARDIAC TROPONIN I PNL SERPL HS: 4 NG/L (ref ?–50)
CHLORIDE SERPL-SCNC: 107 MMOL/L (ref 96–108)
CO2 SERPL-SCNC: 19 MMOL/L (ref 21–32)
CREAT SERPL-MCNC: 2.42 MG/DL (ref 0.6–1.3)
CRP SERPL QL: 70.4 MG/L
D DIMER PPP FEU-MCNC: 1.19 UG/ML FEU
EOSINOPHIL # BLD AUTO: 0.47 THOUSAND/ÂΜL (ref 0–0.61)
EOSINOPHIL NFR BLD AUTO: 5 % (ref 0–6)
ERYTHROCYTE [DISTWIDTH] IN BLOOD BY AUTOMATED COUNT: 15.2 % (ref 11.6–15.1)
ERYTHROCYTE [SEDIMENTATION RATE] IN BLOOD: 107 MM/HOUR (ref 0–29)
GFR SERPL CREATININE-BSD FRML MDRD: 22 ML/MIN/1.73SQ M
GLUCOSE SERPL-MCNC: 170 MG/DL (ref 65–140)
HCT VFR BLD AUTO: 32.7 % (ref 34.8–46.1)
HGB BLD-MCNC: 10.3 G/DL (ref 11.5–15.4)
IMM GRANULOCYTES # BLD AUTO: 0.03 THOUSAND/UL (ref 0–0.2)
IMM GRANULOCYTES NFR BLD AUTO: 0 % (ref 0–2)
LACTATE SERPL-SCNC: 1.6 MMOL/L (ref 0.5–2)
LIPASE SERPL-CCNC: 33 U/L (ref 11–82)
LYMPHOCYTES # BLD AUTO: 2.95 THOUSANDS/ÂΜL (ref 0.6–4.47)
LYMPHOCYTES NFR BLD AUTO: 29 % (ref 14–44)
MCH RBC QN AUTO: 28.4 PG (ref 26.8–34.3)
MCHC RBC AUTO-ENTMCNC: 31.5 G/DL (ref 31.4–37.4)
MCV RBC AUTO: 90 FL (ref 82–98)
MONOCYTES # BLD AUTO: 0.62 THOUSAND/ÂΜL (ref 0.17–1.22)
MONOCYTES NFR BLD AUTO: 6 % (ref 4–12)
NEUTROPHILS # BLD AUTO: 5.98 THOUSANDS/ÂΜL (ref 1.85–7.62)
NEUTS SEG NFR BLD AUTO: 59 % (ref 43–75)
NRBC BLD AUTO-RTO: 0 /100 WBCS
PLATELET # BLD AUTO: 397 THOUSANDS/UL (ref 149–390)
PMV BLD AUTO: 10.3 FL (ref 8.9–12.7)
POTASSIUM SERPL-SCNC: 5.1 MMOL/L (ref 3.5–5.3)
PROT SERPL-MCNC: 7.8 G/DL (ref 6.4–8.4)
RBC # BLD AUTO: 3.63 MILLION/UL (ref 3.81–5.12)
SODIUM SERPL-SCNC: 136 MMOL/L (ref 135–147)
WBC # BLD AUTO: 10.13 THOUSAND/UL (ref 4.31–10.16)

## 2025-08-01 PROCEDURE — 36415 COLL VENOUS BLD VENIPUNCTURE: CPT | Performed by: EMERGENCY MEDICINE

## 2025-08-01 PROCEDURE — 84484 ASSAY OF TROPONIN QUANT: CPT

## 2025-08-01 PROCEDURE — 85652 RBC SED RATE AUTOMATED: CPT | Performed by: EMERGENCY MEDICINE

## 2025-08-01 PROCEDURE — 71045 X-RAY EXAM CHEST 1 VIEW: CPT

## 2025-08-01 PROCEDURE — 83690 ASSAY OF LIPASE: CPT | Performed by: EMERGENCY MEDICINE

## 2025-08-01 PROCEDURE — 74176 CT ABD & PELVIS W/O CONTRAST: CPT

## 2025-08-01 PROCEDURE — 96375 TX/PRO/DX INJ NEW DRUG ADDON: CPT

## 2025-08-01 PROCEDURE — 99285 EMERGENCY DEPT VISIT HI MDM: CPT | Performed by: EMERGENCY MEDICINE

## 2025-08-01 PROCEDURE — 71250 CT THORAX DX C-: CPT

## 2025-08-01 PROCEDURE — 86140 C-REACTIVE PROTEIN: CPT | Performed by: EMERGENCY MEDICINE

## 2025-08-01 PROCEDURE — 96365 THER/PROPH/DIAG IV INF INIT: CPT

## 2025-08-01 PROCEDURE — 93005 ELECTROCARDIOGRAM TRACING: CPT

## 2025-08-01 PROCEDURE — 99285 EMERGENCY DEPT VISIT HI MDM: CPT

## 2025-08-01 PROCEDURE — 85379 FIBRIN DEGRADATION QUANT: CPT | Performed by: EMERGENCY MEDICINE

## 2025-08-01 PROCEDURE — 85025 COMPLETE CBC W/AUTO DIFF WBC: CPT

## 2025-08-01 PROCEDURE — 80053 COMPREHEN METABOLIC PANEL: CPT

## 2025-08-01 PROCEDURE — 83605 ASSAY OF LACTIC ACID: CPT | Performed by: EMERGENCY MEDICINE

## 2025-08-01 PROCEDURE — 83880 ASSAY OF NATRIURETIC PEPTIDE: CPT | Performed by: EMERGENCY MEDICINE

## 2025-08-01 RX ORDER — ACETAMINOPHEN 10 MG/ML
1000 INJECTION, SOLUTION INTRAVENOUS ONCE
Status: COMPLETED | OUTPATIENT
Start: 2025-08-01 | End: 2025-08-01

## 2025-08-01 RX ORDER — PANTOPRAZOLE SODIUM 40 MG/10ML
40 INJECTION, POWDER, LYOPHILIZED, FOR SOLUTION INTRAVENOUS ONCE
Status: COMPLETED | OUTPATIENT
Start: 2025-08-01 | End: 2025-08-01

## 2025-08-01 RX ADMIN — PANTOPRAZOLE SODIUM 40 MG: 40 INJECTION, POWDER, FOR SOLUTION INTRAVENOUS at 20:46

## 2025-08-01 RX ADMIN — ACETAMINOPHEN 1000 MG: 10 INJECTION, SOLUTION INTRAVENOUS at 20:46

## 2025-08-06 ENCOUNTER — HOSPITAL ENCOUNTER (OUTPATIENT)
Facility: HOSPITAL | Age: 50
Setting detail: OBSERVATION
Discharge: HOME | End: 2025-08-08
Attending: INTERNAL MEDICINE | Admitting: INTERNAL MEDICINE
Payer: COMMERCIAL

## 2025-08-06 ENCOUNTER — APPOINTMENT (EMERGENCY)
Dept: RADIOLOGY | Facility: HOSPITAL | Age: 50
End: 2025-08-06
Payer: COMMERCIAL

## 2025-08-06 PROBLEM — M31.4 TAKAYASU'S ARTERITIS (HCC): Status: ACTIVE | Noted: 2025-08-06

## 2025-08-06 PROBLEM — R07.89 OTHER CHEST PAIN: Status: ACTIVE | Noted: 2025-08-06

## 2025-08-06 LAB
ATRIAL RATE: 78 BPM
P AXIS: 48 DEGREES
PR INTERVAL: 190 MS
QRS AXIS: 41 DEGREES
QRSD INTERVAL: 84 MS
QT INTERVAL: 400 MS
QTC INTERVAL: 456 MS
T WAVE AXIS: 74 DEGREES
VENTRICULAR RATE: 78 BPM

## 2025-08-06 PROCEDURE — 93010 ELECTROCARDIOGRAM REPORT: CPT | Performed by: INTERNAL MEDICINE

## 2025-08-07 ENCOUNTER — APPOINTMENT (OUTPATIENT)
Dept: MRI IMAGING | Facility: HOSPITAL | Age: 50
End: 2025-08-07
Payer: COMMERCIAL

## 2025-08-07 ENCOUNTER — APPOINTMENT (OUTPATIENT)
Dept: NON INVASIVE DIAGNOSTICS | Facility: HOSPITAL | Age: 50
End: 2025-08-07
Payer: COMMERCIAL

## 2025-08-08 ENCOUNTER — TELEPHONE (OUTPATIENT)
Dept: RHEUMATOLOGY | Facility: CLINIC | Age: 50
End: 2025-08-08

## 2025-08-18 ENCOUNTER — OFFICE VISIT (OUTPATIENT)
Age: 50
End: 2025-08-18
Payer: COMMERCIAL

## 2025-08-18 VITALS
HEART RATE: 70 BPM | OXYGEN SATURATION: 99 % | BODY MASS INDEX: 42.81 KG/M2 | HEIGHT: 63 IN | TEMPERATURE: 98.2 F | WEIGHT: 241.6 LBS

## 2025-08-18 DIAGNOSIS — R16.0 HEPATOMEGALY: ICD-10-CM

## 2025-08-18 DIAGNOSIS — K21.9 GERD (GASTROESOPHAGEAL REFLUX DISEASE): ICD-10-CM

## 2025-08-18 DIAGNOSIS — K31.A0 INTESTINAL METAPLASIA OF STOMACH: ICD-10-CM

## 2025-08-18 DIAGNOSIS — K59.00 CONSTIPATION, UNSPECIFIED CONSTIPATION TYPE: ICD-10-CM

## 2025-08-18 DIAGNOSIS — Z87.19 HISTORY OF ISCHEMIC COLITIS: ICD-10-CM

## 2025-08-18 DIAGNOSIS — R11.2 NAUSEA AND VOMITING, UNSPECIFIED VOMITING TYPE: Primary | ICD-10-CM

## 2025-08-18 PROCEDURE — 99214 OFFICE O/P EST MOD 30 MIN: CPT | Performed by: INTERNAL MEDICINE

## 2025-08-22 ENCOUNTER — HOSPITAL ENCOUNTER (OUTPATIENT)
Dept: ULTRASOUND IMAGING | Facility: HOSPITAL | Age: 50
Discharge: HOME | End: 2025-08-22
Attending: INTERNAL MEDICINE
Payer: COMMERCIAL

## 2025-08-22 DIAGNOSIS — R16.0 HEPATOMEGALY: ICD-10-CM

## 2025-08-22 PROCEDURE — 76981 USE PARENCHYMA: CPT
